# Patient Record
Sex: FEMALE | Race: WHITE | NOT HISPANIC OR LATINO | Employment: FULL TIME | URBAN - METROPOLITAN AREA
[De-identification: names, ages, dates, MRNs, and addresses within clinical notes are randomized per-mention and may not be internally consistent; named-entity substitution may affect disease eponyms.]

---

## 2019-10-23 ENCOUNTER — OFFICE VISIT (OUTPATIENT)
Dept: FAMILY MEDICINE | Facility: CLINIC | Age: 24
End: 2019-10-23
Payer: COMMERCIAL

## 2019-10-23 VITALS
TEMPERATURE: 97.9 F | DIASTOLIC BLOOD PRESSURE: 74 MMHG | WEIGHT: 276 LBS | OXYGEN SATURATION: 99 % | HEART RATE: 86 BPM | BODY MASS INDEX: 45.98 KG/M2 | RESPIRATION RATE: 20 BRPM | SYSTOLIC BLOOD PRESSURE: 130 MMHG | HEIGHT: 65 IN

## 2019-10-23 DIAGNOSIS — R45.851 SUICIDAL IDEATION: ICD-10-CM

## 2019-10-23 DIAGNOSIS — Z91.51 HISTORY OF SUICIDE ATTEMPT: ICD-10-CM

## 2019-10-23 DIAGNOSIS — F31.32 BIPOLAR AFFECTIVE DISORDER, CURRENTLY DEPRESSED, MODERATE (H): Primary | ICD-10-CM

## 2019-10-23 PROCEDURE — 99203 OFFICE O/P NEW LOW 30 MIN: CPT | Performed by: FAMILY MEDICINE

## 2019-10-23 ASSESSMENT — ANXIETY QUESTIONNAIRES
4. TROUBLE RELAXING: NEARLY EVERY DAY
7. FEELING AFRAID AS IF SOMETHING AWFUL MIGHT HAPPEN: NEARLY EVERY DAY
5. BEING SO RESTLESS THAT IT IS HARD TO SIT STILL: MORE THAN HALF THE DAYS
1. FEELING NERVOUS, ANXIOUS, OR ON EDGE: NEARLY EVERY DAY
3. WORRYING TOO MUCH ABOUT DIFFERENT THINGS: NEARLY EVERY DAY
7. FEELING AFRAID AS IF SOMETHING AWFUL MIGHT HAPPEN: NEARLY EVERY DAY
2. NOT BEING ABLE TO STOP OR CONTROL WORRYING: NEARLY EVERY DAY
6. BECOMING EASILY ANNOYED OR IRRITABLE: NEARLY EVERY DAY
GAD7 TOTAL SCORE: 20

## 2019-10-23 ASSESSMENT — PATIENT HEALTH QUESTIONNAIRE - PHQ9
10. IF YOU CHECKED OFF ANY PROBLEMS, HOW DIFFICULT HAVE THESE PROBLEMS MADE IT FOR YOU TO DO YOUR WORK, TAKE CARE OF THINGS AT HOME, OR GET ALONG WITH OTHER PEOPLE: SOMEWHAT DIFFICULT
SUM OF ALL RESPONSES TO PHQ QUESTIONS 1-9: 24
SUM OF ALL RESPONSES TO PHQ QUESTIONS 1-9: 24

## 2019-10-23 ASSESSMENT — MIFFLIN-ST. JEOR: SCORE: 2007.81

## 2019-10-23 NOTE — PROGRESS NOTES
"Subjective     Lenore Suggs is a 24 year old female who presents to clinic today for the following health issues:    HPI     New Patient/Transfer of Care    Abnormal Mood Symptoms      Duration: Ongoing    Description:  Depression: YES  Anxiety: YES  Panic attacks: YES     Accompanying signs and symptoms: see PHQ-9 and NINFA scores    History (similar episodes/previous evaluation): Yes    Precipitating or alleviating factors: None    Therapies tried and outcome: none    Has been on several anti-psych meds in the past.  Needs to establish with a PCP in order to get into Psych.  Requires referral.    Hx of Bipolar, Schizophrenia.  Diagnosed with mood disorder since age ~9.    Off meds since age 15 due to \"my father ripping up my health insurance cards and telling me I did not need psychiatric care.\"    Parents have since been .  Was living in [place] for some times.  Moved to [place] a few yrs ago.    Lives with her yudi--who is not abusive.  Feels very safe in her environment.  Wants to get mood under better control because she is thinking of getting .  Yudi is very supportive.    Reviewed and updated as needed this visit by Provider  Tobacco  Allergies  Meds  Problems  Med Hx  Surg Hx  Fam Hx         Review of Systems   ROS COMP: CONSTITUTIONAL: NEGATIVE for fever or chills.  INTEGUMENTARY/SKIN: NEGATIVE for worrisome rashes, moles or lesions  EYES: NEGATIVE for vision changes or irritation  ENT/MOUTH: NEGATIVE for ear, mouth and throat problems  RESP: NEGATIVE for significant cough or SOB  CV: NEGATIVE for chest pain, palpitations or peripheral edema  GI: NEGATIVE for nausea, abdominal pain, heartburn, or change in bowel habits  : NEGATIVE for frequency, dysuria, or hematuria  MUSCULOSKELETAL: NEGATIVE for significant arthralgias or myalgia  NEURO: NEGATIVE for weakness, dizziness or paresthesias  HEME: NEGATIVE for bleeding problems      Objective    /74   Pulse 86   Temp 97.9  F " "(36.6  C) (Tympanic)   Resp 20   Ht 1.651 m (5' 5\")   Wt 125.2 kg (276 lb)   LMP  (LMP Unknown)   SpO2 99%   Breastfeeding? No   BMI 45.93 kg/m    Body mass index is 45.93 kg/m .  Physical Exam   GENERAL: very pleasant and cooperative.  EYES: Eyes grossly normal to inspection, PERRL and conjunctivae and sclerae normal  HENT: ear canals and TM's normal, nose and mouth without ulcers or lesions  NECK: no adenopathy, no asymmetry, masses, or scars and thyroid normal to palpation  RESP: lungs clear to auscultation - no rales, rhonchi or wheezes  CV: regular rate and rhythm, normal S1 S2, no S3 or S4, no murmur, click or rub, no peripheral edema and peripheral pulses strong  ABDOMEN: soft, nontender, no hepatosplenomegaly, no masses and bowel sounds normal  MS: no gross musculoskeletal defects noted, no edema  SKIN: no suspicious lesions or rashes  NEURO: Normal strength and tone, mentation intact and speech normal  PSYCH: affect flat, anxious and fatigued; poor eye contact    Diagnostic Test Results:  Labs reviewed in Epic        Assessment & Plan   Problem List Items Addressed This Visit     History of suicide attempt    Suicidal ideation    Bipolar affective disorder, currently depressed, moderate (H) - Primary         Here to establish care and re-establish with Psychiatry.  Will defer meds to Psych.  She has been off meds since age 15 and tells me she would like to wait to start meds until she sees Psych   Has suicidal thoughts but no intent.  Knows to seek immediate medical attention if needed.    Care Coordinator and Psych referrals ordered.  Will RTC for physical with pap smear once she gets into psych and feels ready for her overdue preventive cares.    NATHALIE also requested for her previous clinic in [place].    BMI:   Estimated body mass index is 45.93 kg/m  as calculated from the following:    Height as of this encounter: 1.651 m (5' 5\").    Weight as of this encounter: 125.2 kg (276 lb).   Weight " management plan: Discussed healthy diet and exercise guidelines        See Patient Instructions  Return in about 4 weeks (around 11/20/2019) for re-check / follow-up.    Mercy Kellogg, [facility]    Answers for HPI/ROS submitted by the patient on 10/23/2019   If you checked off any problems, how difficult have these problems made it for you to do your work, take care of things at home, or get along with other people?: Somewhat difficult  PHQ9 TOTAL SCORE: 24  NINFA 7 TOTAL SCORE: 20

## 2019-10-23 NOTE — NURSING NOTE
"Chief Complaint   Patient presents with     New Patient     MOOD CHANGES     /74   Pulse 86   Temp 97.9  F (36.6  C) (Tympanic)   Resp 20   Ht 1.651 m (5' 5\")   Wt 125.2 kg (276 lb)   LMP  (LMP Unknown)   SpO2 99%   Breastfeeding? No   BMI 45.93 kg/m   Estimated body mass index is 45.93 kg/m  as calculated from the following:    Height as of this encounter: 1.651 m (5' 5\").    Weight as of this encounter: 125.2 kg (276 lb).  BP completed using cuff size: regular   Candice Fang CMA    Health Maintenance Due   Topic Date Due     PREVENTIVE CARE VISIT  1996     NINFA ASSESSMENT  1996     DEPRESSION ACTION PLAN  1996     DTAP/TDAP/TD IMMUNIZATION (3 - Tdap) 08/25/2003     HPV IMMUNIZATION (2 - Female 2-dose series) 04/18/2011     HIV SCREENING  08/25/2011     PHQ-9  05/03/2012     PAP  08/25/2017     Health Maintenance reviewed at today's visit patient asked to schedule/complete:   Routine Health Visit: Patient agrees to schedule  Cervical Cancer:  Patient agrees to schedule  Depression:  Patient agrees to schedule    "

## 2019-10-23 NOTE — LETTER
My Depression Action Plan  Name: Lenoer Suggs   Date of Birth 1995  Date: 10/23/2019    My doctor: [facility]   [facility]  [address] [address]  [phone]          GREEN    ZONE   Good Control    What it looks like:     Things are going generally well. You have normal up s and down s. You may even feel depressed from time to time, but bad moods usually last less than a day.   What you need to do:  1. Continue to care for yourself (see self care plan)  2. Check your depression survival kit and update it as needed  3. Follow your physician s recommendations including any medication.  4. Do not stop taking medication unless you consult with your physician first.           YELLOW         ZONE Getting Worse    What it looks like:     Depression is starting to interfere with your life.     It may be hard to get out of bed; you may be starting to isolate yourself from others.    Symptoms of depression are starting to last most all day and this has happened for several days.     You may have suicidal thoughts but they are not constant.   What you need to do:     1. Call your care team, your response to treatment will improve if you keep your care team informed of your progress. Yellow periods are signs an adjustment may need to be made.     2. Continue your self-care, even if you have to fake it!    3. Talk to someone in your support network    4. Open up your depression survival kit           RED    ZONE Medical Alert - Get Help    What it looks like:     Depression is seriously interfering with your life.     You may experience these or other symptoms: You can t get out of bed most days, can t work or engage in other necessary activities, you have trouble taking care of basic hygiene, or basic responsibilities, thoughts of suicide or death that will not go away, self-injurious  behavior.     What you need to do:  1. Call your care team and request a same-day appointment. If they are not available (weekends or after hours) call your local crisis line, emergency room or 911.            Depression Self Care Plan / Survival Kit    Self-Care for Depression  Here s the deal. Your body and mind are really not as separate as most people think.  What you do and think affects how you feel and how you feel influences what you do and think. This means if you do things that people who feel good do, it will help you feel better.  Sometimes this is all it takes.  There is also a place for medication and therapy depending on how severe your depression is, so be sure to consult with your medical provider and/ or Behavioral Health Consultant if your symptoms are worsening or not improving.     In order to better manage my stress, I will:    Exercise  Get some form of exercise, every day. This will help reduce pain and release endorphins, the  feel good  chemicals in your brain. This is almost as good as taking antidepressants!  This is not the same as joining a gym and then never going! (they count on that by the way ) It can be as simple as just going for a walk or doing some gardening, anything that will get you moving.      Hygiene   Maintain good hygiene (Get out of bed in the morning, Make your bed, Brush your teeth, Take a shower, and Get dressed like you were going to work, even if you are unemployed).  If your clothes don't fit try to get ones that do.    Diet  I will strive to eat foods that are good for me, drink plenty of water, and avoid excessive sugar, caffeine, alcohol, and other mood-altering substances.  Some foods that are helpful in depression are: complex carbohydrates, B vitamins, flaxseed, fish or fish oil, fresh fruits and vegetables.    Psychotherapy  I agree to participate in Individual Therapy (if recommended).    Medication  If prescribed medications, I agree to take them.   Missing doses can result in serious side effects.  I understand that drinking alcohol, or other illicit drug use, may cause potential side effects.  I will not stop my medication abruptly without first discussing it with my provider.    Staying Connected With Others  I will stay in touch with my friends, family members, and my primary care provider/team.    Use your imagination  Be creative.  We all have a creative side; it doesn t matter if it s oil painting, sand castles, or mud pies! This will also kick up the endorphins.    Witness Beauty  (AKA stop and smell the roses) Take a look outside, even in mid-winter. Notice colors, textures. Watch the squirrels and birds.     Service to others  Be of service to others.  There is always someone else in need.  By helping others we can  get out of ourselves  and remember the really important things.  This also provides opportunities for practicing all the other parts of the program.    Humor  Laugh and be silly!  Adjust your TV habits for less news and crime-drama and more comedy.    Control your stress  Try breathing deep, massage therapy, biofeedback, and meditation. Find time to relax each day.     My support system    Clinic Contact:  Phone number:    Contact 1:  Phone number:    Contact 2:  Phone number:    Catholic/:  Phone number:    Therapist:  Phone number:    Local crisis center:    Phone number:    Other community support:  Phone number:

## 2019-10-24 ASSESSMENT — ANXIETY QUESTIONNAIRES: GAD7 TOTAL SCORE: 20

## 2019-10-25 ENCOUNTER — PATIENT OUTREACH (OUTPATIENT)
Dept: CARE COORDINATION | Facility: CLINIC | Age: 24
End: 2019-10-25

## 2019-10-25 NOTE — LETTER
[place] CARE COORDINATION  [facility]  [address], [place] [address]    October 25, 2019    Lenore Suggs  [address] [facility] [address]  [place] [facility] [address]      Dear Lenore,    I am a clinic care coordinator who works with Mercy Kellogg DO at [facility]. I wanted to thank you for spending the time to talk with me and provide you with my contact information so that you can call me with questions or concerns about your health care. Below is a description of clinic care coordination and how I can further assist you.     The clinic care coordinator is a registered nurse and/or  who understand the health care system. The goal of clinic care coordination is to help you manage your health and improve access to the [facility] in the most efficient manner. The registered nurse can assist you in meeting your health care goals by providing education, coordinating services, and strengthening the communication among your providers. The  can assist you with financial, behavioral, psychosocial, chemical dependency, counseling, and/or psychiatric resources.    Please feel free to contact me at [phone] with any questions or concerns. We at [facility] are focused on providing you with the highest-quality healthcare experience possible and that all starts with you.     Sincerely,     Patrick Champion RN  Clinic Care Coordinator  [facility]  Ph: [phone]    Enclosed: I have enclosed a copy of the Complex Care Plan. This has helpful information and goals that we have talked about. Please keep this in an easy to access place to use as needed.

## 2019-10-25 NOTE — PROGRESS NOTES
Clinic Care Coordination Contact    Clinic Care Coordination Contact  OUTREACH    Referral Information:  Referral Source: PCP  Primary Diagnosis: Psychosocial  Chief Complaint   Patient presents with     Clinic Care Coordination - Initial     PCP referral     Clinical Concerns:  Care Coordination referral received from PCP per patient request related to mental health concerns.    CCC RN called and spoke with patient who reported she would like to get back on her psychiatric medications which she has been off of for several months.  She would like to establish care with Psychiatry as part of this goal.    CCC RN reviewed with the patient that 2 separate Mental Health referrals were placed by PCP.  Patient requested that this information be sent to her via [employer] message.    CCC RN explained role of Care Coordination within the clinic and discussed setting goal, which patient was agreeable to; see Goals below.    Medication Management:  Not currently taking any medications.     Psychosocial:  Mental health DX: Yes  Mental health DX how managed: None  Mental health management concern (GOAL): Yes  Informal Support system: Significant other    Referrals Placed: Mental Health (placed by PCP)     Goals Addressed                 This Visit's Progress       Patient Stated      1. Mental Health Management (pt-stated)        Goal Statement: I want to better manage my mental health by getting back on my medications and establishing care with a Psychiatrist within the next 3 months.  Measure of Success: The patient will confirm she is established with Psychiatry provider and will verbalize improved mental health.  Supportive Steps to Achieve: PCP placed Mental Health Referrals; CCC RN will provide patient with referral contact information.  The patient will call to schedule initial appointment with one of the mental health providers.  CCC RN will continue routine patient outreaches to provide ongoing support and additional  resources as needed.  Barriers: The patient reports she has been off of her psychiatric medications for several years.  Strengths: The patient is motivated to better control her mental health and establish care with a Psychiatrist.  Date to Achieve By: 1/31/20  Patient expressed understanding of goal: Yes          Patient/Caregiver understanding: Yes    Outreach Frequency: 2 weeks    Plan: CCC RN sent patient [employer] message, per her request, with mental health referral information; the patient will call to schedule an initial appointment with Psychiatry.  The patient will contact CCC RN with additional questions or concerns.  CCC RN will outreach to patient in approximately 2 weeks to get updates on patient status, assess goal progress, and offer additional support and resources as indicated.    Patrick Champion, RN  Clinic Care Coordinator  [facility]  Ph: [phone]

## 2019-10-25 NOTE — LETTER
[facility]  Complex Care Plan  About Me:    Patient Name:  Lenore Suggs    YOB: 1995  Age:         24 year old   [place] MRN:    [id number] Telephone Information:  Home Phone [phone]   Mobile [phone]       Address:  [address]  [facility] [address] Email address:  [email or web address]      Emergency Contact(s)    Name Relationship Lgl Grd Work Phone Home Phone Mobile Phone           Primary language:  English     needed? No   [place] [employer]:  [phone] op. 1  Other communication barriers:    Preferred Method of Communication:     Current living arrangement:    Mobility Status/ Medical Equipment:       Health Maintenance  Health Maintenance Reviewed: Due/Overdue   Health Maintenance Due   Topic Date Due     PREVENTIVE CARE VISIT  1996     NINFA ASSESSMENT  1996     CHLAMYDIA SCREENING  1996     DTAP/TDAP/TD IMMUNIZATION (3 - Tdap) 08/25/2003     HPV IMMUNIZATION (2 - Female 2-dose series) 04/18/2011     HIV SCREENING  08/25/2011     PAP  08/25/2017     My Access Plan  Medical Emergency 911   Primary Clinic Line [facility] - [phone]   24 Hour Appointment Line [phone] or  [phone]) (toll-free)   24 Hour Nurse Line [phone] (toll-free)   Preferred Urgent Care [facility], [phone]   Preferred Hospital     [id number] [address] AT [facility] & [facility]     Behavioral Health Crisis Line The National Suicide Prevention Lifeline at 1-202.143.7628 or 911       My Care Team Members  Patient Care Team       Relationship Specialty Notifications Start End    Mercy Kellogg DO PCP - General Family Practice  10/23/19     Phone: [phone] Fax: [phone] [address] [facility] [address]    Patrick Champion, RN Lead Care Coordinator  Primary Care - [facility]  10/25/19     Phone: [phone]                 My Care Plans  Self Management and Treatment Plan  Goals and (Comments)  Goals        General    1. Mental Health Management (pt-stated)     Notes - Note created  10/25/2019 11:07 AM by Patrick Champion RN    Goal Statement: I want to better manage my mental health by getting back on my medications and establishing care with a Psychiatrist within the next 3 months.  Measure of Success: The patient will confirm she is established with Psychiatry provider and will verbalize improved mental health.  Supportive Steps to Achieve: PCP placed Mental Health Referrals; CCC RN will provide patient with referral contact information.  The patient will call to schedule initial appointment with one of the mental health providers.  CCC RN will continue routine patient outreaches to provide ongoing support and additional resources as needed.  Barriers: The patient reports she has been off of her psychiatric medications for several years.  Strengths: The patient is motivated to better control her mental health and establish care with a Psychiatrist.  Date to Achieve By: 1/31/20  Patient expressed understanding of goal: Yes             Action Plans on File:    Depression       My Medical and Care Information  Problem List   Patient Active Problem List   Diagnosis     Schizophrenia (H)     History of suicide attempt     Suicidal ideation     Bipolar affective disorder, currently depressed, moderate (H)      Current Medications:  No current outpatient medications on file.     No current facility-administered medications for this visit.      Care Coordination Start Date: 10/25/2019   Frequency of Care Coordination: 2 weeks   Form Last Updated: 10/25/2019

## 2019-10-27 ENCOUNTER — MYC MEDICAL ADVICE (OUTPATIENT)
Dept: FAMILY MEDICINE | Facility: CLINIC | Age: 24
End: 2019-10-27

## 2019-10-28 NOTE — TELEPHONE ENCOUNTER
She needs to schedule a follow up appointment with Dr Kellogg for next week to discuss medication

## 2019-10-28 NOTE — TELEPHONE ENCOUNTER
Provider to review MyChart message. Patient was last seen on 10/23/19. Provider to advise if pt needs another OV.

## 2019-10-28 NOTE — TELEPHONE ENCOUNTER
Left message and let her know I will send mychart message that she should have a med check    Radha [facility] RN- Triage [employer]

## 2019-10-30 ENCOUNTER — TRANSFERRED RECORDS (OUTPATIENT)
Dept: HEALTH INFORMATION MANAGEMENT | Facility: CLINIC | Age: 24
End: 2019-10-30

## 2019-10-30 ENCOUNTER — NURSE TRIAGE (OUTPATIENT)
Dept: NURSING | Facility: CLINIC | Age: 24
End: 2019-10-30

## 2019-10-30 NOTE — TELEPHONE ENCOUNTER
Caller is inquiring  what she needs to do to  Fulfill health maintenance suggestions  As noted in her MyChart   [facility]   caller understands and will comply  Rose Marie RN  FNA    Reason for Disposition    General information question, no triage required and triager able to answer question    Protocols used: INFORMATION ONLY CALL-A-AH

## 2019-11-04 ENCOUNTER — OFFICE VISIT (OUTPATIENT)
Dept: FAMILY MEDICINE | Facility: CLINIC | Age: 24
End: 2019-11-04
Payer: COMMERCIAL

## 2019-11-04 ENCOUNTER — TELEPHONE (OUTPATIENT)
Dept: FAMILY MEDICINE | Facility: CLINIC | Age: 24
End: 2019-11-04

## 2019-11-04 VITALS
HEART RATE: 94 BPM | TEMPERATURE: 98.4 F | SYSTOLIC BLOOD PRESSURE: 124 MMHG | OXYGEN SATURATION: 99 % | DIASTOLIC BLOOD PRESSURE: 72 MMHG | RESPIRATION RATE: 20 BRPM

## 2019-11-04 DIAGNOSIS — F31.32 BIPOLAR AFFECTIVE DISORDER, CURRENTLY DEPRESSED, MODERATE (H): Primary | ICD-10-CM

## 2019-11-04 DIAGNOSIS — F41.0 ANXIETY ATTACK: ICD-10-CM

## 2019-11-04 PROCEDURE — 99214 OFFICE O/P EST MOD 30 MIN: CPT | Performed by: PHYSICIAN ASSISTANT

## 2019-11-04 RX ORDER — BUPROPION HYDROCHLORIDE 150 MG/1
300 TABLET ORAL EVERY MORNING
Qty: 60 TABLET | Refills: 1 | Status: SHIPPED | OUTPATIENT
Start: 2019-11-04 | End: 2019-11-14

## 2019-11-04 RX ORDER — PROPRANOLOL HYDROCHLORIDE 20 MG/1
20 TABLET ORAL 3 TIMES DAILY PRN
Qty: 30 TABLET | Refills: 1 | Status: SHIPPED | OUTPATIENT
Start: 2019-11-04 | End: 2021-06-22

## 2019-11-04 NOTE — PROGRESS NOTES
Subjective     Lenore Suggs is a 24 year old female who presents to clinic today for the following health issues:    HPI       Depression and Anxiety Follow-Up    How are you doing with your depression since your last visit? No change    How are you doing with your anxiety since your last visit?  No change    Are you having other symptoms that might be associated with depression or anxiety? Yes    Have you had a significant life event? OTHER: historically family problems, foster care, move across country     Do you have any concerns with your use of alcohol or other drugs? No     Patient would like to restart medication for depression and anxiety  In her chart, she has a history of Bipolar  Patient has tried many medications in the past, most recently bupropion worked well for her    Social History     Tobacco Use     Smoking status: Passive Smoke Exposure - Never Smoker     Smokeless tobacco: Never Used   Substance Use Topics     Alcohol use: Yes     Comment: 2shots and beers every weekend     Drug use: No     PHQ 10/23/2019   PHQ-9 Total Score 24   Q9: Thoughts of better off dead/self-harm past 2 weeks Several days   F/U: Thoughts of suicide or self-harm Yes   F/U: Self harm-plan No   F/U: Self-harm action No   F/U: Safety concerns No     NINFA-7 SCORE 10/23/2019   Total Score 20 (severe anxiety)   Total Score 20     Suicidal ideation:  Patient has history of many suicide attempts, most recently 4 years ago  She has constant thoughts of suicide, but no plan to harm herself or others  She understands to call someone or go to the ER if thoughts worsen   She has called the a suicide help line in the past  She has plenty of resources from her therapist and Dr. Kellogg.     Suicide Assessment Five-step Evaluation and Treatment (SAFE-T)      Patient Active Problem List   Diagnosis     Schizophrenia (H)     History of suicide attempt     Suicidal ideation     Bipolar affective disorder, currently depressed, moderate (H)      History reviewed. No pertinent surgical history.    Social History     Tobacco Use     Smoking status: Passive Smoke Exposure - Never Smoker     Smokeless tobacco: Never Used   Substance Use Topics     Alcohol use: Yes     Comment: 2shots and beers every weekend     Family History   Problem Relation Age of Onset     Mental Illness Mother      No Known Problems Father          Current Outpatient Medications   Medication Sig Dispense Refill     buPROPion (WELLBUTRIN XL) 150 MG 24 hr tablet Take 2 tablets (300 mg) by mouth every morning 60 tablet 1     propranolol (INDERAL) 20 MG tablet Take 1 tablet (20 mg) by mouth 3 times daily as needed (anxiety) 30 tablet 1     buPROPion (WELLBUTRIN XL) 300 MG 24 hr tablet Take 1 tablet (300 mg) by mouth every morning 30 tablet 1     Allergies   Allergen Reactions     Seasonal Allergies Other (See Comments) and Rash     sneezing         Reviewed and updated as needed this visit by Provider         Review of Systems   Constitutional: Negative.    HENT: Negative.    Eyes: Negative.    Respiratory: Negative.    Cardiovascular: Negative.    Gastrointestinal: Negative.    Genitourinary: Negative.    Musculoskeletal: Negative.    Skin: Negative.    Neurological: Negative.    Psychiatric/Behavioral:        As in HPI         Objective    /72 (BP Location: Right arm, Patient Position: Sitting, Cuff Size: Adult Large)   Pulse 94   Temp 98.4  F (36.9  C) (Tympanic)   Resp 20   LMP  (LMP Unknown)   SpO2 99%   Physical Exam  Constitutional:       General: She is not in acute distress.     Appearance: She is well-developed. She is not diaphoretic.   HENT:      Head: Normocephalic.      Right Ear: External ear normal.      Left Ear: External ear normal.      Nose: Nose normal.   Eyes:      Conjunctiva/sclera: Conjunctivae normal.   Neck:      Musculoskeletal: Normal range of motion.   Pulmonary:      Effort: Pulmonary effort is normal.   Neurological:      Mental Status: She is  "alert and oriented to person, place, and time.   Psychiatric:         Judgement: Judgment normal.         Diagnostic Test Results:  No results found for this or any previous visit (from the past 24 hour(s)).        Assessment & Plan   Problem List Items Addressed This Visit        Behavioral    Bipolar affective disorder, currently depressed, moderate (H) - Primary    Relevant Medications    buPROPion (WELLBUTRIN XL) 150 MG 24 hr tablet      Other Visit Diagnoses     Anxiety attack        Relevant Medications    buPROPion (WELLBUTRIN XL) 150 MG 24 hr tablet    propranolol (INDERAL) 20 MG tablet         We will restart bupropion and also start propranolol which she can take prior to work to help ease anxiety.   Follow up in 3-4 weeks.     25 minutes were spent with the patient, greater than 50% of our time was spent in counseling.      BMI:   Estimated body mass index is 45.93 kg/m  as calculated from the following:    Height as of 10/23/19: 1.651 m (5' 5\").    Weight as of 10/23/19: 125.2 kg (276 lb).   Weight management plan: Discussed healthy diet and exercise guidelines        There are no Patient Instructions on file for this visit.    Return in about 3 weeks (around 11/25/2019) for Depression Recheck, Anxiety Recheck, Medication Recheck.    Lanny Kaba PA-C  [facility]          "

## 2019-11-04 NOTE — TELEPHONE ENCOUNTER
Reason for call:  Medication   If this is a refill request, has the caller requested the refill from the pharmacy already? N/A  Will the patient be using a [facility]? N/A  Name of the pharmacy and phone number for the current request: on file    Name of the medication requested: buPROPion (WELLBUTRIN XL)    Other request: insurance does not cover for the  2  buPROPion (WELLBUTRIN XL) 150 MG 24 hr tablet will have to split it into two        Phone number to reach patient:  Other phone number:  [phone]    Best Time:  Business hours    Can we leave a detailed message on this number?  YES

## 2019-11-05 ENCOUNTER — TELEPHONE (OUTPATIENT)
Dept: FAMILY MEDICINE | Facility: CLINIC | Age: 24
End: 2019-11-05

## 2019-11-05 RX ORDER — BUPROPION HYDROCHLORIDE 300 MG/1
300 TABLET ORAL EVERY MORNING
Qty: 30 TABLET | Refills: 1 | Status: SHIPPED | OUTPATIENT
Start: 2019-11-05 | End: 2019-11-14

## 2019-11-05 NOTE — TELEPHONE ENCOUNTER
I believe they meant that insurance will only cover 1 tablet of bupropion and is requesting a Rx for 300 mg 1 tab per day

## 2019-11-05 NOTE — TELEPHONE ENCOUNTER
Prior Authorization Retail Medication Request    Medication/Dose:buPROPion (WELLBUTRIN XL) 300 MG 24 hr tablet   ICD code (if different than what is on RX):     Previously Tried and Failed:     Rationale:       Insurance Name:     Insurance ID:  [id number]      Pharmacy Information (if different than what is on RX)  [facility]  Phone:  [phone]

## 2019-11-06 NOTE — TELEPHONE ENCOUNTER
Contact preference is through [employer]. I will send her a message informing her of this change/update.    Princess MARIANA Murphy, CMA

## 2019-11-06 NOTE — TELEPHONE ENCOUNTER
Central Prior Authorization Team   Phone: [phone]    PA Initiation    Medication: buPROPion (WELLBUTRIN XL) 300 MG 24 hr tablet  Insurance Company: [employer] - Phone [phone] Fax [phone]  Pharmacy Filling the Rx: [id number] [place], [place] - [address] AT [facility]  Filling Pharmacy Phone: [phone]  Filling Pharmacy Fax: [phone]  Start Date: 11/6/2019

## 2019-11-07 ASSESSMENT — ENCOUNTER SYMPTOMS
NEUROLOGICAL NEGATIVE: 1
MUSCULOSKELETAL NEGATIVE: 1
EYES NEGATIVE: 1
CARDIOVASCULAR NEGATIVE: 1
RESPIRATORY NEGATIVE: 1
GASTROINTESTINAL NEGATIVE: 1
CONSTITUTIONAL NEGATIVE: 1

## 2019-11-10 ENCOUNTER — MYC MEDICAL ADVICE (OUTPATIENT)
Dept: FAMILY MEDICINE | Facility: CLINIC | Age: 24
End: 2019-11-10

## 2019-11-11 NOTE — TELEPHONE ENCOUNTER
Please have her come in to be seen for this, might have a UTI that would not get any better with a probiotic.  Thanks!  --Dr. Kellogg

## 2019-11-14 ENCOUNTER — TRANSFERRED RECORDS (OUTPATIENT)
Dept: HEALTH INFORMATION MANAGEMENT | Facility: CLINIC | Age: 24
End: 2019-11-14

## 2019-11-14 ENCOUNTER — E-VISIT (OUTPATIENT)
Dept: FAMILY MEDICINE | Facility: CLINIC | Age: 24
End: 2019-11-14
Payer: COMMERCIAL

## 2019-11-14 ENCOUNTER — MYC MEDICAL ADVICE (OUTPATIENT)
Dept: FAMILY MEDICINE | Facility: CLINIC | Age: 24
End: 2019-11-14

## 2019-11-14 DIAGNOSIS — F31.32 BIPOLAR AFFECTIVE DISORDER, CURRENTLY DEPRESSED, MODERATE (H): ICD-10-CM

## 2019-11-14 DIAGNOSIS — F41.0 ANXIETY ATTACK: Primary | ICD-10-CM

## 2019-11-14 PROCEDURE — 99444 ZZC PHYSICIAN ONLINE EVALUATION & MANAGEMENT SERVICE: CPT | Performed by: PHYSICIAN ASSISTANT

## 2019-11-14 RX ORDER — BUPROPION HYDROCHLORIDE 300 MG/1
300 TABLET ORAL EVERY MORNING
Qty: 90 TABLET | Refills: 1 | Status: SHIPPED | OUTPATIENT
Start: 2019-11-14 | End: 2019-12-16

## 2019-11-14 RX ORDER — BUPROPION HYDROCHLORIDE 300 MG/1
300 TABLET ORAL EVERY MORNING
Qty: 30 TABLET | Refills: 1 | Status: SHIPPED | OUTPATIENT
Start: 2019-11-14 | End: 2019-11-14

## 2019-11-14 RX ORDER — HYDROXYZINE HYDROCHLORIDE 25 MG/1
25 TABLET, FILM COATED ORAL 3 TIMES DAILY PRN
Qty: 60 TABLET | Refills: 1 | Status: SHIPPED | OUTPATIENT
Start: 2019-11-14 | End: 2020-04-06

## 2019-11-14 ASSESSMENT — ANXIETY QUESTIONNAIRES
1. FEELING NERVOUS, ANXIOUS, OR ON EDGE: NEARLY EVERY DAY
4. TROUBLE RELAXING: NEARLY EVERY DAY
GAD7 TOTAL SCORE: 21
7. FEELING AFRAID AS IF SOMETHING AWFUL MIGHT HAPPEN: NEARLY EVERY DAY
GAD7 TOTAL SCORE: 21
7. FEELING AFRAID AS IF SOMETHING AWFUL MIGHT HAPPEN: NEARLY EVERY DAY
GAD7 TOTAL SCORE: 21
3. WORRYING TOO MUCH ABOUT DIFFERENT THINGS: NEARLY EVERY DAY
2. NOT BEING ABLE TO STOP OR CONTROL WORRYING: NEARLY EVERY DAY
6. BECOMING EASILY ANNOYED OR IRRITABLE: NEARLY EVERY DAY
5. BEING SO RESTLESS THAT IT IS HARD TO SIT STILL: NEARLY EVERY DAY

## 2019-11-14 ASSESSMENT — PATIENT HEALTH QUESTIONNAIRE - PHQ9
SUM OF ALL RESPONSES TO PHQ QUESTIONS 1-9: 24
10. IF YOU CHECKED OFF ANY PROBLEMS, HOW DIFFICULT HAVE THESE PROBLEMS MADE IT FOR YOU TO DO YOUR WORK, TAKE CARE OF THINGS AT HOME, OR GET ALONG WITH OTHER PEOPLE: VERY DIFFICULT
SUM OF ALL RESPONSES TO PHQ QUESTIONS 1-9: 24

## 2019-11-14 NOTE — TELEPHONE ENCOUNTER
PHQ-9 SCORE 11/3/2011 10/23/2019 11/14/2019   PHQ-9 Total Score 25 - -   PHQ-9 Total Score MyChart - 24 (Severe depression) 24 (Severe depression)   PHQ-9 Total Score - 24 24     NINFA-7 SCORE 10/23/2019 11/14/2019   Total Score 20 (severe anxiety) 21 (severe anxiety)   Total Score 20 21

## 2019-11-14 NOTE — PATIENT INSTRUCTIONS
"    Recognizing Suicide Warning Signs in Yourself    People who are thinking about suicide may not know they are depressed. Certain thoughts, feelings, and actions can be signals that let you know you may need help. The best thing you can do is watch for signs that you may be at risk. Then, ask for help. You can talk to your regular healthcare provider or seek help from a mental health provider.  Depression  Depression is a treatable illness. To know if depression is causing you to feel like ending your life, ask yourself:    Do I feel worthless, guilty, helpless, or hopeless?    Have I been feeling sad, down, or blue on most days?    Have I lost interest in my work or people I used to enjoy?    Do I have trouble sleeping or do I sleep too much?    Do I eat more or less than usual?    Do I feel tired, weak, and low on energy?    Do I feel restless and unable to sit still?    Do I have trouble thinking or making choices?    Do I cry more than usual?    Do I feel life isn't worth living?  Warning signs for suicide    Thinking often about taking your life    Planning how you may attempt it    Talking or writing about committing suicide    Feeling that death is the only solution to your problems    Feeling a pressing need to make out your will or arrange your     Giving away things you own    Participating in risky behaviors, such as sex with someone you don't know or drinking and driving    Buying a lethal weapon, such as a gun, or hoarding medicines that could be used in an over dose  If you notice any of these warning signs, call for help right away or go to your closest hospital emergency department. You can also call a mental health clinic or a 24-hour suicide crisis hotline for help and support. Search for local suicide prevention resources on your computer or look for the number in the white pages of your phone book under \"Suicide.\" In an emergency, if you are in immediate risk of harming yourself, call " 911. For more information about depression:    National [place] of Mental Hgufcb337-710-4284lbm.[facility].nih.gov    National Suicide Prevention Hvauqndw442-428-9944 (059-468-JQIM)www.suicidepreventionlifeline.org    National [place] on Mental Vbdsgug605-139-2408ngc.negrita.org    Mental Health Dxaeivt154-861-4393hhu.[facility].org    National Suicide Dxjjqfn828-101-9825 (800-SUICIDE)   Date Last Reviewed: 1/1/2017 2000-2018 [employer]. [address] [place] [address]. All rights reserved. This information is not intended as a substitute for professional medical care. Always follow your healthcare professional's instructions.          Depression: Tips to Help Yourself    As your healthcare providers help treat your depression, you can also help yourself. Keep in mind that your illness affects you emotionally, physically, mentally, and socially. So full recovery will take time. Take care of your body and your soul, and be patient with yourself as you get better.  Self-care    Educate yourself. Read about treatment and medicine options. If you have the energy, attend local conferences or support groups. Keep a list of useful websites and helpful books and use them as needed. This illness is not your fault. Don t blame yourself for your depression.    Manage early symptoms. If you notice symptoms returning, experience triggers, or identify other factors that may lead to a depressive episode, get help as soon as possible. Ask trusted friends and family to monitor your behavior and let you know if they see anything of concern.    Work with your provider. Find a provider you can trust. Communicate honestly with that person and share information on your treatment for depression and your reaction to medicines.    Be prepared for a crisis. Know what to do if you experience a crisis. Keep the phone number of a crisis hotline and know the location of your community's urgent care centers and the closest  emergency department.    Hold off on big decisions. Depression can cloud your judgment. So wait until you feel better before making major life decisions, such as changing jobs, moving, or getting  or .    Be patient. Recovering from depression is a process. Don t be discouraged if it takes some time to feel better.    Keep it simple. Depression saps your energy and concentration. So you won t be able to do all the things you used to do. Set small goals and do what you can.    Be with others. Don t isolate yourself--you ll only feel worse. Try to be with other people. And take part in fun activities when you can. Go to a movie, [employer], Sikh service, or social event. Talk openly with people you can trust. And accept help when it s offered.  Take care of your body  People with depression often lose the desire to take care of themselves. That only makes their problems worse. During treatment and afterward, make a point to:    Exercise. It s a great way to take care of your body. And studies have shown that exercise helps fight depression.    Avoid drugs and alcohol. These may ease the pain in the short term. But they ll only make your problems worse in the long run.    Get relief from stress. Ask your healthcare provider for relaxation exercises and techniques to help relieve stress.    Eat right. A balanced and healthy diet helps keep your body healthy.  Date Last Reviewed: 1/1/2017 2000-2018 [employer]. [address] [place] [address], [place] [address]. All rights reserved. This information is not intended as a substitute for professional medical care. Always follow your healthcare professional's instructions.

## 2019-11-15 ASSESSMENT — ANXIETY QUESTIONNAIRES: GAD7 TOTAL SCORE: 21

## 2019-11-15 ASSESSMENT — PATIENT HEALTH QUESTIONNAIRE - PHQ9: SUM OF ALL RESPONSES TO PHQ QUESTIONS 1-9: 24

## 2019-11-18 ENCOUNTER — PATIENT OUTREACH (OUTPATIENT)
Dept: CARE COORDINATION | Facility: CLINIC | Age: 24
End: 2019-11-18

## 2019-11-18 ASSESSMENT — ACTIVITIES OF DAILY LIVING (ADL): DEPENDENT_IADLS:: INDEPENDENT

## 2019-11-18 NOTE — PROGRESS NOTES
Clinic Care Coordination Contact    Follow Up Progress Note      Assessment: The patient contacted CCC RN in response to [employer] message.  She updated that she is established with Nancy Cowart (ph: [phone]) for psychiatry.  She confirmed she has been taking the bupropion and hydroxyzine as prescribed.  She does feel the hydroxyzine has been helping with her sleep when taken in the evening.  The patient updated that she will be returning to [place] on 11/27/19 for college.  She isn't sure how that will impact her ability to see doctors there.  She updated that she continues to have difficulty focusing and states this has been an ongoing issue for years.  A friend of hers suggested she ask about the medication Strattera (for ADHD).    Goals Addressed                 This Visit's Progress       Patient Stated      1. Mental Health Management (pt-stated)   On track     Goal Statement: I want to better manage my mental health by getting back on my medications and establishing care with a Psychiatrist within the next 3 months.  Measure of Success: The patient will confirm she is established with Psychiatry provider and will verbalize improved mental health.  Supportive Steps to Achieve: PCP placed Mental Health Referrals; CCC RN will provide patient with referral contact information.  The patient will call to schedule initial appointment with one of the mental health providers.  CCC RN will continue routine patient outreaches to provide ongoing support and additional resources as needed.  Barriers: The patient reports she has been off of her psychiatric medications for several years.  Strengths: The patient is motivated to better control her mental health and establish care with a Psychiatrist.  Date to Achieve By: 1/31/20  Patient expressed understanding of goal: Yes        Intervention/Education provided during outreach: CCC RN recommended that the patient contact her insurance directly to discuss her upcoming  plans to return to [place] for college and ask about her options to see providers in [place].  The patient agreed to update CCC RN if she finds she is unable to see any [place] providers under her current insurance coverage so alternative options can be explored.  CCC RN discussed with patient plan to forward her ongoing concerns surround her ability to focus onto her PCP for further review and recommendations, though CCC RN did review that this could be a result of her ongoing anxiety/depression that it's possible it may improve now that she is on medications for treatment; she stated understanding.  The patient was agreeable to calling PCP clinic to schedule a follow-up appointment due to her upcoming move and in light of her ongoing struggles with focusing.     Outreach Frequency: 2 weeks    Plan: CCC RN will forward PCP patient's ongoing concern about her ability to focus and her inquiry about medication for this.  The patient will contact her insurance to inquire about her options to see providers in [place] when she moves back at the end of this month and will contact CCC RN if alternative resources are needed.  The patient will continue taking her medications as prescribed and attending her appointments as scheduled.  She will schedule a follow-up PCP appointment as discussed.  CCC RN will outreach to patient in approximately 2 weeks to get updates on patient status, assess goal progress, and offer additional support and resources as indicated.    Patrick Champion, RN  Clinic Care Coordinator  [facility]  Ph: [phone]

## 2019-11-18 NOTE — Clinical Note
Michael Mariscal -- I was following up with the patient today to see how she's been doing with our previously discussed goal of improving her mental health.  She is pleased to be on the bupropion and hydroxyzine and to have established with psychiatry (I called their office and they are processing the request to send our clinic her notes).  However, she just updated me today that she will be returning to [place] for college on 11/27/19.  She will be checking with her insurance to see what options she has for seeing providers there, but does plan to continue communicating with our clinic while she's at college.  In light of her returning to college, one thing she asked me to follow up with you about is her continued inability to focus.  A friend of hers suggested she ask about Strattera which I explained to her is a medication often used for ADHD.  She reported she has never been diagnosed with ADHD but has also never gotten answers as to why she might be experiencing such difficulty with her attention span.  I let her know I would forward this information on to you to see what you might suggest, but I also asked that she schedule an office visit in preparation for her upcoming move to ensure no outstanding concerns.  Let me know if there is anything you'd like me to relay to her!  Thank you!  --REYNALDO Nguyen Care Coordinator

## 2019-12-02 ENCOUNTER — PATIENT OUTREACH (OUTPATIENT)
Dept: CARE COORDINATION | Facility: CLINIC | Age: 24
End: 2019-12-02

## 2019-12-02 ASSESSMENT — ACTIVITIES OF DAILY LIVING (ADL): DEPENDENT_IADLS:: INDEPENDENT

## 2019-12-02 NOTE — PROGRESS NOTES
Clinic Care Coordination Contact  [facility]/Voicemail    Referral Source: PCP  CCC RN outreach to get updates on patient status, assess goal progress, and provide support and additional resources as needed.    Clinical Data: Care Coordinator Outreach  Outreach attempted x 1.  Left message on patient's voicemail with call back information and requested return call.  Plan: Care Coordinator will try to reach patient again in 3-5 business days.    Patrick Champion RN  Clinic Care Coordinator  [facility]  Ph: [phone]

## 2019-12-02 NOTE — Clinical Note
Michael Mariscal-- Felecia is requesting a 3-month supply for her hydroxyzine as well as her bupropion.  However, she is now in [place] for school.  I did explain to her that you likely cannot prescribe outside of [place] but that I would still ask you.  I instructed her to find a doctor in [place] even if she still plans to see you when she's back in [place] and she was agreeable to doing this.  She also agreed to schedule either an E-visit or telephone visit with you to review her plan of care in light of her currently being in [place].  Thanks, --Patrick RN Care Coordinator

## 2019-12-02 NOTE — LETTER
[place] CARE COORDINATION  [facility]  [address], [place] [address]    December 9, 2019    Lenore Suggs  [address]   [place] [address]      Dear Lenore,    I have been attempting to reach you since our last contact. I would like to continue to work with you and provide any additional support you may need on achieving your health care related goals. I would appreciate if you would give me a call at [phone] to let me know if you would like to continue working together. I know that there are many things that can affect our ability to communicate and I hope we can continue to work together.    All of us at the [facility] are invested in your health and are here to assist you in meeting your goals.     Sincerely,    Patrick Champion RN  Clinic Care Coordinator  [facility]  Ph: [phone]

## 2019-12-09 ASSESSMENT — ACTIVITIES OF DAILY LIVING (ADL): DEPENDENT_IADLS:: INDEPENDENT

## 2019-12-09 NOTE — PROGRESS NOTES
Clinic Care Coordination Contact  [facility]/Voicemail    Referral Source: PCP  CCC RN outreach to get updates on patient status, assess goal progress, and provide support and additional resources as needed.    Clinical Data: Care Coordinator Outreach  Outreach attempted x 2.  Left message on patient's voicemail with call back information and requested return call.  Plan: Care Coordinator will send unable to contact letter with care coordinator contact information via mail. Care Coordinator will try to reach patient again in approximately 2 weeks.    Patrick Champion RN  Clinic Care Coordinator  [facility]  Ph: [phone]

## 2019-12-09 NOTE — PROGRESS NOTES
Clinic Care Coordination Contact    Follow Up Progress Note      Assessment: The patient returned CCC RN call.  She updated she is now back in [place] for school, with classes starting in January.  She is living in [place] and doesn't currently have a vehicle, which is making it difficult for her to establish care with any providers as most doctors will be about 30 minutes away or more.  She reported she can likely come back to [place] in 2-3 months, if needed.  The patient did ask if she can get a 90-day supply of both her hydroxyzine and bupropion, though stated these would need to be sent to her [facility] in [place].    Goals addressed this encounter:   Goals Addressed                 This Visit's Progress       Patient Stated      1. Mental Health Management (pt-stated)   On track     Goal Statement: I want to better manage my mental health by getting back on my medications and establishing care with a Psychiatrist within the next 3 months.  Measure of Success: The patient will confirm she is established with Psychiatry provider and will verbalize improved mental health.  Supportive Steps to Achieve: PCP placed Mental Health Referrals; CCC RN will provide patient with referral contact information.  The patient will call to schedule initial appointment with one of the mental health providers.  CCC RN will continue routine patient outreaches to provide ongoing support and additional resources as needed.  Barriers: The patient reports she has been off of her psychiatric medications for several years.  Strengths: The patient is motivated to better control her mental health and establish care with a Psychiatrist.  Date to Achieve By: 1/31/20  Patient expressed understanding of goal: Yes        Intervention/Education provided during outreach: CCC RN recommended that the patient try to locate a doctor locally that she can establish care with that way she can get her medications prescribed and be more closely managed  while she's in school; the patient agreed to find a doctor in [place].  CCC RN also encouraged the patient to schedule either E-visits or telephone visits with both PCP and mental health provider to discuss her plan of care now that she is in [place].  CCC RN will inquire with PCP about medication prescriptions per patient's request but did inform the patient that it's likely the PCP cannot prescribe outside of [place]; she stated understanding.     Outreach Frequency: 2 weeks    Plan: The patient will work to find a doctor locally in [place].  She will also schedule E-visits/telephone visits with her PCP and mental health provider to discuss plans of care now that she is in [place] for school.  CCC RN will inquire with PCP about medication prescriptions per patient's request and will update patient when response received.  CCC RN will outreach to patient in approximately 2 weeks to get updates on patient status, assess goal progress, and offer additional support and resources as indicated.    Patrick Champion RN  Clinic Care Coordinator  [facility]  Ph: [phone]

## 2019-12-15 ENCOUNTER — E-VISIT (OUTPATIENT)
Dept: FAMILY MEDICINE | Facility: CLINIC | Age: 24
End: 2019-12-15
Payer: COMMERCIAL

## 2019-12-15 DIAGNOSIS — F31.32 BIPOLAR AFFECTIVE DISORDER, CURRENTLY DEPRESSED, MODERATE (H): ICD-10-CM

## 2019-12-15 PROCEDURE — 99444 ZZC PHYSICIAN ONLINE EVALUATION & MANAGEMENT SERVICE: CPT | Performed by: PHYSICIAN ASSISTANT

## 2019-12-15 ASSESSMENT — ANXIETY QUESTIONNAIRES
GAD7 TOTAL SCORE: 18
GAD7 TOTAL SCORE: 18
3. WORRYING TOO MUCH ABOUT DIFFERENT THINGS: NEARLY EVERY DAY
1. FEELING NERVOUS, ANXIOUS, OR ON EDGE: MORE THAN HALF THE DAYS
6. BECOMING EASILY ANNOYED OR IRRITABLE: NEARLY EVERY DAY
4. TROUBLE RELAXING: MORE THAN HALF THE DAYS
7. FEELING AFRAID AS IF SOMETHING AWFUL MIGHT HAPPEN: MORE THAN HALF THE DAYS
7. FEELING AFRAID AS IF SOMETHING AWFUL MIGHT HAPPEN: MORE THAN HALF THE DAYS
2. NOT BEING ABLE TO STOP OR CONTROL WORRYING: NEARLY EVERY DAY
5. BEING SO RESTLESS THAT IT IS HARD TO SIT STILL: NEARLY EVERY DAY
GAD7 TOTAL SCORE: 18

## 2019-12-15 ASSESSMENT — PATIENT HEALTH QUESTIONNAIRE - PHQ9
10. IF YOU CHECKED OFF ANY PROBLEMS, HOW DIFFICULT HAVE THESE PROBLEMS MADE IT FOR YOU TO DO YOUR WORK, TAKE CARE OF THINGS AT HOME, OR GET ALONG WITH OTHER PEOPLE: SOMEWHAT DIFFICULT
SUM OF ALL RESPONSES TO PHQ QUESTIONS 1-9: 16
SUM OF ALL RESPONSES TO PHQ QUESTIONS 1-9: 16

## 2019-12-16 RX ORDER — BUPROPION HYDROCHLORIDE 300 MG/1
300 TABLET ORAL EVERY MORNING
Qty: 90 TABLET | Refills: 1 | Status: SHIPPED | OUTPATIENT
Start: 2019-12-16 | End: 2020-06-09

## 2019-12-16 ASSESSMENT — ANXIETY QUESTIONNAIRES: GAD7 TOTAL SCORE: 18

## 2019-12-16 ASSESSMENT — PATIENT HEALTH QUESTIONNAIRE - PHQ9: SUM OF ALL RESPONSES TO PHQ QUESTIONS 1-9: 16

## 2019-12-16 NOTE — PATIENT INSTRUCTIONS
"    Recognizing Suicide Warning Signs in Yourself    People who are thinking about suicide may not know they are depressed. Certain thoughts, feelings, and actions can be signals that let you know you may need help. The best thing you can do is watch for signs that you may be at risk. Then, ask for help. You can talk to your regular healthcare provider or seek help from a mental health provider.  Depression  Depression is a treatable illness. To know if depression is causing you to feel like ending your life, ask yourself:    Do I feel worthless, guilty, helpless, or hopeless?    Have I been feeling sad, down, or blue on most days?    Have I lost interest in my work or people I used to enjoy?    Do I have trouble sleeping or do I sleep too much?    Do I eat more or less than usual?    Do I feel tired, weak, and low on energy?    Do I feel restless and unable to sit still?    Do I have trouble thinking or making choices?    Do I cry more than usual?    Do I feel life isn't worth living?  Warning signs for suicide    Thinking often about taking your life    Planning how you may attempt it    Talking or writing about committing suicide    Feeling that death is the only solution to your problems    Feeling a pressing need to make out your will or arrange your     Giving away things you own    Participating in risky behaviors, such as sex with someone you don't know or drinking and driving    Buying a lethal weapon, such as a gun, or hoarding medicines that could be used in an over dose  If you notice any of these warning signs, call for help right away or go to your closest hospital emergency department. You can also call a mental health clinic or a 24-hour suicide crisis hotline for help and support. Search for local suicide prevention resources on your computer or look for the number in the white pages of your phone book under \"Suicide.\" In an emergency, if you are in immediate risk of harming yourself, call " 911. For more information about depression:    National [place] of Mental Vpijdc006-459-0094ydn.[facility].nih.gov    National Suicide Prevention Bojmhmoh806-963-0875 (092-090-NYSN)www.suicidepreventionlifeline.org    National [place] on Mental Otdysef274-751-5834hbl.negrita.org    Mental Health Qmkgrzo436-155-8745ayg.[facility].org    National Suicide Mrwfcsb808-214-8951 (800-SUICIDE)   Date Last Reviewed: 1/1/2017 2000-2018 [employer]. [address] [place] [address]. All rights reserved. This information is not intended as a substitute for professional medical care. Always follow your healthcare professional's instructions.          Depression: Tips to Help Yourself    As your healthcare providers help treat your depression, you can also help yourself. Keep in mind that your illness affects you emotionally, physically, mentally, and socially. So full recovery will take time. Take care of your body and your soul, and be patient with yourself as you get better.  Self-care    Educate yourself. Read about treatment and medicine options. If you have the energy, attend local conferences or support groups. Keep a list of useful websites and helpful books and use them as needed. This illness is not your fault. Don t blame yourself for your depression.    Manage early symptoms. If you notice symptoms returning, experience triggers, or identify other factors that may lead to a depressive episode, get help as soon as possible. Ask trusted friends and family to monitor your behavior and let you know if they see anything of concern.    Work with your provider. Find a provider you can trust. Communicate honestly with that person and share information on your treatment for depression and your reaction to medicines.    Be prepared for a crisis. Know what to do if you experience a crisis. Keep the phone number of a crisis hotline and know the location of your community's urgent care centers and the closest  emergency department.    Hold off on big decisions. Depression can cloud your judgment. So wait until you feel better before making major life decisions, such as changing jobs, moving, or getting  or .    Be patient. Recovering from depression is a process. Don t be discouraged if it takes some time to feel better.    Keep it simple. Depression saps your energy and concentration. So you won t be able to do all the things you used to do. Set small goals and do what you can.    Be with others. Don t isolate yourself--you ll only feel worse. Try to be with other people. And take part in fun activities when you can. Go to a movie, [employer], Alevism service, or social event. Talk openly with people you can trust. And accept help when it s offered.  Take care of your body  People with depression often lose the desire to take care of themselves. That only makes their problems worse. During treatment and afterward, make a point to:    Exercise. It s a great way to take care of your body. And studies have shown that exercise helps fight depression.    Avoid drugs and alcohol. These may ease the pain in the short term. But they ll only make your problems worse in the long run.    Get relief from stress. Ask your healthcare provider for relaxation exercises and techniques to help relieve stress.    Eat right. A balanced and healthy diet helps keep your body healthy.  Date Last Reviewed: 1/1/2017 2000-2018 [employer]. [address] [place] [address]. All rights reserved. This information is not intended as a substitute for professional medical care. Always follow your healthcare professional's instructions.          Depression Affects Your Mind and Body    Everyone feels sad or  blue  from time to time for a few days or weeks. Depression is when these feelings don't go away and they interfere with daily life.  Depression is a real illness that can develop at any age. It is one of the  most common mental health problems in the [place] Depression makes you feel sad, helpless, and hopeless. It gets in the way of your life and relationships. It inhibits your ability to think and act. But, with help, you can feel better again.  Depression affects your whole body  Brain chemicals affect your body as well as your mood. So depression may do more than just make you feel low. You may also feel bad physically. Depression can:    Cause trouble with mental tasks such as remembering, concentrating, or making decisions    Make you feel nervous and jumpy    Cause trouble sleeping. Or you may sleep too much    Change your appetite    Cause headaches, stomachaches, or other aches and pains    Drain your body of energy  Depression and other illness  It is common for people who have chronic health problems to also have depression. It can often be hard to tell which one caused the other. A person might become depressed after finding out they have a health problem. But some studies suggest being depressed may make certain health problems more likely. And some depressed people stop taking care of themselves. This may make them more likely to get sick.  Date Last Reviewed: 1/1/2017 2000-2018 [employer]. [address] [place] [address], [place] [address]. All rights reserved. This information is not intended as a substitute for professional medical care. Always follow your healthcare professional's instructions.

## 2020-01-14 ENCOUNTER — PATIENT OUTREACH (OUTPATIENT)
Dept: CARE COORDINATION | Facility: CLINIC | Age: 25
End: 2020-01-14

## 2020-01-14 ASSESSMENT — ACTIVITIES OF DAILY LIVING (ADL): DEPENDENT_IADLS:: INDEPENDENT

## 2020-01-14 NOTE — PROGRESS NOTES
Clinic Care Coordination Contact  [facility]/Voicemail    Referral Source: PCP  CCC RN outreach to get updates on patient status, assess goal progress, and provide support and additional resources as needed.    Clinical Data: Care Coordinator Outreach  Outreach attempted x 1.  Left message on patient's voicemail with call back information and requested return call.  Plan: Care Coordinator will try to reach patient again in 1-2 business days.    Patrick Champion RN  Clinic Care Coordinator  [facility]  Ph: [phone]

## 2020-01-17 ASSESSMENT — ACTIVITIES OF DAILY LIVING (ADL): DEPENDENT_IADLS:: INDEPENDENT

## 2020-01-17 NOTE — PROGRESS NOTES
Clinic Care Coordination Contact  [facility]/Voicemail    Referral Source: PCP  CCC RN outreach to get updates on patient status, assess goal progress, and provide support and additional resources as needed.    Clinical Data: Care Coordinator Outreach  Outreach attempted x 2.  Left message on patient's voicemail with call back information and requested return call.  Plan: Care Coordinator will send unable to contact letter with care coordinator contact information via [employer]. Care Coordinator will try to reach patient again in approximately 2 weeks.    Patrick Champion, RN  Clinic Care Coordinator  [facility]  Ph: [phone]

## 2020-02-07 ENCOUNTER — PATIENT OUTREACH (OUTPATIENT)
Dept: CARE COORDINATION | Facility: CLINIC | Age: 25
End: 2020-02-07

## 2020-02-07 NOTE — PROGRESS NOTES
02/07/20-Community Health Worker called the patient for Clinic Care Coordination outreach follow up and scheduled a phone visit  appointment with SETH Jorge discuss goals.  Appointment: 2/12 at 11:00am.  ___________________________________      Next Outreach: 03/12/20   Planned Outreach Frequency: Monthly  Preferred Phone Number: [phone]    Enrollment Date: 10/25/19  Last Care Plan Assessment Date: 01/14/20

## 2020-02-12 ENCOUNTER — PATIENT OUTREACH (OUTPATIENT)
Dept: NURSING | Facility: CLINIC | Age: 25
End: 2020-02-12
Payer: COMMERCIAL

## 2020-02-12 RX ORDER — METHOCARBAMOL 750 MG/1
750 TABLET, FILM COATED ORAL 3 TIMES DAILY PRN
COMMUNITY
End: 2021-09-10

## 2020-02-12 ASSESSMENT — ACTIVITIES OF DAILY LIVING (ADL): DEPENDENT_IADLS:: INDEPENDENT
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
## 2020-02-12 NOTE — LETTER
[place] CARE COORDINATION  [facility] [address]  [address], [place] [address]    February 12, 2020        Lenore Suggs  [address]  [place] [address]                                                                          [facility]  Complex Care Plan  About Me:    Patient Name:  Lenore Suggs    YOB: 1995  Age:         24 year old   [place] MRN:    [id number] [place] Information:  Home Phone [phone]   Mobile [phone]       Address:  [address]  [place] [address] Email address:  [email or web address]      Emergency Contact(s)    Name Relationship Lgl Grd Work Phone Home Phone Mobile Phone   1. MICHELLE MCGREGOR* Friend    [phone]   2. DECLINED, PER * Declined               Primary language:  English     needed? No   [facility] [employer]:  [phone] op. 1  Other communication barriers: None  Preferred Method of Communication:     Current living arrangement:    Mobility Status/ Medical Equipment: Independent    Health Maintenance  Health Maintenance Reviewed: Due/Overdue   Health Maintenance Due   Topic Date Due     PREVENTIVE CARE VISIT  1995     CHLAMYDIA SCREENING  1996     HPV IMMUNIZATION (2 - Female 2-dose series) 04/18/2011     HIV SCREENING  08/25/2011     PAP  08/25/2017     My Access Plan  Medical Emergency 911   Primary Clinic Line [facility] - [phone]   24 Hour Appointment Line [phone] or  [phone]) (toll-free)   24 Hour Nurse Line [phone] (toll-free)   [facility], [phone]   Preferred Hospital     Preferred Pharmacy [id number] - [place], [place] - [address] AT [facility] [address]     Behavioral Health Crisis Line The National Suicide Prevention Lifeline at 1-698.738.6459 or 911       My Care Team  Members  Patient Care Team       Relationship Specialty Notifications Start End    Lanny Kaba PA-C PCP - General Physician Assistant  11/4/19     Phone: [phone] Fax: [phone]         [address] [facility] [address]    Patrick Champion, RN Lead Care Coordinator Primary Care - CC  10/25/19     Phone: [phone]         Lanny Kaba PA-C Assigned PCP   11/24/19     Phone: [phone] Fax: [phone]         [address] [facility] [address]    Fartun Camp MA Community Health Worker Primary Care - CC  1/22/20             My Care Plans  Self Management and Treatment Plan  Goals and (Comments)  Goals        General    1. Medical (pt-stated)     Notes - Note created  2/12/2020 11:38 AM by Patrick Champion, RN    Goal Statement: I will establish with Primary Care and Mental Health providers in [place].  Date Goal set: 2/12/20  Barriers: Patient unsure of insurance coverage.  Strengths: Patient understands importance of having doctors available locally.  Date to Achieve By: 4/30/20  Patient expressed understanding of goal: Yes  Action steps to achieve this goal:  1. I will call my insurance to inquire about my coverage and options to see doctors in [place].  2. I will establish care with a Primary Care doctor in [place].  3. I will establish care with a Psychiatrist and therapist in [place].             Action Plans on File:    Depression     My Medical and Care Information  Problem List   Patient Active Problem List   Diagnosis     Schizophrenia (H)     History of suicide attempt     Suicidal ideation     Bipolar affective disorder, currently depressed, moderate (H)      Current Medications:  Current Outpatient Medications   Medication     methocarbamol (ROBAXIN) 750 MG tablet     buPROPion (WELLBUTRIN XL) 300 MG 24 hr tablet     hydrOXYzine (ATARAX) 25 MG tablet     propranolol (INDERAL) 20 MG tablet     No current facility-administered medications for this visit.       Care Coordination Start Date: 10/25/2019   Frequency of Care Coordination: monthly   Form Last Updated: 02/12/2020

## 2020-02-25 ENCOUNTER — PATIENT OUTREACH (OUTPATIENT)
Dept: CARE COORDINATION | Facility: CLINIC | Age: 25
End: 2020-02-25

## 2020-02-25 NOTE — PROGRESS NOTES
02/25/20-Community Health Worker called the patient for Clinic Care Coordination outreach follow up on goals and action steps.  Spoke to Felecia.  Discussed:    Patient has not been able to find a new PCP or Psychiatrist in [place] yet due to patient's insurance.    CHW called patient's [facility] provider line for information on out of state care.    CHW spoke to Alaina at the Behavioral Health dept with [facility] [phone] option 2 for ().  Per Alaina, patient needs to find a provider in [place] and have the provider call the phone number listed above for prior authorization for care.  For the PCP call the same number [phone] or [phone].  _______________________________________________  Plan:     CHW called the patient with information on prior authorizations.    Patient will schedule appointments.    CHW will follow up next week with patient.  _______________________________________________    Delegation from SETH Jorge to CHW:  Please get updates from patient about what she found out from her insurance regarding her need to establish care with PCP and Psychiatry in [place].    Delegation Complete.  _______________________________________________  Goals:  Goal Statement: I will establish with Primary Care and Mental Health providers in [place].  Date Goal set: 2/12/20  Barriers: Patient unsure of insurance coverage.  Strengths: Patient understands importance of having doctors available locally.  Date to Achieve By: 4/30/20  Patient expressed understanding of goal: Yes  Action steps to achieve this goal:  1. I will call my insurance to inquire about my coverage and options to see doctors in [place].  2. I will establish care with a Primary Care doctor in [place].  3. I will establish care with a Psychiatrist and therapist in [place].  _______________________________________________    Next Outreach: 03/06/20  Planned Outreach Frequency: Monthly  Preferred Phone Number: [phone]     Enrollment  Date: 12/02/19  Last Care Plan Assessment Date: 12/02/19

## 2020-03-01 ENCOUNTER — HEALTH MAINTENANCE LETTER (OUTPATIENT)
Age: 25
End: 2020-03-01

## 2020-03-06 ENCOUNTER — PATIENT OUTREACH (OUTPATIENT)
Dept: CARE COORDINATION | Facility: CLINIC | Age: 25
End: 2020-03-06

## 2020-03-06 NOTE — PROGRESS NOTES
Clinic Care Coordination Contact  [facility]/Voicemail       Clinical Data: Care Coordinator Outreach  Outreach attempted x 1.  Left message on patient's voicemail with call back information and requested return call. Clinic Care Coordination outreach follow up on goals and action steps.  _______________________________________________    Goals:  Goal Statement: I will establish with Primary Care and Mental Health providers in [place].  Date Goal set: 2/12/20  Barriers: Patient unsure of insurance coverage.  Strengths: Patient understands importance of having doctors available locally.  Date to Achieve By: 4/30/20  Patient expressed understanding of goal: Yes  Action steps to achieve this goal:  1. I will call my insurance to inquire about my coverage and options to see doctors in [place].  2. I will establish care with a Primary Care doctor in [place].  3. I will establish care with a Psychiatrist and therapist in [place].  _______________________________________________    Next Outreach: 03/13/20  Planned Outreach Frequency: Monthly  Preferred Phone Number: [phone]  _______________________________________________      GEORGINA Logan  Clinic Care Coordination  [facility]  Phone: [phone]

## 2020-03-16 NOTE — PROGRESS NOTES
Clinic Care Coordination Contact  [facility]/Voicemail       Clinical Data: Care Coordinator Outreach  Outreach attempted x 2.  Left message on patient's voicemail with call back information and requested return call.  Plan: Care Coordinator will try to reach patient again in 14 business days.    GEORGINA Logan  Clinic Care Coordination  [facility]  Phone: [phone]  _______________________________________________    Next Outreach: 03/30/20

## 2020-03-18 ENCOUNTER — PATIENT OUTREACH (OUTPATIENT)
Dept: CARE COORDINATION | Facility: CLINIC | Age: 25
End: 2020-03-18

## 2020-03-18 ASSESSMENT — ACTIVITIES OF DAILY LIVING (ADL): DEPENDENT_IADLS:: INDEPENDENT

## 2020-03-18 NOTE — PROGRESS NOTES
Clinic Care Coordination Contact    Situation: Patient chart reviewed by care coordinator.    Background: Care Coordination following patient to assist with Goal as below.    Assessment: Per chart review, patient has been actively working with CC CHW to accomplish Goal.  Patient's goal remains appropriate and relevant at this time.    Goals Addressed                 This Visit's Progress       Patient Stated      1. Medical (pt-stated)        Goal Statement: I will establish with Primary Care and Mental Health providers in [place].  Date Goal set: 2/12/20  Barriers: Patient unsure of insurance coverage.  Strengths: Patient understands importance of having doctors available locally.  Date to Achieve By: 4/30/20  Patient expressed understanding of goal: Yes  Action steps to achieve this goal:  1. I will call my insurance to inquire about my coverage and options to see doctors in [place].  2. I will establish care with a Primary Care doctor in [place].  3. I will establish care with a Psychiatrist and therapist in [place].    As of today's date 3/18/2020 goal is met at 0 - 25%.   Goal Status:  Active - Patient given information in order to schedule appointments with PCP/Psychiatry; no appointments yet scheduled.        Plan/Recommendations: The patient will continue working with Care Coordination to achieve Goal as above.  CC RN will review patient chart again in approximately 6 weeks to assess patient status and goal progress with outreach to patient as indicated.    Patrick Champion RN  Clinic Care Coordinator  [facility]  Ph: [phone]

## 2020-04-02 ENCOUNTER — PATIENT OUTREACH (OUTPATIENT)
Dept: CARE COORDINATION | Facility: CLINIC | Age: 25
End: 2020-04-02

## 2020-04-02 NOTE — PROGRESS NOTES
Clinic Care Coordination Contact  [facility]/Voicemail       Clinical Data: Care Coordinator Outreach  Outreach attempted x 1.  Left message on patient's voicemail with call back information and requested return call.  Plan: Care Coordinator will try to reach patient again in 10 business days.    Next Outreach: 04/16/20    GEORGINA Logan  Clinic Care Coordination  [facility]  Phone: [phone]

## 2020-04-06 DIAGNOSIS — F41.0 ANXIETY ATTACK: ICD-10-CM

## 2020-04-06 RX ORDER — HYDROXYZINE HYDROCHLORIDE 25 MG/1
25 TABLET, FILM COATED ORAL 3 TIMES DAILY PRN
Qty: 60 TABLET | Refills: 1 | Status: SHIPPED | OUTPATIENT
Start: 2020-04-06 | End: 2021-10-20

## 2020-04-23 ENCOUNTER — PATIENT OUTREACH (OUTPATIENT)
Dept: CARE COORDINATION | Facility: CLINIC | Age: 25
End: 2020-04-23

## 2020-04-23 NOTE — PROGRESS NOTES
Clinic Care Coordination Contact  [facility]/Voicemail     Clinical Data: Care Coordinator Outreach  Outreach attempted x 1.  Left message on patient's voicemail with call back information and requested return call.  Plan:  Care Coordinator will try to reach patient again in 10 business days.    GEORGINA Logan  Clinic Care Coordination  [facility]  Phone: [phone]

## 2020-05-15 ENCOUNTER — PATIENT OUTREACH (OUTPATIENT)
Dept: CARE COORDINATION | Facility: CLINIC | Age: 25
End: 2020-05-15

## 2020-05-15 ASSESSMENT — ACTIVITIES OF DAILY LIVING (ADL): DEPENDENT_IADLS:: INDEPENDENT

## 2020-05-15 NOTE — LETTER
[facility] CARE COORDINATION  [facility]  [address], [place] [address]    May 22, 2020        Lenore Suggs  [address]  [place], [place] [address]          Dear Lenore,     Attached is an updated Complex Care Plan for your continued enrollment in Care Coordination. Please let us know if you have additional questions, concerns, or goals that we can assist with.    Sincerely,    Patrick Champion RN        [facility]  Complex Care Plan  About Me:    Patient Name:  Lenore Suggs    YOB: 1995  Age:         24 year old   [place] MRN:    [id number] Telephone Information:  Home Phone [phone]   Mobile [phone]       Address:  [address]  [place], [place] [address] Email address:  [email or web address]      Emergency Contact(s)    Name Relationship Lgl Grd Work Phone Home Phone Mobile Phone   1. MICHELLE CLARKERIKA* Friend    [phone]   2. DECLINED, PER * Declined               Primary language:  English     needed? No   [employer] [employer]:  [phone] op. 1  Other communication barriers: None  Preferred Method of Communication:     Current living arrangement: I live alone  Mobility Status/ Medical Equipment: Independent    Health Maintenance  Health Maintenance Reviewed: Due/Overdue   Health Maintenance Due   Topic Date Due     PREVENTIVE CARE VISIT  1995     CHLAMYDIA SCREENING  1996     HPV IMMUNIZATION (2 - 2-dose series) 04/18/2011     HIV SCREENING  08/25/2011     PAP  08/25/2017     DTAP/TDAP/TD IMMUNIZATION (4 - Td) 04/25/2020     My Access Plan  Medical Emergency 911   Primary Clinic Line [facility] - [phone]   24 Hour Appointment Line [phone] or  [phone]) (toll-free)   24 Hour Nurse Line [phone] (toll-free)   Preferred Urgent Care [facility], [phone]   [facility]  [phone]   Preferred Pharmacy [id number] - [place], [place] - [address] AT [facility]     Behavioral Health Crisis Line The National Suicide Prevention Lifeline at 1-353.569.9472 or 912       My Care Team Members  Patient Care Team       Relationship Specialty Notifications Start End    Lanny Kaba PA-C PCP - General Physician Assistant  11/4/19     Phone: [phone] Fax: [phone]         [address] [facility] [address]    Patrick Champion, RN Lead Care Coordinator Primary Care - CC  10/25/19     Phone: [phone]         Lanny Kaba PA-C Assigned PCP   11/24/19     Phone: [phone] Fax: [phone]         [address] [facility] [address]    Fartun Camp MA Community Health Worker Primary Care - CC  1/22/20             My Care Plans  Self Management and Treatment Plan  Goals and (Comments)  Goals        General    1. Medical (pt-stated)     Notes - Note edited  5/22/2020  3:52 PM by Patrick Champion, RN    Goal Statement: I will establish with Primary Care and Mental Health providers in [place].  Date Goal set: 2/12/20  Barriers: Patient unsure of insurance coverage.  Strengths: Patient understands importance of having doctors available locally.  Date to Achieve By: extended to 7/31/20  Patient expressed understanding of goal: Yes  Action steps to achieve this goal:  1. I will call my insurance to inquire about my coverage and options to see doctors in [place].  2. I will establish care with a Primary Care doctor in [place].  3. I will establish care with a Psychiatrist and therapist in [place].           Action Plans on File:   Depression      My Medical and Care Information  Problem List   Patient Active Problem List   Diagnosis     Schizophrenia (H)     History of suicide attempt     Suicidal ideation     Bipolar affective disorder, currently depressed, moderate (H)      Current  Medications:    Current Outpatient Medications   Medication     buPROPion (WELLBUTRIN XL) 300 MG 24 hr tablet     hydrOXYzine (ATARAX) 25 MG tablet     methocarbamol (ROBAXIN) 750 MG tablet     propranolol (INDERAL) 20 MG tablet     No current facility-administered medications for this visit.      Care Coordination Start Date: 10/25/2019   Frequency of Care Coordination: monthly   Form Last Updated: 05/22/2020

## 2020-05-15 NOTE — PROGRESS NOTES
Clinic Care Coordination Contact  [facility]/Voicemail    Referral Source: PCP  CC RN outreach to get updates on patient status, assess goal progress, and provide support and additional resources as needed.    Clinical Data: Care Coordinator Outreach  Outreach attempted x 1.  Left message on patient's voicemail with call back information and requested return call.  Also sent patient MyChart message.  Plan: Care Coordinator will try to reach patient again in approximately 1 week.    Patrick Champion RN  Clinic Care Coordinator  [facility]  Ph: [phone]

## 2020-05-22 ASSESSMENT — ACTIVITIES OF DAILY LIVING (ADL): DEPENDENT_IADLS:: INDEPENDENT

## 2020-05-22 NOTE — PROGRESS NOTES
Clinic Care Coordination Contact    Follow Up Progress Note -- CC RN communicated with patient via [employer]; refer to [employer] Medical Advice encounter dated 5/15/20 for details     Assessment:    The patient updated that she plans, at this time, to remain in [place] and has no definitive plans to return to [place].    The patient reported she has been struggling to establish with a primary care doctor in [place] due to limited providers seeing new patients.    The patient stated the COVID-19 pandemic has also made it difficult for her to establish care with any providers.    The patient stated she has noticed her mood has changed; she noted irritability, low attention-span, and difficulty holding conversations.    The patient did note she is interested in establishing care with Dr. Amy Dacosta who is a PCP in [place].    Goals addressed this encounter:   Goals Addressed                 This Visit's Progress       Patient Stated      1. Medical (pt-stated)   Not on track     Goal Statement: I will establish with Primary Care and Mental Health providers in [place].  Date Goal set: 2/12/20  Barriers: Patient unsure of insurance coverage.  Strengths: Patient understands importance of having doctors available locally.  Date to Achieve By: extended to 7/31/20  Patient expressed understanding of goal: Yes  Action steps to achieve this goal:  1. I will call my insurance to inquire about my coverage and options to see doctors in [place].  2. I will establish care with a Primary Care doctor in [place].  3. I will establish care with a Psychiatrist and therapist in [place].    As of today's date 3/18/2020 goal is met at 0 - 25%.   Goal Status:  Active - Patient given information in order to schedule appointments with PCP/Psychiatry; no appointments yet scheduled.  As of today's date 5/22/2020 goal is met at 26 - 50%.   Goal Status:  Active - Patient identified a PCP she would like to establish with; she will call her insurance  "to see if able to see the provider and actions needed.        Intervention/Education provided during outreach: CC RN advised that the patient contact her insurance and let them know of her plans to remain in [place] and inquire about process of her being able to establish with PCP/mental health provider in [place].  CC RN provide the patient with phone number for her insurance; the patient agreed to call them and update CC RN with her progress.  CC RN also provided the patient with information for [facility] (\"one-stop shop for mental health information\") and Crisis Hotline of [facility]; encouraged her to look into both of these resources for additional mental health support.     Outreach Frequency: monthly    Plan:     CC RN will send patient an updated Complex Care Plan.    The patient will contact her insurance to notify of her plan to remain in [place] and inquire about process of getting established with PCP and mental health provider locally.    The patient agree to update CC RN with progress and call/message with additional questions or concerns.    CC RN provided patient with additional local mental health resources in [place] for her use, if needed.    Care Coordination will outreach to patient in approximately 1 month to get updates on patient status, assess goal progress, and offer additional support and resources as indicated.    Patrick Champion, RN  Clinic Care Coordinator  [facility]  Ph: [phone]  "

## 2020-06-09 ENCOUNTER — VIRTUAL VISIT (OUTPATIENT)
Dept: FAMILY MEDICINE | Facility: CLINIC | Age: 25
End: 2020-06-09
Payer: COMMERCIAL

## 2020-06-09 ENCOUNTER — NURSE TRIAGE (OUTPATIENT)
Dept: FAMILY MEDICINE | Facility: CLINIC | Age: 25
End: 2020-06-09

## 2020-06-09 DIAGNOSIS — R51.9 HEADACHE, UNSPECIFIED HEADACHE TYPE: Primary | ICD-10-CM

## 2020-06-09 DIAGNOSIS — F31.32 BIPOLAR AFFECTIVE DISORDER, CURRENTLY DEPRESSED, MODERATE (H): ICD-10-CM

## 2020-06-09 PROBLEM — E66.01 MORBID OBESITY (H): Status: ACTIVE | Noted: 2020-06-09

## 2020-06-09 PROCEDURE — 99214 OFFICE O/P EST MOD 30 MIN: CPT | Mod: 95 | Performed by: PHYSICIAN ASSISTANT

## 2020-06-09 RX ORDER — SUMATRIPTAN 50 MG/1
50-100 TABLET, FILM COATED ORAL
Qty: 9 TABLET | Refills: 3 | Status: SHIPPED | OUTPATIENT
Start: 2020-06-09 | End: 2021-12-02

## 2020-06-09 RX ORDER — BUPROPION HYDROCHLORIDE 300 MG/1
300 TABLET ORAL EVERY MORNING
Qty: 90 TABLET | Refills: 1 | Status: CANCELLED | OUTPATIENT
Start: 2020-06-09

## 2020-06-09 RX ORDER — ONDANSETRON 4 MG/1
4 TABLET, FILM COATED ORAL EVERY 8 HOURS PRN
Qty: 20 TABLET | Refills: 3 | Status: CANCELLED | OUTPATIENT
Start: 2020-06-09

## 2020-06-09 NOTE — PATIENT INSTRUCTIONS
Patient Education     Preventing Migraine Headaches: Triggers     Red wine is a common migraine trigger.   The first step in preventing migraines is to learn what triggers them. You may then be able to control your triggers to avoid or reduce the severity of your migraines.  Know your triggers  Be aware that you may have more than one trigger, and that some triggers may work together. Common migraine triggers include:    Food and nutrition. Skipping meals or not drinking enough water can trigger headaches. So can certain foods, such as caffeine, chocolate, artificial sweeteners, monosodium glutamate (MSG), aged cheese, or sausage.    Alcohol. Red wine and other alcoholic beverages are common migraine triggers.    Chemicals. Scents, cleaning products, gasoline, glue, perfume, and paint can be triggers. So can tobacco smoke, including secondhand smoke.    Emotions. Stress can trigger headaches or make them worse once they start.    Sleep disruption. Staying up late, sleeping late, and traveling across time zones can disrupt your sleep cycle, triggering headaches.    Hormones. Many women notice that migraines tend to happen at a certain point in their menstrual cycle. Birth control pills or hormone replacement therapy may also trigger migraines.    Environment and weather. Air travel, changes in altitude, air pressure changes, hot sun, or bright or flashing lights can be triggers.    Medicine overuse. Frequent use of pain medicines for headache pain can also cause a headache. This may also be called rebound headache.  Control your triggers  These are some of the things you can do to try to control triggers:    Avoid triggers if you can. For example, stay clear of alcohol and foods that trigger your headaches. Use unscented household products. Keep regular sleep habits. Manage stress to help control emotional triggers.    Change your behavior at times when triggers can't be avoided. For example, make sure to get  enough rest and drink plenty of water while you're traveling. Make sure to carry a hat, sunglasses, and your medicines. Be alert for migraine symptoms, so you can treat a migraine early if it happens.  Date Last Reviewed: 5/1/2018 2000-2019 [employer]. [address] [place] [address], [place] [address]. All rights reserved. This information is not intended as a substitute for professional medical care. Always follow your healthcare professional's instructions.
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
## 2020-06-09 NOTE — TELEPHONE ENCOUNTER
"PT stating an increase in severe headaches.  Prior concerns for migraines but recently worsening.  Ibuprofen and HC remedies have not helped.  No fevers, no other issues besides occasional ear pain.    Scheduled for virtual appt with covering provider as pt is currently in [place].    Additional Information    Negative: Difficult to awaken or acting confused (e.g., disoriented, slurred speech)    Negative: Weakness of the face, arm or leg on one side of the body and new onset    Negative: Numbness of the face, arm or leg on one side of the body and new onset    Negative: Loss of speech or garbled speech and new onset    Negative: Passed out (i.e., fainted, collapsed and was not responding)    Negative: Sounds like a life-threatening emergency to the triager    Negative: Followed a head injury within last 3 days    Negative: Traumatic Brain Injury (TBI) is suspected    Negative: Sinus pain of forehead and yellow or green nasal discharge    Negative: Pregnant    Negative: Unable to walk without falling    Negative: Stiff neck (can't touch chin to chest)    Negative: Possibility of carbon monoxide exposure    Negative: SEVERE headache, states 'worst headache' of life    Negative: SEVERE headache, sudden onset (i.e., reaching maximum intensity within 30 seconds)    Negative: Severe pain in one eye    Negative: Loss of vision or double vision    Negative: Patient sounds very sick or weak to the triager    Negative: Fever > 103 F (39.4 C)    Negative: Fever > 100.0 F (37.8 C) and has diabetes mellitus or a weak immune system (e.g., HIV positive, cancer chemotherapy, organ transplant, splenectomy, chronic steroids)    SEVERE headache (e.g., excruciating) and has had severe headaches before    Answer Assessment - Initial Assessment Questions  1. LOCATION: \"Where does it hurt?\"       Back of eyeball to the neck.  Takes air away    2. ONSET: \"When did the headache start?\" (Minutes, hours or days)       Increasing over the " "past few weeks    3. PATTERN: \"Does the pain come and go, or has it been constant since it started?\"      Come and go \"really random\"    4. SEVERITY: \"How bad is the pain?\" and \"What does it keep you from doing?\"  (e.g., Scale 1-10; mild, moderate, or severe)    - MILD (1-3): doesn't interfere with normal activities     - MODERATE (4-7): interferes with normal activities or awakens from sleep     - SEVERE (8-10): excruciating pain, unable to do any normal activities         Moderate to severe    5. RECURRENT SYMPTOM: \"Have you ever had headaches before?\" If so, ask: \"When was the last time?\" and \"What happened that time?\"       Yes. She has had these as a kid but not this bad    6. CAUSE: \"What do you think is causing the headache?\"      Migraines    7. MIGRAINE: \"Have you been diagnosed with migraine headaches?\" If so, ask: \"Is this headache similar?\"       Yes in the past, but not this bad    8. HEAD INJURY: \"Has there been any recent injury to the head?\"       No    9. OTHER SYMPTOMS: \"Do you have any other symptoms?\" (fever, stiff neck, eye pain, sore throat, cold symptoms)      No    10. PREGNANCY: \"Is there any chance you are pregnant?\" \"When was your last menstrual period?\"        No    Protocols used: HEADACHE-A-[place]    "

## 2020-06-09 NOTE — TELEPHONE ENCOUNTER
Patient Contact    Attempt # 1    Was call answered?  No.  Left message on voicemail with information to call triage back.    On call back, triage symptoms re: migraine. See mychart message.

## 2020-06-15 ENCOUNTER — VIRTUAL VISIT (OUTPATIENT)
Dept: FAMILY MEDICINE | Facility: CLINIC | Age: 25
End: 2020-06-15
Payer: COMMERCIAL

## 2020-06-15 DIAGNOSIS — R45.851 SUICIDAL IDEATION: ICD-10-CM

## 2020-06-15 DIAGNOSIS — Z91.51 HISTORY OF SUICIDE ATTEMPT: ICD-10-CM

## 2020-06-15 DIAGNOSIS — F41.0 ANXIETY ATTACK: ICD-10-CM

## 2020-06-15 DIAGNOSIS — F31.32 BIPOLAR AFFECTIVE DISORDER, CURRENTLY DEPRESSED, MODERATE (H): Primary | ICD-10-CM

## 2020-06-15 PROCEDURE — 99213 OFFICE O/P EST LOW 20 MIN: CPT | Mod: TEL | Performed by: PHYSICIAN ASSISTANT

## 2020-06-15 RX ORDER — BUPROPION HYDROCHLORIDE 300 MG/1
TABLET ORAL
COMMUNITY
Start: 2020-05-11 | End: 2020-06-15

## 2020-06-15 RX ORDER — BUPROPION HYDROCHLORIDE 300 MG/1
300 TABLET ORAL EVERY MORNING
Qty: 90 TABLET | Refills: 1 | Status: SHIPPED | OUTPATIENT
Start: 2020-06-15 | End: 2020-11-27

## 2020-06-15 ASSESSMENT — ENCOUNTER SYMPTOMS
MUSCULOSKELETAL NEGATIVE: 1
CONSTITUTIONAL NEGATIVE: 1
CARDIOVASCULAR NEGATIVE: 1
EYES NEGATIVE: 1
GASTROINTESTINAL NEGATIVE: 1
RESPIRATORY NEGATIVE: 1
NEUROLOGICAL NEGATIVE: 1

## 2020-06-15 ASSESSMENT — PATIENT HEALTH QUESTIONNAIRE - PHQ9
SUM OF ALL RESPONSES TO PHQ QUESTIONS 1-9: 19
5. POOR APPETITE OR OVEREATING: NEARLY EVERY DAY

## 2020-06-15 ASSESSMENT — ANXIETY QUESTIONNAIRES
GAD7 TOTAL SCORE: 18
5. BEING SO RESTLESS THAT IT IS HARD TO SIT STILL: MORE THAN HALF THE DAYS
1. FEELING NERVOUS, ANXIOUS, OR ON EDGE: NEARLY EVERY DAY
2. NOT BEING ABLE TO STOP OR CONTROL WORRYING: NEARLY EVERY DAY
6. BECOMING EASILY ANNOYED OR IRRITABLE: NEARLY EVERY DAY
7. FEELING AFRAID AS IF SOMETHING AWFUL MIGHT HAPPEN: SEVERAL DAYS
3. WORRYING TOO MUCH ABOUT DIFFERENT THINGS: NEARLY EVERY DAY
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
## 2020-06-15 NOTE — NURSING NOTE
I called the [facility] # and they will fax over release and then records. Will wait for release today.  Tanika Bailey LPN

## 2020-06-15 NOTE — Clinical Note
Please assist patient with getting her medical records from [place] - please call the patient regarding fax paperwork needed - she may need to come in and sign.

## 2020-06-15 NOTE — Clinical Note
Follow up on previous message - medical records contact in [place] [phone] and they have an incorrect  as 96

## 2020-06-16 ASSESSMENT — ANXIETY QUESTIONNAIRES: GAD7 TOTAL SCORE: 18

## 2020-06-30 ENCOUNTER — PATIENT OUTREACH (OUTPATIENT)
Dept: CARE COORDINATION | Facility: CLINIC | Age: 25
End: 2020-06-30

## 2020-06-30 NOTE — PROGRESS NOTES
Clinic Care Coordination Contact  [facility]/Voicemail       Clinical Data: Care Coordinator Outreach  Outreach attempted x 1.  Left message on patient's voicemail with call back information and requested return call.  Plan: Care Coordinator will try to reach patient again in 5-10 business days.    Next Outreach: 7/10/20    GEORGINA Logan  Clinic Care Coordination  [facility] Clinics: [facility], [facility] [place], and [facility]  Phone: [phone]

## 2020-07-08 ENCOUNTER — TELEPHONE (OUTPATIENT)
Dept: PSYCHIATRY | Facility: CLINIC | Age: 25
End: 2020-07-08

## 2020-07-08 ENCOUNTER — VIRTUAL VISIT (OUTPATIENT)
Dept: PSYCHIATRY | Facility: CLINIC | Age: 25
End: 2020-07-08
Payer: COMMERCIAL

## 2020-07-08 DIAGNOSIS — Z53.9 ERRONEOUS ENCOUNTER--DISREGARD: Primary | ICD-10-CM

## 2020-07-08 ASSESSMENT — ANXIETY QUESTIONNAIRES
2. NOT BEING ABLE TO STOP OR CONTROL WORRYING: SEVERAL DAYS
6. BECOMING EASILY ANNOYED OR IRRITABLE: NEARLY EVERY DAY
5. BEING SO RESTLESS THAT IT IS HARD TO SIT STILL: NEARLY EVERY DAY
7. FEELING AFRAID AS IF SOMETHING AWFUL MIGHT HAPPEN: MORE THAN HALF THE DAYS
1. FEELING NERVOUS, ANXIOUS, OR ON EDGE: SEVERAL DAYS
3. WORRYING TOO MUCH ABOUT DIFFERENT THINGS: NEARLY EVERY DAY
GAD7 TOTAL SCORE: 16
IF YOU CHECKED OFF ANY PROBLEMS ON THIS QUESTIONNAIRE, HOW DIFFICULT HAVE THESE PROBLEMS MADE IT FOR YOU TO DO YOUR WORK, TAKE CARE OF THINGS AT HOME, OR GET ALONG WITH OTHER PEOPLE: EXTREMELY DIFFICULT

## 2020-07-08 ASSESSMENT — PATIENT HEALTH QUESTIONNAIRE - PHQ9
5. POOR APPETITE OR OVEREATING: NEARLY EVERY DAY
SUM OF ALL RESPONSES TO PHQ QUESTIONS 1-9: 21

## 2020-07-08 NOTE — TELEPHONE ENCOUNTER
Reason for Call:  Other call back    Detailed comments: pt stated she has been talking with 2 nurses from dr. Garza's  Office and missed s few ph calls. I did not see any mesg from your dept   But pt is asking for call back     Phone Number Patient can be reached at: Cell number on file:    Telephone Information:   Mobile [phone]       Best Time: any      Can we leave a detailed message on this number? YES    Call taken on 7/8/2020 at 7:30 AM by Dangelo Polo

## 2020-07-08 NOTE — TELEPHONE ENCOUNTER
Attempted to return patient call, LVM for her to call back if she still needs assistance. After patient called office this AM,  patient had conversation with Dr. Garza.

## 2020-07-08 NOTE — PATIENT INSTRUCTIONS
Unfortunately we were unable to visit today. If you are feeling suicidal and/or unable to keep yourself safe, call 911, go to nearest emergency room, and/or call one of the resources below for additional support. I am not sure what city/county you are living in in [place], so I am unable to find the nearest [facility] resources but I am sure the [facility] website would have some good resources for you for mental health services.     Additional Resources:    National [place] of Mental Mwaoyj290-687-7554dzo.[facility].nih.gov    National Suicide Prevention Ldzmulxt896-583-6773 (577-897-SMNW)www.suicidepreventionlifeline.org    National [place] on Mental Bdigyev867-909-0096zup.negrita.org    Mental Health Qdmyxiw237-492-4492jaz.[facility].org    National Suicide Hkcetsq286-342-3899 (800-SUICIDE)

## 2020-07-08 NOTE — PROGRESS NOTES
Pt currently living in [place] for at least the next 6 months. Discussed that many states have ended their state of emergencies (or didn't have one to begin with) and insurance companies are no longer covering telephone/telemedicine visits as if they are in-state. Also difficult to establish care with a patient within the CC[place] model who is not living in [place].     Pt was offered a one-time visit today to provide small supply of medication to get her by until she can establish care in [place]. Due to the possibility she could incur out-of-pocket expenses, she declined the visit at this time.     I offered to assist in finding resources for [facility] mental health services for where she is located in [place] and she declined stating she has already done this and has been unable to find a provider.     Chart review reveals a note from 12/2019 from her primary care provider (e-visit) during which she said she would be finding new pcp and psychiatrist to establish care with in [place]. That was over 7 months ago and pt has yet to establish any care in [place]. I again stressed the importance of finding providers in [place]. She can always resume with providers here and [facility] once she [facility].                                                         Pt did not endorse any acute safety concerns and was given resources for when she is feeling suicidal or unsafe. Declined additional help from me to find local [facility] resources in [place] where she lives.     (Addendum) Of note: I do not have a medical license in [place] and am not aware of what their state rules are for interstate care delivery at this time. I spoke with one of my supervisors and they are also not aware of what the current regulations or exceptions are in [place] regarding interstate care at this time. This was also a concern at the time of this visit.       Claudia Garza, DO on 7/8/2020 at 8:40 AM

## 2020-07-09 ENCOUNTER — PATIENT OUTREACH (OUTPATIENT)
Dept: CARE COORDINATION | Facility: CLINIC | Age: 25
End: 2020-07-09

## 2020-07-09 ASSESSMENT — ACTIVITIES OF DAILY LIVING (ADL): DEPENDENT_IADLS:: INDEPENDENT

## 2020-07-09 ASSESSMENT — ANXIETY QUESTIONNAIRES: GAD7 TOTAL SCORE: 16

## 2020-07-09 NOTE — PROGRESS NOTES
Clinic Care Coordination Contact    Per chart review, CC CHW is in process of patient outreach.  Unclear if patient would like to establish mental health care in [place] or in [place]; will ask that CC CHW clarify with patient so Care Coordination can assist, if needed.  CC RN will await CC CHW updates following patient outreach.    Patrick Champion RN  Clinic Care Coordinator  [facility]  Ph: [phone]

## 2020-07-13 ENCOUNTER — PATIENT OUTREACH (OUTPATIENT)
Dept: CARE COORDINATION | Facility: CLINIC | Age: 25
End: 2020-07-13

## 2020-07-13 NOTE — PROGRESS NOTES
Clinic Care Coordination Contact  [facility]/Voicemail       Clinical Data: Care Coordinator Outreach  Outreach attempted x 2.  Left message on patient's voicemail with call back information and requested return call.  Plan: Care Coordinator will try to reach patient again in 10 business days.    Next Outreach: 7/22/20    GEORGINA Logan  Clinic Care Coordination  [facility] Clinics: [facility]

## 2020-07-29 ENCOUNTER — PATIENT OUTREACH (OUTPATIENT)
Dept: CARE COORDINATION | Facility: CLINIC | Age: 25
End: 2020-07-29

## 2020-07-29 NOTE — PROGRESS NOTES
Clinic Care Coordination Contact  [facility]/Voicemail      Clinical Data: Care Coordinator Outreach  Outreach attempted x 1.  Left message on patient's voicemail with call back information and requested return call.  Plan: Care Coordinator will try to reach patient again in 10 business days.  Patient left a voicemail message stating that she is having difficulty getting an appointment in [place].  Patient stated that she has called many clinics, and she is told that someone will call her back, but she has not heard from any of them.      Next Outreach: 8/12/20  Planned Outreach Frequency: Monthly       GEORGINA Logan  Clinic Care Coordination  [facility] Clinics: [facility], [facility], and [facility]  Phone: [phone]

## 2020-07-31 NOTE — PROGRESS NOTES
Clinic Care Coordination Contact  [facility]/Voicemail       Clinical Data: Care Coordinator Outreach  Outreach attempted x 1.  Left message on patient's voicemail with call back information and requested return call.  Patient returned call from 7/29/20  Plan: Care Coordinator will try to reach patient again in 5-10 business days.    Next Outreach: 8/12/20  Planned Outreach Frequency: Monthly    GEORGINA Logan  Clinic Care Coordination  [facility] Clinics: [facility], [facility], [place], and [facility]  Phone: [phone]

## 2020-08-04 ENCOUNTER — PATIENT OUTREACH (OUTPATIENT)
Dept: NURSING | Facility: CLINIC | Age: 25
End: 2020-08-04
Payer: COMMERCIAL

## 2020-08-04 NOTE — PROGRESS NOTES
Clinic Care Coordination Contact    Situation: Patient chart reviewed by care coordinator.     Background: Care Coordination following patient to assist with goal as below.     Assessment: Per chart review, patient outreach completed by CC CHW on 8/4/20.  Patient is actively working to accomplish goal.  CC CHW huddled with CC RN following patient outreach; she updated that patient continues to struggle to establish care with mental health provider (or PCP) in [place] or [place].  She did attempt to have visit with Dr. Garza through [facility] but due to uncertainty about out-of-pocket costs patient might incur, patient ultimately declined the visit.  Patient reported CC CHW that she has not been getting return calls from the clinics she's tried calling in [place]; she will provide CC CHW details of which clinics she has been trying to contact in hopes that Care Coordination can assist with scheduling, if possible.  Per chart review of past patient outreaches, CC CHW indicated that patient is able to see providers in [place] but the provider will need to call patient's insurance for prior authorization.  Patient's goal remains appropriate and relevant at this time.    Goals        Patient Stated      1. Medical (pt-stated)      Goal Statement: I will establish with Primary Care and Mental Health providers in [place].  Date Goal set: 2/12/20  Barriers: Patient unsure of insurance coverage.  Strengths: Patient understands importance of having doctors available locally.  Date to Achieve By: 2/12/20 // extended to 9/30/20  Patient expressed understanding of goal: Yes  Action steps to achieve this goal:  1. I will call my insurance to inquire about my coverage and options to see doctors in [place].  2. I will establish care with a Primary Care doctor in [place].  3. I will establish care with a Psychiatrist and therapist in [place].      Plan/Recommendations: The patient will continue working with Care Coordination to achieve goal as  "above; \"Date to Achieve By\" extended accordingly.  CC CHW will await return call from patient with details of which clinics she has attempted to reach in order to establish care; CC CHW/RN to assist with getting patient scheduled, if possible.  CC RN will review patient chart again in approximately 6 weeks (sooner if needed) to assess patient status and goal progress with outreach to patient as indicated.    Patrick Champion RN  Clinic Care Coordinator  [facility]  Ph: [phone]  "

## 2020-08-13 NOTE — PROGRESS NOTES
Clinic Care Coordination Contact  Community Health Worker Follow Up    Goals:   Goals Addressed as of 8/13/2020 at 4:11 PM                 8/4/20 7/9/20       Patient Stated      1. Medical (pt-stated)   0%  10%    Added 2/12/20 by Patrick Champion RN     Goal Statement: I will establish with Primary Care and Mental Health providers in [place].  Date Goal set: 2/12/20  Barriers: Patient unsure of insurance coverage.  Strengths: Patient understands importance of having doctors available locally.  Date to Achieve By: 2/12/20 // extended to 9/30/20  Patient expressed understanding of goal: Yes    Action steps to achieve this goal:  1. I will call my insurance to inquire about my coverage and options to see doctors in [place].  2. I will establish care with a Primary Care doctor in [place].  3. I will establish care with a Psychiatrist and therapist in [place].    As of today's date 3/18/2020 goal is met at 0 - 25%.   Goal Status:  Active - Patient given information in order to schedule appointments with PCP/Psychiatry; no appointments yet scheduled.  As of today's date 5/22/2020 goal is met at 26 - 50%.   Goal Status:  Active - Patient identified a PCP she would like to establish with; she will call her insurance to see if able to see the provider and actions needed.  8/4/20-CHW    Patient stated that she has been having a very difficult time finding a primary care doctor in [place].  Patient stated that she has called quite a few clinics, and sh is told that someone will contact her to schedule an appointment, and no one has.  Patient is getting frustrated, and would like to see a counselor.    Patient stated that when she was in [place] for a few weeks, the doctor told her that there would be a co-pay since she is living in [place].  The patient stated that the appointment was cancelled.              Intervention and Education during outreach: Used Empathy and Active listening to understand the patients barriers  and struggles. CHW encouraged patient to contact CC if there are any other changes or resources needed.  Patient acknowledged understanding        CHW Plan: will call [facility] to check on benefits for providers in [place].    Next Outreach: 8/25/20  Planned Outreach Frequency: Monthly    GEORGINA Logan  Clinic Care Coordination  [facility] Clinics: [facility], [facility], and [facility]  Phone: [phone]

## 2020-08-26 ENCOUNTER — MYC MEDICAL ADVICE (OUTPATIENT)
Dept: FAMILY MEDICINE | Facility: CLINIC | Age: 25
End: 2020-08-26

## 2020-08-29 ENCOUNTER — E-VISIT (OUTPATIENT)
Dept: FAMILY MEDICINE | Facility: CLINIC | Age: 25
End: 2020-08-29
Payer: COMMERCIAL

## 2020-08-29 DIAGNOSIS — G89.29 CHRONIC BILATERAL LOW BACK PAIN WITHOUT SCIATICA: Primary | ICD-10-CM

## 2020-08-29 DIAGNOSIS — M54.50 CHRONIC BILATERAL LOW BACK PAIN WITHOUT SCIATICA: Primary | ICD-10-CM

## 2020-08-29 PROCEDURE — 99422 OL DIG E/M SVC 11-20 MIN: CPT | Performed by: FAMILY MEDICINE

## 2020-08-31 ENCOUNTER — PATIENT OUTREACH (OUTPATIENT)
Dept: CARE COORDINATION | Facility: CLINIC | Age: 25
End: 2020-08-31

## 2020-08-31 RX ORDER — METHOCARBAMOL 750 MG/1
750 TABLET, FILM COATED ORAL 3 TIMES DAILY PRN
Qty: 30 TABLET | Refills: 1 | Status: SHIPPED | OUTPATIENT
Start: 2020-08-31 | End: 2021-09-10

## 2020-08-31 NOTE — PATIENT INSTRUCTIONS
Caring for Your Back    You are not alone.    Low back pain is very common. Nearly half of all adults will have low back pain in any given year. The good news is that back pain is rarely a danger to your health. Most people can manage their back pain on their own. About half of people start feeling better within two weeks. In 9 out of 10 cases, low back pain goes away or no longer limits daily activity within 6 weeks.     Your outlook is good!     Your symptoms tell us that your low back pain is most likely not a danger to you. Most of the time we will not know the exact cause of low back pain, even if you see a doctor or have an MRI. However, treatment can still work without knowing the cause of the pain. Less than 1 in 100 people need surgery for their back pain.     What can I do about my low back pain?     There are three basic things you can do to ease low back pain and help it go away.     Use heat or cold packs.    Take medicine as directed.    Use positions, movements and exercises.    Using heat or cold packs:    Try cold packs or gentle heat to ease your pain.  Use whichever gives you the most relief. Apply the cold pack or heat for 15 minutes at a time, as often as needed.    Taking medicine:    If taking over-the-counter medicine:    Take ibuprofen (Advil, Motrin) 600 mg three times a day as needed for pain.  OR    Take Aleve (naproxen) 220 to 440 mg two times a day as needed for pain. If your doctor prescribed a muscle relaxant (cyclobenzaprine 10 mg.):    Take   to 1 tablet at bedtime.    Do not drive when taking this medicine. This drug may make you sleepy.     Using positions, movements and exercises:    Research tells us that moving your joints and muscles can help you recover from back pain. Such activity should be simple and gentle. Use the positions below as well as walking to help relieve your pain. Try taking a short walk every 3 to 4 hours during the day. Walk for a few minutes inside your  home or take longer walks outside, on a treadmill or at a mall. Slowly increase the amount of time you walk. Expect discomfort when you begin, but it should lessen as your back starts to heal. When your back feels better, walk daily to keep your back and body healthy.    Finding a position that is comfortable:    When your back pain is new, certain positions will ease your pain. Gently try each of the positions below until you find one that is helpful. Once you find a position of comfort, use it as often as you like when you are resting. You will recover faster if you combine rest with activity.    * Lie on your back with your legs bent. You can do this by placing a pillow under your knees or lie on the floor and rest your lower legs on the seat of a chair.  * Lie on your side with your knees bent and place a pillow between your knees.    Lie on your stomach over pillows.       When should I call my doctor?    Your back pain should improve over the first couple of weeks. As it improves, you should be able to return to your normal activities.  But call your doctor if:      You have a sudden change in your ability to control your bladder or bowels.    You begin to feel tingling in your groin or legs.    The pain spreads down your leg and into your foot.    Your toes, feet or leg muscles begin to feel weak.    You feel generally unwell or sick.    Your pain gets worse.    If you are deaf or hard of hearing, please let us know. We provide many free services including sign language interpreters, oral interpreters, TTYs, telephone amplifiers, note takers and written materials.    For informational purposes only. Not to replace the advice of your health care provider. Copyright   2013 [facility]. All rights reserved. [employer] [id number] - 04/13.

## 2020-08-31 NOTE — PROGRESS NOTES
Clinic Care Coordination Contact  [facility]/Voicemail       Clinical Data: Care Coordinator Outreach  Outreach attempted x 1.  Left message on patient's voicemail with call back information and requested return call.  Plan: Care Coordinator will try to reach patient again in 10 business days.    Next Outreach: 9/14/20    GEORGINA Logan  Clinic Care Coordination  [facility] Clinics: [facility], [facility], and [facility]  Phone: [phone]

## 2020-09-01 ENCOUNTER — MYC MEDICAL ADVICE (OUTPATIENT)
Dept: FAMILY MEDICINE | Facility: CLINIC | Age: 25
End: 2020-09-01

## 2020-09-01 DIAGNOSIS — R41.840 ATTENTION AND CONCENTRATION DEFICIT: Primary | ICD-10-CM

## 2020-09-01 NOTE — TELEPHONE ENCOUNTER
Please see patient mychart message.     Mental health referral pended for provider review. Also, anything further you would recommend.

## 2020-09-01 NOTE — TELEPHONE ENCOUNTER
I think she needs to see Psych due to her complex mental health history.  Psych can do the ADD testing as well.

## 2020-09-22 ENCOUNTER — PATIENT OUTREACH (OUTPATIENT)
Dept: CARE COORDINATION | Facility: CLINIC | Age: 25
End: 2020-09-22

## 2020-09-22 ASSESSMENT — ACTIVITIES OF DAILY LIVING (ADL): DEPENDENT_IADLS:: INDEPENDENT

## 2020-09-22 NOTE — PROGRESS NOTES
Clinic Care Coordination Contact  [facility]/Voicemail    Referral Source: PCP  Clinical Data: Care Coordinator Outreach  CC RN outreach to get updates on patient status, assess goal progress, and provide support and additional resources as needed.    Outreach attempted x 1.  Left message on patient's voicemail with call back information and requested return call.    Plan: Care Coordinator will try to reach patient again in approximately 7-10 business days.    Patrick Champion RN  Clinic Care Coordinator  [facility]  Ph: [phone]

## 2020-10-12 ENCOUNTER — PATIENT OUTREACH (OUTPATIENT)
Dept: NURSING | Facility: CLINIC | Age: 25
End: 2020-10-12
Payer: COMMERCIAL

## 2020-10-12 ASSESSMENT — ACTIVITIES OF DAILY LIVING (ADL): DEPENDENT_IADLS:: INDEPENDENT

## 2020-10-12 NOTE — LETTER
[place] CARE COORDINATION  [facility]  [address], [place] [address]    October 12, 2020        Lenore Suggs  [address]  [facility] [place] [address]          Dear Brayden Grover is an updated Complex Care Plan for your continued enrollment in Care Coordination. Please let us know if you have additional questions, concerns, or goals that we can assist with.    Sincerely,    Patrick Champion RN        [facility]  Complex Care Plan  About Me:    Patient Name:  Lenore Suggs    YOB: 1995  Age:         25 year old   [place] MRN:    [id number] Telephone Information:  Home Phone [phone]   Mobile [phone]       Address:  [address]  [facility] [place] [address] Email address:  [email or web address]      Emergency Contact(s)    Name Relationship Lgl Grd Work Phone Home Phone Mobile Phone   1. MICHELLE HEALLZE* Friend    [phone]   2. DECLINED, PER * Declined               Primary language:  English     needed? No   [place] [facility]:  [phone] op. 1  Other communication barriers: None  Preferred Method of Communication:     Current living arrangement: I live alone  Mobility Status/ Medical Equipment: Independent    Health Maintenance  Health Maintenance Reviewed: Due/Overdue   Health Maintenance Due   Topic Date Due     PREVENTIVE CARE VISIT  1995     CHLAMYDIA SCREENING  1996     HPV IMMUNIZATION (2 - 2-dose series) 04/18/2011     HIV SCREENING  08/25/2011     PAP  08/25/2017     DTAP/TDAP/TD IMMUNIZATION (4 - Td) 04/25/2020     INFLUENZA VACCINE (1) 09/01/2020     My Access Plan  Medical Emergency 911   Primary Clinic Line [facility] - [phone]   24 Hour Appointment Line [phone] or  [phone]) (toll-free)   24 Hour Nurse Line [phone] (toll-free)   Preferred Urgent Care [facility], [phone]    Preferred Hospital [facility]  [phone]   Preferred Pharmacy [id number] - [place], [place] - [address] AT [facility] & [facility]     Behavioral Health Crisis Line The National Suicide Prevention Lifeline at 1-414.561.3287 or 917       My Care Team Members  Patient Care Team       Relationship Specialty Notifications Start End    Lanny Kaba PA-C PCP - General Physician Assistant  11/4/19     Phone: [phone] Fax: [phone]         [address] [facility] [address]    Patrick Champion, RN Lead Care Coordinator Primary Care - CC  10/25/19     Phone: [phone]         Lanny Kaba PA-C Assigned PCP   11/24/19     Phone: [phone] Fax: [phone]         [address] [facility] [address]    Fartun Camp MA Community Health Worker Primary Care - CC  1/22/20     Phone: [phone]                 My Care Plans  Self Management and Treatment Plan  Goals and (Comments)  Goals        General    1. Medical (pt-stated)     Notes - Note edited  10/12/2020  2:28 PM by Patrick Champion, RN    Goal Statement: I will establish with Primary Care and Mental Health providers in [place].  Date Goal set: 2/12/20  Barriers: Patient unsure of insurance coverage.  Strengths: Patient understands importance of having doctors available locally.  Date to Achieve By: 2/12/20 // extended to 12/31/20  Patient expressed understanding of goal: Yes    Action steps to achieve this goal:  1. I will call my insurance to inquire about my coverage and options to see doctors in [place].  2. I will establish care with a Primary Care doctor in [place].  3. I will establish care with a Psychiatrist and therapist in [place].             Action Plans on File:   Depression    Advance Care Plans/Directives Type:   None on file      My Medical and Care Information  Problem List   Patient Active Problem List   Diagnosis     Schizophrenia (H)     History  of suicide attempt     Suicidal ideation     Bipolar affective disorder, currently depressed, moderate (H)     Headache, unspecified headache type     BMI > 35      Current Medication:  Current Outpatient Medications   Medication Instructions     buPROPion (WELLBUTRIN XL) 300 mg, Oral, EVERY MORNING     hydrOXYzine (ATARAX) 25 mg, Oral, 3 TIMES DAILY PRN     methocarbamol (ROBAXIN) 750 mg, Oral, 3 TIMES DAILY PRN     methocarbamol (ROBAXIN) 750 mg, Oral, 3 TIMES DAILY PRN     propranolol (INDERAL) 20 mg, Oral, 3 TIMES DAILY PRN     SUMAtriptan (IMITREX)  mg, Oral, AT ONSET OF HEADACHE, May repeat in 2 hrs.  Max 200 mg in 24 hrs     Care Coordination Start Date: 10/25/2019   Frequency of Care Coordination: monthly   Form Last Updated: 10/12/2020

## 2020-10-12 NOTE — PROGRESS NOTES
"Clinic Care Coordination Contact    Follow Up Progress Note      Assessment:    Patient updated that she has continued to struggle with establishing care with PCP and Psychiatry in [place].  She stated she \"just keeps calling and gets told they'll look into it and then she never receives a call back\".    Patient did note that she has established care with a therapist in [place].    Patient was unable to provide any names of providers or clinics that she has been in contact with.    She did not that there is a primary care clinic just down the street from where she is living so she can call them back and ask about openings to establish care.  She will get their contact information, as well, and provide it to CC RN following her call.    Patient stated she would appreciate whatever help Care Coordination can provide and she has been struggling to coordinate her care.    Goals addressed this encounter:   Goals Addressed                 This Visit's Progress       Patient Stated      1. Medical (pt-stated)   20%     Goal Statement: I will establish with Primary Care and Mental Health providers in [place].  Date Goal set: 2/12/20  Barriers: Patient unsure of insurance coverage.  Strengths: Patient understands importance of having doctors available locally.  Date to Achieve By: 2/12/20 // extended to 12/31/20  Patient expressed understanding of goal: Yes    Action steps to achieve this goal:  1. I will call my insurance to inquire about my coverage and options to see doctors in [place].  2. I will establish care with a Primary Care doctor in [place].  3. I will establish care with a Psychiatrist and therapist in [place].        Intervention/Education provided during outreach:    CC RN reviewed patient's goal as above and emphasized importance of her establishing care with both PCP and specialty mental health providers/Psychiatry in [place]; patient stated understanding and is agreeable to continuing to work toward this " goal.    CC RN noted patient's scheduled virtual visit with Psychiatry on 10/20/20; patient stated she was not aware of this appointment and did not schedule this; CC RN will look into this.     Outreach Frequency: monthly    Plan:     Patient will contact the primary care clinic close to her house tomorrow and inquire about establishing care.  She will provide CC RN with clinic's name and phone number once she speaks to them so CC can continue supporting patient to establish care.    CC RN will send message to Psychiatry provider as scheduled on 10/20/20 to request that schedulers/team reach out to patient to either confirm or cancel patient's appointment; noted that patient [place]of[place].    CC RN will await follow-up call/message from patient to determine next steps.    Patrick Champion RN  Clinic Care Coordinator  [facility] & [facility]  Ph: [phone]

## 2020-10-23 ENCOUNTER — PATIENT OUTREACH (OUTPATIENT)
Dept: CARE COORDINATION | Facility: CLINIC | Age: 25
End: 2020-10-23

## 2020-10-23 ASSESSMENT — ACTIVITIES OF DAILY LIVING (ADL): DEPENDENT_IADLS:: INDEPENDENT

## 2020-10-23 NOTE — PROGRESS NOTES
Clinic Care Coordination Contact  Care Team Conversations    Spoke with Magdalena, representative with [facility] at [place].  She indicated that their records show that patient has a [facility] insurance plan.  In order to be able to schedule with their clinic, patient needs to call her insurance and have them switch her PCP to one of their approved PCPs; Magdalena stated patient could switch PCP to Dr. Crissy Hernandez.    CC RN spoke with representative with [facility]; they informed that because CC RN did not have member's ID, they were unable to assist.  Member would need to get her member ID, call [facility], select Member Services, and then they can assist with above.    CC RN send update message to patient via [employer] to inquire if she has any information regarding her [employer] insurance plan; will await reply.    Patrick Champion RN  Clinic Care Coordinator  [facility] & [facility]  Ph: [phone]

## 2020-10-25 ENCOUNTER — MYC MEDICAL ADVICE (OUTPATIENT)
Dept: FAMILY MEDICINE | Facility: CLINIC | Age: 25
End: 2020-10-25

## 2020-10-26 ENCOUNTER — NURSE TRIAGE (OUTPATIENT)
Dept: FAMILY MEDICINE | Facility: CLINIC | Age: 25
End: 2020-10-26

## 2020-10-26 NOTE — TELEPHONE ENCOUNTER
Started separate encounter and routed to triage. Closing this encounter. Patient has been notified to seek care in ED with shortness of breath and worsening symptoms by other triage RN.

## 2020-10-26 NOTE — TELEPHONE ENCOUNTER
Patient Contact    Attempt # 1    Was call answered?  No.  Left message on voicemail with information to call triage back.    On call back:     - Triage symptoms

## 2020-10-26 NOTE — TELEPHONE ENCOUNTER
Patient Contact    Attempt # 1 (2nd attempt today)    Was call answered?  No.  Left message on voicemail with information to call triage back.    On call back:     -Triage symptoms

## 2020-10-27 NOTE — TELEPHONE ENCOUNTER
Patient Contact    Attempt # 2    Was call answered?  No.  Left message on voicemail with information to call me back.    On call back:    -Please triage pt sx.

## 2020-10-28 NOTE — TELEPHONE ENCOUNTER
Patient Contact     Attempt # 4     Followup MC message sent.     On call back:     -Please triage pt sx.

## 2020-10-28 NOTE — TELEPHONE ENCOUNTER
Patient Contact     Attempt # 3     Was call answered?  No.  Left message on voicemail with information to call me back.     On call back:     -Please triage pt sx.

## 2020-11-01 ENCOUNTER — MYC MEDICAL ADVICE (OUTPATIENT)
Dept: FAMILY MEDICINE | Facility: CLINIC | Age: 25
End: 2020-11-01

## 2020-11-02 RX ORDER — ALBUTEROL SULFATE 90 UG/1
2 AEROSOL, METERED RESPIRATORY (INHALATION) EVERY 6 HOURS
Status: CANCELLED | OUTPATIENT
Start: 2020-11-02

## 2020-11-02 NOTE — TELEPHONE ENCOUNTER
Copied patient mychart and sent to PCP to review in separate encounter.     No further action needed in this encounter.

## 2020-11-02 NOTE — TELEPHONE ENCOUNTER
Reason for Call:  Medication or medication refill    Do you use a [facility]?    No   Name of the pharmacy and phone number for the current request   [facility]  On [address] [facility]      Name of the medication requested   Pt needs an new inhaler     Other request   No     Can we leave a detailed message on this number? YES    Phone number patient can be reached at: Home number on file [phone] (home)    Best Time   anytime    Call taken on 11/2/2020 at 12:37 PM by Andra Nguyễn

## 2020-11-02 NOTE — TELEPHONE ENCOUNTER
Zyban and Wellbutrin are the same meds...  Wellbutrin has many indications.  It can treat Depression, ADD, help you quit smoking, etc.  So yes, Wellbutrin/Zyban can help you focus better.

## 2020-11-02 NOTE — TELEPHONE ENCOUNTER
See [employer] message.    Patient sent follow-up message:  Update to the Wellbutrin and Zyban together? Is that the medication that was helping me focus? I looked it up and said it's used for patients that smoke? I was 13 years old and I don't smoke anything.    Routing to provider to review/advise.

## 2020-11-09 ENCOUNTER — PATIENT OUTREACH (OUTPATIENT)
Dept: CARE COORDINATION | Facility: CLINIC | Age: 25
End: 2020-11-09

## 2020-11-09 ASSESSMENT — ACTIVITIES OF DAILY LIVING (ADL): DEPENDENT_IADLS:: INDEPENDENT

## 2020-11-09 NOTE — PROGRESS NOTES
Clinic Care Coordination Contact    Care Team Conversations    Based on update from patient stating that she was not able to apply for [facility] Medicaid, CC RN made additional calls in hopes of better understanding how to assist patient to establish care in [place].    CC RN spoke with [facility] provider assistance [place]; was advised that CC RN/patient should contact [facility] to inquire further about next steps related to her foster child history.    CC RN called and spoke with [facility]; was advised that CC RN/patient need to call Economic Assistance provider line (ph: [phone]) to discuss situation further.    [facility]/Voicemail    Referral Source: PCP  Clinical Data: Care Coordinator Outreach  Outreach attempted x 1.  Left message on patient's voicemail with call back information and requested return call.    Also sent patient [employer] message.    Plan: CC RN will await return call/message from patient.  Plan to call with patient to Economic Assistance to discuss patient's situation and plan to ensure appropriate insurance coverage in order for her to establish care with providers in [place].    Patrick Champion RN  Clinic Care Coordinator  [facility]  Ph: [phone]

## 2020-11-16 DIAGNOSIS — R05.9 COUGH: ICD-10-CM

## 2020-11-19 NOTE — TELEPHONE ENCOUNTER
Routing refill request to provider for review/approval because:  ACT out of date, provider review needed.

## 2020-11-20 RX ORDER — ALBUTEROL SULFATE 90 UG/1
2 AEROSOL, METERED RESPIRATORY (INHALATION) EVERY 4 HOURS PRN
Qty: 6.7 G | Refills: 0 | Status: SHIPPED | OUTPATIENT
Start: 2020-11-20 | End: 2021-10-29

## 2020-11-24 ENCOUNTER — PATIENT OUTREACH (OUTPATIENT)
Dept: CARE COORDINATION | Facility: CLINIC | Age: 25
End: 2020-11-24

## 2020-11-24 DIAGNOSIS — F41.0 ANXIETY ATTACK: ICD-10-CM

## 2020-11-24 DIAGNOSIS — F31.32 BIPOLAR AFFECTIVE DISORDER, CURRENTLY DEPRESSED, MODERATE (H): ICD-10-CM

## 2020-11-24 ASSESSMENT — ACTIVITIES OF DAILY LIVING (ADL): DEPENDENT_IADLS:: INDEPENDENT

## 2020-11-24 NOTE — LETTER
[place] CARE COORDINATION  [facility]  [address], [place] [address]    December 14, 2020    Lenore Suggs  [address]  [facility] [place] [facility] [address]      Dear Lenore,    I have been unsuccessful in reaching you since our last contact. At this time the Care Coordination team will make no further attempts to reach you, however this does not change your ability to continue receiving care from your providers at your primary care clinic. If you need additional support from a care coordinator in the future please contact Fartun Camp, Community Health Worker, at [phone].    All of us at the [facility] are invested in your health and are here to assist you in meeting your goals.     Sincerely,    Patrick Champion RN  Clinic Care Coordinator  [facility] & [facility]  Ph: [phone]

## 2020-11-24 NOTE — PROGRESS NOTES
Clinic Care Coordination Contact  [facility]/Voicemail    Referral Source: PCP  Clinical Data: Care Coordinator Outreach  CC RN outreach to get updates on patient status, discuss goal of establishing care in [place], and provide support and additional resources as needed.    Outreach attempted x 2 (previous attempt on 11/9/20).  Left message on patient's voicemail with call back information and requested return call.    Plan: CC RN will try to reach patient again in 7-10 business days.  CC RN will update CC CHW outreach date based on CC RN current patient outreach attempts.    Patrick Champion, RN  Clinic Care Coordinator  [facility] & [facility]  Ph: [phone]

## 2020-11-25 NOTE — TELEPHONE ENCOUNTER
Routing refill request to provider for review/approval because:  Labs out of range:  PHQ-9    PHQ 6/15/2020 7/6/2020 7/8/2020   PHQ-9 Total Score 19 23 21   Q9: Thoughts of better off dead/self-harm past 2 weeks Several days More than half the days Several days   F/U: Thoughts of suicide or self-harm - No -   F/U: Self harm-plan - - -   F/U: Self-harm action - - -   F/U: Safety concerns - No -

## 2020-11-27 RX ORDER — BUPROPION HYDROCHLORIDE 300 MG/1
300 TABLET ORAL EVERY MORNING
Qty: 90 TABLET | Refills: 1 | Status: SHIPPED | OUTPATIENT
Start: 2020-11-27 | End: 2021-05-20

## 2020-12-01 NOTE — TELEPHONE ENCOUNTER
Patient Contact    Attempt # 2    Was call answered?  No.  Left message on voicemail with information to call me back.    On call back:    -Update PHQ-9

## 2020-12-02 NOTE — TELEPHONE ENCOUNTER
Patient Contact     Attempt # 3     Was call answered?  No.  Left message on voicemail with information to call me back.     On call back:     -Update PHQ-9

## 2020-12-14 ENCOUNTER — HEALTH MAINTENANCE LETTER (OUTPATIENT)
Age: 25
End: 2020-12-14

## 2020-12-14 ASSESSMENT — ACTIVITIES OF DAILY LIVING (ADL): DEPENDENT_IADLS:: INDEPENDENT

## 2020-12-14 NOTE — PROGRESS NOTES
Clinic Care Coordination Contact  [facility]/Voicemail    Referral Source: PCP  Clinical Data: Care Coordinator Outreach  CC RN outreach to get updates on patient status, discuss goal of establishing care in [place], and provide support and additional resources as needed.    Outreach attempted x 3 (previous attempts on 11/9/20 and 11/24/20; no return calls/messages received).  Left message on patient's voicemail with call back information and requested return call.    Plan: Care Coordinator will send disenrollment letter with care coordinator contact information via [employer]. Care Coordinator will do no further outreaches at this time.  FYI to PCP.    Patrick Champion, RN  Clinic Care Coordinator  [facility] & [facility]  Ph: [phone]

## 2021-01-06 ENCOUNTER — MYC MEDICAL ADVICE (OUTPATIENT)
Dept: FAMILY MEDICINE | Facility: CLINIC | Age: 26
End: 2021-01-06

## 2021-01-06 NOTE — TELEPHONE ENCOUNTER
Spoke to pt, states she will need to call back and schedule. [employer] message also sent to pt.Ok HART CMA

## 2021-01-08 ENCOUNTER — VIRTUAL VISIT (OUTPATIENT)
Dept: PSYCHOLOGY | Facility: CLINIC | Age: 26
End: 2021-01-08
Payer: COMMERCIAL

## 2021-01-08 DIAGNOSIS — Z13.39 ATTENTION DEFICIT HYPERACTIVITY DISORDER (ADHD) EVALUATION: ICD-10-CM

## 2021-01-08 DIAGNOSIS — F33.2 SEVERE EPISODE OF RECURRENT MAJOR DEPRESSIVE DISORDER, WITHOUT PSYCHOTIC FEATURES (H): ICD-10-CM

## 2021-01-08 DIAGNOSIS — F43.10 POSTTRAUMATIC STRESS DISORDER WITH DISSOCIATIVE SYMPTOMS: Primary | ICD-10-CM

## 2021-01-08 PROCEDURE — 90837 PSYTX W PT 60 MINUTES: CPT | Mod: 95 | Performed by: PSYCHOLOGIST

## 2021-01-08 ASSESSMENT — COLUMBIA-SUICIDE SEVERITY RATING SCALE - C-SSRS: 1. IN THE PAST MONTH, HAVE YOU WISHED YOU WERE DEAD OR WISHED YOU COULD GO TO SLEEP AND NOT WAKE UP?: YES

## 2021-01-08 NOTE — PROGRESS NOTES
Progress Note - Initial Visit    Client Name:  Lenore Suggs Date: 2021         Service Type: Individual     Visit Start Time: 903  Visit End Time: 957    Visit #: 1    Attendees: Client    Service Modality:  Video Visit:      Provider verified identity through the following two step process.  Patient provided:  Patient     Telemedicine Visit: The patient's condition can be safely assessed and treated via synchronous audio and visual telemedicine encounter.      Reason for Telemedicine Visit: Services only offered telehealth    Originating Site (Patient Location): Patient's home    Distant Site (Provider Location): Provider Remote Setting    Consent:  The patient/guardian has verbally consented to: the potential risks and benefits of telemedicine (video visit) versus in person care; bill my insurance or make self-payment for services provided; and responsibility for payment of non-covered services.     Patient would like the video invitation sent by: Send to e-mail at: [email or web address]    Mode of Communication:  Video Conference via Amwell    As the provider I attest to compliance with applicable laws and regulations related to telemedicine.       DATA:   Interactive Complexity: No   Crisis: No     Presenting Concerns/  Current Stressors:   ADHD Evaluation    ASSESSMENT:  Mental Status Assessment:  Appearance:   Appropriate   Eye Contact:   Poor  Psychomotor Behavior: Restless   Attitude:   Cooperative   Orientation:   All  Speech   Rate / Production: Normal/ Responsive   Volume:  Normal   Mood:    Anxious  Depressed   Affect:    Restricted   Thought Content:  Clear   Thought Form:  Logical   Insight:    Fair  Dissociative     Safety Issues and Plan for Safety and Risk Management:     [place] Suicide Severity Rating Scale (Short Version)  [place] Suicide Severity Rating (Short Version) 2021   Over the past 2 weeks have you felt down, depressed, or hopeless? yes   Over  the past 2 weeks have you had thoughts of killing yourself? yes   Have you ever attempted to kill yourself? yes   When did this last happen? more than 6 months ago   Q1 Wished to be Dead (Past Month) yes   Q2 Suicidal Thoughts (Past Month) yes   Q3 Suicidal Thought Method yes   Q4 Suicidal Intent without Specific Plan no   Q5 Suicide Intent with Specific Plan no   Q6 Suicide Behavior (Lifetime) yes     Patient denies current fears or concerns for personal safety.  Patient reports the following current or recent suicidal ideation or behaviors: passive and active.  Patient denies current or recent homicidal ideation or behaviors.  Patient reports current or recent self injurious behavior or ideation including -.  Patient denies other safety concerns.  Recommended that patient call 911 or go to the local ED should there be a change in any of these risk factors.     Diagnostic Criteria:  Posttraumatic Stress Disorder with Dis.  Major Depressive Disorder  Rule Out ADHD    WHODAS 2.0 (12 item): No flowsheet data found.  Intervention:   During today's session, this writer outlined the purpose and expectations for the ADHD Evaluation including Notice of Billing. Ms. Suggs discussed her reason for referral and symptoms. She outlined a history of PTSD and MDD. She struggled to speak of her past and current symptoms due to emotional dysregulation. She disassociated during the meeting and required skills coaching to regulate she was open to coaching and able to follow along. She was encouraged to use the skills as practiced between session. She acknowledged recent passive and active suicidal ideations. She denied recent attempts to end her life yet stated that she will take the option off the table. This writer began filling out the Adult ADHD Evaluation Intake Form to guide the interview; it was not completed. She was scheduled for a second session and will require one or two more session to complete the  interview.  Collateral Reports Completed:  Routed note to PCP      PLAN: (Homework, other):  1. Provider will continue Diagnostic Assessment.  Patient was given the following to do until next session:  Use skills handout sent.    2.  Safety plan will be created.      Lisa Petit, PhD LP  January 8, 2021

## 2021-01-18 ENCOUNTER — VIRTUAL VISIT (OUTPATIENT)
Dept: PSYCHOLOGY | Facility: CLINIC | Age: 26
End: 2021-01-18
Payer: COMMERCIAL

## 2021-01-18 DIAGNOSIS — F33.2 SEVERE EPISODE OF RECURRENT MAJOR DEPRESSIVE DISORDER, WITHOUT PSYCHOTIC FEATURES (H): Primary | ICD-10-CM

## 2021-01-18 DIAGNOSIS — Z13.39 ATTENTION DEFICIT HYPERACTIVITY DISORDER (ADHD) EVALUATION: ICD-10-CM

## 2021-01-18 PROCEDURE — 90837 PSYTX W PT 60 MINUTES: CPT | Mod: 95 | Performed by: PSYCHOLOGIST

## 2021-01-18 NOTE — PROGRESS NOTES
Progress Note - Initial Visit    Client Name:  Lenore Suggs Date: 1/18/2021         Service Type: Individual     Visit Start Time: 1603  Visit End Time: 1715    Visit #: 2    Attendees: Client    Service Modality:  Video Visit:      Provider verified identity through the following two step process.  Patient provided:  Patient photo    Telemedicine Visit: The patient's condition can be safely assessed and treated via synchronous audio and visual telemedicine encounter.      Reason for Telemedicine Visit: Services only offered telehealth    Originating Site (Patient Location): Patient's home    Distant Site (Provider Location): Provider Remote Setting    Consent:  The patient/guardian has verbally consented to: the potential risks and benefits of telemedicine (video visit) versus in person care; bill my insurance or make self-payment for services provided; and responsibility for payment of non-covered services.     Patient would like the video invitation sent by:  Send to e-mail at: [email or web address]    Mode of Communication:  Video Conference via Amwell    As the provider I attest to compliance with applicable laws and regulations related to telemedicine.       DATA:   Interactive Complexity: No   Crisis: No     Presenting Concerns/  Current Stressors:   ADHD Evaluation    ASSESSMENT:  Mental Status Assessment:  Appearance:   Appropriate   Eye Contact:   Poor  Psychomotor Behavior: Restless   Attitude:   Cooperative   Orientation:   All  Speech   Rate / Production: Normal/ Responsive   Volume:  Normal   Mood:    Anxious  Depressed   Affect:    Blunted   Thought Content:  Clear   Thought Form:  Goal Directed   Insight:    Fair       Safety Issues and Plan for Safety and Risk Management:     [place] Suicide Severity Rating Scale (Short Version)  [place] Suicide Severity Rating (Short Version) 1/8/2021   Over the past 2 weeks have you felt down, depressed, or hopeless? yes   Over the  past 2 weeks have you had thoughts of killing yourself? yes   Have you ever attempted to kill yourself? yes   When did this last happen? more than 6 months ago   Q1 Wished to be Dead (Past Month) yes   Q2 Suicidal Thoughts (Past Month) yes   Q3 Suicidal Thought Method yes   Q4 Suicidal Intent without Specific Plan no   Q5 Suicide Intent with Specific Plan no   Q6 Suicide Behavior (Lifetime) yes     Patient denies current fears or concerns for personal safety.  Patient reports the following current or recent suicidal ideation or behaviors: passive SI, 3x per week.  Patient denies current or recent homicidal ideation or behaviors.  Patient denies current or recent self injurious behavior or ideation.  Patient denies other safety concerns.     A safety and risk management plan has been developed including: Patient consented to co-developed safety plan.  Safety and risk management plan was completed.  Patient agreed to use safety plan should any safety concerns arise.  A copy was given to the patient.    Diagnostic Criteria:  A) Recurrent episode(s) - symptoms have been present during the same 2-week period and represent a change from previous functioning 5 or more symptoms (required for diagnosis)   - Depressed mood. Note: In children and adolescents, can be irritable mood.     - Diminished interest or pleasure in all, or almost all, activities.    - Decreased sleep.    - Psychomotor activity retardation.    - Fatigue or loss of energy.    - Feelings of worthlessness or inappropriate and excessive guilt.    - Diminished ability to think or concentrate, or indecisiveness.    - Recurrent thoughts of death (not just fear of dying), recurrent suicidal ideation without a specific plan, or a suicide attempt or a specific plan for committing suicide.   B) The symptoms cause clinically significant distress or impairment in social, occupational, or other important areas of functioning     Social Anxiety    Hx of OCD  "symptoms    Rule Out ADHD    WHODAS 2.0 (12 item): No flowsheet data found.  Intervention:   During today's session this writer worked with Ms. Suggs to complete the Adult ADHD Evaluation Intake form; it was finished. This writer also created a Safety Plan with Ms. Suggs due to her suicidal ideations and history of attempts. This writer scheduled a 3rd session to work on collecting background information; due to the complexities two more sessions likely will be needed.   Collateral Reports Completed:  Routed note to PCP      PLAN: (Homework, other):  1. Provider will continue Diagnostic Assessment.      2.  Safety plan created.  Provider recommended that patient  Utilize plan as needed.        Lisa Petit, PhD LP  January 18, 2021                                                 Lenore LUZMAEwa Suggs     SAFETY PLAN:  Step 1: Warning signs / cues (Thoughts, images, mood, situation, behavior) that a crisis may be developing:    Thoughts: \"People would be better off without me\", \"I'm a burden\", \"I just want this to end\" and \"I'm not good enough\"    Images: visions of harm: previous attempts    Thinking Processes: depersonalization and ruminations, critical thoughts of self    Mood: overwhelmed    Behaviors: isolating/withdrawing  and increasing frequency and duration of dissociation    Situations: teased  by others  Step 2: Coping strategies - Things I can do to take my mind off of my problems without contacting another person (relaxation technique, physical activity):    Distress Tolerance Strategies:  relaxation activities: video games, watch television    Physical Activities: go for a walk and taking pictures    Focus on helpful thoughts:  \"I will get through this\"  Step 3: People and social settings that provide distraction:   Name: Partner    Name: Therapist    Place: Bedroom, the dark   Step 4: Remind myself of people and things that are important to me and worth living for:   Things can get better  Step " 5: When I am in crisis, I can ask these people to help me use my safety plan:   Name: Partner  Step 6: Making the environment safe:     be around others  Step 7: Professionals or agencies I can contact during a crisis:    [facility] Daytime Number: [phone]    Suicide Prevention Lifeline: 4-582-019-TALK (8255)    Crisis Text Line Service (available 24 hours a day, 7 days a week): Text [place] to 354193    Call 911 or go to my nearest emergency department.   I helped develop this safety plan and agree to use it when needed.  I have been given a copy of this plan.      Client signature _________________________________________________________________  Today s date:  1/18/2021  Adapted from Safety Plan Template 2008 Joy Kendall and Jaylan Tanner is reprinted with the express permission of the authors.  No portion of the Safety Plan Template may be reproduced without the express, written permission.  You can contact the authors at [email or web address] or [email or web address].

## 2021-01-29 ENCOUNTER — TELEPHONE (OUTPATIENT)
Dept: FAMILY MEDICINE | Facility: CLINIC | Age: 26
End: 2021-01-29

## 2021-01-29 NOTE — TELEPHONE ENCOUNTER
Needs of attention regarding:  -Cervical Cancer Screening  -Wellness (Physical) Visit   -Chlamydia Screening    Health Maintenance Topics with due status: Overdue       Topic Date Due    PREVENTIVE CARE VISIT 1996    CHLAMYDIA SCREENING 1996    HPV IMMUNIZATION 04/18/2011    HIV SCREENING 08/25/2011    HEPATITIS C SCREENING 08/25/2014    PAP 08/25/2017    DTAP/TDAP/TD IMMUNIZATION 04/25/2020    INFLUENZA VACCINE 09/01/2020       Communication:  See MyChart message

## 2021-02-03 ENCOUNTER — VIRTUAL VISIT (OUTPATIENT)
Dept: PSYCHOLOGY | Facility: CLINIC | Age: 26
End: 2021-02-03
Payer: COMMERCIAL

## 2021-02-03 DIAGNOSIS — Z13.39 ATTENTION DEFICIT HYPERACTIVITY DISORDER (ADHD) EVALUATION: Primary | ICD-10-CM

## 2021-02-03 NOTE — PROGRESS NOTES
This writer spoke to Felecia but the session could not take place because she was vacationing in the [facility]. She was scheduled for 2/16

## 2021-02-16 ENCOUNTER — VIRTUAL VISIT (OUTPATIENT)
Dept: PSYCHOLOGY | Facility: CLINIC | Age: 26
End: 2021-02-16
Payer: COMMERCIAL

## 2021-02-16 DIAGNOSIS — F43.10 POSTTRAUMATIC STRESS DISORDER WITH DISSOCIATIVE SYMPTOMS: Primary | ICD-10-CM

## 2021-02-16 DIAGNOSIS — F33.2 SEVERE EPISODE OF RECURRENT MAJOR DEPRESSIVE DISORDER, WITHOUT PSYCHOTIC FEATURES (H): ICD-10-CM

## 2021-02-16 DIAGNOSIS — Z13.39 ATTENTION DEFICIT HYPERACTIVITY DISORDER (ADHD) EVALUATION: ICD-10-CM

## 2021-02-16 PROCEDURE — 90837 PSYTX W PT 60 MINUTES: CPT | Mod: 95 | Performed by: PSYCHOLOGIST

## 2021-02-16 NOTE — PROGRESS NOTES
Progress Note - Initial Visit    Client Name:  Lenore Suggs Date: 2021         Service Type: Individual     Visit Start Time: 705  Visit End Time: 802    Visit #: 3    Attendees: Client    Service Modality:  Video Visit:      Provider verified identity through the following two step process.  Patient provided:  Patient     Telemedicine Visit: The patient's condition can be safely assessed and treated via synchronous audio and visual telemedicine encounter.      Reason for Telemedicine Visit: Services only offered telehealth    Originating Site (Patient Location): Patient's home    Distant Site (Provider Location): Provider Remote Setting    Consent:  The patient/guardian has verbally consented to: the potential risks and benefits of telemedicine (video visit) versus in person care; bill my insurance or make self-payment for services provided; and responsibility for payment of non-covered services.     Patient would like the video invitation sent by:  My Chart    Mode of Communication:  Video Conference via Amwell    As the provider I attest to compliance with applicable laws and regulations related to telemedicine.       DATA:   Interactive Complexity: No   Crisis: No     Presenting Concerns/  Current Stressors:   ADHD Evaluation    ASSESSMENT:  Mental Status Assessment:  Appearance:   Appropriate   Eye Contact:   Poor  Psychomotor Behavior: Restless   Attitude:   Cooperative   Orientation:   All  Speech   Rate / Production: Talkative   Volume:  Normal   Mood:    Anxious  Depressed   Affect:    Blunted   Thought Content:  Clear   Thought Form:  Goal Directed   Insight:    Fair       Safety Issues and Plan for Safety and Risk Management:     [place] Suicide Severity Rating Scale (Short Version)  [place] Suicide Severity Rating (Short Version) 2021   Over the past 2 weeks have you felt down, depressed, or hopeless? yes   Over the past 2 weeks have you had thoughts of killing  yourself? yes   Have you ever attempted to kill yourself? yes   When did this last happen? more than 6 months ago   Q1 Wished to be Dead (Past Month) yes   Q2 Suicidal Thoughts (Past Month) yes   Q3 Suicidal Thought Method yes   Q4 Suicidal Intent without Specific Plan no   Q5 Suicide Intent with Specific Plan no   Q6 Suicide Behavior (Lifetime) yes     Patient denies current fears or concerns for personal safety.  Patient reports the following current or recent suicidal ideation or behaviors: chronic SI.  Patient denies current or recent homicidal ideation or behaviors.  Patient denies current or recent self injurious behavior or ideation.  Patient denies other safety concerns.  A safety and risk management plan has been developed including: Patient consented to co-developed safety plan.  Safety and risk management plan was completed.  Patient agreed to use safety plan should any safety concerns arise.  A copy was given to the patient.  Patient reports there are no firearms in the house.     Diagnostic Criteria:  PTSD  MDD  Rule Out ADHD    WHODAS 2.0 (12 item): No flowsheet data found.  Intervention:   During today's session, this writer worked to collect background information. Felecia discussed her developmental, relational, educational and occupational history. We began speaking about mental health but didn't complete that section. A 4th session was scheduled to complete the interview process.     Collateral Reports Completed:  Routed note to Care Team Member(s)      PLAN: (Homework, other):  1. Provider will continue Diagnostic Assessment.    2.  Use Safety plan as needed.       Lisa Petit, PhD LP  February 16, 2021

## 2021-03-02 ENCOUNTER — VIRTUAL VISIT (OUTPATIENT)
Dept: PSYCHOLOGY | Facility: CLINIC | Age: 26
End: 2021-03-02
Payer: COMMERCIAL

## 2021-03-02 DIAGNOSIS — F43.10 POSTTRAUMATIC STRESS DISORDER WITH DISSOCIATIVE SYMPTOMS: Primary | ICD-10-CM

## 2021-03-02 DIAGNOSIS — F33.2 SEVERE EPISODE OF RECURRENT MAJOR DEPRESSIVE DISORDER, WITHOUT PSYCHOTIC FEATURES (H): ICD-10-CM

## 2021-03-02 DIAGNOSIS — Z13.39 ATTENTION DEFICIT HYPERACTIVITY DISORDER (ADHD) EVALUATION: ICD-10-CM

## 2021-03-02 PROCEDURE — 90791 PSYCH DIAGNOSTIC EVALUATION: CPT | Mod: 95 | Performed by: PSYCHOLOGIST
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
## 2021-03-26 ENCOUNTER — TELEPHONE (OUTPATIENT)
Dept: PSYCHOLOGY | Facility: CLINIC | Age: 26
End: 2021-03-26

## 2021-04-17 ENCOUNTER — HEALTH MAINTENANCE LETTER (OUTPATIENT)
Age: 26
End: 2021-04-17

## 2021-05-20 DIAGNOSIS — F31.32 BIPOLAR AFFECTIVE DISORDER, CURRENTLY DEPRESSED, MODERATE (H): ICD-10-CM

## 2021-05-20 DIAGNOSIS — F41.0 ANXIETY ATTACK: ICD-10-CM

## 2021-05-20 RX ORDER — BUPROPION HYDROCHLORIDE 300 MG/1
TABLET ORAL
Qty: 30 TABLET | Refills: 0 | Status: SHIPPED | OUTPATIENT
Start: 2021-05-20 | End: 2021-06-23

## 2021-05-20 NOTE — TELEPHONE ENCOUNTER
Routing refill request to provider for review/approval because:  Labs out of range:  PHQ-9  Last visit with Marine Willoughby PA: 6/9/2020    Michelle Vizcarra RN

## 2021-06-07 ENCOUNTER — VIRTUAL VISIT (OUTPATIENT)
Dept: PSYCHOLOGY | Facility: CLINIC | Age: 26
End: 2021-06-07
Payer: COMMERCIAL

## 2021-06-07 DIAGNOSIS — F43.10 POSTTRAUMATIC STRESS DISORDER WITH DISSOCIATIVE SYMPTOMS: Primary | ICD-10-CM

## 2021-06-07 DIAGNOSIS — Z86.59 HX OF BORDERLINE PERSONALITY DISORDER: ICD-10-CM

## 2021-06-07 DIAGNOSIS — F90.2 ATTENTION DEFICIT HYPERACTIVITY DISORDER (ADHD), COMBINED TYPE: ICD-10-CM

## 2021-06-07 DIAGNOSIS — F33.0 MILD EPISODE OF RECURRENT MAJOR DEPRESSIVE DISORDER (H): ICD-10-CM

## 2021-06-07 PROCEDURE — 96131 PSYCL TST EVAL PHYS/QHP EA: CPT | Mod: 95 | Performed by: PSYCHOLOGIST

## 2021-06-07 PROCEDURE — 96130 PSYCL TST EVAL PHYS/QHP 1ST: CPT | Mod: 95 | Performed by: PSYCHOLOGIST
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
## 2021-06-08 ENCOUNTER — FCC EXTENDED DOCUMENTATION (OUTPATIENT)
Dept: PSYCHOLOGY | Facility: CLINIC | Age: 26
End: 2021-06-08

## 2021-06-09 NOTE — PROGRESS NOTES
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 Suggs disclosed she attempted to end her life on several occasions. She stated that her last suicide attempt occurred in April of 2018. She asserted that in her twenties she detached from others and experienced no interest in anyone. She outlined episodes of disassociation, obsessive thoughts, compulsive acts and fantasies of death. She admitted she does not like being touched and fears being contaminated. She reported she washes her hands too much and likes things to feel the right way. She reported she has been diagnosed with several disorders including Schizophrenia and Bipolar I Disorder yet these were rule out by this writer. She indicated she has been prescribed psychiatric medications with little symptom relief which may indicated personality dysfunction.    Major Depressive Disorder, Recurrent, Mild   Ms. Suggs was previously diagnosed with depression and continues to present with features. She indicated that symptoms emerged in her early  childhood and have persisted into adulthood. When administered the MMPI-2 and PHQ-9, her scores were indicative of clinically significant depressive symptoms.     Attention Deficit Hyperactivity Disorder  Ms. Suggs presented with features of ADHD. She recalled symptoms emerging at the age of 11 years old. She indicated she had difficulty socializing with peers, organizing and cleaning, completing school assignments, keeping track of her stuff, remembering school items, independently initiating her work, following the rules, and managing her mood. Ms. Suggs s Childhood Symptoms forms from the [employer], as completed by her and a foster sister, were mixed. She endorsed clinically significant, but her sister did not. In adulthood, Ms. Suggs indicated she has been struggling to stay present at home, work and school. She acknowledged that at work, she is often bored, easily bored and distracted, late finishing projects, poorly manages her time, struggles to get along with co-workers, and has problems organizing. The results of the remaining [facility] Reports, as completed by her and her partner, were also mixed. She endorsed clinically significant impairment in the areas of current and executive functioning but not psychosocial functioning. Her partner reported clinically significant impairment in all areas of functioning.      Recommendations:    1. Ms. Suggs likely would benefit from receiving supportive services, tutoring services and accommodations through the future post-secondary institution of her choosing.    a. Ms. Suggs would benefit from taking exams alone, in a quiet room.  b. Ms. Suggs would benefit from extended exam time.  c. Ms. Suggs would benefit from note taking in or audio recording of lectures.  d. Ms. Suggs would benefit from use of a fidget device or the ability move around the classroom as along as she is not disturbing others.  e. Ms. Suggs would benefit from extensions on  course work due dates.  f. Ms. Suggs may benefit from student support for coping with a disability in college.  g. Ms. Suggs may benefit from help with time management and organizational skills.  h. Ms. Suggs may benefit from assistance with advocating to his instructors and departments, her needs and accommodations.   2. Ms. Suggs would likely benefit from engaging in medication management services. She is recommended to follow her provider s treatment guidelines and recommendations. If her diagnoses are beyond the scope of practice for her primary care provider, she should be recommended to a psychiatric provider.    3. Ms. Suggs may wish to reference the Coping with Attention-Deficit/Hyperactivity Disorder handout made available by [facility] for helpful tips for adults struggling with the disorder.    4. Ms. Suggs may wish to participate in cognitive testing to further assess her attention, working memory and processing speed when in-person sessions resume.    5. Ms. Suggs was encouraged to continue participating in individual therapy and DBT Skills Group.     Psychological Testing Bill/Service Summary:  Interview/Assessment Date Time   Interview 1/07/2021;1/18/2021;02/16/2021; 3/02/[phone] (1); [id number] (1);705-802 (1); [phone] (.75)   Documentation 1/07/2021;1/18/2021; 2/16/2021; 3/02/2021 [id number] (.25); [id number] (.25);   Testing Evaluation 72779- 1st 60 mins 54001- each add 60   Record Review & Clarify Referral Question     Clinical Decision Making     Patient Symptom Management     Battery Modification     Integration/ Report Generation 6/03/2021; 06/06/[phone] (.5); [phone] (1.75) & [id number] (1.5)   Feedback Session 6/07/2021 [id number] (1)   Post-Service Work 6/07/2021 0120-5248   Total Time 5.75    Total Units 1 5   Test Administration 31035- 1st 30 mins 83309- each add 30    Test Administration (Face)     Scoring     Total Time 0    Total Units 0    Diagnoses  PTSD, MDD, ADHD

## 2021-06-23 ENCOUNTER — OFFICE VISIT (OUTPATIENT)
Dept: INTERNAL MEDICINE | Facility: CLINIC | Age: 26
End: 2021-06-23
Payer: COMMERCIAL

## 2021-06-23 VITALS
TEMPERATURE: 98.7 F | DIASTOLIC BLOOD PRESSURE: 74 MMHG | BODY MASS INDEX: 44.2 KG/M2 | WEIGHT: 265.6 LBS | SYSTOLIC BLOOD PRESSURE: 110 MMHG | HEART RATE: 108 BPM | OXYGEN SATURATION: 98 %

## 2021-06-23 DIAGNOSIS — M54.50 CHRONIC BILATERAL LOW BACK PAIN WITHOUT SCIATICA: ICD-10-CM

## 2021-06-23 DIAGNOSIS — R45.851 SUICIDAL IDEATION: ICD-10-CM

## 2021-06-23 DIAGNOSIS — F90.2 ATTENTION DEFICIT HYPERACTIVITY DISORDER (ADHD), COMBINED TYPE: Primary | ICD-10-CM

## 2021-06-23 DIAGNOSIS — Z13.220 ENCOUNTER FOR SCREENING FOR LIPID DISORDER: ICD-10-CM

## 2021-06-23 DIAGNOSIS — Z13.1 ENCOUNTER FOR SCREENING FOR DIABETES MELLITUS: ICD-10-CM

## 2021-06-23 DIAGNOSIS — G89.29 CHRONIC BILATERAL LOW BACK PAIN WITHOUT SCIATICA: ICD-10-CM

## 2021-06-23 DIAGNOSIS — F60.3 BORDERLINE PERSONALITY DISORDER (H): ICD-10-CM

## 2021-06-23 DIAGNOSIS — F43.10 PTSD (POST-TRAUMATIC STRESS DISORDER): ICD-10-CM

## 2021-06-23 LAB
ALBUMIN SERPL-MCNC: 3.7 G/DL (ref 3.4–5)
ALP SERPL-CCNC: 83 U/L (ref 40–150)
ALT SERPL W P-5'-P-CCNC: 44 U/L (ref 0–50)
ANION GAP SERPL CALCULATED.3IONS-SCNC: 3 MMOL/L (ref 3–14)
AST SERPL W P-5'-P-CCNC: 23 U/L (ref 0–45)
BILIRUB SERPL-MCNC: 0.3 MG/DL (ref 0.2–1.3)
BUN SERPL-MCNC: 9 MG/DL (ref 7–30)
CALCIUM SERPL-MCNC: 9 MG/DL (ref 8.5–10.1)
CHLORIDE SERPL-SCNC: 108 MMOL/L (ref 94–109)
CHOLEST SERPL-MCNC: 164 MG/DL
CO2 SERPL-SCNC: 28 MMOL/L (ref 20–32)
CREAT SERPL-MCNC: 0.75 MG/DL (ref 0.52–1.04)
GFR SERPL CREATININE-BSD FRML MDRD: >90 ML/MIN/{1.73_M2}
GLUCOSE SERPL-MCNC: 95 MG/DL (ref 70–99)
HDLC SERPL-MCNC: 52 MG/DL
LDLC SERPL CALC-MCNC: 98 MG/DL
NONHDLC SERPL-MCNC: 112 MG/DL
POTASSIUM SERPL-SCNC: 3.8 MMOL/L (ref 3.4–5.3)
PROT SERPL-MCNC: 7.3 G/DL (ref 6.8–8.8)
SODIUM SERPL-SCNC: 139 MMOL/L (ref 133–144)
TRIGL SERPL-MCNC: 71 MG/DL

## 2021-06-23 PROCEDURE — 36415 COLL VENOUS BLD VENIPUNCTURE: CPT | Performed by: INTERNAL MEDICINE

## 2021-06-23 PROCEDURE — 99214 OFFICE O/P EST MOD 30 MIN: CPT | Performed by: INTERNAL MEDICINE

## 2021-06-23 PROCEDURE — 80053 COMPREHEN METABOLIC PANEL: CPT | Performed by: INTERNAL MEDICINE

## 2021-06-23 PROCEDURE — 80061 LIPID PANEL: CPT | Performed by: INTERNAL MEDICINE

## 2021-06-23 RX ORDER — KETOROLAC TROMETHAMINE 10 MG/1
10 TABLET, FILM COATED ORAL EVERY 6 HOURS PRN
COMMUNITY
End: 2021-06-23

## 2021-06-23 RX ORDER — DEXTROAMPHETAMINE SACCHARATE, AMPHETAMINE ASPARTATE, DEXTROAMPHETAMINE SULFATE AND AMPHETAMINE SULFATE 2.5; 2.5; 2.5; 2.5 MG/1; MG/1; MG/1; MG/1
10 TABLET ORAL DAILY
Qty: 30 TABLET | Refills: 0 | Status: SHIPPED | OUTPATIENT
Start: 2021-06-23 | End: 2021-09-17

## 2021-06-23 RX ORDER — KETOROLAC TROMETHAMINE 10 MG/1
10 TABLET, FILM COATED ORAL EVERY 8 HOURS PRN
Qty: 20 TABLET | Refills: 1 | Status: SHIPPED | OUTPATIENT
Start: 2021-06-23 | End: 2021-08-27

## 2021-06-23 RX ORDER — ATOMOXETINE 40 MG/1
CAPSULE ORAL
Qty: 115 CAPSULE | Refills: 0 | Status: CANCELLED | OUTPATIENT
Start: 2021-06-23 | End: 2021-08-22

## 2021-06-23 RX ORDER — BUPROPION HYDROCHLORIDE 450 MG/1
450 TABLET, FILM COATED, EXTENDED RELEASE ORAL EVERY MORNING
Qty: 30 TABLET | Refills: 1 | Status: SHIPPED | OUTPATIENT
Start: 2021-06-23 | End: 2021-09-10

## 2021-06-23 NOTE — PROGRESS NOTES
Assessment & Plan     Attention deficit hyperactivity disorder (ADHD), combined type  Diagnosed via psych assessment earlier this month. Has found no benefit from Wellbutrin as it relates to her ADHD symptoms (though does feel it improves her SI as below). Discussed atomoxetine vs Adderall and she'd like to try Adderall. Notes of past bipolar diagnosis in her chart but per recent psych note this is not the case and Felecia also denies that to be the case today. Discussed side effects (palpitations, anorexia, weight loss, anxiety, or difficulty sleeping). Blood pressure and heart rate within normal limits today. [place]  Aware reviewed - no suspect behavior. If Adderall does not work, would ask psychiatry to get involved here given Felecia's complex mental history and her history of trying numerous mood-related medications as below without benefit. Referral placed.  - MENTAL HEALTH REFERRAL  - Adult; Psychiatry; Psychiatry and Psychotherapy (Individual/Couple/Family Therapy); [facility] [phone]; Yes; Several Medications tried and failed; Yes; We...  - amphetamine-dextroamphetamine (ADDERALL) 10 MG tablet; Take 1 tablet (10 mg) by mouth daily    Borderline personality disorder (H)  Suicidal ideation  PTSD (post-traumatic stress disorder)  Has struggled with SI for years. Currently undergoing DBT for BPD through outside therapist. She is taking Wellbutrin 450mg daily and feels that this dose works well to diminish her SI. Updated her med list to reflect this. Also placed referral to psychiatry given Felecia's history of trying numerous mood-related medications as below without benefit.  - buPROPion 450 MG TB24; Take 450 mg by mouth every morning  - MENTAL HEALTH REFERRAL  - Adult; Psychiatry; Psychiatry and Psychotherapy (Individual/Couple/Family Therapy); [facility] [phone]; Yes; Several  Medications tried and failed; Yes; We...    Chronic bilateral low back pain without sciatica  Finds benefit from as needed PO ketorolac. Takes very sparingly. Refilled.  - ketorolac (TORADOL) 10 MG tablet; Take 1 tablet (10 mg) by mouth every 8 hours as needed for moderate pain    Encounter for screening for lipid disorder  Fasting today.  - Lipid Profile    Encounter for screening for diabetes mellitus  Fasting today.  - Comprehensive metabolic panel    Return in about 4 weeks (around 7/21/2021) for Mood Check, Video Visit.    Terrance Vazquez MD  [facility]    Subjective   Felecia is a 25 year old who presents today to discuss ADHD medications. She was diagnosed via psych assessment earlier this month (chart reviewed today). She has never tried any medications for ADHD. She has a complex mental health history, and previously carried diagnoses of bipolar and schizophrenia. Per psych notes and per Felecia, these diagnoses are not the case. She has tried numerous psychiatric medications in the past, including Lexapro, sertraline, Seroquel, trazodone, Geodon, Latuda, lithium. None were found to be beneficial. She is currently on Wellbutrin which she has increased slowly to 450mg daily on her own as she did not find benefit from the 150mg or 300mg doses. She does feel the 450mg dose curbs her chronic SI. She knows that is the max dose of that medication, but she reports she is tolerating well without side effects. Denies any history of seizures.    Review of Systems   Constitutional, CV, MSK, psych systems are negative, except as otherwise noted.      Objective    /74   Pulse 108   Temp 98.7  F (37.1  C) (Tympanic)   Wt 120.5 kg (265 lb 9.6 oz)   SpO2 98%   BMI 44.20 kg/m    Body mass index is 44.2 kg/m .     Physical Exam   GENERAL: alert and in no distress.  EYES: conjunctivae/corneas clear. EOMs grossly intact  HENT: NC/AT, facies symmetric.  RESP: No iWOB.  CV: RRR to DP.  MSK:  No cyanosis or clubbing noted bilaterally in upper and/or lower extremities.  SKIN: No significant ulcers, lesions, or rashes on the visualized portions of the skin  NEURO: Alert. Oriented. Sensation grossly WNL.

## 2021-08-03 PROBLEM — F90.2 ATTENTION DEFICIT HYPERACTIVITY DISORDER (ADHD), COMBINED TYPE: Status: ACTIVE | Noted: 2021-08-03

## 2021-08-03 ASSESSMENT — PATIENT HEALTH QUESTIONNAIRE - PHQ9
SUM OF ALL RESPONSES TO PHQ QUESTIONS 1-9: 22
SUM OF ALL RESPONSES TO PHQ QUESTIONS 1-9: 22
10. IF YOU CHECKED OFF ANY PROBLEMS, HOW DIFFICULT HAVE THESE PROBLEMS MADE IT FOR YOU TO DO YOUR WORK, TAKE CARE OF THINGS AT HOME, OR GET ALONG WITH OTHER PEOPLE: VERY DIFFICULT

## 2021-08-04 ASSESSMENT — PATIENT HEALTH QUESTIONNAIRE - PHQ9: SUM OF ALL RESPONSES TO PHQ QUESTIONS 1-9: 22
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
## 2021-08-05 ENCOUNTER — VIRTUAL VISIT (OUTPATIENT)
Dept: BEHAVIORAL HEALTH | Facility: CLINIC | Age: 26
End: 2021-08-05
Attending: INTERNAL MEDICINE
Payer: COMMERCIAL

## 2021-08-05 ENCOUNTER — VIRTUAL VISIT (OUTPATIENT)
Dept: PSYCHIATRY | Facility: CLINIC | Age: 26
End: 2021-08-05
Attending: INTERNAL MEDICINE
Payer: COMMERCIAL

## 2021-08-05 DIAGNOSIS — F41.1 GENERALIZED ANXIETY DISORDER: ICD-10-CM

## 2021-08-05 DIAGNOSIS — F33.1 DEPRESSION, MAJOR, RECURRENT, MODERATE (H): Primary | ICD-10-CM

## 2021-08-05 DIAGNOSIS — F90.2 ATTENTION DEFICIT HYPERACTIVITY DISORDER (ADHD), COMBINED TYPE: Primary | ICD-10-CM

## 2021-08-05 PROCEDURE — 90791 PSYCH DIAGNOSTIC EVALUATION: CPT | Mod: 95 | Performed by: SOCIAL WORKER

## 2021-08-05 PROCEDURE — 99205 OFFICE O/P NEW HI 60 MIN: CPT | Mod: 95 | Performed by: NURSE PRACTITIONER

## 2021-08-05 RX ORDER — BUPROPION HYDROCHLORIDE 150 MG/1
150 TABLET ORAL EVERY MORNING
Qty: 30 TABLET | Refills: 0 | Status: SHIPPED | OUTPATIENT
Start: 2021-08-05 | End: 2021-10-20

## 2021-08-05 RX ORDER — LISDEXAMFETAMINE DIMESYLATE 30 MG/1
30 CAPSULE ORAL EVERY MORNING
Qty: 30 CAPSULE | Refills: 0 | Status: SHIPPED | OUTPATIENT
Start: 2021-08-05 | End: 2021-10-26

## 2021-08-05 ASSESSMENT — ANXIETY QUESTIONNAIRES
7. FEELING AFRAID AS IF SOMETHING AWFUL MIGHT HAPPEN: MORE THAN HALF THE DAYS
3. WORRYING TOO MUCH ABOUT DIFFERENT THINGS: SEVERAL DAYS
2. NOT BEING ABLE TO STOP OR CONTROL WORRYING: SEVERAL DAYS
IF YOU CHECKED OFF ANY PROBLEMS ON THIS QUESTIONNAIRE, HOW DIFFICULT HAVE THESE PROBLEMS MADE IT FOR YOU TO DO YOUR WORK, TAKE CARE OF THINGS AT HOME, OR GET ALONG WITH OTHER PEOPLE: VERY DIFFICULT
6. BECOMING EASILY ANNOYED OR IRRITABLE: SEVERAL DAYS
GAD7 TOTAL SCORE: 10
1. FEELING NERVOUS, ANXIOUS, OR ON EDGE: SEVERAL DAYS
5. BEING SO RESTLESS THAT IT IS HARD TO SIT STILL: SEVERAL DAYS

## 2021-08-05 ASSESSMENT — COLUMBIA-SUICIDE SEVERITY RATING SCALE - C-SSRS
5. HAVE YOU STARTED TO WORK OUT OR WORKED OUT THE DETAILS OF HOW TO KILL YOURSELF? DO YOU INTEND TO CARRY OUT THIS PLAN?: NO
REASONS FOR IDEATION PAST MONTH: MOSTLY TO END OR STOP THE PAIN (YOU COULDN'T GO ON LIVING WITH THE PAIN OR HOW YOU WERE FEELING)
4. HAVE YOU HAD THESE THOUGHTS AND HAD SOME INTENTION OF ACTING ON THEM?: NO
4. HAVE YOU HAD THESE THOUGHTS AND HAD SOME INTENTION OF ACTING ON THEM?: YES
ATTEMPT PAST THREE MONTHS: NO
TOTAL  NUMBER OF INTERRUPTED ATTEMPTS PAST 3 MONTHS: NO
LETHALITY/MEDICAL DAMAGE CODE FIRST ACTUAL ATTEMPT: MINOR PHYSICAL DAMAGE
REASONS FOR IDEATION LIFETIME: MOSTLY TO END OR STOP THE PAIN (YOU COULDN'T GO ON LIVING WITH THE PAIN OR HOW YOU WERE FEELING)
6. HAVE YOU EVER DONE ANYTHING, STARTED TO DO ANYTHING, OR PREPARED TO DO ANYTHING TO END YOUR LIFE?: NO
5. HAVE YOU STARTED TO WORK OUT OR WORKED OUT THE DETAILS OF HOW TO KILL YOURSELF? DO YOU INTEND TO CARRY OUT THIS PLAN?: YES
TOTAL  NUMBER OF ACTUAL ATTEMPTS LIFETIME: 8
TOTAL  NUMBER OF ABORTED OR SELF INTERRUPTED ATTEMPTS PAST LIFETIME: NO
1. IN THE PAST MONTH, HAVE YOU WISHED YOU WERE DEAD OR WISHED YOU COULD GO TO SLEEP AND NOT WAKE UP?: NO
3. HAVE YOU BEEN THINKING ABOUT HOW YOU MIGHT KILL YOURSELF?: YES
2. HAVE YOU ACTUALLY HAD ANY THOUGHTS OF KILLING YOURSELF?: YES
TOTAL  NUMBER OF INTERRUPTED ATTEMPTS LIFETIME: NO
ATTEMPT LIFETIME: YES
2. HAVE YOU ACTUALLY HAD ANY THOUGHTS OF KILLING YOURSELF LIFETIME?: YES
TOTAL  NUMBER OF ABORTED OR SELF INTERRUPTED ATTEMPTS PAST 3 MONTHS: NO
1. IN THE PAST MONTH, HAVE YOU WISHED YOU WERE DEAD OR WISHED YOU COULD GO TO SLEEP AND NOT WAKE UP?: YES
6. HAVE YOU EVER DONE ANYTHING, STARTED TO DO ANYTHING, OR PREPARED TO DO ANYTHING TO END YOUR LIFE?: NO

## 2021-08-05 ASSESSMENT — PATIENT HEALTH QUESTIONNAIRE - PHQ9: 5. POOR APPETITE OR OVEREATING: NEARLY EVERY DAY

## 2021-08-05 NOTE — PROGRESS NOTES
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 between the type of PTSD that developed from chronic, long-lasting traumatic events as compared to PTSD from short-lived event.  The diagnosis of  complex PTSD  refers to the set of symptoms that commonly follow exposure to a chronic, prolonged long lasting traumatic event.  These  generally involve some form of physical or emotional captivity, such as childhood sexual and/or physical abuse or domestic violence.  In these types of events, the victim is under the control of another person and doesn t have the ability to easily escape.     Symptoms of complex PTSD:     Emotion regulation problems- People with complex PTSD experience difficulties managing their emotions.  They may experience severe depression, thoughts of suicide, or have difficulty controlling their anger.     Changes in consciousness: following exposure to a chronic traumatic event, a person may repress memories of the traumatic event or experience flashbacks.  In rare cases, a person may experience disassociation.     Symptoms in this category include feelings of helplessness, shame, guilt, or feeling  detached and different from others     Changes in how the victim views the perpetrator: a person with complex PTSD may feel like he has no power over perpetrator (the perpetrator has complete power in a relationship).  In complex PTSD, people may also become preoccupied with their relationship with the perpetrator.  For example, constant thoughts of wanting revenge.     Changes in personal relationships:  These symptoms include problems with relationships, such as isolating oneself or being distrusting of others     Changes in how one views the world: People exposed to chronic or repeated traumatic events may also lose jose in humanity or have a sense of hopelessness.      Complex PTSD explains affective instability, mood symptoms, anxiety and maladaptive behaviors more parsimoniously than separate diagnosis of anxiety, depression, bipolar, OCD and/or personality disorder.  Medications ultimately will not provide definitive treatment, but nonpharmacological interventions like dialectical behavior therapy (DBT) and eye movement desensitization and reprocessing (EMDR).     MEDICAL COMORBIDITY IMPACTING CLINICAL PICTURE: None noted    ASSESSMENT AND PLAN    Lenore Suggs is a 25 year old White [place]  or  female presenting for psychiatric evaluation and medication management through the [facility].  Information is obtained from patient and available records.  Reports history of multiple diagnosis throughout her childhood primarily in the context of traumatic events.  Previously psychiatrically hospitalized as an adolescent [facility] occassions, typcially in the context of dysregulation, SI/SA.   Hx of suicidal ideation and attempts. Last hospitalization was when she was 18 yo.   Genetically loaded for  schizophrenia (biomother) and unknown paternal genetic load.       Problem List Items Addressed This Visit        Behavioral     Long history of emotional, behavioral and  cognitive dysregulation starting at a young age in the context of chronic trauma.  Mood is essentially stabilizing and she is actively involved in DBT and individual therapy targeting lack of ability to regulate emotions, intense reaction to disappointments, recurrent suicidal ideations and threats, feelings of aloneness and fear of abandonment and learning stress tolerance skills and mindfulness techniques. Recently diagnosed with ADHD through psychiatric testing.  She is currently on Wellbutrin 450 mg which has not had impact on her executive functioning impairment.  The Adderall initiated by her primary care is partially effective however it is immediate release and lasting only about 4 hours.  Will trial Vyvanse 30 mg.  There is concern that the combination of the Wellbutrin and Vyvanse will increase noradrenaline.  Therefore she will decrease to 300 mg of the bupropion well initiating Vyvanse at 30 milligrams.  If she does experience irritability, agitation, headache, feeling jittery.  A prescription for 150 mg of the Wellbutrin is provided.  Likely will find the dose of Vyvanse most effective and evaluate the need for a antidepressant.  If needed will consider an SSRI such as Zoloft.  Follow-up in 4 weeks             Attention deficit hyperactivity disorder (ADHD), combined type - Primary    Relevant Medications    lisdexamfetamine (VYVANSE) 30 MG capsule    buPROPion (WELLBUTRIN XL) 150 MG 24 hr tablet             CONSULTS/REFERRALS:   Continue therapy   Coordinate care with therapist as needed    MEDICAL:   None at this time  Coordinate care with PCP ([facility], [facility]) as needed    FOLLOW UP: Schedule an appointment with me in one week and four weeks or sooner as needed.   Call the psychiatric nurse line with medication questions or concerns at [phone] or [phone]  Follow up with primary care provider as planned or for acute medical concerns.    PSYCHOEDUCATION:  Medication side  effects and alternatives reviewed. Health promotion activities recommended and reviewed today. All questions addressed. Education and counseling completed regarding risks and benefits of medications and psychotherapy options.  Consent provided by patient/guardian  Call the psychiatric nurse line with medication questions or concerns at [phone].  MyChart may be used to communicate with your provider, but this is not intended to be used for emergencies.  STIMULANT THERAPY: Side effects discussed including but not limited to cardiac (including HTN, tachycardia, sudden death), motor/tic, appetite/growth, mood lability and sleep disruption. This is a controlled substance with risk for abuse, need to keep in a safe keep place and cannot replace lost scripts  Medlineplus.gov is information for patients.  It is run by the [employer] and it contains information about all disorders, diseases and all medications.      COMMUNITY RESOURCES:    CRISIS NUMBERS: Provided in AVS 8/5/2021  National Suicide Prevention Lifeline: 9-577-019-TALK (437-798-7116)  CPower/resources for a list of additional resources (SOS)            [facility] - [phone]   Urgent Care Adult Mental Xjhapf-[phone] mobile unit/ 24/7 crisis line  [facility] -[phone]   COPE 24/7 [place] Mobile Team -[phone] (adults)/ [phone] (child)  Poison Control Center - 1-655.576.2537    OR  go to nearest ER  Crisis Text Line for any crisis 24/7 send this-   To: 530499   [employer] [facility] ([facility]  [phone]  National Suicide Prevention Lifeline: 889.469.1090 (TTY: 910.456.1358). Call anytime for help.  (www.suicidepreventionlifeline.org)  National [place] on Mental Illness (www.negrita.org): 630.339.9844 or 493-591-2964.   Mental Health Association (www.mentalhealth.org): 276.778.5668 or 231-896-3722.  [place] Crisis Text Line: Text [place] to 693047  Suicide LifeLine  Chat: suicidepreventionlifeline.org/chat    ADMINISTRATIVE BILLIN min spent interviewing patient, reviewing referral documents, obtaining and reviewing outside records, communication with other health specialists, and preparing this report on today's date  Video/Phone Start Time: 730  Video/Phone End Time: 8:26 AM    Patient Status:  Our psychiatry providers act as a specialty service for Primary Care Providers in the [facility] that seek to optimize medications for unstable patients.  Once medications have been optimized, our providers discharge the patient back to the referring Primary Care Provider for ongoing medication management.  This type of system allows our providers to serve a high volume of patients. At this time  Patient will continue to be seen for ongoing consultation and stabilization.    Signed:   Clementine Myles, MSN, APRN, FMHNP-[facility] ([facility])   Chart documentation done in part with Dragon Voice Recognition software.  Although reviewed after completion, some word and grammatical errors may remain.

## 2021-08-05 NOTE — Clinical Note
Thank you for the Psychiatry referral to the [facility] ([facility]). Our psychiatry providers act as a specialty service for Primary Care Providers in the [facility] who seek to optimize medications for unstable patients.  Once medications have been optimized, our providers discharge the patient back to the referring Primary Care Provider for ongoing medication management.  This type of system allows our providers to serve a high volume of patients.     Please see my Impression and Plan. I will continue to work with them in stabilizing their mood.  If you have any questions or concerns, please let me know. Thank you again!    Clementine Myles, MSN, APRN, CNP, FMHNP-BC,   [facility]

## 2021-08-05 NOTE — ASSESSMENT & PLAN NOTE
Long history of emotional, behavioral and cognitive dysregulation starting at a young age in the context of chronic trauma.  Mood is essentially stabilizing and she is actively involved in DBT and individual therapy targeting lack of ability to regulate emotions, intense reaction to disappointments, recurrent suicidal ideations and threats, feelings of aloneness and fear of abandonment and learning stress tolerance skills and mindfulness techniques. Recently diagnosed with ADHD through psychiatric testing.  She is currently on Wellbutrin 450 mg which has not had impact on her executive functioning impairment.  The Adderall initiated by her primary care is partially effective however it is immediate release and lasting only about 4 hours.  Will trial Vyvanse 30 mg.  There is concern that the combination of the Wellbutrin and Vyvanse will increase noradrenaline.  Therefore she will decrease to 300 mg of the bupropion well initiating Vyvanse at 30 milligrams.  If she does experience irritability, agitation, headache, feeling jittery.  A prescription for 150 mg of the Wellbutrin is provided.  Likely will find the dose of Vyvanse most effective and evaluate the need for a antidepressant.  If needed will consider an SSRI such as Zoloft.  Follow-up in 4 weeks

## 2021-08-06 ASSESSMENT — ANXIETY QUESTIONNAIRES: GAD7 TOTAL SCORE: 10

## 2021-08-16 ENCOUNTER — MYC MEDICAL ADVICE (OUTPATIENT)
Dept: INTERNAL MEDICINE | Facility: CLINIC | Age: 26
End: 2021-08-16

## 2021-08-16 DIAGNOSIS — G89.29 CHRONIC BILATERAL LOW BACK PAIN WITHOUT SCIATICA: Primary | ICD-10-CM

## 2021-08-16 DIAGNOSIS — M54.50 CHRONIC BILATERAL LOW BACK PAIN WITHOUT SCIATICA: Primary | ICD-10-CM

## 2021-08-19 ENCOUNTER — THERAPY VISIT (OUTPATIENT)
Dept: PHYSICAL THERAPY | Facility: CLINIC | Age: 26
End: 2021-08-19
Attending: INTERNAL MEDICINE
Payer: COMMERCIAL

## 2021-08-19 DIAGNOSIS — M54.50 BILATERAL LOW BACK PAIN WITHOUT SCIATICA: ICD-10-CM

## 2021-08-19 DIAGNOSIS — G89.29 CHRONIC BILATERAL LOW BACK PAIN WITHOUT SCIATICA: ICD-10-CM

## 2021-08-19 DIAGNOSIS — M54.50 CHRONIC BILATERAL LOW BACK PAIN WITHOUT SCIATICA: ICD-10-CM

## 2021-08-19 PROCEDURE — 97161 PT EVAL LOW COMPLEX 20 MIN: CPT | Mod: GP | Performed by: PHYSICAL THERAPIST

## 2021-08-19 PROCEDURE — 97110 THERAPEUTIC EXERCISES: CPT | Mod: GP | Performed by: PHYSICAL THERAPIST
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
## 2021-08-27 ENCOUNTER — NURSE TRIAGE (OUTPATIENT)
Dept: NURSING | Facility: CLINIC | Age: 26
End: 2021-08-27

## 2021-08-27 NOTE — TELEPHONE ENCOUNTER
Patient just heard today that someone she had direct contact with on Wednesday has tested positive for Covid and her employer is asking for her to be tested for Covid.  She denies new symptoms.    Care advice given and patient verbalizes understanding.  She will contact her PCP about testing per guidelines.    Alexandria Campos RN  [facility]          Reason for Disposition    [1] CLOSE CONTACT COVID-19 EXPOSURE within last 14 days AND [2] needs COVID-19 lab test to return to work AND [3] NO symptoms    Protocols used: CORONAVIRUS (COVID-19) EXPOSURE-A- 3.25

## 2021-09-02 ENCOUNTER — THERAPY VISIT (OUTPATIENT)
Dept: PHYSICAL THERAPY | Facility: CLINIC | Age: 26
End: 2021-09-02
Payer: COMMERCIAL

## 2021-09-02 DIAGNOSIS — M54.50 BILATERAL LOW BACK PAIN WITHOUT SCIATICA: ICD-10-CM

## 2021-09-02 PROCEDURE — 97110 THERAPEUTIC EXERCISES: CPT | Mod: GP | Performed by: PHYSICAL THERAPIST

## 2021-09-02 PROCEDURE — 97530 THERAPEUTIC ACTIVITIES: CPT | Mod: GP | Performed by: PHYSICAL THERAPIST

## 2021-09-10 ENCOUNTER — VIRTUAL VISIT (OUTPATIENT)
Dept: BEHAVIORAL HEALTH | Facility: CLINIC | Age: 26
End: 2021-09-10
Payer: COMMERCIAL

## 2021-09-10 ENCOUNTER — VIRTUAL VISIT (OUTPATIENT)
Dept: PSYCHIATRY | Facility: CLINIC | Age: 26
End: 2021-09-10
Payer: COMMERCIAL

## 2021-09-10 DIAGNOSIS — F90.2 ATTENTION DEFICIT HYPERACTIVITY DISORDER (ADHD), COMBINED TYPE: ICD-10-CM

## 2021-09-10 DIAGNOSIS — F43.10 COMPLEX POSTTRAUMATIC STRESS DISORDER: Primary | ICD-10-CM

## 2021-09-10 DIAGNOSIS — F33.1 DEPRESSION, MAJOR, RECURRENT, MODERATE (H): Primary | ICD-10-CM

## 2021-09-10 PROBLEM — F31.32 BIPOLAR AFFECTIVE DISORDER, CURRENTLY DEPRESSED, MODERATE (H): Status: RESOLVED | Noted: 2019-10-23 | Resolved: 2021-09-10

## 2021-09-10 PROCEDURE — 90832 PSYTX W PT 30 MINUTES: CPT | Mod: 95 | Performed by: SOCIAL WORKER

## 2021-09-10 PROCEDURE — 99213 OFFICE O/P EST LOW 20 MIN: CPT | Mod: 95 | Performed by: NURSE PRACTITIONER

## 2021-09-10 RX ORDER — SERTRALINE HYDROCHLORIDE 100 MG/1
100 TABLET, FILM COATED ORAL DAILY
Qty: 30 TABLET | Refills: 1 | Status: SHIPPED | OUTPATIENT
Start: 2021-09-10 | End: 2021-10-20

## 2021-09-10 RX ORDER — LISDEXAMFETAMINE DIMESYLATE 40 MG/1
40 CAPSULE ORAL EVERY MORNING
Qty: 30 CAPSULE | Refills: 0 | Status: SHIPPED | OUTPATIENT
Start: 2021-09-10 | End: 2021-10-26
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
## 2021-09-10 NOTE — ASSESSMENT & PLAN NOTE
Discontinue the bupropion   Start sertraline titrated to 100 mg  Increase the Vyvanse to 40 mg  Follow up in 4 weeks.

## 2021-09-16 ENCOUNTER — MYC MEDICAL ADVICE (OUTPATIENT)
Dept: INTERNAL MEDICINE | Facility: CLINIC | Age: 26
End: 2021-09-16

## 2021-09-16 DIAGNOSIS — R11.0 NAUSEA: Primary | ICD-10-CM

## 2021-09-17 RX ORDER — ONDANSETRON 4 MG/1
4 TABLET, ORALLY DISINTEGRATING ORAL EVERY 8 HOURS PRN
Qty: 30 TABLET | Refills: 0 | Status: SHIPPED | OUTPATIENT
Start: 2021-09-17 | End: 2022-01-20

## 2021-09-17 NOTE — TELEPHONE ENCOUNTER
Please see patient's mychart and reply back to patient as appropriate, or route to Triage or TC with any follow up needed.     Ashli William RN

## 2021-10-02 ENCOUNTER — HEALTH MAINTENANCE LETTER (OUTPATIENT)
Age: 26
End: 2021-10-02

## 2021-10-18 ENCOUNTER — MYC MEDICAL ADVICE (OUTPATIENT)
Dept: PSYCHIATRY | Facility: CLINIC | Age: 26
End: 2021-10-18

## 2021-10-20 ENCOUNTER — VIRTUAL VISIT (OUTPATIENT)
Dept: PSYCHIATRY | Facility: CLINIC | Age: 26
End: 2021-10-20
Payer: COMMERCIAL

## 2021-10-20 ENCOUNTER — VIRTUAL VISIT (OUTPATIENT)
Dept: BEHAVIORAL HEALTH | Facility: CLINIC | Age: 26
End: 2021-10-20
Payer: COMMERCIAL

## 2021-10-20 DIAGNOSIS — F41.1 GENERALIZED ANXIETY DISORDER: ICD-10-CM

## 2021-10-20 DIAGNOSIS — R53.83 OTHER FATIGUE: Primary | ICD-10-CM

## 2021-10-20 DIAGNOSIS — F33.1 DEPRESSION, MAJOR, RECURRENT, MODERATE (H): Primary | ICD-10-CM

## 2021-10-20 DIAGNOSIS — F90.2 ATTENTION DEFICIT HYPERACTIVITY DISORDER (ADHD), COMBINED TYPE: ICD-10-CM

## 2021-10-20 PROCEDURE — 99214 OFFICE O/P EST MOD 30 MIN: CPT | Mod: 95 | Performed by: NURSE PRACTITIONER

## 2021-10-20 PROCEDURE — 90832 PSYTX W PT 30 MINUTES: CPT | Mod: 95 | Performed by: SOCIAL WORKER

## 2021-10-20 RX ORDER — METHYLPHENIDATE HYDROCHLORIDE 36 MG/1
36 TABLET ORAL EVERY MORNING
Qty: 7 TABLET | Refills: 0 | Status: SHIPPED | OUTPATIENT
Start: 2021-10-20 | End: 2021-10-27

## 2021-10-20 NOTE — PROGRESS NOTES
"   PSYCHIATRIC MEDICATION FOLLOW UP APPT     Name:  Lenore Suggs  : 1995    Lenore Suggs is a 26 year old female who is being evaluated via a billable video visit.      How would you like to obtain your AVS? MyChart  If the video visit is dropped, the invitation should be resent by: Text to cell phone: [phone]  Will anyone else be joining your video visit? No    Location of patient: at [facility] [employer]  If not at home address below, please ask where they are in case of an emergency situation arises during the appointment.  [address] [facility] [address]    Telemedicine Visit: The patient's condition can be safely assessed and treated via synchronous audio and visual telemedicine encounter.      Reason for Telemedicine Visit: COVID 19 pandemic and the social and physical recommendations by the CDC and MD.      Originating Site (Patient Location): Patient's home    Distant Site (Provider Location): Provider Remote Setting    Consent:  The patient/guardian has verbally consented to: the potential risks and benefits of telemedicine (video visit or phone) versus in person care; bill my insurance or make self-payment for services provided; and responsibility for payment of non-covered services.     Mode of Communication:  [employer] video platform     As the provider I attest to compliance with applicable laws and regulations related to telemedicine.    IDENTIFICATION   Lenore Suggs is a 25 year old female who prefers. Patient prefers to be called: \"Felecia\"  Referred by: Terrance Rand, [facility]    Patient Care Team:  [facility] Macey Nance as PCP - Claudia Austin DO as Assigned Behavioral Health Provider  Terrance Rand MD as Assigned PCP  Therapist: Currently undergoing DBT for BPD through outside therapist.      Patient attended the phone/video session alone.    Last seen for outpatient psychiatry Return Visit on 09/10/2021. "      FOLLOWING PLAN PUT INTO PLACE: Discontinue the bupropion   Start sertraline titrated to 100 mg  Increase the Vyvanse to 40 mg  Follow up in 4 weeks.       INTERIM HISTORY     COMMUNICATIONS FROM PATIENT VIA:  none    RECORDS AVAILABLE FOR REVIEW: EHR records through [facility] .  In addition, reviewed the assessment completed by Riana Roque [facility], dated today        HISTORY OF PRESENT ILLNESS   CCPS referral for psychiatric medication consult in August, 2021.   Reports history of multiple diagnosis throughout her childhood primarily in the context of traumatic events.  [facility] typcially in the context of dysregulation, SI/SA.   Hx of suicidal ideation and attempts. Last hospitalization was when she was 20 yo.   Genetically loaded for  schizophrenia (biomother) and unknown paternal genetic load.      Reports she first sought treatment at a very young age.    Reports symptoms of depression and anxiety emerged in early childhood. She reported she experienced her first panic attack at the age of 7 years old and attempted to end her life at the age of 8 years old. Started medications when she was 9 years old.  She highlighted that at that age, her foster parents wanted to adopt her but not her brothers. She asserted she  didn t want to be without [her] brothers  so she tried to end her life. She disclosed that the family  still wanted to adopt [her]  so she began engaging in behaviors to get them to  change their mind  such as throwing violent tantrums, urinating in her bed and refusing to shower. She indicated she was hospitalized for 3 months and after 2 years of out of control behaviors, the family rescinded their intent to adopt her. She reported she then  bounced around from [foster] home to home  until she was 11 when she settled in a home, she  liked.  She added that her  father  then attempted to get custody of her and she relocated  with him to the [facility] when she was 15 years old. She acknowledged being a  really angry  teenager because she  couldn t express [herself].  She asserted she was diagnosed with several disorders including Schizophrenia and Bipolar I Disorder and prescribed psychiatric medications. She also disclosed she attempted to end her life on several occasions. Ms. Suggs stated that her last suicide attempt occurred in April of 2018. She asserted that in her twenties she has detached from others and experienced  no interest in anyone.  She outlined episodes of disassociation, obsessive thoughts, compulsive acts and fantasies of death. She admitted she does not like being touched and fears being contaminated. She reported she  washes [her] hands too much  and likes things to feel  the right way.   She shares a previous diagnosis of bipolar and schizophrenia. Reports she had been off all medication from age 15 through 24 years old.     Meets criteria for multiple diagnosis. When evaluating closer, symptoms are primarily in the context of chronic trauma they has experienced as a child, adolescent and adult.   Primary diagnosis is complex PTSD.  In individuals who are exposed to chronic repeated, or long-standing traumatic events, the classical symptoms of PTSD often fail to completely describe the psychological harm, emotional problems, and changes in how people view themselves and the world around them.  As a result, some professionals believe that we should distinguish between the type of PTSD that developed from chronic, long-lasting traumatic events as compared to PTSD from short-lived event.  The diagnosis of  complex PTSD  refers to the set of symptoms that commonly follow exposure to a chronic, prolonged long lasting traumatic event.  These  generally involve some form of physical or emotional captivity, such as childhood sexual and/or physical abuse or domestic violence.  In these types of events, the victim  is under the control of another person and doesn t have the ability to easily escape.      Symptoms of complex PTSD:      Emotion regulation problems- People with complex PTSD experience difficulties managing their emotions.  They may experience severe depression, thoughts of suicide, or have difficulty controlling their anger.      Changes in consciousness: following exposure to a chronic traumatic event, a person may repress memories of the traumatic event or experience flashbacks.  In rare cases, a person may experience disassociation.      Symptoms in this category include feelings of helplessness, shame, guilt, or feeling detached and different from others      Changes in how the victim views the perpetrator: a person with complex PTSD may feel like he has no power over perpetrator (the perpetrator has complete power in a relationship).  In complex PTSD, people may also become preoccupied with their relationship with the perpetrator.  For example, constant thoughts of wanting revenge.      Changes in personal relationships:  These symptoms include problems with relationships, such as isolating oneself or being distrusting of others      Changes in how one views the world: People exposed to chronic or repeated traumatic events may also lose jose in humanity or have a sense of hopelessness.     Psychiatric testing by Lisa Petit, PhD LP Psychologist, documented date 6/8/2021 available for review with the following identified diagnoses:     Major Depressive Disorder, Recurrent, Mild   Posttraumatic Stress Disorder, With disassociation   Features of Borderline Personality Disorder  Attention Deficit Hyperactivity Disorder    FAMILY, MEDICAL, SURGICAL HISTORY REVIEWED.  MEDICATION HAVE BEEN REVIEWED AND ARE CURRENT TO THE BEST OF MY KNOWLEDGE AND ABILITY.  Pharmacy tech      MEDICATIONS                                                                                                Current Outpatient  "Medications   Medication Sig     albuterol (PROAIR HFA/PROVENTIL HFA/VENTOLIN HFA) 108 (90 Base) MCG/ACT inhaler Inhale 2 puffs into the lungs every 4 hours as needed for shortness of breath / dyspnea or wheezing     ketorolac (TORADOL) 10 MG tablet Take 1 tablet (10 mg) by mouth every 8 hours as needed for moderate pain     lisdexamfetamine (VYVANSE) 40 MG capsule Take 1 capsule (40 mg) by mouth every morning     ondansetron (ZOFRAN-ODT) 4 MG ODT tab Take 1 tablet (4 mg) by mouth every 8 hours as needed for nausea     SUMAtriptan (IMITREX) 50 MG tablet Take 1-2 tablets ( mg) by mouth at onset of headache May repeat in 2 hrs.  Max 200 mg in 24 hrs     buPROPion (WELLBUTRIN XL) 150 MG 24 hr tablet Take 1 tablet (150 mg) by mouth every morning Tapering off the bupropion.  Patient aware of process (Patient not taking: Reported on 10/20/2021)     hydrOXYzine (ATARAX) 25 MG tablet Take 1 tablet (25 mg) by mouth 3 times daily as needed for anxiety (Patient not taking: Reported on 10/20/2021)     lisdexamfetamine (VYVANSE) 30 MG capsule Take 1 capsule (30 mg) by mouth every morning     sertraline (ZOLOFT) 100 MG tablet Take 1 tablet (100 mg) by mouth daily 1/2 tab daily for one week then increase to 1 tablet thereafter (Patient not taking: Reported on 10/20/2021)     No current facility-administered medications for this visit.      NOTES ABOUT CURRENT PSYCHOTROPIC MEDICATIONS:   Sertraline 100 mg, discontinued due to increase in fatigued   Vyvanse 40 mg, doesn't take on her days off.  Feedback from employer indicate improvement in attention and focus.      PAST PSYCHOTROPIC MEDICATIONS:  Risperidone   Olanzapine   Peshtigo  Ziprasidone   Quetiapine   Guanfacine  Trazodone   Latuda   Citalopram   Escitalopram   Fluoxetine   Adderall IR 10 mg once daily  Bupropion  mg     TODAY PATIENT REPORTS THE FOLLOWING PSYCHIATRIC ROS:   Per [facility], Riana Roque, during today's team-based visit:  \"MH update: Stopped taking " "sertraline because it was causing fatigue. Unsure the vyvanse is working but not sure what improvement to expect. She wonders if having a physical exam to r/o a medical cause for her fatigue, encouraged. Denies SI. Reports that mood is normal. Finding it difficult to stay motivated for grad school.  Most important: testing for ASD, suggested Rigoberto. Wondering if adding another med might help.\"     PROBLEM: DEPRESSION: Improving Feeling more cheerful and primary is fatigue.  Last PHQ-9 8/3/2021   1.  Little interest or pleasure in doing things 3   2.  Feeling down, depressed, or hopeless 1   3.  Trouble falling or staying asleep, or sleeping too much 2   4.  Feeling tired or having little energy 3   5.  Poor appetite or overeating 3   6.  Feeling bad about yourself 3   7.  Trouble concentrating 3   8.  Moving slowly or restless 3   Q9: Thoughts of better off dead/self-harm past 2 weeks 1   PHQ-9 Total Score 22   Difficulty at work, home, or with people -   In the past two weeks have you had thoughts of suicide or self harm? Yes   Do you have concerns about your personal safety or the safety of others? No   In the past 2 weeks have you thought about a plan or had intention to harm yourself? No   In the past 2 weeks have you acted on these thoughts in any way? No     PHQ-9 SCORE 7/8/2020 12/11/2020 8/3/2021   PHQ-9 Total Score - - -   PHQ-9 Total Score MyChart - 18 (Moderately severe depression) 22 (Severe depression)   PHQ-9 Total Score 21 18 22   PHQ9 score is Not completed today  Suicidal ideation:  No     PROBLEM: ANXIETY: No change. Attempting to use nonpharmacological interventions for residual symptoms that she is learning in DBT    NINFA-7 scores:   GAD7 score is Not completed today  NINFA-7 SCORE 7/8/2020 12/11/2020 8/5/2021   Total Score - 17 (severe anxiety) -   Total Score 16 17 10     PROBLEM: CHRONIC SUICIDAL IDEATIONS: current: No     PROBLEM: ADHD: Improving       CURRENT STRESSORS: Occupational and COVID " "19 Pandemic and the social and physical distancing recommendations by the CDC and MD  COPING MECHANISMS AND SUPPORTS: Family and Friends  SLEEP:   adequate  DIET:  Adequate  EXERCISE: Adequate  SIDE EFFECTS:   tolerating medications without reported side effects  SUBSTANCE USE:  Denies   COMPLIANCE:  states Adherent to medication regimen  REPORTS THE FOLLOWING NEW MEDICAL ISSUES:  none    PREGNANT: denies possibility of pregnancy.     PERTINENT PAST MEDICAL AND SURGICAL HISTORY     Past Medical History:   Diagnosis Date     Asthma      Bipolar affective disorder (H)      Outbursts of anger      Schizophrenia (H)        VITALS     BP Readings from Last 1 Encounters:   06/23/21 110/74     Pulse Readings from Last 1 Encounters:   06/23/21 108     Wt Readings from Last 1 Encounters:   06/23/21 120.5 kg (265 lb 9.6 oz)     Ht Readings from Last 1 Encounters:   10/23/19 1.651 m (5' 5\")     Estimated body mass index is 44.2 kg/m  as calculated from the following:    Height as of 10/23/19: 1.651 m (5' 5\").    Weight as of 6/23/21: 120.5 kg (265 lb 9.6 oz).    LABS & IMAGING                                                                                                                  No lab results found.  Recent Labs   Lab Test 06/23/21  1138      POTASSIUM 3.8   CHLORIDE 108   CO2 28   GLC 95   KIKE 9.0   BUN 9   CR 0.75   GFRESTIMATED >90   ALBUMIN 3.7   PROTTOTAL 7.3   AST 23   ALT 44   ALKPHOS 83   BILITOTAL 0.3     Recent Labs   Lab Test 06/23/21  1138   CHOL 164   LDL 98   HDL 52   TRIG 71       ALLERGY & IMMUNIZATIONS       Allergies   Allergen Reactions     Alcohol Hives     Kiwi Hives     Seasonal Allergies Other (See Comments) and Rash     sneezing       MEDICAL REVIEW OF SYSTEMS:   Ten system review was completed with pertinent positives noted     MENTAL STATUS EXAM:   General/Constitutional:  Appearance:   awake, alert, adequately groomed, appeared stated age and no apparent distress  Attitude:    " cooperative   Eye Contact:  good  Musculoskeletal:  Psychomotor Behavior:  no evidence of tardive dyskinesia, dystonia, or tics from the head up  Psychiatric:  Speech:  clear, coherent, regular rate, rhythm, and volume,   No pressure speech noted.  Associations:  no loose associations  Thought Process:   logical, linear and goal oriented  Thought Content:   Endorses passive SI, no intent or plan. No homicidal ideation, no evidence of psychotic thought, no auditory hallucinations present and no visual hallucinations present  Mood:  emotionally labile and can use nonpharmacological interventions for residual symptoms    Affect:  full range and was congruent to speech content.  Insight:  good  Judgment:  intact, adequate for safety  Impulse Control:  intact  Neurological:  Oriented to:  person, place, time, and situation  Attention Span and Concentration:  normal    Language: intact     Recent and Remote Memory:  Intact to interview. Not formally assessed. No amnesia.    Fund of Knowledge: appropriate        SAFETY   Feels safe in home: Yes   Suicidal ideation: passive, no intent or plan  History of suicide attempts:  Yes   Hx of impulsivity: Yes   Hope for the future: present    Hx of Command hallucinations or current psychosis: No  History of Self-injurious behaviors: Yes Current:  No  Family member  by suicide:  No        SAFETY ASSESSMENT:   Based on all available evidence including the factors cited above, overall Risk for harm is low and is appropriate for outpatient level of care.   Recommended that patient call 911 or go to the local ED should there be a change in any of these risk factors.    DSM 5 DIAGNOSIS:   Major Depressive Disorder, Recurrent, Mild, per psych testing 2021  Complex Posttraumatic Stress Disorder, With disassociation (Primary)  Features of Borderline Personality Disorder  Attention Deficit Hyperactivity Disorder, per psych testing 2021        MEDICAL COMORBIDITY IMPACTING CLINICAL  PICTURE: None noted    ASSESSMENT AND PLAN      Problem List as of 10/20/2021 Reviewed: 10/20/2021  2:36 PM by Clementine Myles APRN CNP        Noted       Behavioral    Last System Assessment & Plan 10/20/2021 Virtual Visit Written 10/26/2021  9:54 AM by Clementine Myles APRN CNP     Unclear benefit of the Vyvanse and will transition to the methylphenidate family, specifically Concerta.  She is planning to see her PCP regarding fatigue and will order CBC with diff. To evaluate for anemia and also thyroid tests.  Follow up in 6 weeks.          1. Attention deficit hyperactivity disorder (ADHD), combined type 8/3/2021     Relevant Medications     methylphenidate (CONCERTA) 36 MG CR tablet     Other Relevant Orders     **CBC with platelets FUTURE 6mo         CONSULTS/REFERRALS:   Continue therapy   Coordinate care with therapist as needed     MEDICAL:   None at this time  Coordinate care with PCP ([facility], [facility]) as needed     FOLLOW UP: Schedule an appointment with me in four weeks or sooner as needed.   Call the psychiatric nurse line with medication questions or concerns at [phone] or [phone]  Follow up with primary care provider as planned or for acute medical concerns.     PSYCHOEDUCATION:  Medication side effects and alternatives reviewed. Health promotion activities recommended and reviewed today. All questions addressed. Education and counseling completed regarding risks and benefits of medications and psychotherapy options.  Consent provided by patient/guardian  Call the psychiatric nurse line with medication questions or concerns at [phone].  MyChart may be used to communicate with your provider, but this is not intended to be used for emergencies.  STIMULANT THERAPY: Side effects discussed including but not limited to cardiac (including HTN, tachycardia, sudden death), motor/tic, appetite/growth, mood lability and sleep disruption. This is a controlled substance  with risk for abuse, need to keep in a safe keep place and cannot replace lost scripts  Medlineplus.gov is information for patients.  It is run by the National Library of Medicine and it contains information about all disorders, diseases and all medications.       COMMUNITY RESOURCES:    CRISIS NUMBERS: Provided in AVS 2021  National Suicide Prevention Lifeline: 0-716-411-TALK (469-756-7773)  Applits/resources for a list of additional resources (SOS)            [facility] - [phone]   Urgent Care Adult Mental Ekfyxy-[phone] mobile unit/  crisis line  [facility] -[phone]   COPE  [facility] Mobile Team -[phone] (adults)/ [phone] (child)  Poison Control Center - 1-977.552.9321    OR  go to nearest ER  Crisis Text Line for any crisis  send this-   To: 290467   [facility]  [phone]  National Suicide Prevention Lifeline: 940.278.9604 (TTY: 259.648.7860). Call anytime for help.  (www.suicidepreventionlifeline.org)  National [place] on Mental Illness (www.negrita.org): 751.346.8269 or 570-223-7301.   Mental Health Association (www.mentalhealth.org): 906.982.2430 or 711-422-6565.  [place] Crisis Text Line: Text [place] to 691214  Suicide LifeLine Chat: suicidepreCytonics.org/chat      ADMINISTRATIVE BILLIN min spent interviewing patient, reviewing referral documents, obtaining and reviewing outside records, communication with other health specialists, and preparing this report.    Video/Phone Start Time:  2:20 pm  Video/Phone End Time:  2:45 pm    Greater than 50% of time was spent in counseling and coordination of care regarding above diagnoses and treatment plan.  Patient Status:  Patient will continue to be seen for ongoing consultation and stabilization.    Signed:   Clementine Myles, MSN, APRN, FMHNP-BC  [facility] ([facility])   Chart documentation done in part with  [employer] Voice Recognition software.  Although reviewed after completion, some word and grammatical errors may remain.
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
## 2021-10-21 ENCOUNTER — DOCUMENTATION ONLY (OUTPATIENT)
Dept: LAB | Facility: CLINIC | Age: 26
End: 2021-10-21

## 2021-10-21 DIAGNOSIS — R53.83 FATIGUE, UNSPECIFIED TYPE: Primary | ICD-10-CM

## 2021-10-21 PROBLEM — M54.50 BILATERAL LOW BACK PAIN WITHOUT SCIATICA: Status: RESOLVED | Noted: 2021-08-19 | Resolved: 2021-10-21

## 2021-10-21 NOTE — PROGRESS NOTES
PATIENT COMING IN FOR LABS 10/25/2021. ORDERS PLACED ARE NOT DUE TILL 04/18/2022. PLEASE CONFIRM YOU WOULD LIKE THESE LABS DONE NOW AND OR PLACE NEW ORDERS. THANK YOU.

## 2021-10-21 NOTE — PROGRESS NOTES
Subjective:  HPI  Physical Exam  Oswestry Score: 6 %                 Objective:  System    Physical Exam    General     ROS    Assessment/Plan:    DISCHARGE REPORT    Progress reporting period is from 8-19-21 to 9-2-21, 2 visits.       SUBJECTIVE  At second visit patient reported being partially compliant with HEP.  She was doing REIL about 2-3x/day. Using towel roll for lumbar roll.  She was improving and had not had pain for a couple days when being active.  Sitting could still produce pain after about an hour, quickly relieved with walking - up to 7/10 briefly/first steps.     Current Pain level: 0/10.      Initial Pain level:  (3-9/10).   Changes in function:  Yes (See Goal flowsheet attached for changes in current functional level)  Adverse reaction to treatment or activity: None    OBJECTIVE  At second visit lumbar AROM extension WNL, bilateral SG WNL with slight LB discomfort.    Good sitting posture.      ASSESSMENT/PLAN  Updated problem list and treatment plan: Diagnosis 1:  Chronic LBP  Pain -  self management and home program  STG/LTGs have been met or progress has been made towards goals:  Yes (See Goal flow sheet completed today.)  Assessment of Progress: The patient's condition is improving.  The patient's condition has potential to improve.  Self Management Plans:  Patient is independent in a home treatment program.  I have re-evaluated this patient and find that the nature, scope, duration and intensity of the therapy is appropriate for the medical condition of the patient.  Lenore continues to require the following intervention to meet STG and LTG's:  PT intervention is no longer required to meet STG/LTG.    Recommendations:  This patient is ready to be discharged from therapy and continue their home treatment program.    Please refer to the daily flowsheet for treatment today, total treatment time and time spent performing 1:1 timed codes.

## 2021-10-21 NOTE — TELEPHONE ENCOUNTER
Thank you. I put knew orders in. Please let me know what I should have done to make it correct for what you need.  I want to make sure I do it write going forward.  Clementine Myles, MSN, APRN, CNP, FMHNP-BC,

## 2021-10-25 ENCOUNTER — MYC MEDICAL ADVICE (OUTPATIENT)
Dept: PSYCHIATRY | Facility: CLINIC | Age: 26
End: 2021-10-25

## 2021-10-25 ENCOUNTER — LAB (OUTPATIENT)
Dept: LAB | Facility: CLINIC | Age: 26
End: 2021-10-25
Payer: COMMERCIAL

## 2021-10-25 DIAGNOSIS — R53.83 FATIGUE, UNSPECIFIED TYPE: ICD-10-CM

## 2021-10-25 DIAGNOSIS — F90.2 ATTENTION DEFICIT HYPERACTIVITY DISORDER (ADHD), COMBINED TYPE: Primary | ICD-10-CM

## 2021-10-25 LAB
BASOPHILS # BLD AUTO: 0 10E3/UL (ref 0–0.2)
BASOPHILS NFR BLD AUTO: 0 %
EOSINOPHIL # BLD AUTO: 0.1 10E3/UL (ref 0–0.7)
EOSINOPHIL NFR BLD AUTO: 2 %
ERYTHROCYTE [DISTWIDTH] IN BLOOD BY AUTOMATED COUNT: 12.7 % (ref 10–15)
HCT VFR BLD AUTO: 38 % (ref 35–47)
HGB BLD-MCNC: 12.2 G/DL (ref 11.7–15.7)
LYMPHOCYTES # BLD AUTO: 2.1 10E3/UL (ref 0.8–5.3)
LYMPHOCYTES NFR BLD AUTO: 40 %
MCH RBC QN AUTO: 29.3 PG (ref 26.5–33)
MCHC RBC AUTO-ENTMCNC: 32.1 G/DL (ref 31.5–36.5)
MCV RBC AUTO: 91 FL (ref 78–100)
MONOCYTES # BLD AUTO: 0.4 10E3/UL (ref 0–1.3)
MONOCYTES NFR BLD AUTO: 8 %
NEUTROPHILS # BLD AUTO: 2.7 10E3/UL (ref 1.6–8.3)
NEUTROPHILS NFR BLD AUTO: 50 %
PLATELET # BLD AUTO: 192 10E3/UL (ref 150–450)
RBC # BLD AUTO: 4.16 10E6/UL (ref 3.8–5.2)
TSH SERPL DL<=0.005 MIU/L-ACNC: 1.36 MU/L (ref 0.4–4)
WBC # BLD AUTO: 5.3 10E3/UL (ref 4–11)

## 2021-10-25 PROCEDURE — 84443 ASSAY THYROID STIM HORMONE: CPT

## 2021-10-25 PROCEDURE — 36415 COLL VENOUS BLD VENIPUNCTURE: CPT

## 2021-10-25 PROCEDURE — 85025 COMPLETE CBC W/AUTO DIFF WBC: CPT

## 2021-10-26 RX ORDER — METHYLPHENIDATE HYDROCHLORIDE 54 MG/1
54 TABLET ORAL EVERY MORNING
Qty: 30 TABLET | Refills: 0 | Status: SHIPPED | OUTPATIENT
Start: 2021-10-26 | End: 2021-12-09

## 2021-10-26 NOTE — TELEPHONE ENCOUNTER
See MyChart message regarding Concerta.  Will increase to 54 mg daily.  #30 prescribed.    Clementine Myles, MSN, APRN, CNP, FMHNP-BC,

## 2021-10-26 NOTE — ASSESSMENT & PLAN NOTE
Unclear benefit of the Vyvanse and will transition to the methylphenidate family, specifically Concerta.  7 days prescribed and she will send a MyChart message to this provider to update efficacy and tolerability. She is planning to see her PCP regarding fatigue and will order CBC with diff. To evaluate for anemia and also thyroid tests.  Follow up in 6 weeks.

## 2021-11-30 DIAGNOSIS — R51.9 HEADACHE, UNSPECIFIED HEADACHE TYPE: ICD-10-CM

## 2021-11-30 NOTE — LETTER
December 9, 2021      Lenore Suggs  [address] [place] [id number]        {Comm Man dear:[id number]}          Sincerely,        Laura Willoughby PA-C

## 2021-11-30 NOTE — LETTER
December 9, 2021      Lenore Suggs  [address] [facility] [id number]        Dear Lenore,     We have made several attempts to contact you.  You will need to call the clinic at [phone] to schedule an appointment prior to any further refills of your medication.        Sincerely,        Laura Willoughby PA-C

## 2021-12-01 RX ORDER — SUMATRIPTAN 50 MG/1
50-100 TABLET, FILM COATED ORAL
Qty: 9 TABLET | Refills: 3 | OUTPATIENT
Start: 2021-12-01

## 2021-12-01 NOTE — TELEPHONE ENCOUNTER
Routing refill request to provider for review/approval because:  Patient needs to be seen because it has been more than 1 year since last office visit.    Yonathan VALENZUELA RN

## 2021-12-02 ENCOUNTER — E-VISIT (OUTPATIENT)
Dept: FAMILY MEDICINE | Facility: CLINIC | Age: 26
End: 2021-12-02
Payer: COMMERCIAL

## 2021-12-02 ENCOUNTER — MYC MEDICAL ADVICE (OUTPATIENT)
Dept: PSYCHIATRY | Facility: CLINIC | Age: 26
End: 2021-12-02

## 2021-12-02 DIAGNOSIS — R51.9 HEADACHE, UNSPECIFIED HEADACHE TYPE: ICD-10-CM

## 2021-12-02 PROCEDURE — 99421 OL DIG E/M SVC 5-10 MIN: CPT | Performed by: PHYSICIAN ASSISTANT

## 2021-12-02 RX ORDER — SUMATRIPTAN 50 MG/1
50-100 TABLET, FILM COATED ORAL
Qty: 9 TABLET | Refills: 3 | Status: SHIPPED | OUTPATIENT
Start: 2021-12-02 | End: 2022-04-06

## 2021-12-02 NOTE — TELEPHONE ENCOUNTER
Can you send a message to the patient giving her the appointment number?  I dont think people understand they can't make appointments with specialities via [employer].  If she can't get in with me, I will put a refill until her next appointment that is scheduled.  Thank you!     Clementine Myles, MSN, APRN, CNP, [facility],

## 2021-12-02 NOTE — PATIENT INSTRUCTIONS
Thank you for choosing us for your care. I have placed an order for a prescription so that you can start treatment. View your full visit summary for details by clicking on the link below. Your pharmacist will able to address any questions you may have about the medication.     If you're not feeling better within 5-7 days, please schedule an appointment.  You can schedule an appointment right here in [employer], or call [phone]  If the visit is for the same symptoms as your eVisit, we'll refund the cost of your eVisit if seen within seven days.      Migraine Headache   A migraine headache is an often severe type of headache. It's different from other types of headaches in that symptoms other than pain occur with the it. For instance, a classic migraine headache means visual symptoms (or aura) such as flashes of light, blind spots or other vision changes, warns you a headache is coming on. Nausea and vomiting, lightheadedness, sensitivity to light or sound, and other visual disturbances are common migraine symptoms. The pain may last from a few hours to several days. It's not clear why migraines occur, but certain factors called triggers can raise the risk of having a migraine attack. A migraine may be triggered by emotional stress or depression, or by hormone changes during the menstrual cycle. Other triggers include certain birth control pills, overuse of migraine medicines, alcohol or caffeine, foods with tyramine such as aged cheese and wine, eyestrain, weather changes, missed meals, or too little or too much sleep.  Home care  Follow these tips when taking care of yourself at home:    Don t drive yourself home if you were given pain medicine for your headache or are having visual symptoms. Instead, have someone else drive you home. Try to sleep when you get home. You should feel much better when you wake up.    Cold can help ease migraine symptoms. Put an ice pack wrapped in a thin towel on your forehead or  at the base of your skull. Put heat on the back of your neck to help ease any neck spasm.    Drink only clear liquids or eat a light diet until your symptoms get better. This will help you prevent nausea and vomiting.  How to prevent migraines  Pay attention to what seems to trigger your headache. Try to stay away from the triggers when you can. If you have headaches often, consider keeping a headache diary. In it, write down what you were doing, feeling, or eating in the hours before each headache. Show this to your healthcare provider to help find the cause of your headaches.  If stress seems to be a trigger for your headaches, figure out what is causing stress in your life. Learn new ways to handle your stress. Ideas include regular exercise, biofeedback, self-hypnosis, yoga, and meditation. Talk with your healthcare provider to find out more information about managing stress. Many books and digital media are also available on this subject.  Tyramine is a substance found in many foods. It can trigger a migraine in some people. These foods contain tyramine:    Chocolate    Yogurt    All cheeses, but especially aged cheeses    Smoked or pickled fish and meat, including herring, caviar, bologna, pepperoni, and salami    Liver    Avocados    Bananas    Figs    Raisins    Red wine  Try staying away from these foods for 1 to 2 months to see if you have fewer headaches.  How to treat future headaches    Take time out at the first sign of a headache, if possible. Find a quiet, dark, comfortable place to sit or lie down. Let yourself relax or sleep.    Put an ice pack wrapped in a thin towel on your forehead or on the area of greatest pain. A heating pad and massage may help if you are having a muscle spasm and tightness in your neck.    If you have been prescribed a medicine to stop a migraine headache, use this at the first warning sign of the headache for best results. First signs may be an aura or pain.    If you have  been prescribed a medicine to prevent the headaches, it's important to take the medicine as directed. Many of these medicines may take a few weeks to start preventing headaches, so it's important to not give up on them right away. If you continue to have just as many headaches after taking these medicines for a while, talk with your doctor to see if the dose needs to be changed or if a different medicine is advised.    If you need to take medicine often for your migraine, talk with your healthcare provider about other ways to prevent your headaches.    Follow-up care  Follow up with your healthcare provider, or as advised. Talk with your provider if you have frequent headaches. He or she can figure out a treatment plan. Ask if you can have medicine to take at home the next time you get a bad headache. This may keep you from having to visit the emergency department in the future. You may need to see a headache specialist (neurologist) if you continue to have headaches.  When to seek medical advice  Call your healthcare provider right away if any of these occur:    Your head pain gets worse, or doesn t get better within 24 hours    You can t keep liquids down (repeated vomiting)    Pain in your sinuses, ears, or throat    Fever of 100.4  F (38  C) or higher, or as directed by your healthcare provider    Stiff neck    Extreme drowsiness, confusion, or fainting    Dizziness, or dizziness with spinning sensation (vertigo)    Weakness or trouble feeling in an arm or leg, or on one side of your face    Trouble talking or seeing  StayWell last reviewed this educational content on 9/1/2019 2000-2021 [employer]. All rights reserved. This information is not intended as a substitute for professional medical care. Always follow your healthcare professional's instructions.
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
## 2021-12-09 ENCOUNTER — VIRTUAL VISIT (OUTPATIENT)
Dept: BEHAVIORAL HEALTH | Facility: CLINIC | Age: 26
End: 2021-12-09
Payer: COMMERCIAL

## 2021-12-09 ENCOUNTER — TELEPHONE (OUTPATIENT)
Dept: PSYCHIATRY | Facility: CLINIC | Age: 26
End: 2021-12-09
Payer: COMMERCIAL

## 2021-12-09 ENCOUNTER — VIRTUAL VISIT (OUTPATIENT)
Dept: PSYCHIATRY | Facility: CLINIC | Age: 26
End: 2021-12-09
Payer: COMMERCIAL

## 2021-12-09 DIAGNOSIS — G47.00 PERSISTENT INSOMNIA: Primary | ICD-10-CM

## 2021-12-09 DIAGNOSIS — F43.10 COMPLEX POSTTRAUMATIC STRESS DISORDER: ICD-10-CM

## 2021-12-09 DIAGNOSIS — F33.1 DEPRESSION, MAJOR, RECURRENT, MODERATE (H): Primary | ICD-10-CM

## 2021-12-09 DIAGNOSIS — F90.2 ATTENTION DEFICIT HYPERACTIVITY DISORDER (ADHD), COMBINED TYPE: ICD-10-CM

## 2021-12-09 DIAGNOSIS — F41.1 GENERALIZED ANXIETY DISORDER: ICD-10-CM

## 2021-12-09 DIAGNOSIS — G25.81 RESTLESS LEGS SYNDROME: ICD-10-CM

## 2021-12-09 PROCEDURE — 99214 OFFICE O/P EST MOD 30 MIN: CPT | Mod: 95 | Performed by: NURSE PRACTITIONER

## 2021-12-09 PROCEDURE — 90832 PSYTX W PT 30 MINUTES: CPT | Mod: 95 | Performed by: SOCIAL WORKER

## 2021-12-09 RX ORDER — VILAZODONE HYDROCHLORIDE 40 MG/1
40 TABLET ORAL DAILY
Qty: 30 TABLET | Refills: 1 | Status: SHIPPED | OUTPATIENT
Start: 2021-12-09 | End: 2022-01-20

## 2021-12-09 RX ORDER — METHYLPHENIDATE HYDROCHLORIDE 54 MG/1
54 TABLET ORAL EVERY MORNING
Qty: 30 TABLET | Refills: 0 | Status: SHIPPED | OUTPATIENT
Start: 2022-01-07 | End: 2022-04-06

## 2021-12-09 RX ORDER — METHYLPHENIDATE HYDROCHLORIDE 54 MG/1
54 TABLET ORAL EVERY MORNING
Qty: 30 TABLET | Refills: 0 | Status: SHIPPED | OUTPATIENT
Start: 2021-12-09 | End: 2022-01-20

## 2021-12-09 NOTE — PROGRESS NOTES
"   PSYCHIATRIC MEDICATION FOLLOW UP APPT     Name:  Lenore Suggs  : 1995    Lenore Suggs is a 26 year old female who is being evaluated via a billable video visit.      How would you like to obtain your AVS? MyChart  If the video visit is dropped, the invitation should be resent by: Text to cell phone: [phone]  Will anyone else be joining your video visit? No    Location of patient: at [facility] [employer]  If not at home address below, please ask where they are in case of an emergency situation arises during the appointment.  [address] [facility] [address]    Telemedicine Visit: The patient's condition can be safely assessed and treated via synchronous audio and visual telemedicine encounter.      Reason for Telemedicine Visit: COVID 19 pandemic and the social and physical recommendations by the CDC and MD.      Originating Site (Patient Location): Patient's home    Distant Site (Provider Location): Provider Remote Setting    Consent:  The patient/guardian has verbally consented to: the potential risks and benefits of telemedicine (video visit or phone) versus in person care; bill my insurance or make self-payment for services provided; and responsibility for payment of non-covered services.     Mode of Communication:  [employer] video platform     As the provider I attest to compliance with applicable laws and regulations related to telemedicine.    IDENTIFICATION   Lenore Suggs is a 25 year old female who prefers. Patient prefers to be called: \"Felecia\"  Referred by: Terrance Rand, [facility]    Patient Care Team:  [facility] Macey Shawano as PCP - Claudia Austin DO as Assigned Behavioral Health Provider  Terrance Rand MD as Assigned PCP  Therapist: Currently undergoing DBT for BPD through outside therapist.      Patient attended the phone/video session alone.    Last seen for outpatient psychiatry Return Visit on 10/20/2021. "      FOLLOWING PLAN PUT INTO PLACE: Unclear benefit of the Vyvanse and will transition to the methylphenidate family, specifically Concerta.  7 days prescribed and she will send a WISETIVI message to this provider to update efficacy and tolerability. She is planning to see her PCP regarding fatigue and will order CBC with diff. To evaluate for anemia and also thyroid tests.  Follow up in 6 weeks.     INTERIM HISTORY     COMMUNICATIONS FROM PATIENT VIA:  10/26/2021 increase the Concerta to 54 mg    RECORDS AVAILABLE FOR REVIEW: EHR records through [employer] .  In addition, reviewed the assessment completed by Riana Roque [facility], dated today        HISTORY OF PRESENT ILLNESS   CCPS referral for psychiatric medication consult in August, 2021.   Reports history of multiple diagnosis throughout her childhood primarily in the context of traumatic events.  [facility], typcially in the context of dysregulation, SI/SA.   Hx of suicidal ideation and attempts. Last hospitalization was when she was 18 yo.   Genetically loaded for  schizophrenia (biomother) and unknown paternal genetic load.      Reports she first sought treatment at a very young age.    Reports symptoms of depression and anxiety emerged in early childhood. She reported she experienced her first panic attack at the age of 7 years old and attempted to end her life at the age of 8 years old. Started medications when she was 9 years old.  She highlighted that at that age, her foster parents wanted to adopt her but not her brothers. She asserted she  didn t want to be without [her] brothers  so she tried to end her life. She disclosed that the family  still wanted to adopt [her]  so she began engaging in behaviors to get them to  change their mind  such as throwing violent tantrums, urinating in her bed and refusing to shower. She indicated she was hospitalized for 3 months and after 2 years of out  of control behaviors, the family rescinded their intent to adopt her. She reported she then  bounced around from [foster] home to home  until she was 11 when she settled in a home, she  liked.  She added that her  father  then attempted to get custody of her and she relocated with him to the [facility] when she was 15 years old. She acknowledged being a  really angry  teenager because she  couldn t express [herself].  She asserted she was diagnosed with several disorders including Schizophrenia and Bipolar I Disorder and prescribed psychiatric medications. She also disclosed she attempted to end her life on several occasions. Ms. Suggs stated that her last suicide attempt occurred in April of 2018. She asserted that in her twenties she has detached from others and experienced  no interest in anyone.  She outlined episodes of disassociation, obsessive thoughts, compulsive acts and fantasies of death. She admitted she does not like being touched and fears being contaminated. She reported she  washes [her] hands too much  and likes things to feel  the right way.   She shares a previous diagnosis of bipolar and schizophrenia. Reports she had been off all medication from age 15 through 24 years old.     Meets criteria for multiple diagnosis. When evaluating closer, symptoms are primarily in the context of chronic trauma they has experienced as a child, adolescent and adult.   Primary diagnosis is complex PTSD.  In individuals who are exposed to chronic repeated, or long-standing traumatic events, the classical symptoms of PTSD often fail to completely describe the psychological harm, emotional problems, and changes in how people view themselves and the world around them.  As a result, some professionals believe that we should distinguish between the type of PTSD that developed from chronic, long-lasting traumatic events as compared to PTSD from short-lived event.  The diagnosis of  complex PTSD  refers to the  set of symptoms that commonly follow exposure to a chronic, prolonged long lasting traumatic event.  These  generally involve some form of physical or emotional captivity, such as childhood sexual and/or physical abuse or domestic violence.  In these types of events, the victim is under the control of another person and doesn t have the ability to easily escape.      Symptoms of complex PTSD:      Emotion regulation problems- People with complex PTSD experience difficulties managing their emotions.  They may experience severe depression, thoughts of suicide, or have difficulty controlling their anger.      Changes in consciousness: following exposure to a chronic traumatic event, a person may repress memories of the traumatic event or experience flashbacks.  In rare cases, a person may experience disassociation.      Symptoms in this category include feelings of helplessness, shame, guilt, or feeling detached and different from others      Changes in how the victim views the perpetrator: a person with complex PTSD may feel like he has no power over perpetrator (the perpetrator has complete power in a relationship).  In complex PTSD, people may also become preoccupied with their relationship with the perpetrator.  For example, constant thoughts of wanting revenge.      Changes in personal relationships:  These symptoms include problems with relationships, such as isolating oneself or being distrusting of others      Changes in how one views the world: People exposed to chronic or repeated traumatic events may also lose jose in humanity or have a sense of hopelessness.     Psychiatric testing by Lisa Petit, PhD LP Psychologist, documented date 6/8/2021 available for review with the following identified diagnoses:     Major Depressive Disorder, Recurrent, Mild   Posttraumatic Stress Disorder, With disassociation   Features of Borderline Personality Disorder  Attention Deficit Hyperactivity Disorder    FAMILY,  "MEDICAL, SURGICAL HISTORY REVIEWED.  MEDICATION HAVE BEEN REVIEWED AND ARE CURRENT TO THE BEST OF MY KNOWLEDGE AND ABILITY.  Pharmacy tech    MEDICATIONS                                                                                                Current Outpatient Medications   Medication Sig     albuterol (PROAIR HFA/PROVENTIL HFA/VENTOLIN HFA) 108 (90 Base) MCG/ACT inhaler Inhale 1-2 puffs into the lungs every 6 hours as needed for shortness of breath / dyspnea or wheezing     ketorolac (TORADOL) 10 MG tablet Take 1 tablet (10 mg) by mouth every 8 hours as needed for moderate pain     methylphenidate (CONCERTA) 54 MG CR tablet Take 1 tablet (54 mg) by mouth every morning     SUMAtriptan (IMITREX) 50 MG tablet Take 1-2 tablets ( mg) by mouth at onset of headache May repeat in 2 hrs.  Max 200 mg in 24 hrs     ondansetron (ZOFRAN-ODT) 4 MG ODT tab Take 1 tablet (4 mg) by mouth every 8 hours as needed for nausea (Patient not taking: Reported on 12/9/2021)     No current facility-administered medications for this visit.      NOTES ABOUT CURRENT PSYCHOTROPIC MEDICATIONS:   Concerta 54 mg       PAST PSYCHOTROPIC MEDICATIONS:  Risperidone   Olanzapine   [place]  Ziprasidone   Quetiapine   Guanfacine  Trazodone   Latuda   Citalopram   Escitalopram   Fluoxetine   Adderall IR 10 mg once daily  Bupropion  mg   Sertraline 100 mg, discontinued due to increase in fatigued   Vyvanse 40 mg, doesn't take on her days off.  Feedback from employer indicate improvement in attention and focus.     TODAY PATIENT REPORTS THE FOLLOWING PSYCHIATRIC ROS:   Per [facility] [place], Riana Roque, during today's team-based visit:  \" update: Fewer side effects with methylphenidate, calmer but felt more focused with vyvanse. People have noticed that she's more approachable since being on the methylphenidate. Mood is \"relaxed\" but ongoing irritability. She's going to take a year off from grad school to take classes at [employer] because " "she's feeling burned out of rigorous academics. Denies SI.  Stresses: Work stress may be causing migraines.  Appetite: unchanged  Sleep: better but difficult to get up  Therapy: Inquired with Rigoberto about getting tested for ASD but she didn't hear back.  Interventions: Explored good self care, deep breathing, educated about how exercise can enhance and stabilize mood.  Most important: is methylphenidate causing or contributing to her headaches which have increased since starting it, referral for ASD testing.\"     PROBLEM: DEPRESSION: Improving Feeling more cheerful and primary is fatigue.  Primary symptom is irritable, quick to react to minimal triggers which is the same and not worsened by the stimulant.  Still feeling tired and fatigued.  Reports the lab work up with PCP was within normal limits.  Reports she is not snoring and endorsing restless legs but sleep is nonrestorative.  Last PHQ-9 8/3/2021   1.  Little interest or pleasure in doing things 3   2.  Feeling down, depressed, or hopeless 1   3.  Trouble falling or staying asleep, or sleeping too much 2   4.  Feeling tired or having little energy 3   5.  Poor appetite or overeating 3   6.  Feeling bad about yourself 3   7.  Trouble concentrating 3   8.  Moving slowly or restless 3   Q9: Thoughts of better off dead/self-harm past 2 weeks 1   PHQ-9 Total Score 22   Difficulty at work, home, or with people -   In the past two weeks have you had thoughts of suicide or self harm? Yes   Do you have concerns about your personal safety or the safety of others? No   In the past 2 weeks have you thought about a plan or had intention to harm yourself? No   In the past 2 weeks have you acted on these thoughts in any way? No     PHQ-9 SCORE 7/8/2020 12/11/2020 8/3/2021   PHQ-9 Total Score - - -   PHQ-9 Total Score MyChart - 18 (Moderately severe depression) 22 (Severe depression)   PHQ-9 Total Score 21 18 22   PHQ9 score is Not completed today  Suicidal ideation:  No " "    PROBLEM: ANXIETY: No change. Attempting to use nonpharmacological interventions for residual symptoms that she is learning in DBT    NINFA-7 scores:   GAD7 score is Not completed today  NINFA-7 SCORE 7/8/2020 12/11/2020 8/5/2021   Total Score - 17 (severe anxiety) -   Total Score 16 17 10     PROBLEM: CHRONIC SUICIDAL IDEATIONS: current: No     PROBLEM: ADHD: Improving       CURRENT STRESSORS: Occupational and COVID 19 Pandemic and the social and physical distancing recommendations by the CDC and MD  COPING MECHANISMS AND SUPPORTS: Family and Friends  SLEEP:   adequate  DIET:  Adequate  EXERCISE: Adequate  SIDE EFFECTS:   tolerating medications without reported side effects  SUBSTANCE USE:  Denies   COMPLIANCE:  states Adherent to medication regimen  REPORTS THE FOLLOWING NEW MEDICAL ISSUES:  none    PREGNANT: denies possibility of pregnancy.     PERTINENT PAST MEDICAL AND SURGICAL HISTORY     Past Medical History:   Diagnosis Date     Asthma      Bipolar affective disorder (H)      Outbursts of anger      Schizophrenia (H)        VITALS     BP Readings from Last 1 Encounters:   06/23/21 110/74     Pulse Readings from Last 1 Encounters:   06/23/21 108     Wt Readings from Last 1 Encounters:   06/23/21 120.5 kg (265 lb 9.6 oz)     Ht Readings from Last 1 Encounters:   10/23/19 1.651 m (5' 5\")     Estimated body mass index is 44.2 kg/m  as calculated from the following:    Height as of 10/23/19: 1.651 m (5' 5\").    Weight as of 6/23/21: 120.5 kg (265 lb 9.6 oz).    LABS & IMAGING                                                                                                                  Recent Labs   Lab Test 10/25/21  1138   WBC 5.3   HGB 12.2   HCT 38.0   MCV 91        Recent Labs   Lab Test 06/23/21  1138      POTASSIUM 3.8   CHLORIDE 108   CO2 28   GLC 95   KIKE 9.0   BUN 9   CR 0.75   GFRESTIMATED >90   ALBUMIN 3.7   PROTTOTAL 7.3   AST 23   ALT 44   ALKPHOS 83   BILITOTAL 0.3     Recent Labs "   Lab Test 21  1138   CHOL 164   LDL 98   HDL 52   TRIG 71       ALLERGY & IMMUNIZATIONS       Allergies   Allergen Reactions     Alcohol Hives     Kiwi Hives     Seasonal Allergies Other (See Comments) and Rash     sneezing       MEDICAL REVIEW OF SYSTEMS:   Ten system review was completed with pertinent positives noted     MENTAL STATUS EXAM:   General/Constitutional:  Appearance:   awake, alert, adequately groomed, appeared stated age and no apparent distress  Attitude:    cooperative   Eye Contact:  good  Musculoskeletal:  Psychomotor Behavior:  no evidence of tardive dyskinesia, dystonia, or tics from the head up  Psychiatric:  Speech:  clear, coherent, regular rate, rhythm, and volume,   No pressure speech noted.  Associations:  no loose associations  Thought Process:   logical, linear and goal oriented  Thought Content:   Endorses passive SI, no intent or plan. No homicidal ideation, no evidence of psychotic thought, no auditory hallucinations present and no visual hallucinations present  Mood:  emotionally labile and can use nonpharmacological interventions for residual symptoms    Affect:  full range and was congruent to speech content.  Insight:  good  Judgment:  intact, adequate for safety  Impulse Control:  intact  Neurological:  Oriented to:  person, place, time, and situation  Attention Span and Concentration:  normal    Language: intact     Recent and Remote Memory:  Intact to interview. Not formally assessed. No amnesia.    Fund of Knowledge: appropriate        SAFETY   Feels safe in home: Yes   Suicidal ideation: passive, no intent or plan  History of suicide attempts:  Yes   Hx of impulsivity: Yes   Hope for the future: present    Hx of Command hallucinations or current psychosis: No  History of Self-injurious behaviors: Yes Current:  No  Family member  by suicide:  No        SAFETY ASSESSMENT:   Based on all available evidence including the factors cited above, overall Risk for harm is  low and is appropriate for outpatient level of care.   Recommended that patient call 911 or go to the local ED should there be a change in any of these risk factors.    DSM 5 DIAGNOSIS:   Major Depressive Disorder, Recurrent, Mild, per psych testing 6/2021  Complex Posttraumatic Stress Disorder, With disassociation (Primary)  Features of Borderline Personality Disorder  Attention Deficit Hyperactivity Disorder, per psych testing 6/2021        MEDICAL COMORBIDITY IMPACTING CLINICAL PICTURE: None noted    ASSESSMENT AND PLAN      Problem List as of 12/9/2021 Reviewed: 10/20/2021  2:36 PM by Clementine Myles APRN CNP          Noted       Musculoskeletal and Integumentary    1. Complex posttraumatic stress disorder 9/10/2021     Overview Signed 9/10/2021  2:45 PM by Clementine Myles APRN CNP      Complex PTSD explains affective instability, mood symptoms, anxiety and maladaptive behaviors more parsimoniously than separate diagnosis of anxiety, depression, bipolar, OCD and/or personality disorder.  Medications ultimately will not provide definitive treatment, but nonpharmacological interventions like dialectical behavior therapy (DBT) and eye movement desensitization and reprocessing (EMDR).             Relevant Medications     vilazodone (VIIBRYD) 40 MG TABS tablet       Behavioral    Last System Assessment & Plan 12/9/2021 Virtual Visit Written 12/9/2021 11:10 AM by Clementine Myles APRN CNP     Has noted the Concerta is effective in targeting attention and focus and coworkers have noticed that she is more calm.  She continues to endorse irritability, quick to react to minimal triggers.  She has not had an increase in this since starting stimulant it has remained consistently the same.  Not currently on an SSRI or SNRI.  She has had multiple medication trials.  Will trial Viibryd titrated to 40 mg.  Continue current dose of Concerta.  Follow-up in 2 months.  Continue therapy learning DBT skills               CONSULTS/REFERRALS:   Continue therapy   Coordinate care with therapist as needed     MEDICAL:   None at this time  [facility] as needed     FOLLOW UP: Schedule an appointment with me in four weeks or sooner as needed.   Call the psychiatric nurse line with medication questions or concerns at [phone] or [phone]  Follow up with primary care provider as planned or for acute medical concerns.     PSYCHOEDUCATION:  Medication side effects and alternatives reviewed. Health promotion activities recommended and reviewed today. All questions addressed. Education and counseling completed regarding risks and benefits of medications and psychotherapy options.  Consent provided by patient/guardian  Call the psychiatric nurse line with medication questions or concerns at [phone].  C3DNAhart may be used to communicate with your provider, but this is not intended to be used for emergencies.  STIMULANT THERAPY: Side effects discussed including but not limited to cardiac (including HTN, tachycardia, sudden death), motor/tic, appetite/growth, mood lability and sleep disruption. This is a controlled substance with risk for abuse, need to keep in a safe keep place and cannot replace lost scripts  Medlineplus.gov is information for patients.  It is run by the National Library of Medicine and it contains information about all disorders, diseases and all medications.       COMMUNITY RESOURCES:    CRISIS NUMBERS: Provided in AVS 8/5/2021  National Suicide Prevention Lifeline: 2-586-641-TALK (482-922-7104)  Seen/resources for a list of additional resources (SOS)            [facility] - [phone]   Urgent Care Adult Mental Lgjplq-[phone] mobile unit/ 24/7 crisis line  [facility] -[phone]   COPE 24/7 [place] Mobile Team -[phone] (adults)/ [phone] (child)  Poison Control Center - [phone]     OR  go to nearest ER  Crisis Text Line for any crisis  send this-   To: 700377   [facility] ([facility]  [phone]  National Suicide Prevention Lifeline: 281.327.8486 (TTY: 874.119.8838). Call anytime for help.  (www.suicidepreventionlifeline.org)  National [place] [employer] Mental Illness (www.negrita.org): 309.445.7690 or 231-963-9965.   Mental Health Association (www.mentalhealth.org): 743.149.6087 or 246-687-6926.  [place] Crisis Text Line: Text [place] to 956243  Suicide LifeLine Chat: suicideXY Mobile.org/chat      ADMINISTRATIVE BILLIN min spent interviewing patient, reviewing referral documents, obtaining and reviewing outside records, communication with other health specialists, and preparing this report.    Video/Phone Start Time:  1022  Video/Phone End Time:  1051    Greater than 50% of time was spent in counseling and coordination of care regarding above diagnoses and treatment plan.    Patient Status:  Patient will continue to be seen for ongoing consultation and stabilization.      Signed:   Clementine Myles, MSN, APRN, FMHNP-[facility] ([facility])   Chart documentation done in part with Dragon Voice Recognition software.  Although reviewed after completion, some word and grammatical errors may remain.

## 2021-12-09 NOTE — TELEPHONE ENCOUNTER
Prior Authorization Specialty Medication Request    Medication/Dose: Viibryd titrated to 40 mg  ICD code (if different than what is on RX):  MDD  Previously Tried and Failed:  PAST PSYCHOTROPIC MEDICATIONS:  Risperidone   Olanzapine   [place]  Ziprasidone   Quetiapine   Guanfacine  Trazodone   Latuda   Citalopram   Escitalopram   Fluoxetine   Bupropion  mg   Sertraline 100 mg, discontinued due to increase in fatigued     Important Lab Values: not applicable   Rationale: multiple failed trials    Insurance Name: [facility]  Insurance ID: [id number]  Insurance Phone Number: ?    Pharmacy Information (if different than what is on RX)  same

## 2021-12-09 NOTE — ASSESSMENT & PLAN NOTE
Has noted the Concerta is effective in targeting attention and focus and coworkers have noticed that she is more calm.  She continues to endorse irritability, quick to react to minimal triggers.  She has not had an increase in this since starting stimulant it has remained consistently the same.  Not currently on an SSRI or SNRI.  She has had multiple medication trials.  Will trial Viibryd titrated to 40 mg.  Continue current dose of Concerta.  Follow-up in 2 months.  Continue therapy learning DBT skills

## 2021-12-09 NOTE — LETTER
2021    Regarding:  Lenore Suggs  [address]  [facility] [address]  :  1995    I am writing to you regarding the recent denial of Viibryd for the above named patient.  This is a formal request for appeal.  Patient has trialed the following medications:      Risperidone   Olanzapine   [place]  Ziprasidone   Quetiapine   Guanfacine  Trazodone   Latuda   Citalopram   Escitalopram   Fluoxetine   Bupropion  mg   Sertraline 100 mg, discontinued due to increase in fatigue     Nefazodone is not available in the [place].  She has trialed bupropion and trazodone.  Mirtazapine is not appropriate as it is not indicated for Major Depressive Disorder and carries a significant side effect profile of weight gain and patient's BMI is currently 44.1.  Venlafaxine is an SNRI and she is currently on methylphenidate which is contraindicated due to increase in norepinephrine.     Continued denial of this medication places her at a dangerous risk of side effects, hospitalizations, and instability as well as symptom exacerbation that may have untoward side effects and far reaching consequences. By taking her off this medication, there is an increased risk of expense, adverse events, and lengthy medical appointments, not to mention the cost accrued by each of these as we fight for stability once again- trialing medications that have proven to be failures in the past. Please forward the name of the person within your organization that is assuming responsibility for the care of this patient, as well as the management of the side effects, adverse reactions, increased medical treatment necessary, and cost incurred by making this change, which is not in the best interest of Ms. Suggs.  I am prescribing utilizing evidenced based practice.  I will need confirmation that your company will take full responsibility for any complications or risks involved to my patient should the Viibryd continued to be denied  and she utilizes your recommended psychotropic requirements.   I will await your prompt reply.      Clementine Myles, MSN, APRN, CNP, [facility],   [facility]

## 2021-12-09 NOTE — TELEPHONE ENCOUNTER
Central Prior Authorization Team   Phone: [phone]      PA Initiation    Medication: Viibryd-Initiated  Insurance Company: [employer] - Phone [phone] Fax [phone]  Pharmacy Filling the Rx: [id number] [place], [place] - [address] AT [facility] & [facility]  Filling Pharmacy Phone: [phone]  Filling Pharmacy Fax:    Start Date: 12/9/2021

## 2021-12-13 ENCOUNTER — MYC MEDICAL ADVICE (OUTPATIENT)
Dept: PSYCHIATRY | Facility: CLINIC | Age: 26
End: 2021-12-13
Payer: COMMERCIAL

## 2021-12-13 NOTE — TELEPHONE ENCOUNTER
PRIOR AUTHORIZATION DENIED    Medication: Viibryd-DENIED    Denial Date: 12/9/2021    Denial Rational:                Appeal Information:        Name band;

## 2021-12-14 NOTE — TELEPHONE ENCOUNTER
Medication Appeal Initiation    We have initiated an appeal for the requested medication:  Medication: Viibryd-APPEAL INITIATED  Appeal Start Date:  12/14/2021  Insurance Company: [employer] - Phone [phone] Fax [phone]  Comments:       Manually faxed letter of medical necessity to [phone].

## 2021-12-15 NOTE — TELEPHONE ENCOUNTER
MEDICATION APPEAL APPROVED    Medication: Viibryd-APPEAL APPROVED  Authorization Effective Date: 11/14/2021  Authorization Expiration Date: 12/14/2022  Approved Dose/Quantity:   Reference #:     Insurance Company: [employer] - Phone [phone] Fax [phone]  Expected CoPay:       CoPay Card Available:      Foundation Assistance Needed:    Which Pharmacy is filling the prescription (Not needed for infusion/clinic administered): [id number] - [facility] [address] AT [address]       Pharmacy will notify patient when medication is ready.

## 2021-12-20 ENCOUNTER — HOSPITAL ENCOUNTER (EMERGENCY)
Facility: CLINIC | Age: 26
Discharge: HOME | End: 2021-12-20
Attending: EMERGENCY MEDICINE | Admitting: EMERGENCY MEDICINE
Payer: COMMERCIAL

## 2021-12-20 VITALS
WEIGHT: 250 LBS | RESPIRATION RATE: 16 BRPM | BODY MASS INDEX: 40.18 KG/M2 | HEIGHT: 66 IN | SYSTOLIC BLOOD PRESSURE: 151 MMHG | OXYGEN SATURATION: 99 % | DIASTOLIC BLOOD PRESSURE: 103 MMHG | HEART RATE: 94 BPM

## 2021-12-20 DIAGNOSIS — T88.7XXA MEDICATION SIDE EFFECTS: ICD-10-CM

## 2021-12-20 PROCEDURE — 99282 EMERGENCY DEPT VISIT SF MDM: CPT

## 2021-12-20 ASSESSMENT — ENCOUNTER SYMPTOMS
NAUSEA: 1
CONFUSION: 1
WEAKNESS: 1
FATIGUE: 1
FEVER: 0
DIZZINESS: 1
COUGH: 0

## 2021-12-20 ASSESSMENT — MIFFLIN-ST. JEOR: SCORE: 1895.74

## 2021-12-20 NOTE — ED TRIAGE NOTES
Pt takes medication for ADD and today took first dose of viibryd (?) for an antidepressant.  Pt started feeling, weak, sweaty, shaky, and confused.

## 2021-12-20 NOTE — DISCHARGE INSTRUCTIONS
Speak with your psychiatrist for further guidance on this medication.  Do not take this medication until you speak with him.  Options may be to decrease the dose and taper up more slowly.

## 2021-12-20 NOTE — Clinical Note
Lenore Suggs was seen and treated in our emergency department on 12/20/2021.  She may return to work on 12/21/2021.       If you have any questions or concerns, please don't hesitate to call.      Crissy Menon MD

## 2021-12-20 NOTE — ED PROVIDER NOTES
"  History   Chief Complaint:  Altered Mental Status    The history is provided by the patient.      Lenore Suggs is a 26 year old female with a history of suicide attempts, asthma, and ADHD who presents with her friend for an altered mental status. At 0800 today, she took her new medications, which were vilazodone for an antidepressant. At 0900, she had breakfast. She started experiencing symptoms of  confusion, a sense of time going fast, dizziness, nauseous, and weakness at 1000.  She  Now feels better. She was diagnosed with ADHD earlier this year and is on methylphenidate. The patient denies cough, fever, illness, and no calf swelling. She denies taking any recreational drugs and is allergic to alcohol, so she does not drink.     Review of Systems   Psychiatric/Behavioral: Positive for confusion.   All other systems reviewed and are negative.      Allergies:  Alcohol  Kiwi  Seasonal Allergies    Medications:  ketorolac   methylphenidate  ondansetron   SUMAtriptan   vilazodone    Past Medical History:     Asthma   Bipolar affective disorder   Outbursts of anger  Schizophrenia   History of suicide attempt  Suicidal ideation  Headache, unspecified headache type  BMI > 35  Attention deficit hyperactivity disorder (ADHD), combined type  Complex posttraumatic stress disorder      Past Surgical History:    The patient denies any past surgical history.    Family History:    Mental Illness - Mother  Paranoid behavior - Mother    Social History:  The patient arrived with her friend.  The patient is a pharmacy technician.     Physical Exam     Patient Vitals for the past 24 hrs:   BP Pulse Resp SpO2 Height Weight   12/20/21 1228 (!) 151/103 94 16 99 % 1.676 m (5' 6\") 113.4 kg (250 lb)     Physical Exam   Constitutional:  Patient is oriented to person, place, and time. They appear well-developed and well-nourished.   HENT:   Mouth/Throat:   Oropharynx is clear and moist.   Eyes:    Conjunctivae normal and EOM are " normal. Pupils are equal, round, and reactive to light.   Neck:    Normal range of motion.   Cardiovascular: Normal rate, regular rhythm and normal heart sounds.  Exam reveals no gallop and no friction rub.  No murmur heard.  Pulmonary/Chest:  Effort normal and breath sounds normal. Patient has no wheezes. Patient has no rales.   Abdominal:   Soft. Bowel sounds are normal. Patient exhibits no mass. There is no tenderness. There is no rebound and no guarding.   Musculoskeletal:  Normal range of motion. Patient exhibits no edema.   Neurological:   Patient is alert and oriented to person, place, and time. Patient has normal strength. No cranial nerve deficit or sensory deficit. GCS 15. NHSS is 0. Fine tremor in hands.   Skin:   Skin is warm and dry. No rash noted. No erythema.   Psychiatric:   Patient has a normal mood and affect. Patient's behavior is normal. Judgment and thought content normal.     Emergency Department Course     Emergency Department Course:  Reviewed:  I reviewed nursing notes, vitals, past medical history, Care Everywhere and [facility]    Assessments:  1058 I obtained history and examined the patient as noted above.   1315 I rechecked the patient and explained findings.     Disposition:  The patient was discharged to home.     Impression & Plan     Medical Decision Making:    Felecia Suggs is a 26-year-old female presenting to the emergency department with side effects from taking a new medication.  She started her new medication this morning about an hour later began to feel lightheaded dizzy and as of time were moving really fast.  She feels much improved now.  This is the only change that she has had and she is otherwise been in her normal state of health.  Her physical exam was very reassuring.  She was moderately hypertensive on exam here I think this is in part due to her medications.  She had no focal neurologic symptoms.  She had no rigidity or concern for adverse reaction or allergic reaction.   At this time we did advise her to follow-up with her psychiatrist to prescribe this medication and let them know the side effects she was having.  She may need to start at a lower dose or they may need to find a different medication.    Diagnosis:    ICD-10-CM    1. Medication side effects  T88.7XXA        Discharge Medications:  Discharge Medication List as of 12/20/2021  1:07 PM          Scribe Disclosure:  I, Jenaleksandareb Mario, am serving as a scribe at 12:55 PM on 12/20/2021 to document services personally performed by Crissy Menon MD based on my observations and the provider's statements to me.          Crissy Menon MD  12/20/21 3410

## 2021-12-24 ENCOUNTER — NURSE TRIAGE (OUTPATIENT)
Dept: NURSING | Facility: CLINIC | Age: 26
End: 2021-12-24
Payer: COMMERCIAL

## 2021-12-24 NOTE — TELEPHONE ENCOUNTER
Patient calling reporting her girlfriend has been around someone that has covid19. States she has difficulty of breathing at rest. Reports having headache. Per guideline, advised patient to be seen at the emergency department. Caller verbalized understanding. Denies further questions.      Rolando Craig RN  [facility]     COVID 19 Nurse Triage Plan/Patient Instructions    Please be aware that novel coronavirus (COVID-19) may be circulating in the community. If you develop symptoms such as fever, cough, or SOB or if you have concerns about the presence of another infection including coronavirus (COVID-19), please contact your health care provider or visit [email or web address].     Disposition/Instructions    ED Visit recommended. Follow protocol based instructions.     Bring Your Own Device:  Please also bring your smart device(s) (smart phones, tablets, laptops) and their charging cables for your personal use and to communicate with your care team during your visit.    Thank you for taking steps to prevent the spread of this virus.  o Limit your contact with others.  o Wear a simple mask to cover your cough.  o Wash your hands well and often.    Resources    [facility]: About COVID-19: [email or web address]    CDC: What to Do If You're Sick: www.cdc.gov/coronavirus/2019-ncov/about/steps-when-sick.html    CDC: Ending Home Isolation: www.cdc.gov/coronavirus/2019-ncov/hcp/disposition-in-home-patients.html     CDC: Caring for Someone: www.cdc.gov/coronavirus/2019-ncov/if-you-are-sick/care-for-someone.html     [facility]: Interim Guidance for Hospital Discharge to Home: www.health.[facility].[place]/diseases/coronavirus/hcp/hospdischarge.pdf    [facility] clinical trials (COVID-19 research studies): [email or web address]/umn-[email or web address]     Below are the COVID-19 hotlines at the [place] Department of Health ([facility]). Interpreters are available.   o For health questions:  Call 322-732-2536 or 1-562.736.5279 (7 a.m. to 7 p.m.)  o For questions about schools and childcare: Call 593-708-3369 or 1-477.409.1626 (7 a.m. to 7 p.m.)                       Reason for Disposition    MODERATE difficulty breathing (e.g., speaks in phrases, SOB even at rest, pulse 100-120)    Additional Information    Negative: SEVERE difficulty breathing (e.g., struggling for each breath, speaks in single words)    Negative: Difficult to awaken or acting confused (e.g., disoriented, slurred speech)    Negative: Bluish (or gray) lips or face now    Negative: Shock suspected (e.g., cold/pale/clammy skin, too weak to stand, low BP, rapid pulse)    Negative: Sounds like a life-threatening emergency to the triager    Negative: SEVERE or constant chest pain or pressure (Exception: mild central chest pain, present only when coughing)    Protocols used: CORONAVIRUS (COVID-19) DIAGNOSED OR [id number] 8.25.2021

## 2022-02-24 ENCOUNTER — MYC MEDICAL ADVICE (OUTPATIENT)
Dept: PSYCHIATRY | Facility: CLINIC | Age: 27
End: 2022-02-24
Payer: COMMERCIAL

## 2022-02-24 NOTE — LETTER
[facility] MENTAL HEALTH & ADDICTION [facility]  [address]  [address]  [facility] [address]  Phone: [phone]  2022    RE: Lenore Suggs  : 1995      To whom it may concern:    Dear Erik,  I am a Psychiatric Nurse Practioner with the [facility] Psychiatry Services team.  I am writing to confirm that she is a person with a disability and requires an emotional support animal to fully use and enjoy their housing.  It is my understanding that they are requesting to have an emotional support animal in their rental housing.  The patient has a relevant diagnosis or diagnoses of:  Major Depressive Disorder. The associated impairments impact some of the Patient s major life activities, including but not limited to caring for self, sleeping, thinking, communicating, and working.  An emotional support animal will allow the Patient to fully enjoy and use their home because:   *The companionship of the animal will alleviate fears of being alone.  *The companionship of the animal will ease the stress of social interactions.  *The companionship of the animal will alleviate or calm her mood.  *The need to care for the animal will motivate them to engage in self-care.  *The need to care for the animal will provide a sense of purpose.  *The need to take the animal outside will motivate them to leave the apartment and interact with others.    This individual does not have impairments that would prevent them from caring for a  pet. It should be understood that a  status does not exempt the pet owner from any damage caused by the pet.    Sincerely,    Clementine Myles, MSN, APRN, CNP, FMHNP-BC,   [facility]

## 2022-02-25 NOTE — TELEPHONE ENCOUNTER
See [employer] Online message regarding emotional service animal letter      Clementine Myles, MSN, APRN, CNP, FMHNP-BC,

## 2022-02-28 ENCOUNTER — OFFICE VISIT (OUTPATIENT)
Dept: SLEEP MEDICINE | Facility: CLINIC | Age: 27
End: 2022-02-28
Attending: NURSE PRACTITIONER
Payer: COMMERCIAL

## 2022-02-28 VITALS
OXYGEN SATURATION: 99 % | DIASTOLIC BLOOD PRESSURE: 85 MMHG | SYSTOLIC BLOOD PRESSURE: 125 MMHG | WEIGHT: 275 LBS | HEART RATE: 98 BPM | HEIGHT: 66 IN | BODY MASS INDEX: 44.2 KG/M2

## 2022-02-28 DIAGNOSIS — F51.04 CHRONIC INSOMNIA: Primary | ICD-10-CM

## 2022-02-28 DIAGNOSIS — F43.10 PTSD (POST-TRAUMATIC STRESS DISORDER): ICD-10-CM

## 2022-02-28 DIAGNOSIS — G25.81 RESTLESS LEGS SYNDROME: ICD-10-CM

## 2022-02-28 DIAGNOSIS — R53.82 CHRONIC FATIGUE: ICD-10-CM

## 2022-02-28 PROCEDURE — 99204 OFFICE O/P NEW MOD 45 MIN: CPT | Performed by: INTERNAL MEDICINE

## 2022-02-28 RX ORDER — ZOLPIDEM TARTRATE 5 MG/1
TABLET ORAL
Qty: 1 TABLET | Refills: 0 | Status: SHIPPED | OUTPATIENT
Start: 2022-02-28 | End: 2022-08-10

## 2022-02-28 ASSESSMENT — SLEEP AND FATIGUE QUESTIONNAIRES
HOW LIKELY ARE YOU TO NOD OFF OR FALL ASLEEP WHEN YOU ARE A PASSENGER IN A CAR FOR AN HOUR WITHOUT A BREAK: WOULD NEVER DOZE
HOW LIKELY ARE YOU TO NOD OFF OR FALL ASLEEP IN A CAR, WHILE STOPPED FOR A FEW MINUTES IN TRAFFIC: WOULD NEVER DOZE
HOW LIKELY ARE YOU TO NOD OFF OR FALL ASLEEP WHILE LYING DOWN TO REST IN THE AFTERNOON WHEN CIRCUMSTANCES PERMIT: WOULD NEVER DOZE
HOW LIKELY ARE YOU TO NOD OFF OR FALL ASLEEP WHILE SITTING AND READING: SLIGHT CHANCE OF DOZING
HOW LIKELY ARE YOU TO NOD OFF OR FALL ASLEEP WHILE SITTING QUIETLY AFTER LUNCH WITHOUT ALCOHOL: WOULD NEVER DOZE
HOW LIKELY ARE YOU TO NOD OFF OR FALL ASLEEP WHILE SITTING INACTIVE IN A PUBLIC PLACE: WOULD NEVER DOZE
HOW LIKELY ARE YOU TO NOD OFF OR FALL ASLEEP WHILE WATCHING TV: SLIGHT CHANCE OF DOZING
HOW LIKELY ARE YOU TO NOD OFF OR FALL ASLEEP WHILE SITTING AND TALKING TO SOMEONE: WOULD NEVER DOZE

## 2022-02-28 ASSESSMENT — ANXIETY QUESTIONNAIRES
7. FEELING AFRAID AS IF SOMETHING AWFUL MIGHT HAPPEN: MORE THAN HALF THE DAYS
2. NOT BEING ABLE TO STOP OR CONTROL WORRYING: MORE THAN HALF THE DAYS
1. FEELING NERVOUS, ANXIOUS, OR ON EDGE: MORE THAN HALF THE DAYS
GAD7 TOTAL SCORE: 16
3. WORRYING TOO MUCH ABOUT DIFFERENT THINGS: SEVERAL DAYS
GAD7 TOTAL SCORE: 16
7. FEELING AFRAID AS IF SOMETHING AWFUL MIGHT HAPPEN: MORE THAN HALF THE DAYS
4. TROUBLE RELAXING: NEARLY EVERY DAY
5. BEING SO RESTLESS THAT IT IS HARD TO SIT STILL: NEARLY EVERY DAY
6. BECOMING EASILY ANNOYED OR IRRITABLE: NEARLY EVERY DAY
GAD7 TOTAL SCORE: 16

## 2022-02-28 ASSESSMENT — PATIENT HEALTH QUESTIONNAIRE - PHQ9
10. IF YOU CHECKED OFF ANY PROBLEMS, HOW DIFFICULT HAVE THESE PROBLEMS MADE IT FOR YOU TO DO YOUR WORK, TAKE CARE OF THINGS AT HOME, OR GET ALONG WITH OTHER PEOPLE: VERY DIFFICULT
SUM OF ALL RESPONSES TO PHQ QUESTIONS 1-9: 22
SUM OF ALL RESPONSES TO PHQ QUESTIONS 1-9: 22

## 2022-02-28 NOTE — NURSING NOTE
"Chief Complaint   Patient presents with     Sleep Problem     Long history of trouble sleeping, RLS       Initial /85   Pulse 98   Ht 1.676 m (5' 6\")   Wt 124.7 kg (275 lb)   SpO2 99%   BMI 44.39 kg/m   Estimated body mass index is 44.39 kg/m  as calculated from the following:    Height as of this encounter: 1.676 m (5' 6\").    Weight as of this encounter: 124.7 kg (275 lb).    Medication Reconciliation: complete  ESS 2  Neck circumference: 44 centimeters.  Saida Hernandez, MONE  "

## 2022-02-28 NOTE — PATIENT INSTRUCTIONS
- Overnight sleep study     Instructions for treating Delayed Sleep Phase Syndrome:    Delayed Sleep Phase Syndrome (DSPS) means that your body's internal timing is set late compared to the 24 hour day. Therefore, it is often difficult to get up on time for work in the morning and sometimes difficult to fall asleep on time, in order to get enough sleep. People with DSPS often tend to like to stay up late on weekends and sleep in until between 10 AM and noon, sometimes even later.This is actually a bad habit that will perpetuate the problem. It reinforces your body's tendency to be on that later schedule.    You should go to bed when you are sleepy and ready to sleep. During this entire process, you should not engage in activities that may make it worse, such as watching TV in bed, leaving the TV on all night, drinking any caffeine 6 hours before bed or exercising 1-2 hours before bed.     Start taking Melatonin, 1 mg tablet 5 hours before the time that you fall asleep on average (not your desired bedtime or time that you get in bed, but the time you normally fall asleep on your own).     Upon awakening, get exposure to sun-light for about 30-45 minutes. You do not need to look at the sun, in fact, this is dangerous. Reading the paper with the sun shining on you is adequate.  Alternatively, you may use a Seasonal Affective Disorder Lamp (intensity 10,000 Lux) instead of the sun. The lamp should be positioned 1-2 arms lengths away from you. They lamps are sold at Home Medical Companies such as [employer] or [employer]. A prescription can be written to get insurance coverage in some cases. They are also sold on [employer].    Using the light and melatonin should help march your internal clock (known as your circadian rhythms) gradually earlier. As your bedtime advances, remember to take your melatonin earlier, keeping it 5 hours before your fall asleep time.    Avoid naps and sleeping in because sleeping  during the day will delay your body's clock and you will have to start from scratch.     More information about light therapy:  If you have any concerns regarding the safety of bright light therapy for you, it is recommended that you consult an ophthalmologist before using a light box.  If you have a condition that makes your eyes very sensitive to light, macular degeneration, a family history of such problems, or diabetic changes to your eyes, consult an ophthalmologist before using a light box. If you have anxiety disorder and have an increase in anxiety discontinue use.

## 2022-02-28 NOTE — PROGRESS NOTES
Presenting History:     Ms. Lenore Suggs is a 26 years old female with history of PTSD, depression & ADHD, who presents to clinic to discuss poor sleep quality, daytime fatigue and sleep initiation difficulty.     Patient works as a pharmacy technician. Her work schedules starts at 8 AM on some days and 12 PM on other days.     Patient has a history of depression and anxiety symptoms and psychiatric treatment since childhood.  A history of previous psychiatric hospitalizations and traumatic experiences is detailed on psychiatry note from 12/9/2021.  Patient reports that she was in a gifted class while in school and developed competency in 8 languages.    She gets into bed around 10 PM and plays video games in bed until she is ready to sleep. At midnight, she will switch off her devices and try to sleep.  Sleep latency can be up to 2 hours.  When she has to work early, she keeps an alarm to wake up in the morning. On days off, she falls asleep around 2-3 AM and wakes up around 9 AM.  Sleep is reported to be fragmented with multiple nocturnal arousals.  She does not feel refreshed in the morning.    Patient is told that she has intermittent mild snoring. She has occasionally experienced a gasping episode in sleep. She mostly sleeps on her back.  Patient does not have a regular bed partner.  She denies any knowledge of witnessed apneas.  She denies morning headaches.    She does experience restless leg symptoms at bedtime.  Symptomatic burden is reported to be low.    She has occasional sleep talking and denies sleep walking, dream enactment behavior or hypnagogic/ hypnopompic hallucinations.     Patient reports feeling tired during the day but denies any significant sleepiness during the daytime.  Her [place] Sleepiness Scale score is 0.  She does not drive.    She has 1 coffee per day. She denies alcohol or drug use.     Past Medical History:   Diagnosis Date     Asthma      Bipolar affective disorder (H)       "Outbursts of anger      Schizophrenia (H)      /85   Pulse 98   Ht 1.676 m (5' 6\")   Wt 124.7 kg (275 lb)   SpO2 99%   BMI 44.39 kg/m      Exam:  Constitutional: alert and active  ENT: Parker class IV  Psychiatric: affect normal/bright. No suicidal intentions or plans.     Impression & Plan:     1. To rule out obstructive sleep apnea    There is an intermediate risk for sleep apnea considering elevated BMI, history of snoring, nonrestorative sleep and daytime fatigue.  She is noted to have a crowded upper airway on examination.  An overnight sleep study is recommended to evaluate if there is clinically significant sleep disordered breathing.    Plan:     - PSG for assessment of sleep apnea    2. Delayed sleep phase circadian rhythm     Her insomnia is multifactorial, contributed by comorbid PTSD and depression.  She also seems to have a delayed sleep phase circadian rhythm, which is also playing a significant role in her sleep initiation difficulty.  Reviewed management of delayed sleep phase with light therapy and melatonin and patient was given instructions.    3. Restless leg syndrome     We will follow-up on restless leg symptoms after completion of her sleep study to decide if we need to consider pharmacotherapy.    I will follow-up with her after completion of her sleep study    I spent a total of 45 minutes for this appointment on this date of service which include time spent before, during and after the visit for chart review, patient care, counseling and coordination of care.    Dr. Randal Villafuerte                 "

## 2022-03-01 ASSESSMENT — ANXIETY QUESTIONNAIRES: GAD7 TOTAL SCORE: 16

## 2022-03-01 ASSESSMENT — PATIENT HEALTH QUESTIONNAIRE - PHQ9: SUM OF ALL RESPONSES TO PHQ QUESTIONS 1-9: 22

## 2022-03-17 ENCOUNTER — TELEPHONE (OUTPATIENT)
Dept: PSYCHIATRY | Facility: CLINIC | Age: 27
End: 2022-03-17
Payer: COMMERCIAL

## 2022-03-17 NOTE — TELEPHONE ENCOUNTER
Request for assistance animal verification received via fax. Forms scanned in to provider's e-mail

## 2022-03-30 ENCOUNTER — MYC MEDICAL ADVICE (OUTPATIENT)
Dept: PSYCHIATRY | Facility: CLINIC | Age: 27
End: 2022-03-30
Payer: COMMERCIAL

## 2022-04-05 NOTE — PROGRESS NOTES
[facility] behavioral health [facility]  April 6, 2022      Behavioral Health Clinician Progress Note    Patient Name: Lenore Suggs           Service Type:  Individual      Service Location:   [employer] / Email (patient reached)     Session Start Time: 1100am  Session End Time: 1119am      Session Length: 16 - 37      Attendees: Patient     Service Modality:  Video Visit:      Provider verified identity through the following two step process.  Patient provided:  Patient photo    Telemedicine Visit: The patient's condition can be safely assessed and treated via synchronous audio and visual telemedicine encounter.      Reason for Telemedicine Visit: Services only offered telehealth    Originating Site (Patient Location): Patient's home    Distant Site (Provider Location): Provider Remote Setting- Home Office    Consent:  The patient/guardian has verbally consented to: the potential risks and benefits of telemedicine (video visit) versus in person care; bill my insurance or make self-payment for services provided; and responsibility for payment of non-covered services.     Patient would like the video invitation sent by:  My Chart    Mode of Communication:  Video Conference via [employer]    As the provider I attest to compliance with applicable laws and regulations related to telemedicine.    Visit Activities (Refresh list every visit): [facility] Only    Diagnostic Assessment Date: 08/25/2021  Treatment Plan Review Date: 08/25/2022  See Flowsheets for today's PHQ-9 and NINFA-7 results  Previous PHQ-9:   PHQ-9 SCORE 8/3/2021 2/28/2022 4/6/2022   PHQ-9 Total Score - - -   PHQ-9 Total Score MyChart 22 (Severe depression) 22 (Severe depression) -   PHQ-9 Total Score 22 22 12     Previous NINFA-7:   NINFA-7 SCORE 8/5/2021 2/28/2022 4/6/2022   Total Score - 16 (severe anxiety) -   Total Score 10 16 13       DORINA LEVEL:  No flowsheet data found.    DATA  Extended Session (60+ minutes): No  Interactive Complexity: No  Crisis:  "No  [facility] Patient: No    Treatment Objective(s) Addressed in This Session:  Target Behavior(s): maintain progress    Anxiety: will experience a reduction in anxiety    Current Stressors / Issues:  MH update: Pt reports that things have going well. Mood is stable and good. Attention is good. Has taken a year off from school because of stress. She reports ongoing high work stress, had some tingling in her face but denies any weakness, drooping. Explored skills to manage her anxiety. Reports that she uses her CBT skills, deep breathing. Denies SI. She has a sleep study scheduled.   Stresses: work  Appetite: unremarkable  Sleep: trouble staying asleep, worrying, nightmares, vivid dreams  Therapy: Pt still hasn't heard anything from [facility] after leaving several voice mails and sending an email. Suggested she check with [facility].   MIGUEL: No  Preg: No  Interventions: reinforced CBT skills  Goals: \"maintain\"  Most important: she sometimes has headaches and has imitrix but it's contraindicated with vybriid because that can cause seizures.       Progress on Treatment Objective(s) / Homework:  Stable - MAINTENANCE (Working to maintain change, with risk of relapse); Intervened by continuing to positively reinforce healthy behavior choice     Motivational Interviewing    MI Intervention: Co-Developed Goal: use skills to maintain progress, Expressed Empathy/Understanding, Open-ended questions, Reflections: simple and complex, Change talk (evoked) and Reframe     Change Talk Expressed by the Patient: Desire to change Ability to change Reasons to change Need to change Committment to change Activation Taking steps    Provider Response to Change Talk: E - Evoked more info from patient about behavior change, A - Affirmed patient's thoughts, decisions, or attempts at behavior change, R - Reflected patient's change talk and S - Summarized patient's change talk statements      Care Plan review completed: Yes    Medication Review:  No changes " to current psychiatric medication(s)    Medication Compliance:  NA    Changes in Health Issues:   None reported    Chemical Use Review:   Substance Use: Chemical use reviewed, no active concerns identified      Tobacco Use: No current tobacco use.      Assessment: Current Emotional / Mental Status (status of significant symptoms):  Risk status (Self / Other harm or suicidal ideation)  Patient has had a history of suicidal ideation: age 19  Patient denies current fears or concerns for personal safety.  Patient denies current or recent suicidal ideation or behaviors.  Patient denies current or recent homicidal ideation or behaviors.  Patient denies current or recent self injurious behavior or ideation.  Patient denies other safety concerns.  A safety and risk management plan has not been developed at this time, however patient was encouraged to call [place] [phone] should there be a change in any of these risk factors.    Appearance:   Appropriate   Eye Contact:   Good   Psychomotor Behavior: Normal   Attitude:   Cooperative   Orientation:   All  Speech   Rate / Production: Normal    Volume:  Normal   Mood:    Normal  Affect:    Appropriate   Thought Content:  Clear   Thought Form:  Coherent  Logical   Insight:    Good     Diagnoses:  1. Depression, major, recurrent, moderate (H)    2. Generalized anxiety disorder    3. Attention deficit hyperactivity disorder (ADHD), combined type        Collateral Reports Completed:  Collaborated with Clementine Myles, MSN, APRN, CNP, FMHNP-BC, [place] [facility]    Plan: (Homework, other):  Patient was given information about behavioral services and encouraged to schedule a follow up appointment with the clinic [facility] in 1 month.  She was also given information about mental health symptoms and treatment options .  CD Recommendations: No indications of CD issues.  Riana CORTES [facility]      ______________________________________________________________________    Integrated  Primary Care Behavioral Health Treatment Plan    Patient's Name: Lenore Suggs  YOB: 1995    Date of Creation: 04/06/2022  Date Treatment Plan Last Reviewed/Revised: NA    DSM5 Diagnoses: 296.31 (F33.0) Major Depressive Disorder, Recurrent Episode, Mild _ and With anxious distress or 300.02 (F41.1) Generalized Anxiety Disorder  Psychosocial / Contextual Factors: work stress  PROMIS (reviewed every 90 days): 24    Referral / Collaboration:  Collaborated with Clementine Myles, MSN, APRN, CNP, [facility], [place] [facility].    Anticipated number of session for this episode of care: 4-6  Anticipation frequency of session: Monthly  Anticipated Duration of each session: 16-37 minutes  Treatment plan will be reviewed in 90 days or when goals have been changed.       MeasurableTreatment Goal(s) related to diagnosis / functional impairment(s)  Goal 1: Patient will experience improvements in her anxiety    I will know I've met my goal when maintain.      Objective #A (Patient Action)    Patient will use at least 3 coping skills for anxiety management in the next 4 weeks.  Status: Continued - Date(s): 7/6/2022    Intervention(s)  Therapist will teach emotional regulation skills. for anxiety.      Patient has reviewed and agreed to the above plan.      Riana Roque, [place]  April 6, 2022

## 2022-04-06 ENCOUNTER — VIRTUAL VISIT (OUTPATIENT)
Dept: PSYCHIATRY | Facility: CLINIC | Age: 27
End: 2022-04-06
Payer: COMMERCIAL

## 2022-04-06 ENCOUNTER — VIRTUAL VISIT (OUTPATIENT)
Dept: BEHAVIORAL HEALTH | Facility: CLINIC | Age: 27
End: 2022-04-06
Payer: COMMERCIAL

## 2022-04-06 DIAGNOSIS — F33.1 DEPRESSION, MAJOR, RECURRENT, MODERATE (H): Primary | ICD-10-CM

## 2022-04-06 DIAGNOSIS — F90.2 ATTENTION DEFICIT HYPERACTIVITY DISORDER (ADHD), COMBINED TYPE: ICD-10-CM

## 2022-04-06 DIAGNOSIS — F43.10 COMPLEX POSTTRAUMATIC STRESS DISORDER: ICD-10-CM

## 2022-04-06 DIAGNOSIS — F41.1 GENERALIZED ANXIETY DISORDER: ICD-10-CM

## 2022-04-06 PROCEDURE — 99214 OFFICE O/P EST MOD 30 MIN: CPT | Mod: 95 | Performed by: NURSE PRACTITIONER

## 2022-04-06 PROCEDURE — 90832 PSYTX W PT 30 MINUTES: CPT | Mod: 95 | Performed by: SOCIAL WORKER

## 2022-04-06 RX ORDER — VILAZODONE HYDROCHLORIDE 40 MG/1
40 TABLET ORAL DAILY
Qty: 90 TABLET | Refills: 0 | Status: SHIPPED | OUTPATIENT
Start: 2022-04-06 | End: 2022-07-20

## 2022-04-06 RX ORDER — METHYLPHENIDATE HYDROCHLORIDE 54 MG/1
54 TABLET ORAL EVERY MORNING
Qty: 30 TABLET | Refills: 0 | Status: SHIPPED | OUTPATIENT
Start: 2022-04-06 | End: 2022-07-28

## 2022-04-06 RX ORDER — METHYLPHENIDATE HYDROCHLORIDE 54 MG/1
54 TABLET ORAL EVERY MORNING
Qty: 30 TABLET | Refills: 0 | Status: SHIPPED | OUTPATIENT
Start: 2022-05-04 | End: 2022-08-10

## 2022-04-06 RX ORDER — METHYLPHENIDATE HYDROCHLORIDE 54 MG/1
54 TABLET ORAL EVERY MORNING
Qty: 30 TABLET | Refills: 0 | Status: SHIPPED | OUTPATIENT
Start: 2022-06-02 | End: 2022-08-10

## 2022-04-06 ASSESSMENT — ANXIETY QUESTIONNAIRES
1. FEELING NERVOUS, ANXIOUS, OR ON EDGE: MORE THAN HALF THE DAYS
6. BECOMING EASILY ANNOYED OR IRRITABLE: MORE THAN HALF THE DAYS
3. WORRYING TOO MUCH ABOUT DIFFERENT THINGS: MORE THAN HALF THE DAYS
5. BEING SO RESTLESS THAT IT IS HARD TO SIT STILL: NEARLY EVERY DAY
2. NOT BEING ABLE TO STOP OR CONTROL WORRYING: MORE THAN HALF THE DAYS
GAD7 TOTAL SCORE: 13
7. FEELING AFRAID AS IF SOMETHING AWFUL MIGHT HAPPEN: NOT AT ALL

## 2022-04-06 ASSESSMENT — PATIENT HEALTH QUESTIONNAIRE - PHQ9
5. POOR APPETITE OR OVEREATING: MORE THAN HALF THE DAYS
SUM OF ALL RESPONSES TO PHQ QUESTIONS 1-9: 12

## 2022-04-06 NOTE — PROGRESS NOTES
"   PSYCHIATRIC MEDICATION FOLLOW UP APPT     Name:  Lenore Suggs  : 1995    Lenore Suggs is a 26 year old female who is being evaluated via a billable video visit.      How would you like to obtain your AVS? MyChart  If the video visit is dropped, the invitation should be resent by: Text to cell phone: [phone]  Will anyone else be joining your video visit? No    Location [place] If not at [place] address below, please ask where they are in case of an emergency situation arises during the appointment.  [address] [facility] [address]    Telemedicine Visit: The patient's condition can be safely assessed and treated via synchronous audio and visual telemedicine encounter.      Reason for Telemedicine Visit: COVID 19 pandemic and the social and physical recommendations by the CDC and MD.      Originating Site (Patient Location): Patient's home    Distant Site (Provider Location): Provider Remote Setting    Consent:  The patient/guardian has verbally consented to: the potential risks and benefits of telemedicine (video visit or phone) versus in person care; bill my insurance or make self-payment for services provided; and responsibility for payment of non-covered services.     Mode of Communication:  Amwell video platform     As the provider I attest to compliance with applicable laws and regulations related to telemedicine.    IDENTIFICATION   Lenore Suggs is a 25 year old female who prefers. Patient prefers to be called: \"Felecia\"  Referred by: Terrance Rand, [facility]    Patient Care Team:  [facility] Macey Treutlen as PCP - Claudia Austin DO as Assigned Behavioral Health Provider  Terrance Rand MD as Assigned PCP  Therapist: Currently undergoing DBT for BPD through outside therapist.      Patient attended the phone/video session alone.    Last seen for outpatient psychiatry Return Visit on 2021.      FOLLOWING " PLAN PUT INTO PLACE: Has noted the Concerta is effective in targeting attention and focus and coworkers have noticed that she is more calm. She continues to endorse irritability, quick to react to minimal triggers. She has not had an increase in this since starting stimulant it has remained consistently the same. Not currently on an SSRI or SNRI. She has had multiple medication trials. Will trial Viibryd titrated to 40 mg. Continue current dose of Concerta. Follow-up in 2 months. Continue therapy learning DBT skills       INTERIM HISTORY     COMMUNICATIONS FROM PATIENT VIA:  SyndicateRoom message regarding emotional service animal     RECORDS AVAILABLE FOR REVIEW: EHR records through [employer] .  In addition, reviewed the assessment completed by Riana Roque [facility], dated today        HISTORY OF PRESENT ILLNESS   CCPS referral for psychiatric medication consult in August, 2021.   Reports history of multiple diagnosis throughout her childhood primarily in the context of traumatic events.  Previously psychiatrically hospitalized as an adolescent on [facility] occassions, typcially in the context of dysregulation, SI/SA.   Hx of suicidal ideation and attempts. Last hospitalization was when she was 18 yo.   Genetically loaded for  schizophrenia (biomother) and unknown paternal genetic load.      Reports she first sought treatment at a very young age.    Reports symptoms of depression and anxiety emerged in early childhood. She reported she experienced her first panic attack at the age of 7 years old and attempted to end her life at the age of 8 years old. Started medications when she was 9 years old.  She highlighted that at that age, her foster parents wanted to adopt her but not her brothers. She asserted she  didn t want to be without [her] brothers  so she tried to end her life. She disclosed that the family  still wanted to adopt [her]  so she began engaging in behaviors to get them to  change their mind  such as throwing  violent tantrums, urinating in her bed and refusing to shower. She indicated she was hospitalized for 3 months and after 2 years of out of control behaviors, the family rescinded their intent to adopt her. She reported she then  bounced around from [foster] home to home  until she was 11 when she settled in a home, she  liked.  She added that her  father  then attempted to get custody of her and she relocated with him to the [facility] [place] when she was 15 years old. She acknowledged being a  really angry  teenager because she  couldn t express [herself].  She asserted she was diagnosed with several disorders including Schizophrenia and Bipolar I Disorder and prescribed psychiatric medications. She also disclosed she attempted to end her life on several occasions. Ms. Suggs stated that her last suicide attempt occurred in April of 2018. She asserted that in her twenties she has detached from others and experienced  no interest in anyone.  She outlined episodes of disassociation, obsessive thoughts, compulsive acts and fantasies of death. She admitted she does not like being touched and fears being contaminated. She reported she  washes [her] hands too much  and likes things to feel  the right way.   She shares a previous diagnosis of bipolar and schizophrenia. Reports she had been off all medication from age 15 through 24 years old.     Meets criteria for multiple diagnosis. When evaluating closer, symptoms are primarily in the context of chronic trauma they has experienced as a child, adolescent and adult.   Primary diagnosis is complex PTSD.  In individuals who are exposed to chronic repeated, or long-standing traumatic events, the classical symptoms of PTSD often fail to completely describe the psychological harm, emotional problems, and changes in how people view themselves and the world around them.  As a result, some professionals believe that we should distinguish between the type of PTSD that developed  from chronic, long-lasting traumatic events as compared to PTSD from short-lived event.  The diagnosis of  complex PTSD  refers to the set of symptoms that commonly follow exposure to a chronic, prolonged long lasting traumatic event.  These  generally involve some form of physical or emotional captivity, such as childhood sexual and/or physical abuse or domestic violence.  In these types of events, the victim is under the control of another person and doesn t have the ability to easily escape.      Symptoms of complex PTSD:      Emotion regulation problems- People with complex PTSD experience difficulties managing their emotions.  They may experience severe depression, thoughts of suicide, or have difficulty controlling their anger.      Changes in consciousness: following exposure to a chronic traumatic event, a person may repress memories of the traumatic event or experience flashbacks.  In rare cases, a person may experience disassociation.      Symptoms in this category include feelings of helplessness, shame, guilt, or feeling detached and different from others      Changes in how the victim views the perpetrator: a person with complex PTSD may feel like he has no power over perpetrator (the perpetrator has complete power in a relationship).  In complex PTSD, people may also become preoccupied with their relationship with the perpetrator.  For example, constant thoughts of wanting revenge.      Changes in personal relationships:  These symptoms include problems with relationships, such as isolating oneself or being distrusting of others      Changes in how one views the world: People exposed to chronic or repeated traumatic events may also lose jose in humanity or have a sense of hopelessness.     Psychiatric testing by Lisa Petit, PhD LP Psychologist, documented date 6/8/2021 available for review with the following identified diagnoses:     Major Depressive Disorder, Recurrent, Mild   Posttraumatic  Stress Disorder, With disassociation   Features of Borderline Personality Disorder  Attention Deficit Hyperactivity Disorder    FAMILY, MEDICAL, SURGICAL HISTORY REVIEWED.  MEDICATION HAVE BEEN REVIEWED AND ARE CURRENT TO THE BEST OF MY KNOWLEDGE AND ABILITY.  Pharmacy tech    MEDICATIONS                                                                                                Current Outpatient Medications   Medication Sig     albuterol (PROAIR HFA/PROVENTIL HFA/VENTOLIN HFA) 108 (90 Base) MCG/ACT inhaler Inhale 1-2 puffs into the lungs every 6 hours as needed for shortness of breath / dyspnea or wheezing     ketorolac (TORADOL) 10 MG tablet Take 1 tablet (10 mg) by mouth every 8 hours as needed for moderate pain     methylphenidate (CONCERTA) 54 MG CR tablet Take 1 tablet (54 mg) by mouth every morning     methylphenidate (CONCERTA) 54 MG CR tablet Take 1 tablet (54 mg) by mouth every morning     vilazodone (VIIBRYD) 40 MG TABS tablet Take 1 tablet (40 mg) by mouth daily     zolpidem (AMBIEN) 5 MG tablet Take tablet by mouth 15 minutes prior to sleep, for Sleep Study     SUMAtriptan (IMITREX) 50 MG tablet Take 1-2 tablets ( mg) by mouth at onset of headache May repeat in 2 hrs.  Max 200 mg in 24 hrs (Patient not taking: Reported on 2/28/2022)     No current facility-administered medications for this visit.      NOTES ABOUT CURRENT PSYCHOTROPIC MEDICATIONS:   Concerta 54 mg  Viibryd 40 mg      PAST PSYCHOTROPIC MEDICATIONS:  Risperidone   Olanzapine   [place]  Ziprasidone   Quetiapine   Guanfacine  Trazodone   Latuda   Citalopram   Escitalopram   Fluoxetine   Adderall IR 10 mg once daily  Bupropion  mg   Sertraline 100 mg, discontinued due to increase in fatigued   Vyvanse 40 mg, doesn't take on her days off.  Feedback from employer indicate improvement in attention and focus.     TODAY PATIENT REPORTS THE FOLLOWING PSYCHIATRIC ROS:   Per [facility], Riana Roque, during today's team-based  "visit:  \" update: Pt reports that things have going well. Mood is stable and good. Attention is good. Has taken a year off from school because of stress. She reports ongoing high work stress, had some tingling in her face but denies any weakness, drooping. Explored skills to manage her anxiety. Reports that she uses her CBT skills, deep breathing. Denies SI. She has a sleep study scheduled.  Stresses: work  Appetite: unremarkable  Sleep: trouble staying asleep, worrying, nightmares, vivid dreams  Therapy: Pt still hasn't heard anything from [facility] after leaving several voice mails and sending an email. Suggested she check with [facility].  MIGUEL: No  Preg: No  Interventions: reinforced CBT skills  Goals: \"maintain\"  Most important: she sometimes has headaches and has imitrix but it's contraindicated with vybriid because that can cause seizures.\"     PROBLEM: DEPRESSION: Improving Reports energy is improved overall  Last PHQ-9 2/28/2022   1.  Little interest or pleasure in doing things 3   2.  Feeling down, depressed, or hopeless 2   3.  Trouble falling or staying asleep, or sleeping too much 3   4.  Feeling tired or having little energy 3   5.  Poor appetite or overeating 3   6.  Feeling bad about yourself 3   7.  Trouble concentrating 2   8.  Moving slowly or restless 3   Q9: Thoughts of better off dead/self-harm past 2 weeks 0   PHQ-9 Total Score 22   Difficulty at work, home, or with people -   In the past two weeks have you had thoughts of suicide or self harm? -   Do you have concerns about your personal safety or the safety of others? -   In the past 2 weeks have you thought about a plan or had intention to harm yourself? -   In the past 2 weeks have you acted on these thoughts in any way? -     PHQ-9 SCORE 8/3/2021 2/28/2022 4/6/2022   PHQ-9 Total Score - - -   PHQ-9 Total Score MyChart 22 (Severe depression) 22 (Severe depression) -   PHQ-9 Total Score 22 22 12   PHQ9 score is 12 indicating moderate " "depression.  Suicidal ideation:  No     PROBLEM: ANXIETY: Improving. Attempting to use nonpharmacological interventions for residual symptoms that she is learning in DBT    NINFA-7 scores:   GAD7 score is Not completed today  NINFA-7 SCORE 8/5/2021 2/28/2022 4/6/2022   Total Score - 16 (severe anxiety) -   Total Score 10 16 13     PROBLEM: CHRONIC SUICIDAL IDEATIONS: current: No     PROBLEM: ADHD: Improving       CURRENT STRESSORS: Occupational and COVID 19 Pandemic and the social and physical distancing recommendations by the CDC and [facility]  COPING MECHANISMS AND SUPPORTS: Family and Friends  SLEEP:   adequate  DIET:  Adequate  EXERCISE: Adequate  SIDE EFFECTS:   tolerating medications without reported side effects  SUBSTANCE USE:  Denies   COMPLIANCE:  states Adherent to medication regimen  REPORTS THE FOLLOWING NEW MEDICAL ISSUES:  none    PREGNANT: denies possibility of pregnancy.     PERTINENT PAST MEDICAL AND SURGICAL HISTORY     Past Medical History:   Diagnosis Date     Asthma      Bipolar affective disorder (H)      Outbursts of anger      Schizophrenia (H)        VITALS     BP Readings from Last 1 Encounters:   02/28/22 125/85     Pulse Readings from Last 1 Encounters:   02/28/22 98     Wt Readings from Last 1 Encounters:   02/28/22 124.7 kg (275 lb)     Ht Readings from Last 1 Encounters:   02/28/22 1.676 m (5' 6\")     Estimated body mass index is 44.39 kg/m  as calculated from the following:    Height as of 2/28/22: 1.676 m (5' 6\").    Weight as of 2/28/22: 124.7 kg (275 lb).    LABS & IMAGING                                                                                                                  Recent Labs   Lab Test 10/25/21  1138   WBC 5.3   HGB 12.2   HCT 38.0   MCV 91        Recent Labs   Lab Test 06/23/21  1138      POTASSIUM 3.8   CHLORIDE 108   CO2 28   GLC 95   KIKE 9.0   BUN 9   CR 0.75   GFRESTIMATED >90   ALBUMIN 3.7   PROTTOTAL 7.3   AST 23   ALT 44   ALKPHOS 83   BILITOTAL 0.3 "     Recent Labs   Lab Test 21  1138   CHOL 164   LDL 98   HDL 52   TRIG 71       ALLERGY & IMMUNIZATIONS       Allergies   Allergen Reactions     Alcohol Hives     Kiwi Hives     Seasonal Allergies Other (See Comments) and Rash     sneezing       MEDICAL REVIEW OF SYSTEMS:   Ten system review was completed with pertinent positives noted     MENTAL STATUS EXAM:   General/Constitutional:  Appearance:   awake, alert, adequately groomed, appeared stated age and no apparent distress  Attitude:    cooperative   Eye Contact:  good  Musculoskeletal:  Psychomotor Behavior:  no evidence of tardive dyskinesia, dystonia, or tics from the head up  Psychiatric:  Speech:  clear, coherent, regular rate, rhythm, and volume,   No pressure speech noted.  Associations:  no loose associations  Thought Process:   logical, linear and goal oriented  Thought Content:   Endorses passive SI, no intent or plan. No homicidal ideation, no evidence of psychotic thought, no auditory hallucinations present and no visual hallucinations present  Mood:  emotionally labile and can use nonpharmacological interventions for residual symptoms    Affect:  full range and was congruent to speech content.  Insight:  good  Judgment:  intact, adequate for safety  Impulse Control:  intact  Neurological:  Oriented to:  person, place, time, and situation  Attention Span and Concentration:  normal    Language: intact     Recent and Remote Memory:  Intact to interview. Not formally assessed. No amnesia.    Fund of Knowledge: appropriate        SAFETY   Feels safe in home: Yes   Suicidal ideation: passive, no intent or plan  History of suicide attempts:  Yes   Hx of impulsivity: Yes   Hope for the future: present    Hx of Command hallucinations or current psychosis: No  History of Self-injurious behaviors: Yes Current:  No  Family member  by suicide:  No        SAFETY ASSESSMENT:   Based on all available evidence including the factors cited above, overall  Risk for harm is low and is appropriate for outpatient level of care.   Recommended that patient call 911 or go to the local ED should there be a change in any of these risk factors.    DSM 5 DIAGNOSIS:   Major Depressive Disorder, Recurrent, Mild, per psych testing 6/2021  Complex Posttraumatic Stress Disorder, With disassociation (Primary)  Features of Borderline Personality Disorder  Attention Deficit Hyperactivity Disorder, per psych testing 6/2021        MEDICAL COMORBIDITY IMPACTING CLINICAL PICTURE: None noted    ASSESSMENT AND PLAN      Problem List as of 4/6/2022 Reviewed: 4/6/2022 11:41 AM by Clementine Myles APRN CNP          Noted       Behavioral    Last System Assessment & Plan 4/6/2022 Virtual Visit Written 4/6/2022 11:43 AM by Clementine Myles APRN CNP     Psychiatrically stable at present. Will return to PCP for ongoing care, medication prescribing and future medication refills.   3 months of medications fills provided.  Follow up with [facility] in about  3 months. Patient can be re-referred back to this service for further consultation in the future if needed but a new referral will need to be placed.    Form completed regarding emotional service animal          1. Attention deficit hyperactivity disorder (ADHD), combined type 8/3/2021     Relevant Medications     methylphenidate (CONCERTA) 54 MG CR tablet (Start on 6/2/2022)     methylphenidate (CONCERTA) 54 MG CR tablet (Start on 5/4/2022)     methylphenidate (CONCERTA) 54 MG CR tablet    2. Complex posttraumatic stress disorder 9/10/2021     Overview Signed 9/10/2021  2:45 PM by Clementine Myles APRN CNP      Complex PTSD explains affective instability, mood symptoms, anxiety and maladaptive behaviors more parsimoniously than separate diagnosis of anxiety, depression, bipolar, OCD and/or personality disorder.  Medications ultimately will not provide definitive treatment, but nonpharmacological  interventions like dialectical behavior therapy (DBT) and eye movement desensitization and reprocessing (EMDR).             Relevant Medications     vilazodone (VIIBRYD) 40 MG TABS tablet         CONSULTS/REFERRALS:   Continue therapy   Coordinate care with therapist as needed     MEDICAL:   None at this time  Coordinate care with PCP ([facility]) as needed     FOLLOW UP: Schedule an appointment with me in four weeks or sooner as needed.   Call the psychiatric nurse line with medication questions or concerns at [phone] or [phone]  Follow up with primary care provider as planned or for acute medical concerns.     PSYCHOEDUCATION:  Medication side effects and alternatives reviewed. Health promotion activities recommended and reviewed today. All questions addressed. Education and counseling completed regarding risks and benefits of medications and psychotherapy options.  Consent provided by patient/guardian  Call the psychiatric nurse line with medication questions or concerns at [phone].  Expandlyhart may be used to communicate with your provider, but this is not intended to be used for emergencies.  STIMULANT THERAPY: Side effects discussed including but not limited to cardiac (including HTN, tachycardia, sudden death), motor/tic, appetite/growth, mood lability and sleep disruption. This is a controlled substance with risk for abuse, need to keep in a safe keep place and cannot replace lost scripts  Medlineplus.gov is information for patients.  It is run by the National Library of Medicine and it contains information about all disorders, diseases and all medications.       COMMUNITY RESOURCES:    CRISIS NUMBERS: Provided in AVS 8/5/2021  National Suicide Prevention Lifeline: 1-827-413-TALK (136-612-9719)  Cloudmach/resources for a list of additional resources (SOS)            [facility] - [phone]   Urgent Care Adult Mental Lrjmnq-[phone] mobile unit/  24/7 crisis line  [facility] -[phone]   COPE 24/7 [place] Mobile Team -[phone] (adults)/ [phone] (child)  Poison Control Center - [phone]    OR  go to nearest ER  Crisis Text Line for any crisis 24/7 send this-   To: 438456   [facility] ER  [phone]  National Suicide Prevention Lifeline: 249.514.2264 (TTY: 428.180.6281). Call anytime for help.  (www.suicidepreventionlifeline.org)  National [employer]land on Mental Illness (www.negrita.org): 506.961.2182 or 183-128-0044.   Mental Health Association (www.mentalhealth.org): 534.967.4249 or 950-114-6568.  [place] Crisis Text Line: Text [place] to 016337  Suicide LifeLine Chat: suicideVelotton.org/chat      ADMINISTRATIVE BILLING:     Time spent interviewing patient, reviewing referral documents, obtaining and reviewing outside records, communication with other health specialists, and preparing this report.    Video/Phone Start Time:  11:22 AM  Video/Phone End Time:  11:47 AM    Greater than 50% of time was spent in counseling and coordination of care regarding above diagnoses and treatment plan.    Patient Status:  Patient has been stabilized and will be returned to PCP for ongoing care, medication prescribing and future medication refills.    3 months of medications fills provided.  Follow up with @PCP@ in about   3 months. Patient can be re-referred back to this service for further consultation in the future if needed but a new referral will need to be placed.      Signed:   Clementine Myles, MSN, APRN, FMHNP-[facility] Psychiatry Service (CCPS)   Chart documentation done in part with Dragon Voice Recognition software.  Although reviewed after completion, some word and grammatical errors may remain.

## 2022-04-06 NOTE — Clinical Note
The patient is being returned to the referring provider for ongoing care and medication prescribing.  The patient can be referred back to this service for further consultation as needed.  Please make a note should they call to schedule for follow-up.  Thank you!    Clementine Myles, MSN, APRN, CNP, HNP-BC,   [facility]

## 2022-04-07 ASSESSMENT — ANXIETY QUESTIONNAIRES: GAD7 TOTAL SCORE: 13

## 2022-04-17 ENCOUNTER — THERAPY VISIT (OUTPATIENT)
Dept: SLEEP MEDICINE | Facility: CLINIC | Age: 27
End: 2022-04-17
Attending: INTERNAL MEDICINE
Payer: COMMERCIAL

## 2022-04-17 DIAGNOSIS — G25.81 RESTLESS LEGS SYNDROME: ICD-10-CM

## 2022-04-17 DIAGNOSIS — F43.10 PTSD (POST-TRAUMATIC STRESS DISORDER): ICD-10-CM

## 2022-04-17 DIAGNOSIS — R53.82 CHRONIC FATIGUE: ICD-10-CM

## 2022-04-17 DIAGNOSIS — F51.04 CHRONIC INSOMNIA: ICD-10-CM

## 2022-04-17 PROCEDURE — 95810 POLYSOM 6/> YRS 4/> PARAM: CPT | Performed by: INTERNAL MEDICINE

## 2022-04-18 NOTE — PATIENT INSTRUCTIONS
[facility]    1. Your sleep study will be reviewed by a sleep physician within the next few days.     2. Please follow up in the sleep clinic as scheduled, or, make an appointment with your sleep provider to be seen within two weeks to discuss the results of the sleep study.    3. If you have any questions or problems with your treatment plan, please contact your sleep clinic provider at [phone] to further manage your condition.    4. Please review your attached medication list, and, at your follow-up appointment advise your sleep clinic provider about any changes.    5. Go to [email or web address] for more information about your sleep problems.    Crissy Pittman, JEFFERSONGT  April 18, 2022

## 2022-04-25 LAB — SLPCOMP: NORMAL

## 2022-04-29 ASSESSMENT — SLEEP AND FATIGUE QUESTIONNAIRES
HOW LIKELY ARE YOU TO NOD OFF OR FALL ASLEEP WHILE SITTING INACTIVE IN A PUBLIC PLACE: WOULD NEVER DOZE
HOW LIKELY ARE YOU TO NOD OFF OR FALL ASLEEP WHILE SITTING AND TALKING TO SOMEONE: WOULD NEVER DOZE
HOW LIKELY ARE YOU TO NOD OFF OR FALL ASLEEP WHILE SITTING AND READING: WOULD NEVER DOZE
HOW LIKELY ARE YOU TO NOD OFF OR FALL ASLEEP WHEN YOU ARE A PASSENGER IN A CAR FOR AN HOUR WITHOUT A BREAK: WOULD NEVER DOZE
HOW LIKELY ARE YOU TO NOD OFF OR FALL ASLEEP WHILE LYING DOWN TO REST IN THE AFTERNOON WHEN CIRCUMSTANCES PERMIT: SLIGHT CHANCE OF DOZING
HOW LIKELY ARE YOU TO NOD OFF OR FALL ASLEEP IN A CAR, WHILE STOPPED FOR A FEW MINUTES IN TRAFFIC: WOULD NEVER DOZE
HOW LIKELY ARE YOU TO NOD OFF OR FALL ASLEEP WHILE WATCHING TV: WOULD NEVER DOZE
HOW LIKELY ARE YOU TO NOD OFF OR FALL ASLEEP WHILE SITTING QUIETLY AFTER LUNCH WITHOUT ALCOHOL: WOULD NEVER DOZE

## 2022-05-06 ENCOUNTER — VIRTUAL VISIT (OUTPATIENT)
Dept: SLEEP MEDICINE | Facility: CLINIC | Age: 27
End: 2022-05-06
Payer: COMMERCIAL

## 2022-05-06 VITALS — HEIGHT: 66 IN | BODY MASS INDEX: 44.2 KG/M2 | WEIGHT: 275 LBS

## 2022-05-06 DIAGNOSIS — F51.04 CHRONIC INSOMNIA: Primary | ICD-10-CM

## 2022-05-06 DIAGNOSIS — G47.21 CIRCADIAN RHYTHM SLEEP DISORDER, DELAYED SLEEP PHASE TYPE: ICD-10-CM

## 2022-05-06 PROCEDURE — 99213 OFFICE O/P EST LOW 20 MIN: CPT | Mod: 95 | Performed by: INTERNAL MEDICINE

## 2022-05-06 NOTE — PATIENT INSTRUCTIONS
Instructions for treating Delayed Sleep Phase Syndrome:    Delayed Sleep Phase Syndrome (DSPS) means that your body's internal timing is set late compared to the 24 hour day. Therefore, it is often difficult to get up on time for work in the morning and sometimes difficult to fall asleep on time, in order to get enough sleep. People with DSPS often tend to like to stay up late on weekends and sleep in until between 10 AM and noon, sometimes even later.This is actually a bad habit that will perpetuate the problem. It reinforces your body's tendency to be on that later schedule.    You should go to bed when you are sleepy and ready to sleep. During this entire process, you should not engage in activities that may make it worse, such as watching TV in bed, leaving the TV on all night, drinking any caffeine 6 hours before bed or exercising 1-2 hours before bed.     Start taking Melatonin, 1 mg tablet 5 hours before the time that you fall asleep on average (not your desired bedtime or time that you get in bed, but the time you normally fall asleep on your own).     Upon awakening, get exposure to sun-light for about 30-45 minutes. You do not need to look at the sun, in fact, this is dangerous. Reading the paper with the sun shining on you is adequate.  Alternatively, you may use a Seasonal Affective Disorder Lamp (intensity 10,000 Lux) instead of the sun. The lamp should be positioned 1-2 arms lengths away from you. They lamps are sold at Home Medical Companies such as [employer] or [employer]. A prescription can be written to get insurance coverage in some cases. They are also sold on [employer].    Using the light and melatonin should help march your internal clock (known as your circadian rhythms) gradually earlier. As your bedtime advances, remember to take your melatonin earlier, keeping it 5 hours before your fall asleep time.    Avoid naps and sleeping in because sleeping during the day will delay  your body's clock and you will have to start from scratch.     More information about light therapy:  If you have any concerns regarding the safety of bright light therapy for you, it is recommended that you consult an ophthalmologist before using a light box.  If you have a condition that makes your eyes very sensitive to light, macular degeneration, a family history of such problems, or diabetic changes to your eyes, consult an ophthalmologist before using a light box. If you have anxiety disorder and have an increase in anxiety discontinue use.  Insomnia  Several treatment books for insomnia are recommended for your consideration.These resources are intended to guide you through a process of change that may take 4-6 weeks to complete.    Suggested Resource Insomnia Treatment Books   Overcoming Insomnia:  A Cognitive Behavioral Therapy  Approach Workbook by Oniel Jordan and Olivia Ramos (2008)    Say Lupe to Insomnia by Kwame Petit (2009)     Quiet Your Mind and Get to Sleep:  Solutions to Insomnia for Those with Depression, Anxiety or Chronic Pain by Olivia Ramos and Anya Barrera (2009)      You have symptoms of insomnia and would likely benefit from non-medication approaches to treatment.  your behaviors and thoughts associated with sleep.  People that are motivated to make changes in their sleep pattern are likely to benefit from internet based cognitive behavioral treatment for insomnia.  Internet CBT programs include but are not limited to www.A-STAR or www.[employer].  There is a fee for using these programs that is similar to actually seeing an insomnia expert.  By entering the code  [employer]  it will allow us to know if our patients find these services useful.      Online Programs   www.A-STAR (pronounced shut eye). There is a fee for this program. Enter the code  [employer] [place]  if you decide to enroll in this program.    www.sleepIO.com (pronounced sleep ee oh). There is a fee  for this program. Enter the [facility]  if you decide to enroll in this program.

## 2022-05-06 NOTE — PROGRESS NOTES
Felecia is a 26 year old who is being evaluated via a billable video visit.      How would you like to obtain your AVS? MyChart  If the video visit is dropped, the invitation should be resent by: Text to cell phone: [phone]  Will anyone else be joining your video visit? Salome Murphy      Video Start Time: 2:05 PM  Video-Visit Details    Type of service:  Video Visit    Video End Time:2:20 PM    Originating Location (pt. Location): Home    Distant Location (provider location):  [facility]     Platform used for Video Visit: Break30  Sleep Study Follow-Up Visit:    Date on this visit: 5/6/2022    Lenore Suggs comes in today for follow-up of her sleep study done on 4/17/22 at the [facility] for possible sleep apnea.    Sleep latency 35 minutes with Ambien.  REM achieved.   REM latency 335 minutes.  Sleep efficiency 88%. Total sleep time 403.5 minutes.    Sleep architecture:  Stage 1, 3.6% (5%), stage 2, 55.5% (45-55%), stage 3, 34.9% (15-20%), stage REM, 5.9% (20-25%).  AHI was 1.8, without desaturations. RDI 2.2.  REM AHI 7.5, consistent with mild REM JILLIAN.  Supine AHI 2, consistent with no SUPINE JILLIAN.  Periodic Limb Movement Index 10/hour.       These findings were reviewed with patient.     Past medical/surgical history, family history, social history, medications and allergies were reviewed.      Problem List:  Patient Active Problem List    Diagnosis Date Noted     Complex posttraumatic stress disorder 09/10/2021     Priority: Medium     Complex PTSD explains affective instability, mood symptoms, anxiety and maladaptive behaviors more parsimoniously than separate diagnosis of anxiety, depression, bipolar, OCD and/or personality disorder.  Medications ultimately will not provide definitive treatment, but nonpharmacological interventions like dialectical behavior therapy (DBT) and eye movement desensitization and reprocessing (EMDR).          Attention deficit  hyperactivity disorder (ADHD), combined type 08/03/2021     Priority: Medium     Headache, unspecified headache type 06/09/2020     Priority: Medium     BMI > 35 06/09/2020     Priority: Medium     History of suicide attempt 10/23/2019     Priority: Medium     Suicidal ideation 10/23/2019     Priority: Medium        Impression/Plan:    1. Negative for sleep apnea or movement abnormalities.     PSG result was reviewed in detail.  Test is negative for sleep apnea or movement abnormalities.  This was a good test that captured entire night in supine sleep and monitored supine REM.  Overall percentage of REM was low.  Arousal frequency was normal and she had adequate stage III sleep.    2. Chronic Insomnia     As discussed before, insomnia is multifactorial and complicated by comorbid PTSD and depression.  However his significant factor in sleep initiation difficulty is an underlying delayed sleep phase circadian rhythm.  Her sleep onset is usually around 2 AM.  On weekends, she sleeps until 9 AM and on weekdays she has wake up by 6:30 AM for work.  We reviewed management of delayed sleep phase circadian rhythm again with the use of light therapy and melatonin.    Patient is interested in self-help for insomnia.  I provided recommendations for insomnia treatment books and Internet cognitive behavioral therapy resources.    We discussed occasional PVCs seen in her EKG. Patient has experienced skipped beats and occasional symptoms. I advises that she gets this evaluated further through primary care.     I spent a total of 25 minutes for this appointment on this date of service which include time spent before, during and after the visit for chart review, patient care, counseling and coordination of care.    Dr. Randal Villafuerte     CC: Clinic, [facility]

## 2022-05-14 ENCOUNTER — HEALTH MAINTENANCE LETTER (OUTPATIENT)
Age: 27
End: 2022-05-14

## 2022-06-02 ENCOUNTER — OFFICE VISIT (OUTPATIENT)
Dept: URGENT CARE | Facility: URGENT CARE | Age: 27
End: 2022-06-02
Payer: COMMERCIAL

## 2022-06-02 ENCOUNTER — NURSE TRIAGE (OUTPATIENT)
Dept: NURSING | Facility: CLINIC | Age: 27
End: 2022-06-02
Payer: COMMERCIAL

## 2022-06-02 VITALS
TEMPERATURE: 98.6 F | OXYGEN SATURATION: 100 % | SYSTOLIC BLOOD PRESSURE: 142 MMHG | WEIGHT: 274 LBS | BODY MASS INDEX: 44.22 KG/M2 | HEART RATE: 96 BPM | DIASTOLIC BLOOD PRESSURE: 80 MMHG | RESPIRATION RATE: 20 BRPM

## 2022-06-02 DIAGNOSIS — R42 VERTIGO: Primary | ICD-10-CM

## 2022-06-02 DIAGNOSIS — R07.89 CHEST HEAVINESS: ICD-10-CM

## 2022-06-02 PROCEDURE — 99214 OFFICE O/P EST MOD 30 MIN: CPT | Performed by: NURSE PRACTITIONER

## 2022-06-02 PROCEDURE — 93000 ELECTROCARDIOGRAM COMPLETE: CPT | Performed by: NURSE PRACTITIONER

## 2022-06-02 RX ORDER — SUMATRIPTAN 50 MG/1
TABLET, FILM COATED ORAL
COMMUNITY
Start: 2022-04-26 | End: 2023-08-30

## 2022-06-02 NOTE — PROGRESS NOTES
"Chief Complaint   Patient presents with     Tingling     Pt is having L arm tingling that has been going on for a month along with L shoulder pain. Pt also has been having a lot of dizziness and she is also feeling a \"chill sensation\" going up chest.          ICD-10-CM    1. Vertigo  R42    2. Chest heaviness  R07.89 EKG 12-lead complete w/read - Clinics     Suggested patient try meclizine and to be sure she is drinking enough water.  If chest discomfort recurs and is continuous she is instructed to go to the emergency room for further assessment and treatment.  Otherwise suggested she follow-up with her primary care provider as scheduled.    32 minutes spent on the date of the encounter doing chart review, history and exam, documentation and further activities per the note    EKG - Reviewed and interpreted by me appears normal, NSR,, with no acute changes.    Subjective     Lenore Suggs is an 26 year old female who presents to clinic today for left arm tingling intermittently, dizziness, feeling like she is spinning, wakes with right hand feeling numb and takes a minute for her to be able to start moving it.  Symptoms started a couple of days ago.  Her primary concern is the dizziness and spinning.  She has also felt a chill sensation substernally 1 or 2 times today    ROS: 10 point ROS neg other than the symptoms noted above in the HPI.       Objective    BP (!) 142/80   Pulse 96   Temp 98.6  F (37  C) (Tympanic)   Resp 20   Wt 124.3 kg (274 lb)   SpO2 100%   BMI 44.22 kg/m    Nurses notes and VS have been reviewed.    Physical Exam         GENERAL APPEARANCE: healthy appearing, alert     EYES: PERRL, EOMI, sclera non-icteric     HENT: oral exam benign, mucus membranes intact, without ulcers or lesions     NECK: no adenopathy or asymmetry, thyroid normal to palpation     RESP: lungs clear to auscultation - no rales, rhonchi or wheezes     CV: regular rates and rhythm, no murmurs, rubs, or gallop     " ABDOMEN:  soft, nontender, no HSM or masses and bowel sounds normal     MS: extremities normal- no gross deformities noted; normal muscle tone.     SKIN: no suspicious lesions or rashes     NEURO: Normal strength and tone, mentation intact and speech normal     PSYCH: normal thought process; no significant mood disturbance    Patient Instructions   Try Meclizine which is available over the counter.      ANAYELI Chávez, CNP  [facility] Provider    The use of Dragon/[employer] dictation services may have been used to construct the content in this note; any grammatical or spelling errors are non-intentional. Please contact the author of this note directly if you are in need of any clarification.

## 2022-06-02 NOTE — TELEPHONE ENCOUNTER
Agree with ER/UC evaluation if these symptoms persist or worsen. I do not see a visit for 6/7 as mentioned, though see she's on my schedule for 7/8. Okay to move that up and overbook a 7:30 AM slot on Tues, Thurs, or Fri.

## 2022-06-02 NOTE — TELEPHONE ENCOUNTER
"Nurse Triage SBAR    Is this a 2nd Level Triage? YES, LICENSED PRACTITIONER REVIEW IS REQUIRED    Situation: Patient calling with several days of constant dizziness and intermittent severe dizziness.    Background: Patient states she recently had a sleep study, they found she has heart palpitations, she has a follow up with reported PCP Dr. Vazquez on 6/7/22, but feels she cannot wait until then to address current dizziness.      Assessment: Patient is calling due to feeling dizzy for the last couple of days. She also says she feels low energy. Sometime she feels disoriented, and she gets \"a chill up her body\" as well during dizzy spells. She checked her blood pressure \"ten minutes ago\" and it was 164/113, pulse 91.She also states she recently had a sleep study, they noted heart palpitations, she has an appointment with reported PCP Dr. Vazquez on 6/7 for follow-up.     She states she is dizzy most of the time, but then occasionally it will get \"really bad.\" Right hand and left hand get really tingly occasionally as well. She currently feels hot and sweaty, not cold/clammy. She is walking around at work and moving to a private room to check her blood pressure: /107, pulse 96. She was just feeling very dizzy when she called the nurse line.     Per protocol, patient should be seen in ED/UCC or to office with PCP approval. Patient states she plans to finish her shift until 6pm due to being short staffed, though if her PCP says she should go to ED right away she will ask her direct supervisor.    Protocol Recommended Disposition:   Go To ED/UCC Now (Or To Office With PCP Approval)    Recommendation: Routing high priority message to Dr. Vazquez, patient's reported PCP for second level triage. She requests calling back on work phone: [phone], and ask for Lenore.    Routed to provider    Does the patient meet one of the following criteria for ADS visit consideration? 16+ years old, with an [facility] PCP     Nori SEPULVEDA" "REYNALDO Caruso  [place] Nurse Advisor  2:51 PM  6/2/2022    COVID 19 Nurse Triage Plan/Patient Instructions    Please be aware that novel coronavirus (COVID-19) may be circulating in the community. If you develop symptoms such as fever, cough, or SOB or if you have concerns about the presence of another infection including coronavirus (COVID-19), please contact your health care provider or visit [email or web address].     Disposition/Instructions    Additional COVID19 information to add for patients.   How can I protect others?  If you have symptoms (fever, cough, body aches or trouble breathing): Stay home and away from others (self-isolate) until:    At least 10 days have passed since your symptoms started, And     You ve had no fever--and no medicine that reduces fever--for 1 full day (24 hours), And      Your other symptoms have resolved (gotten better).     If you don t have symptoms, but a test showed that you have COVID-19 (you tested positive):    Stay home and away from others (self-isolate). Follow the tips under \"How do I self-isolate?\" below for 10 days (20 days if you have a weak immune system).    You don't need to be retested for COVID-19 before going back to school or work. As long as you're fever-free and feeling better, you can go back to school, work and other activities after waiting the 10 or 20 days.     How do I self-isolate?    Stay in your own room, even for meals. Use your own bathroom if you can.     Stay away from others in your home. No hugging, kissing or shaking hands. No visitors.    Don t go to work, school or anywhere else.     Clean  high touch  surfaces often (doorknobs, counters, handles, etc.). Use a household cleaning spray or wipes. You ll find a full list on the EPA website:  www.epa.gov/pesticide-registration/list-n-disinfectants-use-against-sars-cov-2.    Cover your mouth and nose with a mask, tissue or washcloth to avoid spreading germs.    Wash your hands and face " often. Use soap and water.    Caregivers in these groups are at risk for severe illness due to COVID-19:  o People 65 years and older  o People who live in a nursing home or long-term care facility  o People with chronic disease (lung, heart, cancer, diabetes, kidney, liver, immunologic)  o People who have a weakened immune system, including those who:  - Are in cancer treatment  - Take medicine that weakens the immune system, such as corticosteroids  - Had a bone marrow or organ transplant  - Have an immune deficiency  - Have poorly controlled HIV or AIDS  - Are obese (body mass index of 40 or higher)  - Smoke regularly    Caregivers should wear gloves while washing dishes, handling laundry and cleaning bedrooms and bathrooms.    Use caution when washing and drying laundry: Don t shake dirty laundry, and use the warmest water setting that you can.    For more tips, go to www.cdc.gov/coronavirus/2019-ncov/downloads/10Things.pdf.    How can I take care of myself?  1. Get lots of rest. Drink extra fluids (unless a doctor has told you not to).     2. Take Tylenol (acetaminophen) for fever or pain. If you have liver or kidney problems, ask your family doctor if it s okay to take Tylenol.     Adults can take either:     650 mg (two 325 mg pills) every 4 to 6 hours, or     1,000 mg (two 500 mg pills) every 8 hours as needed.     Note: Don t take more than 3,000 mg in one day.   Acetaminophen is found in many medicines (both prescribed and over-the-counter medicines). Read all labels to be sure you don t take too much.     For children, check the Tylenol bottle for the right dose. The dose is based on the child s age or weight.    3. If you have other health problems (like cancer, heart failure, an organ transplant or severe kidney disease): Call your specialty clinic if you don t feel better in the next 2 days.    4. Know when to call 911: Emergency warning signs include:    Trouble breathing or shortness of  breath    Pain or pressure in the chest that doesn t go away    Feeling confused like you haven t felt before, or not being able to wake up    Bluish-colored lips or face    What are the symptoms of COVID-19?     The most common symptoms are cough, fever and trouble breathing.     Less common symptoms include body aches, chills, diarrhea (loose, watery poops), fatigue (feeling very tired), headache, runny nose, sore throat and loss of smell.    COVID-19 can cause severe coughing (bronchitis) and lung infection (pneumonia).    How does it spread?     The virus may spread when a person coughs or sneezes into the air. The virus can travel about 6 feet this way, and it can live on surfaces.      Common  (household disinfectants) will kill the virus.    Who is at risk?  Anyone can catch COVID-19 if they re around someone who has the virus.    How can others protect themselves?     Stay away from people who have COVID-19 (or symptoms of COVID-19).    Wash hands often with soap and water. Or, use hand  with at least 60% alcohol.    Avoid touching the eyes, nose or mouth.     Wear a face mask when you go out in public, when sick or when caring for a sick person.    Where can I get more information?    [facility]: About COVID-19: [email or web address]    CDC: What to Do If You re Sick: www.cdc.gov/coronavirus/2019-ncov/about/steps-when-sick.html    CDC: Ending Home Isolation: www.cdc.gov/coronavirus/2019-ncov/hcp/disposition-in-home-patients.html     CDC: Caring for Someone: www.cdc.gov/coronavirus/2019-ncov/if-you-are-sick/care-for-someone.html     [facility]: Interim Guidance for Hospital Discharge to Home: www.health.[facility].mn.us/diseases/coronavirus/hcp/hospdischarge.pdf    [facility] clinical trials (COVID-19 research studies): clinicalaffairs.[facility]-clinical-trials     Below are the COVID-19 hotlines at the [place] Department of Health ([facility]). Interpreters are available.    o For health questions: Call 457-354-8696 or 1-647.513.3423 (7 a.m. to 7 p.m.)  o For questions about schools and childcare: Call 248-322-6840 or 1-872.687.3747 (7 a.m. to 7 p.m.)          Thank you for taking steps to prevent the spread of this virus.  o Limit your contact with others.  o Wear a simple mask to cover your cough.  o Wash your hands well and often.    Resources    [facility]: About COVID-19: [email or web address]    CDC: What to Do If You're Sick: www.cdc.gov/coronavirus/2019-ncov/about/steps-when-sick.html    CDC: Ending Home Isolation: www.cdc.gov/coronavirus/2019-ncov/hcp/disposition-in-home-patients.html     CDC: Caring for Someone: www.cdc.gov/coronavirus/2019-ncov/if-you-are-sick/care-for-someone.html     [facility]: Interim Guidance for Hospital Discharge to Home: www.[facility].mn.us/diseases/coronavirus/hcp/hospdischarge.pdf    [facility] clinical trials (COVID-19 research studies): clinicalaffairs.[facility]-clinical-trials     Below are the COVID-19 hotlines at the [place] Department of [facility] ([facility]). Interpreters are available.   o For health questions: Call 550-476-1943 or 1-761.253.4862 (7 a.m. to 7 p.m.)  o For questions about schools and childcare: Call 652-002-8076 or 1-774.629.8795 (7 a.m. to 7 p.m.)                     TIP  Providers, please consider if this condition is appropriate for management at one of our Acute and Diagnostic Services sites.     If patient is a good candidate, please use dotphrase <dot>triageresponse and select Refer to ADS to document.    Reason for Disposition    Extra heart beats OR irregular heart beating (i.e., 'palpitations')    Additional Information    Negative: Shock suspected (e.g., cold/pale/clammy skin, too weak to stand, low BP, rapid pulse)    Negative: Fainted, and still feels dizzy afterwards    Negative: Difficult to awaken or acting confused (e.g., disoriented, slurred speech)    Negative: Overdose (accidental  or intentional) of medications    Negative: Severe difficulty breathing (e.g., struggling for each breath, speaks in single words)    Negative: New neurologic deficit that is present now: * Weakness of the face, arm, or leg on one side of the body * Numbness of the face, arm, or leg on one side of the body * Loss of speech or garbled speech    Negative: Heart beating < 50 beats per minute OR > 140 beats per minute    Negative: Sounds like a life-threatening emergency to the triager    Negative: Chest pain    Negative: Rectal bleeding, bloody stool, or tarry-black stool    Negative: Vomiting is the main symptom    Negative: Diarrhea is the main symptom    Negative: Headache is the main symptom    Negative: Heat exhaustion suspected (i.e., dehydration from heat exposure)    Negative: Patient states that he/she is having an anxiety/panic attack    Negative: SEVERE dizziness (e.g., unable to stand, requires support to walk, feels like passing out now)    Negative: Spinning or tilting sensation (vertigo) present now and one or more stroke risk factors (i.e., hypertension, diabetes, prior stroke/TIA, heart attack, age over 60) (Exception: prior physician evaluation for this AND no different/worse than usual)    Negative: SEVERE headache or neck pain    Negative: Loss of vision or double vision    Protocols used: DIZZINESS-A-OH

## 2022-06-02 NOTE — TELEPHONE ENCOUNTER
Provider Recommendation Follow Up:   Reached patient/caregiver. Informed of provider's recommendations. Patient verbalized understanding and agrees with the plan.     Called work phone as directed by patient was on hold for 10 minutes then call was disconnected    Called back and relayed recommendations patient will go to  now     Appointment rescheduled for 6/7

## 2022-07-06 ENCOUNTER — TELEPHONE (OUTPATIENT)
Dept: ALLERGY | Facility: CLINIC | Age: 27
End: 2022-07-06

## 2022-07-06 NOTE — TELEPHONE ENCOUNTER
[facility] Call Center    Phone Message    May a detailed message be left on voicemail: yes     Reason for Call: Symptoms or Concerns     If patient has red-flag symptoms, warm transfer to triage line    Current symptom or concern: Hives, Coughing fits, red and swollen cheeks, Asthma Sx    Symptoms have been present for:  1 week(s) Hives and swollen cheeks   Since 2018 for the cough asthma Dx    Has patient previously been seen for this? No    By : NA    Date: NA    Are there any new or worsening symptoms? Yes: Please call pt to see what steps to take. Thanks       Action Taken: Message routed to:  [facility] Allergy     Travel Screening: Not Applicable

## 2022-07-07 NOTE — TELEPHONE ENCOUNTER
Called patient. She would like to establish care with Allergy. Has 2 year history of on and off hives, coughing fits, red/swollen cheeks and history of asthma.   Patient's PCP manages asthma currently and takes an inhaler PRN.    Scheduled for next available new appt time 8/19/22 at 1pm. Advised to hold any antihistamines 7 days prior to visit in case the provider recommends skin testing. Patient does not take any antihistamines. Denies previously seeing any other allergy providers.   Hunter JACOBS, BSN  07/07/22 9:56 AM

## 2022-07-17 DIAGNOSIS — F43.10 COMPLEX POSTTRAUMATIC STRESS DISORDER: ICD-10-CM

## 2022-07-20 RX ORDER — VILAZODONE HYDROCHLORIDE 40 MG/1
40 TABLET ORAL DAILY
Qty: 90 TABLET | Refills: 0 | Status: SHIPPED | OUTPATIENT
Start: 2022-07-20 | End: 2022-08-10

## 2022-07-20 NOTE — TELEPHONE ENCOUNTER
"Good morning!     She actually is no longer under my care.  Last seen 4/2021 \"Psychiatrically stable at present. Will return to PCP for ongoing care, medication prescribing and future medication refills.   3 months of medications fills provided.  Follow up with [facility], [facility] in about  3 months. Patient can be re-referred back to this service for further consultation in the future if needed but a new referral will need to be placed.\"  Clementine Myles, MSN, APRN, CNP, FMHNP-BC,     "

## 2022-08-10 ENCOUNTER — VIRTUAL VISIT (OUTPATIENT)
Dept: INTERNAL MEDICINE | Facility: CLINIC | Age: 27
End: 2022-08-10
Payer: COMMERCIAL

## 2022-08-10 DIAGNOSIS — F90.2 ATTENTION DEFICIT HYPERACTIVITY DISORDER (ADHD), COMBINED TYPE: Primary | ICD-10-CM

## 2022-08-10 DIAGNOSIS — G89.29 CHRONIC BILATERAL LOW BACK PAIN WITHOUT SCIATICA: ICD-10-CM

## 2022-08-10 DIAGNOSIS — M54.50 CHRONIC BILATERAL LOW BACK PAIN WITHOUT SCIATICA: ICD-10-CM

## 2022-08-10 DIAGNOSIS — F43.10 COMPLEX POSTTRAUMATIC STRESS DISORDER: ICD-10-CM

## 2022-08-10 PROCEDURE — 99214 OFFICE O/P EST MOD 30 MIN: CPT | Mod: 95 | Performed by: INTERNAL MEDICINE

## 2022-08-10 RX ORDER — METHYLPHENIDATE HYDROCHLORIDE 54 MG/1
54 TABLET ORAL DAILY
Qty: 30 TABLET | Refills: 0 | Status: SHIPPED | OUTPATIENT
Start: 2022-08-10 | End: 2022-08-19

## 2022-08-10 RX ORDER — KETOROLAC TROMETHAMINE 10 MG/1
10 TABLET, FILM COATED ORAL EVERY 8 HOURS PRN
Qty: 30 TABLET | Refills: 3 | Status: SHIPPED | OUTPATIENT
Start: 2022-08-10 | End: 2023-05-18

## 2022-08-10 RX ORDER — METHYLPHENIDATE HYDROCHLORIDE 54 MG/1
54 TABLET ORAL DAILY
Qty: 30 TABLET | Refills: 0 | Status: SHIPPED | OUTPATIENT
Start: 2022-09-10 | End: 2022-08-19

## 2022-08-10 RX ORDER — VILAZODONE HYDROCHLORIDE 40 MG/1
40 TABLET ORAL DAILY
Qty: 90 TABLET | Refills: 3 | Status: SHIPPED | OUTPATIENT
Start: 2022-08-10 | End: 2023-07-25

## 2022-08-10 RX ORDER — METHYLPHENIDATE HYDROCHLORIDE 54 MG/1
54 TABLET ORAL DAILY
Qty: 30 TABLET | Refills: 0 | Status: SHIPPED | OUTPATIENT
Start: 2022-10-11 | End: 2022-08-19

## 2022-08-10 NOTE — PROGRESS NOTES
Felecia is a 26 year old who is being evaluated via a billable video visit.      How would you like to obtain your AVS? ActualSunhart  If the video visit is dropped, the invitation should be resent by: Text to cell phone: [phone]  Will anyone else be joining your video visit? No    Assessment & Plan   Attention deficit hyperactivity disorder (ADHD), combined type  Doing well on Concerta 54mg daily. Three months' worth of medication provided today; may receive another three months' worth of medication in three months without office visit (patient to send me a [employer] message or refill request at least a week before this refills is needed); in-person office visit in six months (earlier as needed). I encouraged her to book this at least 2 months in advance to ensure proper timing.  - methylphenidate (CONCERTA) 54 MG CR tablet; Take 1 tablet (54 mg) by mouth daily for 30 days  - methylphenidate (CONCERTA) 54 MG CR tablet; Take 1 tablet (54 mg) by mouth daily for 30 days  - methylphenidate (CONCERTA) 54 MG CR tablet; Take 1 tablet (54 mg) by mouth daily for 30 days    Complex posttraumatic stress disorder  Stable on Viibryd. Refilled.  - vilazodone (VIIBRYD) 40 MG TABS tablet; Take 1 tablet (40 mg) by mouth daily    Chronic bilateral low back pain without sciatica  Takes intermittent for back pain. 90 tabs has lasted 1+ years. Refilled.  - ketorolac (TORADOL) 10 MG tablet; Take 1 tablet (10 mg) by mouth every 8 hours as needed for moderate pain    Return in about 6 months (around 2/10/2023) for Physical Exam, ADHD Check.    Terrance Vazquez MD  [facility]    Subjective   Felecia is a 26 year old who presents via video visit for a med check. Currently on Concerta 54mg daily for ADHD. This was previously instituted and managed by psychiatry but Felecia was felt to be stable on her current regimen and her care was transitioned back to us in primary care. Today is the first visit since that happened.  Felecia states she's tolerating Concerta well - no adverse side effects (palpitations, anorexia, weight loss, anxiety, or difficulty sleeping). Blood pressure and heart rate within normal limits. Dose working well for attention/hyperactivity. Patient would like to remain on current dose. Needs refills. [place]  Aware reviewed - no suspect behavior.    Review of Systems   Constitutional, psych systems are negative, except as otherwise noted.      Objective    Vitals - Patient Reported  Weight (Patient Reported): 124.3 kg (274 lb)  Vitals: No vitals were obtained today due to virtual visit.    Physical Exam   GENERAL: Healthy, alert and no distress  EYES: Eyes grossly normal to inspection.  No discharge or erythema, or obvious scleral/conjunctival abnormalities.  RESP: No audible wheeze, cough, or visible cyanosis.  No visible retractions or increased work of breathing.    SKIN: Visible skin clear. No significant rash, abnormal pigmentation or lesions.  NEURO: Cranial nerves grossly intact.  Mentation and speech appropriate for age.  PSYCH: Mentation appears normal, affect normal/bright, judgement and insight intact, normal speech and appearance well-groomed.    Video-Visit Details  Video Start Time: 11:51 AM    Type of service: Video Visit    Video End Time: 11:57 AM    Originating Location (pt. Location): Home    Distant Location (provider location):  [facility]     Platform used for Video Visit: [email or web address]

## 2022-08-19 ENCOUNTER — OFFICE VISIT (OUTPATIENT)
Dept: ALLERGY | Facility: CLINIC | Age: 27
End: 2022-08-19

## 2022-08-19 VITALS
HEART RATE: 97 BPM | WEIGHT: 282.6 LBS | OXYGEN SATURATION: 99 % | DIASTOLIC BLOOD PRESSURE: 81 MMHG | BODY MASS INDEX: 45.61 KG/M2 | SYSTOLIC BLOOD PRESSURE: 120 MMHG

## 2022-08-19 DIAGNOSIS — L50.9 HIVES: ICD-10-CM

## 2022-08-19 DIAGNOSIS — J45.20 MILD INTERMITTENT ASTHMA, UNSPECIFIED WHETHER COMPLICATED: Primary | ICD-10-CM

## 2022-08-19 DIAGNOSIS — R05.9 COUGH: ICD-10-CM

## 2022-08-19 DIAGNOSIS — T78.1XXA OTHER ADVERSE FOOD REACTIONS, NOT ELSEWHERE CLASSIFIED, INITIAL ENCOUNTER: ICD-10-CM

## 2022-08-19 PROCEDURE — 95004 PERQ TESTS W/ALRGNC XTRCS: CPT | Performed by: INTERNAL MEDICINE

## 2022-08-19 PROCEDURE — 99204 OFFICE O/P NEW MOD 45 MIN: CPT | Mod: 25 | Performed by: INTERNAL MEDICINE

## 2022-08-19 ASSESSMENT — ENCOUNTER SYMPTOMS
CHEST TIGHTNESS: 0
FEVER: 0
MYALGIAS: 0
FACIAL SWELLING: 0
SINUS PRESSURE: 1
COUGH: 0
EYE REDNESS: 0
SHORTNESS OF BREATH: 0
JOINT SWELLING: 0
WHEEZING: 0
ADENOPATHY: 0
EYE DISCHARGE: 1
RHINORRHEA: 1
ARTHRALGIAS: 0
EYE ITCHING: 1
DIARRHEA: 0
CHILLS: 0
VOMITING: 0
ACTIVITY CHANGE: 0
NAUSEA: 0
FATIGUE: 1
HEADACHES: 1

## 2022-08-19 ASSESSMENT — ASTHMA QUESTIONNAIRES: ACT_TOTALSCORE: 13

## 2022-08-19 NOTE — PATIENT INSTRUCTIONS
Start Famotidine 20 mg twice daily for possible reflux  Start Allegra 180 mg daily to help prevent hives  Follow up in 4 weeks for a breathing test

## 2022-08-19 NOTE — PROGRESS NOTES
Lenore Suggs was seen in the Allergy Clinic at [facility].    Lenore Suggs is a 26 year old female being seen today  in consultation for food allergies and asthma.    The patient has symptoms of a throat closing sensation and white flashy lights in her eyes when she eats spicy Doritos and Cheetos.  She also has coughing associated with these foods.  However further questioning she describes a numerous meals will cause increased coughing episodes and which is usually productive of mucus.  This usually occurs during eating or right after eating.  The white flashing of the eyes is something that she atypically happens.    She also has a history of asthma which was diagnosed a few years ago and albuterol does help with the cough.  Albuterol is used on a daily basis currently.    She also describes hives with itching for several years.  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  However she did have a reaction occurring when she was given the antihistamine.    She is not had a recent spirometry.      Past Medical History:   Diagnosis Date     Asthma      Bipolar affective disorder (H)      Outbursts of anger      Schizophrenia (H)      Family History   Problem Relation Age of Onset     Mental Illness Mother      Paranoid behavior Mother      No Known Problems Father      History reviewed. No pertinent surgical history.    ENVIRONMENTAL HISTORY:   Pets inside the house include 1 cat(s).  Do you smoke cigarettes or other recreational drugs? No There is/are 0 smokers living in the house. The house does not have a damp basement.     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      Review of Systems   Constitutional: Positive for fatigue. Negative for activity change, chills and fever.   HENT: Positive  for congestion, postnasal drip, rhinorrhea, sinus pressure and sneezing. Negative for dental problem, ear pain, facial swelling and nosebleeds.    Eyes: Positive for discharge and itching. Negative for redness.   Respiratory: Negative for cough, chest tightness, shortness of breath and wheezing.    Cardiovascular: Negative for chest pain.   Gastrointestinal: Negative for diarrhea, nausea and vomiting.   Musculoskeletal: Negative for arthralgias, joint swelling and myalgias.   Skin: Positive for rash.   Neurological: Positive for headaches.   Hematological: Negative for adenopathy.   Psychiatric/Behavioral: Negative for behavioral problems and self-injury.         Current Outpatient Medications:      albuterol (PROAIR HFA/PROVENTIL HFA/VENTOLIN HFA) 108 (90 Base) MCG/ACT inhaler, Inhale 1-2 puffs into the lungs every 6 hours as needed for shortness of breath / dyspnea or wheezing, Disp: 18 g, Rfl: 4     ketorolac (TORADOL) 10 MG tablet, Take 1 tablet (10 mg) by mouth every 8 hours as needed for moderate pain, Disp: 30 tablet, Rfl: 3     methylphenidate (CONCERTA) 54 MG CR tablet, Take 1 tablet (54 mg) by mouth every morning, Disp: 7 tablet, Rfl: 0     vilazodone (VIIBRYD) 40 MG TABS tablet, Take 1 tablet (40 mg) by mouth daily, Disp: 90 tablet, Rfl: 3     SUMAtriptan (IMITREX) 50 MG tablet, , Disp: , Rfl:   Allergies   Allergen Reactions     Alcohol Hives     Kiwi Hives     Seasonal Allergies Other (See Comments) and Rash     sneezing         EXAM:   /81   Pulse 97   Wt 128.2 kg (282 lb 9.6 oz)   LMP 07/17/2022   SpO2 99%   BMI 45.61 kg/m      Physical Exam  Constitutional:       General: She is not in acute distress.     Appearance: Normal appearance. She is not ill-appearing.   HENT:      Head: Normocephalic and atraumatic.      Nose: Nose normal. No congestion or rhinorrhea.      Mouth/Throat:      Mouth: Mucous membranes are moist.      Pharynx: Oropharynx is clear. No posterior oropharyngeal erythema.    Eyes:      General:         Right eye: No discharge.         Left eye: No discharge.   Cardiovascular:      Rate and Rhythm: Normal rate and regular rhythm.      Heart sounds: Normal heart sounds.   Pulmonary:      Effort: Pulmonary effort is normal.      Breath sounds: Normal breath sounds. No wheezing or rhonchi.   Skin:     General: Skin is warm.      Findings: She has hyperpigmented lesion small macular lesions on bilateral forearms.  No obvious hives.  Neurological:      General: No focal deficit present.      Mental Status: She is alert. Mental status is at baseline.   Psychiatric:         Mood and Affect: Mood normal.         Behavior: Behavior normal.      WORKUP: Skin testing was negative for all aeroallergens    ENVIRONMENTAL PERCUTANEOUS SKIN TESTING: ADULT  [facility] 8/19/2022   Consent N   Ordering Physician Dr. Lizarraga   Interpreting Physician Dr. Lizarraga   Testing Technician Merly JOHN MA   Location Back   Time start:  2:05 PM   Time End:  2:20 PM   Positive Control: Histatrol*ALK 1 mg/ml 5/7   Negative Control: 50% Glycerin 0   Cat Hair*ALK (10,000 BAU/ml) 0   AP Dog Hair/Dander (1:100 w/v) 0   Dust Mite p. 30,000 AU/ml 0   Dust Mite f. (30,000 AU/ml) 0   [place]oney (W/F in millimeters) 0   Theodore Grass (100,000 BAU/mL) 0   Red Cedar (W/F in millimeters) 0   Maple/[place] (W/F in millimeters) 0   Hackberry (W/F in millimeters) 0   [place] (W/F in millimeters) 0   [place] *ALK (W/F in millimeters) 0   American Elm (W/F in millimeters) 0   [place] (W/F in millimeters) 0   [place] (W/F in millimeters) 0   Birch Mix (W/F in millimeters) 0   [place] (W/F in millimeters) 0   Oak (W/F in millimeters) 0   Cocklebur (W/F in millimeters) 0   [place] (W/F in millimeters) 0   White Javier (W/F in millimeters) 0   Careless (W/F in millimeters) 0   Nettle (W/F in millimeters) 0   English Plantain (W/F in millimeters) 0   Kochia (W/F in millimeters) 0   Lamb's Quarter (W/F in millimeters) 0    Marshelder (W/F in millimeters) 0   Ragweed Mix* ALK (W/F in millimeters) 0   Russian Thistle (W/F in millimeters) 0   Sagebrush/Mugwort (W/F in millimeters) 0   Sheep Sorrel (W/F in millimeters) 0   Feather Mix* ALK (W/F in millimeters) 0   Penicillium Mix (1:10 w/v) 0   Curvularia spicifera (1:10 w/v) 0   Epicoccum (1:10 w/v) 0   Aspergillus fumigatus (1:10 w/v): 0   Alternaria tenius (1:10 w/v) 0   H. Cladosporium (1:10 w/v) 0   Phoma herbarum (1:10 w/v) 0   Other (W/F in millimeters) 0   Other (W/F in millimeters) 0      ASSESSMENT/PLAN:  Lenore Suggs is a 26 year old female seen today for your possible allergic rhinitis, food allergy concerns, as well as hives.  She did not have any hives when in the office.  The rash that she is pointing to on her arms does not look consistent with hives but there may be an additional rash that she has at times which is hives.  Also will further evaluate her asthma concerns with a breathing test.  Continue albuterol as needed.  Depending on those results may consider alternative therapies.    With the increased cough with mucus production after almost all meals sounds more reflux related rather than allergy related.  The spicy Doritos and Oswaldo's causing white flashing of her eyes is very atypical.  Recommended to see ophthalmology.    1. Start Famotidine 20 mg twice daily for possible reflux  2. Start Allegra 180 mg daily to help prevent hives  3. Follow up in 4 weeks for a breathing test    Follow-up in 4 weeks      Thank you for allowing me to participate in the care of Lenore Suggs.      A total of 40 minutes was spent on the day of the encounter performing chart review, history and exam, documentation, and counseling and coordination of care as noted above.       Christo Lizarraga MD  Allergy/Immunology  [facility]

## 2022-08-19 NOTE — PROGRESS NOTES
Per provider verbal order, placed Adult Environmental Panel scratch test.  Once panels were placed, patient was monitored for 15 minutes in clinic.  Provider read test after 15 minutes..  Pt tolerated procedure well.  All questions and concerns were addressed at office visit.     JOSE Hoover

## 2022-08-19 NOTE — LETTER
8/19/2022         RE: Lenore Suggs  [address] [facility] [address]        Dear Colleague,    Thank you for referring your patient, Lenore Suggs, to the [facility] [place]. Please see a copy of my visit note below.    Lenore Suggs was seen in the Allergy Clinic at [facility].    Lenore Suggs is a 26 year old female being seen today  in consultation for food allergies and asthma.    The patient has symptoms of a throat closing sensation and white flashy lights in her eyes when she eats spicy Doritos and Cheetos.  She also has coughing associated with these foods.  However further questioning she describes a numerous meals will cause increased coughing episodes and which is usually productive of mucus.  This usually occurs during eating or right after eating.  The white flashing of the eyes is something that she atypically happens.    She also has a history of asthma which was diagnosed a few years ago and albuterol does help with the cough.  Albuterol is used on a daily basis currently.    She also describes hives with itching for several years.  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  However she did have a reaction occurring when she was given the antihistamine.    She is not had a recent spirometry.      Past Medical History:   Diagnosis Date     Asthma      Bipolar affective disorder (H)      Outbursts of anger      Schizophrenia (H)      Family History   Problem Relation Age of Onset     Mental Illness Mother      Paranoid behavior Mother      No Known Problems Father      History reviewed. No pertinent surgical history.    ENVIRONMENTAL HISTORY:   Pets inside the house include 1 cat(s).  Do you smoke cigarettes or other recreational drugs? No There is/are 0 smokers living in the house.  The house does not have a damp basement.     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      Review of Systems   Constitutional: Positive for fatigue. Negative for activity change, chills and fever.   HENT: Positive for congestion, postnasal drip, rhinorrhea, sinus pressure and sneezing. Negative for dental problem, ear pain, facial swelling and nosebleeds.    Eyes: Positive for discharge and itching. Negative for redness.   Respiratory: Negative for cough, chest tightness, shortness of breath and wheezing.    Cardiovascular: Negative for chest pain.   Gastrointestinal: Negative for diarrhea, nausea and vomiting.   Musculoskeletal: Negative for arthralgias, joint swelling and myalgias.   Skin: Positive for rash.   Neurological: Positive for headaches.   Hematological: Negative for adenopathy.   Psychiatric/Behavioral: Negative for behavioral problems and self-injury.         Current Outpatient Medications:      albuterol (PROAIR HFA/PROVENTIL HFA/VENTOLIN HFA) 108 (90 Base) MCG/ACT inhaler, Inhale 1-2 puffs into the lungs every 6 hours as needed for shortness of breath / dyspnea or wheezing, Disp: 18 g, Rfl: 4     ketorolac (TORADOL) 10 MG tablet, Take 1 tablet (10 mg) by mouth every 8 hours as needed for moderate pain, Disp: 30 tablet, Rfl: 3     methylphenidate (CONCERTA) 54 MG CR tablet, Take 1 tablet (54 mg) by mouth every morning, Disp: 7 tablet, Rfl: 0     vilazodone (VIIBRYD) 40 MG TABS tablet, Take 1 tablet (40 mg) by mouth daily, Disp: 90 tablet, Rfl: 3     SUMAtriptan (IMITREX) 50 MG tablet, , Disp: , Rfl:   Allergies   Allergen Reactions     Alcohol Hives     Kiwi Hives     Seasonal Allergies Other (See Comments) and Rash     sneezing         EXAM:   /81   Pulse 97   Wt 128.2 kg (282 lb 9.6 oz)   LMP 07/17/2022   SpO2 99%   BMI 45.61 kg/m      Physical Exam  Constitutional:       General: She is not in acute distress.     Appearance: Normal  appearance. She is not ill-appearing.   HENT:      Head: Normocephalic and atraumatic.      Nose: Nose normal. No congestion or rhinorrhea.      Mouth/Throat:      Mouth: Mucous membranes are moist.      Pharynx: Oropharynx is clear. No posterior oropharyngeal erythema.   Eyes:      General:         Right eye: No discharge.         Left eye: No discharge.   Cardiovascular:      Rate and Rhythm: Normal rate and regular rhythm.      Heart sounds: Normal heart sounds.   Pulmonary:      Effort: Pulmonary effort is normal.      Breath sounds: Normal breath sounds. No wheezing or rhonchi.   Skin:     General: Skin is warm.      Findings: She has hyperpigmented lesion small macular lesions on bilateral forearms.  No obvious hives.  Neurological:      General: No focal deficit present.      Mental Status: She is alert. Mental status is at baseline.   Psychiatric:         Mood and Affect: Mood normal.         Behavior: Behavior normal.      WORKUP: Skin testing was negative for all aeroallergens    ENVIRONMENTAL PERCUTANEOUS SKIN TESTING: ADULT  [facility] 8/19/2022   Consent N   Ordering Physician Dr. Lizarraga   Interpreting Physician Dr. Lizarraga   Testing Technician Merly JOHN MA   Location Back   Time start:  2:05 PM   Time End:  2:20 PM   Positive Control: Histatrol*ALK 1 mg/ml 5/7   Negative Control: 50% Glycerin 0   Cat Hair*ALK (10,000 BAU/ml) 0   AP Dog Hair/Dander (1:100 w/v) 0   Dust Mite p. 30,000 AU/ml 0   Dust Mite f. (30,000 AU/ml) 0   [place] (W/F in millimeters) 0   Theodore Grass (100,000 BAU/mL) 0   Red Cedar (W/F in millimeters) 0   Maple/[place] (W/F in millimeters) 0   Hackberry (W/F in millimeters) 0   [place] (W/F in millimeters) 0   [place] *ALK (W/F in millimeters) 0   American Elm (W/F in millimeters) 0   [place] (W/F in millimeters) 0   [place] (W/F in millimeters) 0   Birch Mix (W/F in millimeters) 0   [place] (W/F in millimeters) 0   Oak (W/F in millimeters) 0   Cocklebur (W/F in  millimeters) 0   [place] (W/F in millimeters) 0   White Javier (W/F in millimeters) 0   Careless (W/F in millimeters) 0   Nettle (W/F in millimeters) 0   English Plantain (W/F in millimeters) 0   Kochia (W/F in millimeters) 0   Lamb's Quarter (W/F in millimeters) 0   Marshelder (W/F in millimeters) 0   Ragweed Mix* ALK (W/F in millimeters) 0   Russian Thistle (W/F in millimeters) 0   Sagebrush/Mugwort (W/F in millimeters) 0   Sheep Sorrel (W/F in millimeters) 0   Feather Mix* ALK (W/F in millimeters) 0   Penicillium Mix (1:10 w/v) 0   Curvularia spicifera (1:10 w/v) 0   Epicoccum (1:10 w/v) 0   Aspergillus fumigatus (1:10 w/v): 0   Alternaria tenius (1:10 w/v) 0   H. Cladosporium (1:10 w/v) 0   Phoma herbarum (1:10 w/v) 0   Other (W/F in millimeters) 0   Other (W/F in millimeters) 0      ASSESSMENT/PLAN:  Lenore Suggs is a 26 year old female seen today for your possible allergic rhinitis, food allergy concerns, as well as hives.  She did not have any hives when in the office.  The rash that she is pointing to on her arms does not look consistent with hives but there may be an additional rash that she has at times which is hives.  Also will further evaluate her asthma concerns with a breathing test.  Continue albuterol as needed.  Depending on those results may consider alternative therapies.    With the increased cough with mucus production after almost all meals sounds more reflux related rather than allergy related.  The spicy Doritos and Oswaldo's causing white flashing of her eyes is very atypical.  Recommended to see ophthalmology.    1. Start Famotidine 20 mg twice daily for possible reflux  2. Start Allegra 180 mg daily to help prevent hives  3. Follow up in 4 weeks for a breathing test    Follow-up in 4 weeks      Thank you for allowing me to participate in the care of Lenore Suggs.      A total of 40 minutes was spent on the day of the encounter performing chart review, history and exam,  documentation, and counseling and coordination of care as noted above.       Christo Lizarraga MD  Allergy/Immunology  [facility]      Per provider verbal order, placed Adult Environmental Panel scratch test.  Once panels were placed, patient was monitored for 15 minutes in clinic.  Provider read test after 15 minutes..  Pt tolerated procedure well.  All questions and concerns were addressed at office visit.     JOSE Hoover              Again, thank you for allowing me to participate in the care of your patient.        Sincerely,        Christo Lizarraga MD

## 2022-09-03 ENCOUNTER — HEALTH MAINTENANCE LETTER (OUTPATIENT)
Age: 27
End: 2022-09-03

## 2022-09-30 ENCOUNTER — MYC MEDICAL ADVICE (OUTPATIENT)
Dept: INTERNAL MEDICINE | Facility: CLINIC | Age: 27
End: 2022-09-30

## 2022-09-30 DIAGNOSIS — F90.2 ATTENTION DEFICIT HYPERACTIVITY DISORDER (ADHD), COMBINED TYPE: ICD-10-CM

## 2022-09-30 RX ORDER — METHYLPHENIDATE HYDROCHLORIDE 54 MG/1
54 TABLET ORAL DAILY
Qty: 30 TABLET | Refills: 0 | Status: SHIPPED | OUTPATIENT
Start: 2022-09-30 | End: 2022-11-15

## 2022-09-30 RX ORDER — METHYLPHENIDATE HYDROCHLORIDE 54 MG/1
54 TABLET ORAL DAILY
Qty: 30 TABLET | Refills: 0 | Status: SHIPPED | OUTPATIENT
Start: 2022-12-01 | End: 2022-10-28

## 2022-09-30 RX ORDER — METHYLPHENIDATE HYDROCHLORIDE 54 MG/1
54 TABLET ORAL EVERY MORNING
Qty: 30 TABLET | Refills: 0 | Status: CANCELLED | OUTPATIENT
Start: 2022-09-30

## 2022-09-30 RX ORDER — METHYLPHENIDATE HYDROCHLORIDE 54 MG/1
54 TABLET ORAL DAILY
Qty: 30 TABLET | Refills: 0 | Status: SHIPPED | OUTPATIENT
Start: 2022-10-31 | End: 2022-10-28

## 2022-09-30 NOTE — TELEPHONE ENCOUNTER
Dr. Rand,    Patient is requesting a refill of her Concerta. Patient last had virtual visit 8/10/22. Medication pended for review.     Josefina Hobson RN

## 2022-10-19 ENCOUNTER — NURSE TRIAGE (OUTPATIENT)
Dept: NURSING | Facility: CLINIC | Age: 27
End: 2022-10-19

## 2022-10-20 ENCOUNTER — HOSPITAL ENCOUNTER (EMERGENCY)
Facility: CLINIC | Age: 27
Discharge: HOME | End: 2022-10-20
Attending: PHYSICIAN ASSISTANT | Admitting: PHYSICIAN ASSISTANT
Payer: COMMERCIAL

## 2022-10-20 ENCOUNTER — APPOINTMENT (OUTPATIENT)
Dept: GENERAL RADIOLOGY | Facility: CLINIC | Age: 27
End: 2022-10-20
Attending: EMERGENCY MEDICINE
Payer: COMMERCIAL

## 2022-10-20 VITALS
WEIGHT: 282 LBS | TEMPERATURE: 97.6 F | BODY MASS INDEX: 45.32 KG/M2 | DIASTOLIC BLOOD PRESSURE: 80 MMHG | SYSTOLIC BLOOD PRESSURE: 131 MMHG | HEART RATE: 85 BPM | OXYGEN SATURATION: 100 % | RESPIRATION RATE: 16 BRPM | HEIGHT: 66 IN

## 2022-10-20 DIAGNOSIS — M25.512 LEFT SHOULDER PAIN: ICD-10-CM

## 2022-10-20 DIAGNOSIS — M54.12 CERVICAL RADICULOPATHY: ICD-10-CM

## 2022-10-20 PROCEDURE — 73030 X-RAY EXAM OF SHOULDER: CPT | Mod: LT

## 2022-10-20 PROCEDURE — 99284 EMERGENCY DEPT VISIT MOD MDM: CPT

## 2022-10-20 PROCEDURE — 73000 X-RAY EXAM OF COLLAR BONE: CPT | Mod: LT

## 2022-10-20 RX ORDER — METHYLPREDNISOLONE 4 MG
TABLET, DOSE PACK ORAL
Qty: 21 TABLET | Refills: 0 | Status: SHIPPED | OUTPATIENT
Start: 2022-10-20 | End: 2023-05-18

## 2022-10-20 ASSESSMENT — ENCOUNTER SYMPTOMS
TREMORS: 1
NUMBNESS: 1
FEVER: 0
ARTHRALGIAS: 1
MYALGIAS: 1
NECK PAIN: 0
WEAKNESS: 1

## 2022-10-20 NOTE — ED NOTES
Rapid Assessment Note    History:   Lenore Suggs is a 27 year old right handed female who presents with left shoulder and clavicle pain that has been ongoing for the past 5-6 months. Recently she notes nonstop throbbing in her shoulder and clavicle accompanied by decreased sleep. Describes pain as a burning sensation. Alongside this, she reports muscle weakness in her left arm. 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 denies neck pain.    Exam:   General:  Alert, interactive  Cardiovascular:  Well perfused  Lungs:  No respiratory distress, no accessory muscle use  Neuro:  Moving all 4 extremities  Skin:  Warm, dry  Psych:  Normal affect  Musculoskeletal: Pain to left clavicle and shoulder. 5/5 strength for biceps, triceps, deltoid. Supraspinatus test was normal, Spurling's test is normal    Plan of Care:   I evaluated the patient and developed an initial plan of care. I discussed this plan and explained that I, or one of my partners, would be returning to complete the evaluation.         I, Quoc Ferrell, am serving as a scribe to document services personally performed by German Godwin MD, based on my observations and the provider's statements to me.    10/20/2022  [employer]    Portions of this medical record were completed by a scribe. UPON MY REVIEW AND AUTHENTICATION BY ELECTRONIC SIGNATURE, this confirms (a) I performed the applicable clinical services, and (b) the record is accurate.        German Godwin MD  10/20/22 1902

## 2022-10-20 NOTE — TELEPHONE ENCOUNTER
"Pt calling today regarding concerns of left side shoulder and clavicle pain, for 5-6 months, been getting worst,   Works as pharm tech, was really stressed out, thinks might have over stretched arm as reaching for medications from high shelves, when stretching arm and moving it back, can hear a pop, bones rubbing against each other  Shoulder and clavicle throbs, spreads down to the top of breast area  Have hard time getting up, hurts to move arm in certain position, dressing  Pain level 8, elevating on pillow, movements hurts  Neck pain, arm shakes from weakness when trying to lift    Takes toradol for back pain, tried taking ibuprofen, tylenol, tried icing, and heat, not helping      Triage to see PCP within 24 hours, care advice given, call back if symptoms worsen. Pt verbalizes understanding.    Adonay Martin, RN, BSN  10/19/2022 at 9:33 PM  [facility]      Reason for Disposition    Shoulder pain from overuse (work, exercise, gardening) OR from self-induced lifting injury    [1] Unable to use arm at all AND [2] because of shoulder pain or stiffness    Additional Information    Negative: Passed out (i.e., lost consciousness, collapsed and was not responding)    Negative: Shock suspected (e.g., cold/pale/clammy skin, too weak to stand, low BP, rapid pulse)    Negative: [1] Similar pain previously AND [2] it was from \"heart attack\"    Negative: [1] Similar pain previously AND [2] it was from \"angina\" AND [3] not relieved by nitroglycerin    Negative: Sounds like a life-threatening emergency to the triager    Negative: Serious injury with multiple fractures (broken bones)    Negative: [1] Major bleeding (e.g., actively dripping or spurting) AND [2] can't be stopped    Negative: Bullet wound, stabbed by knife, or other serious penetrating wound    Negative: Sounds like a life-threatening emergency to the triager    Negative: Followed a shoulder injury    Negative: Chest pain    Negative: Difficulty " "breathing or unusual sweating (e.g., sweating without exertion)    Negative: [1] Pain lasting > 5 minutes AND [2] pain also present in chest  (Exception: pain is clearly made worse by movement)    Negative: [1] Age > 40 AND [2] no obvious cause AND [3] pain even when not moving the arm  (Exception: pain is clearly made worse by moving arm or bending neck)    Negative: [1] SEVERE pain AND [2] not improved 2 hours after pain medicine    Negative: [1] Red area or streak AND [2] fever    Negative: [1] Swollen joint AND [2] fever    Negative: Patient sounds very sick or weak to the triager    Negative: Entire arm is swollen    Negative: Weakness (i.e., loss of strength) in hand or fingers (Exception: not truly weak; hand feels weak because of pain)    Negative: [1] Shoulder pains with exertion (e.g., walking) AND [2] pain goes away on resting AND [3] not present now    Negative: [1] Painful rash AND [2] multiple small blisters grouped together (i.e., dermatomal distribution or \"band\" or \"stripe\")    Negative: Looks like a boil, infected sore, deep ulcer or other infected rash (spreading redness, pus)    Negative: [1] Localized rash is very painful AND [2] no fever    Negative: Numbness (i.e., loss of sensation) in hand or fingers    Protocols used: SHOULDER INJURY-A-, SHOULDER PAIN-A-AH      "

## 2022-10-20 NOTE — ED TRIAGE NOTES
Patient was told to come to the ED and have an x-ray or ultra sound of her arm and shoulder for pain in the area for the past 5 or 6 months. Patient denied any trauma to the area.     Triage Assessment     Row Name 10/20/22 4847       Triage Assessment (Adult)    Airway WDL WDL       Respiratory WDL    Respiratory WDL WDL       Skin Circulation/Temperature WDL    Skin Circulation/Temperature WDL WDL       Cardiac WDL    Cardiac WDL WDL       Peripheral/Neurovascular WDL    Peripheral Neurovascular WDL WDL       Cognitive/Neuro/Behavioral WDL    Cognitive/Neuro/Behavioral WDL WDL

## 2022-10-21 NOTE — ED PROVIDER NOTES
"  History     Chief Complaint:  Chest/Shoulder Pain     HPI:  The history is provided by the patient.      Lenore Suggs is a 27 year old female with history of schizophrenia and asthma who presents with 5-6 months of left-sided chest and shoulder pain. She states that 5-6 months ago, she was reaching upwards with her left arm to grab something from a high shelf when she felt a \"pop\" followed by sudden onset of pain to her left chest/shoulder. 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 This pain is described as a \"burning\" sensation which has been persistent since the onset 5-6 months ago. There have been no alleviating factors including applying ice and heat; however, her pain does worsen when laying on the left side and while ambulating. Felecia notes that in addition to the pain, she has had some positional numbness and tremors as well as subjective weakness of her left arm. She continues to have intermittent \"popping\" sensations when moving her left arm. Yesterday, her pain was particularly worse, prompting her presentation to the ED. No midline neck pain or fever. Denies history of IV drug use or dialysis. No numbness or weakness in legs.    Review of Systems   Constitutional: Negative for fever.   Cardiovascular: Positive for chest pain.   Musculoskeletal: Positive for arthralgias and myalgias. Negative for neck pain.   Neurological: Positive for tremors, weakness and numbness.   All other systems reviewed and are negative.    Allergies:  The patient has no known medication allergies.    Medications:  Albuterol inhaler  Toradol   Concerta  Imitrex  Vilazodone    Past Medical History:     Asthma   Suicide attempt  Complex PTSD  Schizophrenia  Bipolar affective disorder   ADHD    Family History:    Mother: mental illness    Social History:  The patient presents to the ED alone.  The patient presents to the ED via private vehicle.  The patient works as a pharmacy technician. " "    Physical Exam     Patient Vitals for the past 24 hrs:   BP Temp Temp src Pulse Resp SpO2 Height Weight   10/20/22 1845 131/80 97.6  F (36.4  C) Temporal 85 16 100 % 1.676 m (5' 6\") 127.9 kg (282 lb)     Physical Exam  General: Well appearing.  Non-toxic    Head:  Atraumatic, external ears and nose normal.    Neck:  Normal range of motion without rigidity.    Negative Spurling test in neck    CV:  Regular rate and rhythm    No pathologic murmur, rubs, or gallops.    Resp:  Breath sounds are clear bilaterally.  No crackles, wheezes, rhonchi.    Non-labored, no retractions or accessory muscle use.     MS:  TTP over trapezius, clavicle and deltoid area of left side.  Full passive ROM in shoulder and elbow.  Reproducible pain with active ROM of shoulder.  No redness or effusion seen.  There is no tenderness or deformity of sternoclavicular joint, clavicle, AC joint.  No peripheral edema to the arm.       No midline spinal ttp.   Neuro: Normal strength and sensation at elbows, and radial, median, ulnar nerve distributions of hands Bl.  Skin:  2+ brachial and radial pulses.  Normal cap refill.  No rashes, fluctuance, or crepitance.    Psych: Alert, oriented.  Appropriate interactions.      Emergency Department Course     Imaging:    XR Shoulder Left 2 Views  The left clavicle is negative. The left glenoid humeral and acromioclavicular joints are negative.  Reading per radiology     Clavicle XR, left  The left clavicle is negative. The left glenoid humeral and acromioclavicular joints are negative.  Reading per radiology     Emergency Department Course:       Reviewed:  I reviewed nursing notes, vitals, past medical history and Care Everywhere    Assessments:   I obtained history and examined the patient as noted above. I explained findings.     Disposition:  The patient was discharged to home.     Impression & Plan     Medical Decision Makin year old female presents with left shoulder pain.  Patient history " and records reviewed.  Broad differential was considered.  Patient is well appearing with stable vitals.  X-rays were obtained which demonstrates no evidence of fracture or dislocation.  Examination of joint above and below does not suggest referred pain and do not feel radiographs of these joints are indicated at this time.  Neurovascular status is intact distally and no signs of compartment syndrome.  Nothing to suggest upper extremity DVT no swelling, etc.    I suspect musculoskeletal strain vs pinched nerve is most likely. Question possible radicular component.  No indication for spinal imaging emergently.  The patient's pain is clearly mechanical in nature, reproducible with movement/palpation, and there is no associated chest pain or shortness of breath.  Suspicion for referred pain from cardiac or pulmonary cause is extremely low.  I doubt septic arthritis or inflammatory effusion such as gout or pseudogout given absence of effusion, joint redness, afebrile, normal active and passive ROM.  No clinical evidence of overlying skin or soft tissue infection such as cellulitis or NSTI.  Given significant pain with movement, the patient was given a sling and instructed to follow-up with ortho for further evaluation.  Instructed to make sure to do ROM exercises early to avoid frozen shoulder.  Return precautions given.    Diagnosis:    ICD-10-CM    1. Left shoulder pain  M25.512       2. Cervical radiculopathy  M54.12         Discharge Medications:  New Prescriptions    METHYLPREDNISOLONE (MEDROL DOSEPAK) 4 MG TABLET THERAPY PACK    Follow Package Directions     Scribe Disclosure:  I, Priya Berg, am serving as a scribe at 7:46 PM on 10/20/2022 to document services personally performed by Dominic Jones PA-C based on my observations and the provider's statements to me.      Dominic Jones PA-C  10/20/22 8020

## 2022-10-28 ENCOUNTER — OFFICE VISIT (OUTPATIENT)
Dept: ALLERGY | Facility: CLINIC | Age: 27
End: 2022-10-28
Payer: COMMERCIAL

## 2022-10-28 ENCOUNTER — LAB (OUTPATIENT)
Dept: LAB | Facility: CLINIC | Age: 27
End: 2022-10-28
Payer: COMMERCIAL

## 2022-10-28 VITALS
DIASTOLIC BLOOD PRESSURE: 82 MMHG | SYSTOLIC BLOOD PRESSURE: 133 MMHG | HEART RATE: 83 BPM | BODY MASS INDEX: 45.51 KG/M2 | WEIGHT: 281.97 LBS | OXYGEN SATURATION: 97 %

## 2022-10-28 DIAGNOSIS — T78.1XXD ADVERSE REACTION TO FOOD, SUBSEQUENT ENCOUNTER: ICD-10-CM

## 2022-10-28 DIAGNOSIS — Z91.018 ALLERGY TO KIWI FRUIT: ICD-10-CM

## 2022-10-28 DIAGNOSIS — T78.1XXD ADVERSE REACTION TO FOOD, SUBSEQUENT ENCOUNTER: Primary | ICD-10-CM

## 2022-10-28 PROCEDURE — 36415 COLL VENOUS BLD VENIPUNCTURE: CPT

## 2022-10-28 PROCEDURE — 99214 OFFICE O/P EST MOD 30 MIN: CPT | Performed by: INTERNAL MEDICINE

## 2022-10-28 PROCEDURE — 86003 ALLG SPEC IGE CRUDE XTRC EA: CPT

## 2022-10-28 RX ORDER — EPINEPHRINE 0.3 MG/.3ML
0.3 INJECTION SUBCUTANEOUS PRN
Qty: 2 EACH | Refills: 2 | Status: SHIPPED | OUTPATIENT
Start: 2022-10-28 | End: 2023-05-18

## 2022-10-28 ASSESSMENT — ASTHMA QUESTIONNAIRES
QUESTION_4 LAST FOUR WEEKS HOW OFTEN HAVE YOU USED YOUR RESCUE INHALER OR NEBULIZER MEDICATION (SUCH AS ALBUTEROL): ONCE A WEEK OR LESS
ACT_TOTALSCORE: 20
QUESTION_1 LAST FOUR WEEKS HOW MUCH OF THE TIME DID YOUR ASTHMA KEEP YOU FROM GETTING AS MUCH DONE AT WORK, SCHOOL OR AT HOME: A LITTLE OF THE TIME
QUESTION_2 LAST FOUR WEEKS HOW OFTEN HAVE YOU HAD SHORTNESS OF BREATH: THREE TO SIX TIMES A WEEK
ACT_TOTALSCORE: 20
QUESTION_3 LAST FOUR WEEKS HOW OFTEN DID YOUR ASTHMA SYMPTOMS (WHEEZING, COUGHING, SHORTNESS OF BREATH, CHEST TIGHTNESS OR PAIN) WAKE YOU UP AT NIGHT OR EARLIER THAN USUAL IN THE MORNING: ONCE OR TWICE
QUESTION_5 LAST FOUR WEEKS HOW WOULD YOU RATE YOUR ASTHMA CONTROL: COMPLETELY CONTROLLED

## 2022-10-28 NOTE — LETTER
10/28/2022         RE: Lenore Suggs  [address] [facility] [address]        Dear Colleague,    Thank you for referring your patient, Lenore Suggs, to the [facility] [place]. Please see a copy of my visit note below.    Lenore Suggs was seen in the Allergy Clinic at [facility].    Lenore Suggs is a 27 year old female being seen today for ongoing evaluation of possible food allergy.  At the last appointment there was a concern for asthma and pulmonary function tests were ordered which she did not do due to insurance concerns.  Skin testing was negative at the last appointment.  There was also concerns about hives.    Since the last visit the patient has been much better.  She noticed that when she stopped eating spicy Doritos and hot Cheetos and other chips that have dyes she had resolution of all of her symptoms.  She was having flashing lights in her vision when she ate these foods will as well as the shortness of breath and hives.  All of the symptoms have resolved.  She can eat potato chips without any problems.  She is not taking any medication such as famotidine or Allegra at this time.    She also brings up today that she did have a Kiwi reaction when she was younger that resulted in hives and would like to have that further evaluated.    Past Medical History:   Diagnosis Date     Asthma      Bipolar affective disorder (H)      Outbursts of anger      Schizophrenia (H)      Family History   Problem Relation Age of Onset     Mental Illness Mother      Paranoid behavior Mother      No Known Problems Father      History reviewed. No pertinent surgical history.      Current Outpatient Medications:      albuterol (PROAIR HFA/PROVENTIL HFA/VENTOLIN HFA) 108 (90 Base) MCG/ACT inhaler, Inhale 1-2 puffs into the lungs every 6 hours as needed for shortness of breath / dyspnea or wheezing, Disp: 18 g, Rfl: 4     EPINEPHrine (ANY BX GENERIC  EQUIV) 0.3 MG/0.3ML injection 2-pack, Inject 0.3 mLs (0.3 mg) into the muscle as needed for anaphylaxis, Disp: 2 each, Rfl: 2     ketorolac (TORADOL) 10 MG tablet, Take 1 tablet (10 mg) by mouth every 8 hours as needed for moderate pain, Disp: 30 tablet, Rfl: 3     methylphenidate (CONCERTA) 54 MG CR tablet, Take 1 tablet (54 mg) by mouth daily for 30 days, Disp: 30 tablet, Rfl: 0     methylPREDNISolone (MEDROL DOSEPAK) 4 MG tablet therapy pack, Follow Package Directions, Disp: 21 tablet, Rfl: 0     SUMAtriptan (IMITREX) 50 MG tablet, , Disp: , Rfl:      vilazodone (VIIBRYD) 40 MG TABS tablet, Take 1 tablet (40 mg) by mouth daily, Disp: 90 tablet, Rfl: 3  Allergies   Allergen Reactions     Alcohol Hives     Kiwi Hives     Seasonal Allergies Other (See Comments) and Rash     sneezing         EXAM:   /82   Pulse 83   Wt 127.9 kg (281 lb 15.5 oz)   SpO2 97%   BMI 45.51 kg/m      Constitutional:       General: She is not in acute distress.     Appearance: Normal appearance. She is not ill-appearing.   HENT:      Head: Normocephalic and atraumatic.      Eyes:      General:         Right eye: No discharge.         Left eye: No discharge.     Pulmonary:      Effort: Pulmonary effort is normal.    Skin:     General: Skin is warm.   Neurological:      General: No focal deficit present.      Mental Status: She is alert. Mental status is at baseline.   Psychiatric:         Mood and Affect: Mood normal.         Behavior: Behavior normal.       ASSESSMENT/PLAN:  Lenore Suggs is a 27 year old female seen today for possible food allergy.  She specifically interested in getting tested for food dyes.  No longer having any cough or shortness of breath or reflux symptoms.  Skin testing to environmental allergens was negative at the last appointment.  She did describe because she was using albuterol on a daily basis at the last appointment which has stopped since not eating the chips of concern.    1. History of Kiwi  allergy - we will check an IgE level.  EpiPen will be prescribed due to the history of anaphylaxis in the past.  We will also check a couple different food dyes due to the reactions you have had with processed food.  2. You can take Allegra as needed for hives.  3. Since the cough and shortness of breath is improved with no need to pursue the pulmonary function test at this time.  Continue to use albuterol 2 puffs every 4 hours as needed  4. Continue to avoid the processed chips such as the Doritos and Cheetos which cause symptoms for you in the past.  5. No need for the famotidine since the reflux symptoms have also improved.    Follow-up as needed      Thank you for allowing me to participate in the care of Lenore Suggs.      I spent 30 minutes on the date of the encounter doing chart review, history and exam, documentation and further coordination as noted above exclusive of separately reported interpretations    Christo Lizarraga MD  Allergy/Immunology  [facility]      Again, thank you for allowing me to participate in the care of your patient.        Sincerely,        Christo Lizarraga MD

## 2022-10-28 NOTE — PATIENT INSTRUCTIONS
History of Kiwi allergy - we will check an IgE level.  EpiPen will be prescribed due to the history of anaphylaxis in the past.  We will also check a couple different food dyes due to the reactions you have had with processed food.  You can take Allegra as needed for hives.  Since the cough and shortness of breath is improved with no need to pursue the pulmonary function test at this time.  Continue to avoid the processed chips such as the Doritos and Cheetos which cause symptoms for you in the past.  No need for the famotidine since the reflux symptoms have also improved.          Allergy Staff Appt Hours Shot Hours Location         Physician   Christo Lizarraga MD      Support Staff   REYNALDO Pardo MA          Mondays  Not available until January/2023 Wednesdays  Close    Tuesdays Thursdays and Fridays:  [place] 7-5                    [place]     Tuesdays: 7:40-3:20      Fridays: 7:40-4:20                [facility]  [address]  [place] [address]  Appt Line: [phone]    Pulmonary Function Scheduling:  [place]: [phone]

## 2022-10-28 NOTE — PROGRESS NOTES
Lenore Suggs was seen in the Allergy Clinic at [facility].    Lenore Suggs is a 27 year old female being seen today for ongoing evaluation of possible food allergy.  At the last appointment there was a concern for asthma and pulmonary function tests were ordered which she did not do due to insurance concerns.  Skin testing was negative at the last appointment.  There was also concerns about hives.    Since the last visit the patient has been much better.  She noticed that when she stopped eating spicy Doritos and hot Cheetos and other chips that have dyes she had resolution of all of her symptoms.  She was having flashing lights in her vision when she ate these foods will as well as the shortness of breath and hives.  All of the symptoms have resolved.  She can eat potato chips without any problems.  She is not taking any medication such as famotidine or Allegra at this time.    She also brings up today that she did have a Kiwi reaction when she was younger that resulted in hives and would like to have that further evaluated.    Past Medical History:   Diagnosis Date     Asthma      Bipolar affective disorder (H)      Outbursts of anger      Schizophrenia (H)      Family History   Problem Relation Age of Onset     Mental Illness Mother      Paranoid behavior Mother      No Known Problems Father      History reviewed. No pertinent surgical history.      Current Outpatient Medications:      albuterol (PROAIR HFA/PROVENTIL HFA/VENTOLIN HFA) 108 (90 Base) MCG/ACT inhaler, Inhale 1-2 puffs into the lungs every 6 hours as needed for shortness of breath / dyspnea or wheezing, Disp: 18 g, Rfl: 4     EPINEPHrine (ANY BX GENERIC EQUIV) 0.3 MG/0.3ML injection 2-pack, Inject 0.3 mLs (0.3 mg) into the muscle as needed for anaphylaxis, Disp: 2 each, Rfl: 2     ketorolac (TORADOL) 10 MG tablet, Take 1 tablet (10 mg) by mouth every 8 hours as needed for moderate pain, Disp: 30 tablet, Rfl: 3      methylphenidate (CONCERTA) 54 MG CR tablet, Take 1 tablet (54 mg) by mouth daily for 30 days, Disp: 30 tablet, Rfl: 0     methylPREDNISolone (MEDROL DOSEPAK) 4 MG tablet therapy pack, Follow Package Directions, Disp: 21 tablet, Rfl: 0     SUMAtriptan (IMITREX) 50 MG tablet, , Disp: , Rfl:      vilazodone (VIIBRYD) 40 MG TABS tablet, Take 1 tablet (40 mg) by mouth daily, Disp: 90 tablet, Rfl: 3  Allergies   Allergen Reactions     Alcohol Hives     Kiwi Hives     Seasonal Allergies Other (See Comments) and Rash     sneezing         EXAM:   /82   Pulse 83   Wt 127.9 kg (281 lb 15.5 oz)   SpO2 97%   BMI 45.51 kg/m      Constitutional:       General: She is not in acute distress.     Appearance: Normal appearance. She is not ill-appearing.   HENT:      Head: Normocephalic and atraumatic.      Eyes:      General:         Right eye: No discharge.         Left eye: No discharge.     Pulmonary:      Effort: Pulmonary effort is normal.    Skin:     General: Skin is warm.   Neurological:      General: No focal deficit present.      Mental Status: She is alert. Mental status is at baseline.   Psychiatric:         Mood and Affect: Mood normal.         Behavior: Behavior normal.       ASSESSMENT/PLAN:  Lenore Suggs is a 27 year old female seen today for possible food allergy.  She specifically interested in getting tested for food dyes.  No longer having any cough or shortness of breath or reflux symptoms.  Skin testing to environmental allergens was negative at the last appointment.  She did describe because she was using albuterol on a daily basis at the last appointment which has stopped since not eating the chips of concern.    1. History of Kiwi allergy - we will check an IgE level.  EpiPen will be prescribed due to the history of anaphylaxis in the past.  We will also check a couple different food dyes due to the reactions you have had with processed food.  2. You can take Allegra as needed for  hives.  3. Since the cough and shortness of breath is improved with no need to pursue the pulmonary function test at this time.  Continue to use albuterol 2 puffs every 4 hours as needed  4. Continue to avoid the processed chips such as the Doritos and Cheetos which cause symptoms for you in the past.  5. No need for the famotidine since the reflux symptoms have also improved.    Follow-up as needed      Thank you for allowing me to participate in the care of Lenore LUZMA Suggs.      I spent 30 minutes on the date of the encounter doing chart review, history and exam, documentation and further coordination as noted above exclusive of separately reported interpretations    Christo Lizarraga MD  Allergy/Immunology  [facility]

## 2022-10-29 LAB
Lab: NORMAL
Lab: NORMAL
PERFORMING LABORATORY: NORMAL
PERFORMING LABORATORY: NORMAL
TEST NAME: NORMAL
TEST NAME: NORMAL

## 2022-10-30 LAB
DEPRECATED MISC ALLERGEN IGE RAST QL: NORMAL
KIWIFRUIT IGE QN: <0.1 KU/L

## 2022-11-02 ENCOUNTER — OFFICE VISIT (OUTPATIENT)
Dept: ORTHOPEDICS | Facility: CLINIC | Age: 27
End: 2022-11-02
Payer: COMMERCIAL

## 2022-11-02 ENCOUNTER — ANCILLARY PROCEDURE (OUTPATIENT)
Dept: GENERAL RADIOLOGY | Facility: CLINIC | Age: 27
End: 2022-11-02
Attending: FAMILY MEDICINE
Payer: COMMERCIAL

## 2022-11-02 VITALS — BODY MASS INDEX: 46.21 KG/M2 | WEIGHT: 287.5 LBS | HEIGHT: 66 IN

## 2022-11-02 DIAGNOSIS — M54.2 CERVICALGIA: ICD-10-CM

## 2022-11-02 DIAGNOSIS — M25.512 CHRONIC LEFT SHOULDER PAIN: ICD-10-CM

## 2022-11-02 DIAGNOSIS — G89.29 CHRONIC LEFT SHOULDER PAIN: Primary | ICD-10-CM

## 2022-11-02 DIAGNOSIS — G89.29 CHRONIC LEFT SHOULDER PAIN: ICD-10-CM

## 2022-11-02 DIAGNOSIS — M25.512 CHRONIC LEFT SHOULDER PAIN: Primary | ICD-10-CM

## 2022-11-02 PROCEDURE — 99204 OFFICE O/P NEW MOD 45 MIN: CPT | Performed by: FAMILY MEDICINE

## 2022-11-02 PROCEDURE — 72040 X-RAY EXAM NECK SPINE 2-3 VW: CPT | Mod: TC | Performed by: RADIOLOGY

## 2022-11-02 ASSESSMENT — PAIN SCALES - GENERAL: PAINLEVEL: SEVERE PAIN (7)

## 2022-11-02 NOTE — PROGRESS NOTES
"CHIEF COMPLAINT:  Pain of the Left Shoulder       HISTORY OF PRESENT ILLNESS  Ms. Suggs is a pleasant 27 year old right hand dominant female who presents to clinic today with left sided neck and shoulder pain.  Lenore explains that she is following up from Emergency  Department on 10/22/22 for this issue.  Believes this began 6 months ago after reaching for something at work, and this precipitated shocking numbning pain to anterior left shoulder.      Since this time she has experienced anterior shoulder pain, pain with shoulder motion and overhead reaching.  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  States at times she can have anterior pain next to proximal clavicle as well.  Occasional pain next to shoulder blade.  Occasionally she experiences tremors of her arm when she brings it to her in an abducted position.  Lastly she notes catching in her shoulder and feeling of a \"clunk\" when she moves her arm in a circumducted position.    She is also been seen back on 6/2/2022 for left arm numbness.  She had an EKG completed and was diagnosed with vertigo at that time.  She does not currently experiencing any left arm numbness or tingling and this has resolved since June.    She was placed on a Medrol pack after ED visit l on 10/20/2022.  They x-rayed her shoulder and clavicle.  There was some concern for possible cervical or shoulder etiology.    She feels that Medrol pack provided exceptional relief and she is almost pain-free, but over the past couple of days pain has returning.    Onset: rapid  Location: left neck and shoulder  Quality: Sharp.  Sometimes burning sensation.  Duration: 6 months   Severity: 10/10 at worst  Timing:constant  Modifying factors:  resting and non-use makes it better, movement and use makes it worse  Associated signs & symptoms: pain, numbness and tingling   Previous similar pain: No  Treatments to date: ice, ibuprofen, Medrol dosepak, Toradol (for back), has " tried ibuprofen with no relief.    Additional history: as documented    Review of Systems:    Have you recently had a a fever, chills, weight loss? No    Do you have any vision problems? No    Do you have any chest pain or edema? No    Do you have any shortness of breath or wheezing?  Yes, patient has asthma    Do you have stomach problems? No    Do you have any numbness or focal weakness? No    Do you have diabetes? No    Do you have problems with bleeding or clotting? No    Do you have an rashes or other skin lesions? No    MEDICAL HISTORY  Patient Active Problem List   Diagnosis     History of suicide attempt     Suicidal ideation     Headache, unspecified headache type     BMI > 35     Attention deficit hyperactivity disorder (ADHD), combined type     Complex posttraumatic stress disorder       Current Outpatient Medications   Medication Sig Dispense Refill     albuterol (PROAIR HFA/PROVENTIL HFA/VENTOLIN HFA) 108 (90 Base) MCG/ACT inhaler Inhale 1-2 puffs into the lungs every 6 hours as needed for shortness of breath / dyspnea or wheezing 18 g 4     EPINEPHrine (ANY BX GENERIC EQUIV) 0.3 MG/0.3ML injection 2-pack Inject 0.3 mLs (0.3 mg) into the muscle as needed for anaphylaxis 2 each 2     ketorolac (TORADOL) 10 MG tablet Take 1 tablet (10 mg) by mouth every 8 hours as needed for moderate pain 30 tablet 3     methylPREDNISolone (MEDROL DOSEPAK) 4 MG tablet therapy pack Follow Package Directions 21 tablet 0     SUMAtriptan (IMITREX) 50 MG tablet        tiZANidine (ZANAFLEX) 4 MG tablet Take 1 tablet (4 mg) by mouth nightly as needed for muscle spasms 15 tablet 0     vilazodone (VIIBRYD) 40 MG TABS tablet Take 1 tablet (40 mg) by mouth daily 90 tablet 3       Allergies   Allergen Reactions     Alcohol Hives     Kiwi Hives     Seasonal Allergies Other (See Comments) and Rash     sneezing       Family History   Problem Relation Age of Onset     Mental Illness Mother      Paranoid behavior Mother      No Known  "Problems Father        Additional medical/Social/Surgical histories reviewed in [facility] and updated as appropriate.       PHYSICAL EXAM  Ht 1.676 m (5' 6\")   Wt 130.4 kg (287 lb 8 oz)   BMI 46.40 kg/m      General  - normal appearance, in no obvious distress  Musculoskeletal - cervical spine  - inspection: normal bone and joint alignment, no obvious kyphosis  - palpation: Left  Scalene and traps tightness, tenderness  - ROM: normal and painless flexion, extension, left and right sidebending and rotation  - strength: upper extremities 5/5 in all planes  - special tests:  (+) Spurling left with mild pain to left periscapular region.  Musculoskeletal - Left shoulder  - inspection: normal bone and joint alignment, no obvious deformity, no scapular winging, no AC step-off  - palpation: no bony or soft tissue tenderness, normal clavicle, non-tender AC  - ROM:  150 deg flexion   45 deg extension   150 deg abduction   70 deg ER   IR to low thoracic   Cross body adduction with tenderness at sternoclavicular joint  - strength: 5/5  strength, 5/5 in all shoulder planes  - special tests:  (-) Speed's  (-) Neer  (-) Hawkin's  (-) Shawn  (-) Trimble's  Neuro  - no sensory or motor deficit, grossly normal coordination, normal muscle tone  Skin  - no ecchymosis, erythema, warmth, or induration, no obvious rash  Psych  - interactive, appropriate, normal mood and affect    IMAGING : X-ray cervical 2 view. Final results and radiologist's interpretation, available in the [facility] health record. Images were reviewed with the patient/family members in the office today. My personal interpretation of the performed imaging is loss of cervical lordosis, otherwise no degenerative changes or significant to space narrowing.    EXAM: XR CLAVICLE LEFT 2 VIEWS, XR SHOULDER LEFT 2 VIEWS  LOCATION: [facility]  DATE/TIME: 10/20/2022 7:24 PM     INDICATION: pain, no trauma  COMPARISON: None.                                      "                                 IMPRESSION: The left clavicle is negative. The left glenoid humeral and acromioclavicular joints are negative.    EXAM: XR CLAVICLE LEFT 2 VIEWS, XR SHOULDER LEFT 2 VIEWS  LOCATION: [facility]  DATE/TIME: 10/20/2022 7:24 PM     INDICATION: pain, no trauma  COMPARISON: None.                                                                      IMPRESSION: The left clavicle is negative. The left glenoid humeral and acromioclavicular joints are negative.       ASSESSMENT & PLAN  Ms. Suggs is a 27 year old year old female who presents to clinic today with chronic left-sided shoulder, neck pain, burning and occasional paresthesias in the left upper extremity.    Etiology unclear.  I do think there is possibly multiple etiologies for her symptoms described.  Although her x-ray of cervical spine does look unremarkable, there may be some intermittent radiculitis present to her scapular region, lateral shoulder and down her arm during his rare instances.  Muscular component to her shoulder issues with trap trigger point and possible cuff involvement.    No red flags at this time to suggest neurologic deficits or weakness.    Diagnosis: Chronic pain of left shoulder, cervicalgia    Further diagnostic and treatment options discussed.  At this time given multiple etiologies possible, would like her to start with formal physical therapy for initial cervical protocol, left shoulder strengthening and trapezius trigger point release techniques.    Tizanidine nightly for the next week to try to reduce muscular hypertonicity  Heat such as heating pad or hot water level  May try lidocaine patches and or Voltaren gel    Follow-up in 6 weeks if persisting    It was a pleasure seeing Lenore today.    Teddy Sneed DO, CAQSM  Primary Care Sports Medicine

## 2022-11-02 NOTE — PATIENT INSTRUCTIONS
Thank you for choosing [facility]    [facility] LOCATIONS  [place] [address] [address] [facility], [address]th [place], [address] [place] [address]   [phone]       APPOINTMENTS: [phone]    CARE QUESTIONS: [phone],    BILLING QUESTIONS: [phone]    FAX NUMBER: [phone]        Follow up: as needed      1. Chronic left shoulder pain    2. Cervicalgia      Physical Therapy orders have been placed with [facility] (formally [facility])  You can call [phone] to schedule at your convenience.       CERVICAL (Neck) STRAIN    WHAT IS NECK STRAIN?    A neck strain is a stretch or tear of a muscle in your neck.    WHAT IS THE CAUSE?    Neck strains usually happen when the head and neck are hit or forcibly moved, such as during rough contact sports or a whiplash injury (like a car accident or a fall) where your head and neck are snapped back and forth. Sometimes strains happen from an awkward position during sleep or poor posture while you work at a desk.    WHAT ARE THE SYMPTOMS?    The main symptom is pain in your neck. You may have pain when you move your head to the side or when you try to move your head up or down. The neck muscles may tighten (spasm) and feel hard and very tender to the touch. You may feel pain right after an injury or not until a few hours or days after the injury.    Other symptoms may include:    Stiff neck  Dizziness  Unusual feelings, like burning or a pins-and-needles feeling  Headache    HOW IS IT DIAGNOSED?    Your healthcare provider will ask about your symptoms, medical history, and activities and examine your neck. You may have X-rays to make sure the bones in your neck (vertebrae) are not injured.    HOW IS IT TREATED?    Your healthcare provider may recommend stretching and strengthening exercises and other types of physical  therapy to help you heal.    The pain often gets better within a few weeks with self-care, but some injuries may take longer to heal. It s important to follow all of your healthcare provider s instructions.    HOW CAN I HELP TAKE CARE OF MYSELF?    To help relieve pain:    Put an ice pack, gel pack, or package of frozen vegetables wrapped in a cloth on the injured area every 3 to 4 hours for up to 20 minutes at a time for the first day or two after the injury.    Take nonprescription pain medicine, such as acetaminophen, ibuprofen, or naproxen. Read the label and take as directed. Nonsteroidal anti-inflammatory medicines (NSAIDs), such as ibuprofen or naproxen, may cause stomach bleeding and other problems. These risks increase with age. Unless recommended by your healthcare provider, do not take an NSAID for more than 10 days.    Moist heat may help relax your muscles and make it easier to move your neck. Put moist heat on the sore area for 10 to 15 minutes before you do warm-up and stretching exercises. Moist heat includes heat patches or moist heating pads that you can purchase at most [employer], a wet washcloth or towel that has been heated in a microwave or the dryer, or a hot shower. Don t use heat if you have swelling.    You may find that it helps to alternate putting heat and ice on your neck.    HOW CAN I HELP PREVENT NECK STRAIN?    Neck strain may be prevented by keeping your neck muscles strong and flexible. Ask your healthcare provider about stretching and exercises that may help. If you have a job or hobby that requires you to hold your neck in one position for long hours, like working at a computer all day, it s very important to take breaks and stretch your neck muscles.    Developed by [employer].  Published by [employer].  Copyright  2014 [employer] and/or one of its subsidiaries. All rights reserved.      Myofascial Release    Tennis balls can provide myofascial release through  myofascial therapy. You can perform self-myofascial release by placing a tennis ball on the tender area and leaning against a wall or the floor to apply light pressure until you feel the release. Or you can put two tennis balls in a sock, leaving a small space between the balls, and tying the open end closed. Lie on the floor and put the tennis balls behind your neck, one on either side of your spine. Press your neck against the tennis balls until you feel the release. Do not press hard enough to cause pain.    Neck Spasm Exercises    You may do all of these exercises right away but avoid any movements that increase your pain.    Neck rotation with flexion  Right side: Turn your head to the right and clasp your hands behind your head. Let the weight of your arms pull your chin to the right side of your chest. Relax. Hold for a count of 15. Do this 3 times.    Left side: Turn your head to the left and clasp your hands behind your head. Let the weight of your arms pull your chin to the left side of your chest. Relax. Hold for a count of 15. Do this 3 times.    Chin tuck: Place your fingertips on your chin and gently push your head straight back as if you are trying to make a double chin. Keep looking forward as your head moves back. Hold 5 seconds and repeat 5 times.  Scalene stretch: Sit or stand and clasp both hands behind your back. Lower your left shoulder and tilt your head toward the right until you feel a stretch. Hold this position for 15 to 30 seconds and then come back to the starting position. Then lower your right shoulder and tilt your head toward the left. Hold for 15 to 30 seconds. Repeat 3 times on each side.  Neck rotation stretch    Right side: Rotate your neck by looking over your right shoulder. Lift your right hand and place your palm on the left side of your chin. Push your chin with your palm toward your right shoulder. Hold for a count of 10. Do this 3 times.    Left side: Rotate your neck by  looking over your left shoulder. Lift your left hand and place your palm on the right side of your chin. Push your chin with your palm toward your left shoulder. Hold for a count of 10. Do this 3 times.    Scapular squeeze: While sitting or standing with your arms by your sides, squeeze your shoulder blades together and hold for 5 seconds. Do 2 sets of 15.  Thoracic extension: Sit in a chair and clasp both arms behind your head. Gently arch backward and look up toward the ceiling. Repeat 10 times. Do this several times each day.    Head lift with neck curl: Lie on your back with your knees bent and your feet flat on the floor. Tuck your chin and lift your head about 3 inches off the floor, keeping your shoulders flat on the floor. Hold for 10 seconds. Repeat 5 times. Try to work up to holding for 20 to 30 seconds.

## 2022-11-02 NOTE — LETTER
"    11/2/2022         RE: Lenore Suggs  [address] [facility] [address]        Dear Colleague,    Thank you for referring your patient, Lenore Suggs, to the [facility] [place]. Please see a copy of my visit note below.    CHIEF COMPLAINT:  Pain of the Left Shoulder       HISTORY OF PRESENT ILLNESS  Ms. Suggs is a pleasant 27 year old right hand dominant female who presents to clinic today with left sided neck and shoulder pain.  Lenore explains that she is following up from Emergency  Department on 10/22/22 for this issue.  Believes this began 6 months ago after reaching for something at work, and this precipitated shocking numbning pain to anterior left shoulder.      Since this time she has experienced anterior shoulder pain, pain with shoulder motion and overhead reaching.  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  States at times she can have anterior pain next to proximal clavicle as well.  Occasional pain next to shoulder blade.  Occasionally she experiences tremors of her arm when she brings it to her in an abducted position.  Lastly she notes catching in her shoulder and feeling of a \"clunk\" when she moves her arm in a circumducted position.    She is also been seen back on 6/2/2022 for left arm numbness.  She had an EKG completed and was diagnosed with vertigo at that time.  She does not currently experiencing any left arm numbness or tingling and this has resolved since June.    She was placed on a Medrol pack after ED visit l on 10/20/2022.  They x-rayed her shoulder and clavicle.  There was some concern for possible cervical or shoulder etiology.    She feels that Medrol pack provided exceptional relief and she is almost pain-free, but over the past couple of days pain has returning.    Onset: rapid  Location: left neck and shoulder  Quality: Sharp.  Sometimes burning sensation.  Duration: 6 months   Severity: 10/10 at " worst  Timing:constant  Modifying factors:  resting and non-use makes it better, movement and use makes it worse  Associated signs & symptoms: pain, numbness and tingling   Previous similar pain: No  Treatments to date: ice, ibuprofen, Medrol dosepak, Toradol (for back), has tried ibuprofen with no relief.    Additional history: as documented    Review of Systems:    Have you recently had a a fever, chills, weight loss? No    Do you have any vision problems? No    Do you have any chest pain or edema? No    Do you have any shortness of breath or wheezing?  Yes, patient has asthma    Do you have stomach problems? No    Do you have any numbness or focal weakness? No    Do you have diabetes? No    Do you have problems with bleeding or clotting? No    Do you have an rashes or other skin lesions? No    MEDICAL HISTORY  Patient Active Problem List   Diagnosis     History of suicide attempt     Suicidal ideation     Headache, unspecified headache type     BMI > 35     Attention deficit hyperactivity disorder (ADHD), combined type     Complex posttraumatic stress disorder       Current Outpatient Medications   Medication Sig Dispense Refill     albuterol (PROAIR HFA/PROVENTIL HFA/VENTOLIN HFA) 108 (90 Base) MCG/ACT inhaler Inhale 1-2 puffs into the lungs every 6 hours as needed for shortness of breath / dyspnea or wheezing 18 g 4     EPINEPHrine (ANY BX GENERIC EQUIV) 0.3 MG/0.3ML injection 2-pack Inject 0.3 mLs (0.3 mg) into the muscle as needed for anaphylaxis 2 each 2     ketorolac (TORADOL) 10 MG tablet Take 1 tablet (10 mg) by mouth every 8 hours as needed for moderate pain 30 tablet 3     methylPREDNISolone (MEDROL DOSEPAK) 4 MG tablet therapy pack Follow Package Directions 21 tablet 0     SUMAtriptan (IMITREX) 50 MG tablet        tiZANidine (ZANAFLEX) 4 MG tablet Take 1 tablet (4 mg) by mouth nightly as needed for muscle spasms 15 tablet 0     vilazodone (VIIBRYD) 40 MG TABS tablet Take 1 tablet (40 mg) by mouth daily  "90 tablet 3       Allergies   Allergen Reactions     Alcohol Hives     Kiwi Hives     Seasonal Allergies Other (See Comments) and Rash     sneezing       Family History   Problem Relation Age of Onset     Mental Illness Mother      Paranoid behavior Mother      No Known Problems Father        Additional medical/Social/Surgical histories reviewed in [facility] and updated as appropriate.       PHYSICAL EXAM  Ht 1.676 m (5' 6\")   Wt 130.4 kg (287 lb 8 oz)   BMI 46.40 kg/m      General  - normal appearance, in no obvious distress  Musculoskeletal - cervical spine  - inspection: normal bone and joint alignment, no obvious kyphosis  - palpation: Left  Scalene and traps tightness, tenderness  - ROM: normal and painless flexion, extension, left and right sidebending and rotation  - strength: upper extremities 5/5 in all planes  - special tests:  (+) Spurling left with mild pain to left periscapular region.  Musculoskeletal - Left shoulder  - inspection: normal bone and joint alignment, no obvious deformity, no scapular winging, no AC step-off  - palpation: no bony or soft tissue tenderness, normal clavicle, non-tender AC  - ROM:  150 deg flexion   45 deg extension   150 deg abduction   70 deg ER   IR to low thoracic   Cross body adduction with tenderness at sternoclavicular joint  - strength: 5/5  strength, 5/5 in all shoulder planes  - special tests:  (-) Speed's  (-) Neer  (-) Hawkin's  (-) Shawn  (-) [place]'s  Neuro  - no sensory or motor deficit, grossly normal coordination, normal muscle tone  Skin  - no ecchymosis, erythema, warmth, or induration, no obvious rash  Psych  - interactive, appropriate, normal mood and affect    IMAGING : X-ray cervical 2 view. Final results and radiologist's interpretation, available in the [facility] health record. Images were reviewed with the patient/family members in the office today. My personal interpretation of the performed imaging is loss of cervical lordosis, otherwise no " degenerative changes or significant to space narrowing.    EXAM: XR CLAVICLE LEFT 2 VIEWS, XR SHOULDER LEFT 2 VIEWS  LOCATION: [facility]  DATE/TIME: 10/20/2022 7:24 PM     INDICATION: pain, no trauma  COMPARISON: None.                                                                      IMPRESSION: The left clavicle is negative. The left glenoid humeral and acromioclavicular joints are negative.    EXAM: XR CLAVICLE LEFT 2 VIEWS, XR SHOULDER LEFT 2 VIEWS  LOCATION: [facility]  DATE/TIME: 10/20/2022 7:24 PM     INDICATION: pain, no trauma  COMPARISON: None.                                                                      IMPRESSION: The left clavicle is negative. The left glenoid humeral and acromioclavicular joints are negative.       ASSESSMENT & PLAN  Ms. Suggs is a 27 year old year old female who presents to clinic today with chronic left-sided shoulder, neck pain, burning and occasional paresthesias in the left upper extremity.    Etiology unclear.  I do think there is possibly multiple etiologies for her symptoms described.  Although her x-ray of cervical spine does look unremarkable, there may be some intermittent radiculitis present to her scapular region, lateral shoulder and down her arm during his rare instances.  Muscular component to her shoulder issues with trap trigger point and possible cuff involvement.    No red flags at this time to suggest neurologic deficits or weakness.    Diagnosis: Chronic pain of left shoulder, cervicalgia    Further diagnostic and treatment options discussed.  At this time given multiple etiologies possible, would like her to start with formal physical therapy for initial cervical protocol, left shoulder strengthening and trapezius trigger point release techniques.    Tizanidine nightly for the next week to try to reduce muscular hypertonicity  Heat such as heating pad or hot water level  May try lidocaine patches  and or Voltaren gel    Follow-up in 6 weeks if persisting    It was a pleasure seeing Lenore today.    Teddy Sneed DO, [facility]  Primary Care Sports Medicine          Again, thank you for allowing me to participate in the care of your patient.        Sincerely,        Teddy Sneed DO

## 2022-11-03 LAB
MAYO MISC RESULT: NORMAL
MAYO MISC RESULT: NORMAL

## 2022-11-15 ENCOUNTER — MYC MEDICAL ADVICE (OUTPATIENT)
Dept: INTERNAL MEDICINE | Facility: CLINIC | Age: 27
End: 2022-11-15

## 2022-11-15 DIAGNOSIS — F90.2 ATTENTION DEFICIT HYPERACTIVITY DISORDER (ADHD), COMBINED TYPE: ICD-10-CM

## 2022-11-15 RX ORDER — METHYLPHENIDATE HYDROCHLORIDE 54 MG/1
54 TABLET ORAL DAILY
Qty: 30 TABLET | Refills: 0 | Status: SHIPPED | OUTPATIENT
Start: 2022-11-15 | End: 2022-12-23

## 2022-12-12 ENCOUNTER — TELEPHONE (OUTPATIENT)
Dept: NURSING | Facility: CLINIC | Age: 27
End: 2022-12-12

## 2022-12-12 ENCOUNTER — HOSPITAL ENCOUNTER (EMERGENCY)
Facility: CLINIC | Age: 27
Discharge: HOME | End: 2022-12-12
Payer: COMMERCIAL

## 2022-12-12 VITALS
HEIGHT: 66 IN | SYSTOLIC BLOOD PRESSURE: 148 MMHG | BODY MASS INDEX: 45 KG/M2 | RESPIRATION RATE: 16 BRPM | OXYGEN SATURATION: 100 % | DIASTOLIC BLOOD PRESSURE: 89 MMHG | WEIGHT: 280 LBS | TEMPERATURE: 98.6 F | HEART RATE: 71 BPM

## 2022-12-12 NOTE — TELEPHONE ENCOUNTER
Wants to make an appointment. Attempted to transfer call to scheduling, and the caller hung up while waiting for the call to be picked up.    Merly Lopez RN on 12/12/2022 at 1:56 PM

## 2022-12-13 ENCOUNTER — OFFICE VISIT (OUTPATIENT)
Dept: URGENT CARE | Facility: URGENT CARE | Age: 27
End: 2022-12-13
Payer: COMMERCIAL

## 2022-12-13 VITALS
WEIGHT: 287 LBS | HEART RATE: 69 BPM | DIASTOLIC BLOOD PRESSURE: 83 MMHG | OXYGEN SATURATION: 99 % | BODY MASS INDEX: 46.32 KG/M2 | TEMPERATURE: 98.2 F | SYSTOLIC BLOOD PRESSURE: 118 MMHG

## 2022-12-13 DIAGNOSIS — H91.22 SUDDEN HEARING LOSS, LEFT: Primary | ICD-10-CM

## 2022-12-13 PROCEDURE — 99213 OFFICE O/P EST LOW 20 MIN: CPT | Performed by: FAMILY MEDICINE

## 2022-12-13 RX ORDER — PREDNISONE 20 MG/1
20 TABLET ORAL 2 TIMES DAILY
Qty: 10 TABLET | Refills: 0 | Status: SHIPPED | OUTPATIENT
Start: 2022-12-13 | End: 2022-12-18

## 2022-12-14 ENCOUNTER — NURSE TRIAGE (OUTPATIENT)
Dept: NURSING | Facility: CLINIC | Age: 27
End: 2022-12-14

## 2022-12-14 DIAGNOSIS — Z01.10 ENCOUNTER FOR HEARING TEST: Primary | ICD-10-CM

## 2022-12-14 NOTE — TELEPHONE ENCOUNTER
Nurse Triage SBAR    Is this a 2nd Level Triage?   Yes    Situation:  Sudden hearing loss and has a referral to see ENT    Background/Assessment:    Pt woke up 4 days ago with no hearing in her left ear and mild hearing loss in the right ear.     No cough, cold, or flu symptoms noted.  Pt mentioned she had surgery in both ears when she was 9 years old.    Pt has a referral to see ENT Provider for the hearing loss.     Please call the Pt back @ [phone] for further assistance    Renuka Ayala RN  Central Triage Red Flags/Med Refills        Pt is willing to go anywhere for a visit.      Protocol Recommended Disposition:   See in Office Today      Reason for Disposition    Patient wants to be seen    Additional Information    Negative: Followed an ear injury    Negative: Decreased hearing with nasal allergies    Negative: Part of a cold    Negative: Follows air travel or mountain driving    Negative: Earwax, questions about    Negative: Dizziness is main symptom    Negative: Tinnitus (e.g., ringing, hissing, beating) is main symptom    Negative: Patient sounds very sick or weak to the triager    Negative: Ringing in the ears (tinnitus) and taking aspirin and dosage sounds high (i.e., > 1500 mg/day)    Negative: Hearing loss in one or both ears of sudden onset and present now    Negative: Earache    Negative: Decreased hearing followed sudden, extremely loud noise (e.g., explosion, not just loud concert)    Negative: Decreased hearing and taking medication that can damage hearing (i.e., gentamycin, tobramycin, furosemide, ethacrynic acid, cisplatin, quinidine)    Protocols used: HEARING LOSS OR CHANGE-A-OH

## 2022-12-14 NOTE — PROGRESS NOTES
SUBJECTIVE:Lenore Suggs is a 27 year old female who presents with left ear hearing loss for 1-2 day(s).   No URI symptoms    Past Medical History:   Diagnosis Date     Asthma      Bipolar affective disorder (H)      Outbursts of anger      Schizophrenia (H)      Allergies   Allergen Reactions     Alcohol Hives     Kiwi Hives     Seasonal Allergies Other (See Comments) and Rash     sneezing     Social History     Tobacco Use     Smoking status: Never     Smokeless tobacco: Never   Substance Use Topics     Alcohol use: Yes     Comment: occasional       ROS: CONSTITUTIONAL:NEGATIVE for fever, chills, change in weight    OBJECTIVE:  /83   Pulse 69   Temp 98.2  F (36.8  C) (Oral)   Wt 130.2 kg (287 lb)   SpO2 99%   BMI 46.32 kg/m     The right TM is normal: no effusions, no erythema, and normal landmarks     The right auditory canal is normal and without drainage, edema or erythema  The left TM is normal: no effusions, no erythema, and normal landmarks  The left auditory canal is normal and without drainage, edema or erythema  Oropharynx exam is normal: no lesions, erythema, adenopathy or exudate.GENERAL: no acute distress  EYES: EOMI,  PERRL, conjunctiva clear  SKIN: no suspicious lesions or rashes       ICD-10-CM    1. Sudden hearing loss, left  H91.22 predniSONE (DELTASONE) 20 MG tablet     Adult ENT  Referral          F/U PCP/IM/FP/UC if worse, not any better

## 2022-12-15 ENCOUNTER — OFFICE VISIT (OUTPATIENT)
Dept: AUDIOLOGY | Facility: CLINIC | Age: 27
End: 2022-12-15
Attending: FAMILY MEDICINE
Payer: COMMERCIAL

## 2022-12-15 ENCOUNTER — DOCUMENTATION ONLY (OUTPATIENT)
Dept: OTOLARYNGOLOGY | Facility: CLINIC | Age: 27
End: 2022-12-15

## 2022-12-15 DIAGNOSIS — H90.42 SENSORINEURAL HEARING LOSS (SNHL) OF LEFT EAR WITH UNRESTRICTED HEARING OF RIGHT EAR: Primary | ICD-10-CM

## 2022-12-15 DIAGNOSIS — H91.20 SUDDEN HEARING LOSS: Primary | ICD-10-CM

## 2022-12-15 DIAGNOSIS — H91.22 SUDDEN HEARING LOSS, LEFT: ICD-10-CM

## 2022-12-15 PROCEDURE — 92550 TYMPANOMETRY & REFLEX THRESH: CPT | Performed by: AUDIOLOGIST

## 2022-12-15 PROCEDURE — 92557 COMPREHENSIVE HEARING TEST: CPT | Performed by: AUDIOLOGIST

## 2022-12-15 PROCEDURE — 92565 STENGER TEST PURE TONE: CPT | Performed by: AUDIOLOGIST

## 2022-12-15 RX ORDER — PREDNISONE 20 MG/1
TABLET ORAL
Qty: 38 TABLET | Refills: 0 | Status: SHIPPED | OUTPATIENT
Start: 2022-12-15 | End: 2022-12-30

## 2022-12-15 NOTE — PROGRESS NOTES
AUDIOLOGY REPORT     SUMMARY: Audiology visit completed. See audiogram for results.       RECOMMENDATIONS: Follow-up with ENT.     Candy Browning CCC-A  Licensed Audiologist   [place] #[id number]

## 2022-12-15 NOTE — PROGRESS NOTES
Let pt know that Dr. Harvey reviewed her audiogram. She has a sudden loss and is to start strong steroids which we went to her pharmacy. Pt should come in 2-3 weeks to have a repeat hearing test on the same day as Dr. Harvey as she reviewed the results. No further questions.    Anneliese Cason LPN

## 2022-12-21 ENCOUNTER — TELEPHONE (OUTPATIENT)
Dept: OPHTHALMOLOGY | Facility: CLINIC | Age: 27
End: 2022-12-21

## 2022-12-21 NOTE — TELEPHONE ENCOUNTER
Spoke with patient regarding offered sooner appointment. Patient requested to cancel appointment and declined offered appointments for rescheduling.Patient will call later for scheduling. -Per PT

## 2022-12-23 ENCOUNTER — MYC REFILL (OUTPATIENT)
Dept: INTERNAL MEDICINE | Facility: CLINIC | Age: 27
End: 2022-12-23

## 2022-12-23 DIAGNOSIS — F90.2 ATTENTION DEFICIT HYPERACTIVITY DISORDER (ADHD), COMBINED TYPE: ICD-10-CM

## 2022-12-23 RX ORDER — METHYLPHENIDATE HYDROCHLORIDE 54 MG/1
54 TABLET ORAL DAILY
Qty: 30 TABLET | Refills: 0 | Status: SHIPPED | OUTPATIENT
Start: 2022-12-23 | End: 2023-01-24

## 2022-12-23 NOTE — TELEPHONE ENCOUNTER
This patient is due for an IN-PERSON visit in February 2023 in order for me to continue prescribing her a controlled substance. I am currently booking out to mid-February. I have refilled her script, but please reach out to her and communicate this need/schedule an appointment.

## 2022-12-29 NOTE — TELEPHONE ENCOUNTER
Called LVM to have pt call back, Upon call back please schedule pt for annual preventative visit/ Med check follow up per providers note below.     Adilene Reno  Lead     [facility]

## 2023-01-24 ENCOUNTER — MYC REFILL (OUTPATIENT)
Dept: INTERNAL MEDICINE | Facility: CLINIC | Age: 28
End: 2023-01-24
Payer: COMMERCIAL

## 2023-01-24 DIAGNOSIS — F90.2 ATTENTION DEFICIT HYPERACTIVITY DISORDER (ADHD), COMBINED TYPE: ICD-10-CM

## 2023-01-24 RX ORDER — METHYLPHENIDATE HYDROCHLORIDE 54 MG/1
54 TABLET ORAL DAILY
Qty: 30 TABLET | Refills: 0 | Status: SHIPPED | OUTPATIENT
Start: 2023-01-24 | End: 2023-05-18

## 2023-01-24 NOTE — TELEPHONE ENCOUNTER
Due for in-person office visit prior to any additional refills, as communicated to her last month.

## 2023-02-07 ENCOUNTER — MYC MEDICAL ADVICE (OUTPATIENT)
Dept: AUDIOLOGY | Facility: CLINIC | Age: 28
End: 2023-02-07
Payer: COMMERCIAL

## 2023-02-08 DIAGNOSIS — Z01.10 ENCOUNTER FOR HEARING TEST: ICD-10-CM

## 2023-02-08 DIAGNOSIS — H91.20 SUDDEN HEARING LOSS: Primary | ICD-10-CM

## 2023-02-09 ENCOUNTER — TELEPHONE (OUTPATIENT)
Dept: OTOLARYNGOLOGY | Facility: CLINIC | Age: 28
End: 2023-02-09
Payer: COMMERCIAL

## 2023-02-09 NOTE — TELEPHONE ENCOUNTER
This patent needs a New appt with either Dr. Nissen or Mary Ellen and a ENT Audio prior to the appt.    PROCEDURES:  Breast reduction 04-May-2021 14:58:36  Louise Livingston

## 2023-02-15 ENCOUNTER — TELEPHONE (OUTPATIENT)
Dept: OTOLARYNGOLOGY | Facility: CLINIC | Age: 28
End: 2023-02-15
Payer: COMMERCIAL

## 2023-02-15 NOTE — TELEPHONE ENCOUNTER
This patent needs a New appt with either Dr. Nissen or Mary Ellen and a ENT Audio prior to the appt.

## 2023-02-16 ENCOUNTER — TELEPHONE (OUTPATIENT)
Dept: INTERNAL MEDICINE | Facility: CLINIC | Age: 28
End: 2023-02-16

## 2023-02-16 DIAGNOSIS — F43.10 COMPLEX POSTTRAUMATIC STRESS DISORDER: ICD-10-CM

## 2023-02-16 NOTE — TELEPHONE ENCOUNTER
"Per pharmacy    \"drug (vilazodone (VIIBRYD) 40 MG TABS tablet) not covered by pt plan.  Please call/fax the pharmacy to change med.\"    Ulises   "

## 2023-02-20 ENCOUNTER — TELEPHONE (OUTPATIENT)
Dept: INTERNAL MEDICINE | Facility: CLINIC | Age: 28
End: 2023-02-20
Payer: COMMERCIAL

## 2023-02-20 NOTE — TELEPHONE ENCOUNTER
"Per pharmacy    \"Plan does not cover this med (vilazodone (VIIBRYD) 40 MG TABS tablet) Please call plan at [phone] to initiate prior auth or call/fax pharmacy to change med. Pt id is [id number]\"    geoff   "

## 2023-02-22 NOTE — TELEPHONE ENCOUNTER
Central Prior Authorization Team   Phone: [phone]    PA Initiation    Medication: vilazodone (VIIBRYD) 40 MG TABS tablet  Insurance Company: [employer] - Phone [phone] Fax [phone]  Pharmacy Filling the Rx: [id number] [place], [place] - [address] AT [facility] & [facility]  Filling Pharmacy Phone: [phone]  Filling Pharmacy Fax:    Start Date: 2/22/2023

## 2023-02-23 NOTE — TELEPHONE ENCOUNTER
PRIOR AUTHORIZATION DENIED    Medication: vilazodone (VIIBRYD) 40 MG TABS tablet    Denial Date: 2/23/2023    Denial Rational:                    Appeal Information:    If you would like to appeal, please supply P/A team with a letter of medical necessity with clinical reason.

## 2023-02-27 ENCOUNTER — TELEPHONE (OUTPATIENT)
Dept: INTERNAL MEDICINE | Facility: CLINIC | Age: 28
End: 2023-02-27
Payer: COMMERCIAL

## 2023-02-27 NOTE — TELEPHONE ENCOUNTER
For appeals, please supply P/A team with a letter of medical necessity with clinical reason. Thank you.

## 2023-03-13 NOTE — TELEPHONE ENCOUNTER
Provider E-Visit time total (minutes):    (2) One of the meds listed above

## 2023-03-21 NOTE — TELEPHONE ENCOUNTER
Per Dr. Rnad: This was prescribed last month. Prior auth denied. From what I can tell, that is still under appeal. Any update?

## 2023-03-21 NOTE — TELEPHONE ENCOUNTER
An appeal was not started. PA team did not receive an appeal letter from the clinic to send to patient's insurance.

## 2023-04-06 NOTE — TELEPHONE ENCOUNTER
Central Prior Authorization Team   Phone: [phone]      Terrie from [facility] Appeals calling to with additional questions in regards to diagnosis. Relayed the Dx on Rx order and also that patient has   per chart notes as she was calling to inquire if patient also has this diagnosis.

## 2023-05-18 ENCOUNTER — OFFICE VISIT (OUTPATIENT)
Dept: INTERNAL MEDICINE | Facility: CLINIC | Age: 28
End: 2023-05-18
Payer: COMMERCIAL

## 2023-05-18 VITALS
BODY MASS INDEX: 44.37 KG/M2 | HEART RATE: 64 BPM | TEMPERATURE: 97.6 F | SYSTOLIC BLOOD PRESSURE: 124 MMHG | WEIGHT: 274.9 LBS | DIASTOLIC BLOOD PRESSURE: 82 MMHG

## 2023-05-18 DIAGNOSIS — F90.2 ATTENTION DEFICIT HYPERACTIVITY DISORDER (ADHD), COMBINED TYPE: Primary | ICD-10-CM

## 2023-05-18 DIAGNOSIS — F43.10 COMPLEX POSTTRAUMATIC STRESS DISORDER: ICD-10-CM

## 2023-05-18 DIAGNOSIS — R45.851 SUICIDAL IDEATION: ICD-10-CM

## 2023-05-18 PROCEDURE — 99214 OFFICE O/P EST MOD 30 MIN: CPT | Performed by: INTERNAL MEDICINE

## 2023-05-18 RX ORDER — METHYLPHENIDATE HYDROCHLORIDE 54 MG/1
54 TABLET ORAL DAILY
Qty: 14 TABLET | Refills: 0 | Status: SHIPPED | OUTPATIENT
Start: 2023-05-18 | End: 2023-06-13

## 2023-05-18 ASSESSMENT — ASTHMA QUESTIONNAIRES
QUESTION_4 LAST FOUR WEEKS HOW OFTEN HAVE YOU USED YOUR RESCUE INHALER OR NEBULIZER MEDICATION (SUCH AS ALBUTEROL): ONCE A WEEK OR LESS
QUESTION_5 LAST FOUR WEEKS HOW WOULD YOU RATE YOUR ASTHMA CONTROL: COMPLETELY CONTROLLED
QUESTION_1 LAST FOUR WEEKS HOW MUCH OF THE TIME DID YOUR ASTHMA KEEP YOU FROM GETTING AS MUCH DONE AT WORK, SCHOOL OR AT HOME: NONE OF THE TIME
QUESTION_2 LAST FOUR WEEKS HOW OFTEN HAVE YOU HAD SHORTNESS OF BREATH: ONCE OR TWICE A WEEK
ACT_TOTALSCORE: 23
QUESTION_3 LAST FOUR WEEKS HOW OFTEN DID YOUR ASTHMA SYMPTOMS (WHEEZING, COUGHING, SHORTNESS OF BREATH, CHEST TIGHTNESS OR PAIN) WAKE YOU UP AT NIGHT OR EARLIER THAN USUAL IN THE MORNING: NOT AT ALL
ACT_TOTALSCORE: 23

## 2023-05-18 ASSESSMENT — ANXIETY QUESTIONNAIRES
6. BECOMING EASILY ANNOYED OR IRRITABLE: NEARLY EVERY DAY
GAD7 TOTAL SCORE: 17
1. FEELING NERVOUS, ANXIOUS, OR ON EDGE: MORE THAN HALF THE DAYS
GAD7 TOTAL SCORE: 17
7. FEELING AFRAID AS IF SOMETHING AWFUL MIGHT HAPPEN: NEARLY EVERY DAY
4. TROUBLE RELAXING: NEARLY EVERY DAY
7. FEELING AFRAID AS IF SOMETHING AWFUL MIGHT HAPPEN: NEARLY EVERY DAY
5. BEING SO RESTLESS THAT IT IS HARD TO SIT STILL: NEARLY EVERY DAY
3. WORRYING TOO MUCH ABOUT DIFFERENT THINGS: MORE THAN HALF THE DAYS
IF YOU CHECKED OFF ANY PROBLEMS ON THIS QUESTIONNAIRE, HOW DIFFICULT HAVE THESE PROBLEMS MADE IT FOR YOU TO DO YOUR WORK, TAKE CARE OF THINGS AT HOME, OR GET ALONG WITH OTHER PEOPLE: SOMEWHAT DIFFICULT
GAD7 TOTAL SCORE: 17
8. IF YOU CHECKED OFF ANY PROBLEMS, HOW DIFFICULT HAVE THESE MADE IT FOR YOU TO DO YOUR WORK, TAKE CARE OF THINGS AT HOME, OR GET ALONG WITH OTHER PEOPLE?: SOMEWHAT DIFFICULT
2. NOT BEING ABLE TO STOP OR CONTROL WORRYING: SEVERAL DAYS

## 2023-05-18 NOTE — PATIENT INSTRUCTIONS
- Call the psychiatric nurse line with medication questions or concerns at [phone] or [phone] - [facility]  [address] [place] [address]

## 2023-05-18 NOTE — PROGRESS NOTES
Assessment & Plan   Attention deficit hyperactivity disorder (ADHD), combined type  She has been off Concerta for a few months. She did feel she got benefit from it, but is also open to trying Adderall again. After some discussion, we settled on a plan of her trying Concerta again for 2 weeks (rx for 14 tabs sent) and she will send me [employer] message with her response to this. She also plans to reach back out to psychiatry to re-establish. I think it is clear Felecia would benefit from regular psychiatric care and would most benefit from establishing with psychiatry for long-term care. She agrees.  - methylphenidate (CONCERTA) 54 MG CR tablet; Take 1 tablet (54 mg) by mouth daily    Complex posttraumatic stress disorder  Suicidal ideation  Felecia feels Viibryd is helpful for her mental health, but unfortunately was off of it for a few months earlier this year due to insurance issues. I discussed I'm happy to provide any documentation needed to keep her on this medication as it is clear she needs it (and has failed MANY other therapies in the past). We also discussed switching the prescription to a [facility] and her using GoodRx, something she is open to doing if insurance stops paying for it. I discussed a safety plan if these passive SI thoughts get loud/more frequent/worse, including the [facility] unit. She plans to reach back out to her psychiatry team to re-establish with them as above.    Terrance Vazquez MD  [facility]    Subjective   Felecia is a 27 year old for follow-up on her mood and medications. I last saw Felecia 9 months ago. Has been off meds off/on. Back on Viibryd. Not on Concerta. Mood worsen.    Depression Screening Follow Up      5/18/2023     7:08 AM   PHQ   PHQ-9 Total Score 16   Q9: Thoughts of better off dead/self-harm past 2 weeks Several days   F/U: Thoughts of suicide or self-harm Yes   F/U: Self harm-plan No   F/U: Self-harm action No   F/U: Safety concerns No          5/18/2023     7:08 AM   Last PHQ-9   1.  Little interest or pleasure in doing things 3   2.  Feeling down, depressed, or hopeless 1   3.  Trouble falling or staying asleep, or sleeping too much 1   4.  Feeling tired or having little energy 3   5.  Poor appetite or overeating 1   6.  Feeling bad about yourself 1   7.  Trouble concentrating 3   8.  Moving slowly or restless 2   Q9: Thoughts of better off dead/self-harm past 2 weeks 1   PHQ-9 Total Score 16   In the past two weeks have you had thoughts of suicide or self harm? Yes   Do you have concerns about your personal safety or the safety of others? No   In the past 2 weeks have you thought about a plan or had intention to harm yourself? No   In the past 2 weeks have you acted on these thoughts in any way? No     Review of Systems   Constitutional, psych systems are negative, except as otherwise noted.      Objective    /82   Pulse 64   Temp 97.6  F (36.4  C)   Wt 124.7 kg (274 lb 14.4 oz)   LMP 05/15/2023 (Exact Date)   BMI 44.37 kg/m    Body mass index is 44.37 kg/m .     Physical Exam   GENERAL: alert and in no distress.  EYES: conjunctivae/corneas clear. EOMs grossly intact  HENT: Facies symmetric.  RESP: No iWOB.  MSK: Moves all four extremities freely  SKIN: No significant ulcers, lesions, or rashes on the visualized portions of the skin  NEURO: CN II-XII grossly intact.

## 2023-05-30 ENCOUNTER — MYC MEDICAL ADVICE (OUTPATIENT)
Dept: INTERNAL MEDICINE | Facility: CLINIC | Age: 28
End: 2023-05-30
Payer: COMMERCIAL

## 2023-05-30 DIAGNOSIS — F90.2 ATTENTION DEFICIT HYPERACTIVITY DISORDER (ADHD), COMBINED TYPE: Primary | ICD-10-CM

## 2023-06-01 RX ORDER — DEXTROAMPHETAMINE SACCHARATE, AMPHETAMINE ASPARTATE MONOHYDRATE, DEXTROAMPHETAMINE SULFATE AND AMPHETAMINE SULFATE 3.75; 3.75; 3.75; 3.75 MG/1; MG/1; MG/1; MG/1
15 CAPSULE, EXTENDED RELEASE ORAL DAILY
Qty: 15 CAPSULE | Refills: 0 | Status: SHIPPED | OUTPATIENT
Start: 2023-06-01 | End: 2023-06-13

## 2023-06-02 ENCOUNTER — HEALTH MAINTENANCE LETTER (OUTPATIENT)
Age: 28
End: 2023-06-02

## 2023-06-12 ENCOUNTER — MYC MEDICAL ADVICE (OUTPATIENT)
Dept: INTERNAL MEDICINE | Facility: CLINIC | Age: 28
End: 2023-06-12
Payer: COMMERCIAL

## 2023-06-12 DIAGNOSIS — F90.2 ATTENTION DEFICIT HYPERACTIVITY DISORDER (ADHD), COMBINED TYPE: Primary | ICD-10-CM

## 2023-06-13 RX ORDER — DEXTROAMPHETAMINE SACCHARATE, AMPHETAMINE ASPARTATE MONOHYDRATE, DEXTROAMPHETAMINE SULFATE AND AMPHETAMINE SULFATE 3.75; 3.75; 3.75; 3.75 MG/1; MG/1; MG/1; MG/1
15 CAPSULE, EXTENDED RELEASE ORAL DAILY
Qty: 30 CAPSULE | Refills: 0 | Status: SHIPPED | OUTPATIENT
Start: 2023-07-14 | End: 2023-08-13

## 2023-06-13 RX ORDER — DEXTROAMPHETAMINE SACCHARATE, AMPHETAMINE ASPARTATE MONOHYDRATE, DEXTROAMPHETAMINE SULFATE AND AMPHETAMINE SULFATE 3.75; 3.75; 3.75; 3.75 MG/1; MG/1; MG/1; MG/1
15 CAPSULE, EXTENDED RELEASE ORAL DAILY
Qty: 30 CAPSULE | Refills: 0 | Status: SHIPPED | OUTPATIENT
Start: 2023-08-14 | End: 2023-09-12

## 2023-06-13 RX ORDER — DEXTROAMPHETAMINE SACCHARATE, AMPHETAMINE ASPARTATE MONOHYDRATE, DEXTROAMPHETAMINE SULFATE AND AMPHETAMINE SULFATE 3.75; 3.75; 3.75; 3.75 MG/1; MG/1; MG/1; MG/1
15 CAPSULE, EXTENDED RELEASE ORAL DAILY
Qty: 30 CAPSULE | Refills: 0 | Status: SHIPPED | OUTPATIENT
Start: 2023-06-13 | End: 2023-07-13

## 2023-07-27 ENCOUNTER — NURSE TRIAGE (OUTPATIENT)
Dept: INTERNAL MEDICINE | Facility: CLINIC | Age: 28
End: 2023-07-27
Payer: COMMERCIAL

## 2023-07-27 NOTE — TELEPHONE ENCOUNTER
For chronic condition, she should be seen in-person by next available provider. If symptoms worsening, UC or ER.

## 2023-07-27 NOTE — TELEPHONE ENCOUNTER
Called and spoke with pt. Relayed Dr Rand's message from below.     Pt stated she does not go into the ED anymore due to not caring anymore (she said this and was talking about her heart issues at the time). Pt stated she has talked with Dr Rand about this.     Pt stated its hard for her to tell if her symptoms are worsening due to a high pain tolerance. Pt stated this has been on going. Encouraged pt to go to ED/UC if she feels her symptoms are getting worse.      Next available opening in clinic 8/30/23. Pt agrees to be seen then.
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
## 2023-07-28 ENCOUNTER — APPOINTMENT (OUTPATIENT)
Dept: CT IMAGING | Facility: CLINIC | Age: 28
End: 2023-07-28
Attending: EMERGENCY MEDICINE
Payer: COMMERCIAL

## 2023-07-28 ENCOUNTER — APPOINTMENT (OUTPATIENT)
Dept: GENERAL RADIOLOGY | Facility: CLINIC | Age: 28
End: 2023-07-28
Attending: EMERGENCY MEDICINE
Payer: COMMERCIAL

## 2023-07-28 ENCOUNTER — HOSPITAL ENCOUNTER (EMERGENCY)
Facility: CLINIC | Age: 28
Discharge: HOME | End: 2023-07-29
Attending: EMERGENCY MEDICINE | Admitting: EMERGENCY MEDICINE
Payer: COMMERCIAL

## 2023-07-28 DIAGNOSIS — R55 SYNCOPE, UNSPECIFIED SYNCOPE TYPE: ICD-10-CM

## 2023-07-28 LAB
ANION GAP SERPL CALCULATED.3IONS-SCNC: 12 MMOL/L (ref 7–15)
BASOPHILS # BLD AUTO: 0 10E3/UL (ref 0–0.2)
BASOPHILS NFR BLD AUTO: 0 %
BUN SERPL-MCNC: 10.7 MG/DL (ref 6–20)
CALCIUM SERPL-MCNC: 8.9 MG/DL (ref 8.6–10)
CHLORIDE SERPL-SCNC: 106 MMOL/L (ref 98–107)
CREAT SERPL-MCNC: 0.67 MG/DL (ref 0.51–0.95)
D DIMER PPP FEU-MCNC: 0.9 UG/ML FEU (ref 0–0.5)
DEPRECATED HCO3 PLAS-SCNC: 24 MMOL/L (ref 22–29)
EOSINOPHIL # BLD AUTO: 0 10E3/UL (ref 0–0.7)
EOSINOPHIL NFR BLD AUTO: 1 %
ERYTHROCYTE [DISTWIDTH] IN BLOOD BY AUTOMATED COUNT: 13.2 % (ref 10–15)
GFR SERPL CREATININE-BSD FRML MDRD: >90 ML/MIN/1.73M2
GLUCOSE SERPL-MCNC: 126 MG/DL (ref 70–99)
HCG SERPL QL: NEGATIVE
HCT VFR BLD AUTO: 38.9 % (ref 35–47)
HGB BLD-MCNC: 12.4 G/DL (ref 11.7–15.7)
IMM GRANULOCYTES # BLD: 0 10E3/UL
IMM GRANULOCYTES NFR BLD: 0 %
LYMPHOCYTES # BLD AUTO: 1.2 10E3/UL (ref 0.8–5.3)
LYMPHOCYTES NFR BLD AUTO: 18 %
MCH RBC QN AUTO: 28.3 PG (ref 26.5–33)
MCHC RBC AUTO-ENTMCNC: 31.9 G/DL (ref 31.5–36.5)
MCV RBC AUTO: 89 FL (ref 78–100)
MONOCYTES # BLD AUTO: 0.4 10E3/UL (ref 0–1.3)
MONOCYTES NFR BLD AUTO: 6 %
NEUTROPHILS # BLD AUTO: 5 10E3/UL (ref 1.6–8.3)
NEUTROPHILS NFR BLD AUTO: 75 %
NRBC # BLD AUTO: 0 10E3/UL
NRBC BLD AUTO-RTO: 0 /100
PLATELET # BLD AUTO: 180 10E3/UL (ref 150–450)
POTASSIUM SERPL-SCNC: 3.9 MMOL/L (ref 3.4–5.3)
RBC # BLD AUTO: 4.38 10E6/UL (ref 3.8–5.2)
SODIUM SERPL-SCNC: 142 MMOL/L (ref 136–145)
TROPONIN T SERPL HS-MCNC: <6 NG/L
WBC # BLD AUTO: 6.6 10E3/UL (ref 4–11)

## 2023-07-28 PROCEDURE — 85379 FIBRIN DEGRADATION QUANT: CPT | Performed by: EMERGENCY MEDICINE

## 2023-07-28 PROCEDURE — 80048 BASIC METABOLIC PNL TOTAL CA: CPT | Performed by: EMERGENCY MEDICINE

## 2023-07-28 PROCEDURE — 71275 CT ANGIOGRAPHY CHEST: CPT

## 2023-07-28 PROCEDURE — 71046 X-RAY EXAM CHEST 2 VIEWS: CPT

## 2023-07-28 PROCEDURE — 99285 EMERGENCY DEPT VISIT HI MDM: CPT | Mod: 25

## 2023-07-28 PROCEDURE — 250N000009 HC RX 250: Performed by: EMERGENCY MEDICINE

## 2023-07-28 PROCEDURE — 84484 ASSAY OF TROPONIN QUANT: CPT | Performed by: EMERGENCY MEDICINE

## 2023-07-28 PROCEDURE — 250N000011 HC RX IP 250 OP 636: Performed by: EMERGENCY MEDICINE

## 2023-07-28 PROCEDURE — 85025 COMPLETE CBC W/AUTO DIFF WBC: CPT | Performed by: EMERGENCY MEDICINE

## 2023-07-28 PROCEDURE — 36415 COLL VENOUS BLD VENIPUNCTURE: CPT | Performed by: EMERGENCY MEDICINE

## 2023-07-28 PROCEDURE — 84703 CHORIONIC GONADOTROPIN ASSAY: CPT | Performed by: EMERGENCY MEDICINE

## 2023-07-28 RX ORDER — IOPAMIDOL 755 MG/ML
82 INJECTION, SOLUTION INTRAVASCULAR ONCE
Status: COMPLETED | OUTPATIENT
Start: 2023-07-28 | End: 2023-07-28

## 2023-07-28 RX ADMIN — SODIUM CHLORIDE 100 ML: 9 INJECTION, SOLUTION INTRAVENOUS at 23:51

## 2023-07-28 RX ADMIN — IOPAMIDOL 82 ML: 755 INJECTION, SOLUTION INTRAVENOUS at 23:51

## 2023-07-28 NOTE — ED TRIAGE NOTES
Pt stated that she was at work today and collapsed. Pt stated she has had chest pain for about a year now that sometimes is crushing and sometimes radiates to her left arm .

## 2023-07-28 NOTE — ED NOTES
PIT/Triage Evaluation    Patient presented with left-sided chest pain and syncope.  She also notes pain in her left leg.  Denies hemoptysis.  Notes pain is constant for a year.  She reports occasionally getting syncope when pain is severe.    Exam is notable for:  Speaking in full sentences    Appropriate interventions for symptom management were initiated if applicable.  Appropriate diagnostic tests were initiated if indicated.    Important information for subsequent clinician:  D-dimer ordered for left leg pain, syncope, chest pain. Vitals normal.    I briefly evaluated the patient and developed an initial plan of care. I discussed this plan and explained that this brief interaction does not constitute a full evaluation. Patient/family understands that they should wait to be fully evaluated and discuss any test results with another clinician prior to leaving the hospital.       Bakari Hernandez MD  07/28/23 1321

## 2023-07-29 VITALS
SYSTOLIC BLOOD PRESSURE: 121 MMHG | HEART RATE: 81 BPM | OXYGEN SATURATION: 98 % | WEIGHT: 274 LBS | TEMPERATURE: 96.9 F | RESPIRATION RATE: 16 BRPM | BODY MASS INDEX: 44.22 KG/M2 | DIASTOLIC BLOOD PRESSURE: 66 MMHG

## 2023-07-29 ASSESSMENT — ACTIVITIES OF DAILY LIVING (ADL): ADLS_ACUITY_SCORE: 35
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
## 2023-08-04 ENCOUNTER — HOSPITAL ENCOUNTER (OUTPATIENT)
Dept: CARDIOLOGY | Facility: CLINIC | Age: 28
Discharge: HOME | End: 2023-08-04
Attending: EMERGENCY MEDICINE | Admitting: EMERGENCY MEDICINE
Payer: COMMERCIAL

## 2023-08-04 DIAGNOSIS — R55 SYNCOPE, UNSPECIFIED SYNCOPE TYPE: ICD-10-CM

## 2023-08-04 PROCEDURE — 93225 XTRNL ECG REC<48 HRS REC: CPT

## 2023-08-04 PROCEDURE — 93227 XTRNL ECG REC<48 HR R&I: CPT | Performed by: INTERNAL MEDICINE

## 2023-08-06 ENCOUNTER — MYC MEDICAL ADVICE (OUTPATIENT)
Dept: INTERNAL MEDICINE | Facility: CLINIC | Age: 28
End: 2023-08-06
Payer: COMMERCIAL

## 2023-08-07 ENCOUNTER — NURSE TRIAGE (OUTPATIENT)
Dept: NURSING | Facility: CLINIC | Age: 28
End: 2023-08-07
Payer: COMMERCIAL

## 2023-08-07 NOTE — TELEPHONE ENCOUNTER
RN sent mychart msg reply to pt regarding request for treatment. Rn also attempted to contact via phone and t ndm for patient to check mychart.     If pt returns call, please advise.     Lindsay HART RN

## 2023-08-08 ENCOUNTER — NURSE TRIAGE (OUTPATIENT)
Dept: NURSING | Facility: CLINIC | Age: 28
End: 2023-08-08
Payer: COMMERCIAL

## 2023-08-08 NOTE — TELEPHONE ENCOUNTER
COVID Positive/Requesting COVID treatment    Patient is positive for COVID and requesting treatment options.    Date of positive COVID test (PCR or at home)? 8/6/23  Current COVID symptoms: sore throat and congestion or runny nose  Date COVID symptoms began: 8/6/23    Message should be routed to clinic RN pool. Best phone number to use for call back: [phone]    Evelyn Coyle, RN, BSN  [facility] Nurse Advisor 11:48 AM 8/8/2023    Additional Information   Negative: SEVERE difficulty breathing (e.g., struggling for each breath, speaks in single words)   Negative: Difficult to awaken or acting confused (e.g., disoriented, slurred speech)   Negative: Bluish (or gray) lips or face now   Negative: Shock suspected (e.g., cold/pale/clammy skin, too weak to stand, low BP, rapid pulse)   Negative: Sounds like a life-threatening emergency to the triager   Negative: [1] Diagnosed or suspected COVID-19 AND [2] symptoms lasting 3 or more weeks   Negative: [1] COVID-19 exposure AND [2] no symptoms   Negative: COVID-19 vaccine reaction suspected (e.g., fever, headache, muscle aches) occurring 1 to 3 days after getting vaccine   Negative: COVID-19 vaccine, questions about   Negative: [1] Lives with someone known to have influenza (flu test positive) AND [2] flu-like symptoms (e.g., cough, runny nose, sore throat, SOB; with or without fever)   Negative: [1] Adult with possible COVID-19 symptoms AND [2] triager concerned about severity of symptoms or other causes   Negative: COVID-19 and breastfeeding, questions about   Negative: SEVERE or constant chest pain or pressure  (Exception: Mild central chest pain, present only when coughing.)   Negative: MODERATE difficulty breathing (e.g., speaks in phrases, SOB even at rest, pulse 100-120)   Negative: Headache and stiff neck (can't touch chin to chest)   Negative: Oxygen level (e.g., pulse oximetry) 90 percent or lower   Negative: Chest pain or pressure  (Exception: MILD  central chest pain, present only when coughing)   Negative: Patient sounds very sick or weak to the triager   Negative: MILD difficulty breathing (e.g., minimal/no SOB at rest, SOB with walking, pulse <100)   Negative: Fever > 103 F (39.4 C)   Negative: [1] Fever > 101 F (38.3 C) AND [2] over 60 years of age   Negative: [1] Fever > 100.0 F (37.8 C) AND [2] bedridden (e.g., nursing home patient, CVA, chronic illness, recovering from surgery)    Protocols used: Coronavirus (COVID-19) Diagnosed or [id number]

## 2023-08-09 NOTE — TELEPHONE ENCOUNTER
Nurse called patient regarding positive covid diagnosis and possible treatment. Nurse left a message advising patient to call the covid line at [phone] to get connected with the paxlovid team.     Darnell Bass RN BSN  [facility]

## 2023-08-09 NOTE — TELEPHONE ENCOUNTER
RN has reached out via [employer] with patient. 2nd attempt to make contact.      Encounter started on 8/7/2023. RN had not received reply back from patient until today, 8/9. (Msg sent late 8/8) Patient was triaged by clinical staff on 8/8 but hub did not receive patient information.     Pt stated concerns over previous experience and symptoms from ED visit 7/28. RN recommended that pt seek provider care if still concerned with symptoms.     Pt will need to go through Paxlovid protocol in order to receive tx or go through vv.     Lindsay HART RN

## 2023-08-09 NOTE — TELEPHONE ENCOUNTER
Patient Contact    Attempt # 1    Was call answered?  No.  Left message on voicemail with information to call me back.    Upon callback, please complete RN Paxlovid protocol. Routed to Hub RN.

## 2023-08-30 ENCOUNTER — OFFICE VISIT (OUTPATIENT)
Dept: INTERNAL MEDICINE | Facility: CLINIC | Age: 28
End: 2023-08-30
Payer: COMMERCIAL

## 2023-08-30 VITALS
HEART RATE: 73 BPM | HEIGHT: 66 IN | BODY MASS INDEX: 40.4 KG/M2 | TEMPERATURE: 97.3 F | OXYGEN SATURATION: 99 % | DIASTOLIC BLOOD PRESSURE: 84 MMHG | WEIGHT: 251.4 LBS | SYSTOLIC BLOOD PRESSURE: 127 MMHG

## 2023-08-30 DIAGNOSIS — J45.20 MILD INTERMITTENT ASTHMA WITHOUT COMPLICATION: ICD-10-CM

## 2023-08-30 DIAGNOSIS — Z11.4 SCREENING FOR HIV (HUMAN IMMUNODEFICIENCY VIRUS): ICD-10-CM

## 2023-08-30 DIAGNOSIS — Z11.59 NEED FOR HEPATITIS C SCREENING TEST: ICD-10-CM

## 2023-08-30 DIAGNOSIS — R07.89 ATYPICAL CHEST PAIN: Primary | ICD-10-CM

## 2023-08-30 DIAGNOSIS — R55 SYNCOPE, UNSPECIFIED SYNCOPE TYPE: ICD-10-CM

## 2023-08-30 DIAGNOSIS — F90.2 ATTENTION DEFICIT HYPERACTIVITY DISORDER (ADHD), COMBINED TYPE: ICD-10-CM

## 2023-08-30 PROCEDURE — 96127 BRIEF EMOTIONAL/BEHAV ASSMT: CPT | Performed by: INTERNAL MEDICINE

## 2023-08-30 PROCEDURE — 99214 OFFICE O/P EST MOD 30 MIN: CPT | Performed by: INTERNAL MEDICINE

## 2023-08-30 RX ORDER — ALBUTEROL SULFATE 90 UG/1
2 AEROSOL, METERED RESPIRATORY (INHALATION) EVERY 6 HOURS PRN
Qty: 18 G | Refills: 3 | Status: SHIPPED | OUTPATIENT
Start: 2023-08-30

## 2023-08-30 ASSESSMENT — PATIENT HEALTH QUESTIONNAIRE - PHQ9
SUM OF ALL RESPONSES TO PHQ QUESTIONS 1-9: 21
10. IF YOU CHECKED OFF ANY PROBLEMS, HOW DIFFICULT HAVE THESE PROBLEMS MADE IT FOR YOU TO DO YOUR WORK, TAKE CARE OF THINGS AT HOME, OR GET ALONG WITH OTHER PEOPLE: SOMEWHAT DIFFICULT
SUM OF ALL RESPONSES TO PHQ QUESTIONS 1-9: 21

## 2023-08-30 NOTE — PATIENT INSTRUCTIONS
Please schedule an appointment for a Physical and Pap.    Please make appointment with Psychiatry.

## 2023-08-30 NOTE — PROGRESS NOTES
"  Assessment & Plan     Atypical chest pain  Her pain sounds mostly from anxiety  Stress test ordered    Syncope, unspecified syncope type  Echocardiogram ordered    Attention deficit hyperactivity disorder (ADHD), combined type  Continue Adderall and follow-up with psychiatry    Mild intermittent asthma without complication  Albuterol inhaler refilled    Screening for HIV (human immunodeficiency virus)  HIV ordered, to be done at patient convenience      Need for hepatitis C screening test  Hepatitis C ordered, to be done at patient convenience    Depression and anxiety  Continue Viibryd  Strongly encouraged her to see psychiatry.             BMI:   Estimated body mass index is 40.58 kg/m  as calculated from the following:    Height as of this encounter: 1.676 m (5' 6\").    Weight as of this encounter: 114 kg (251 lb 6.4 oz).   Weight management plan: Discussed healthy diet and exercise guidelines    Depression Screening Follow Up        8/30/2023    11:14 AM   PHQ   PHQ-9 Total Score 21   Q9: Thoughts of better off dead/self-harm past 2 weeks Several days   F/U: Thoughts of suicide or self-harm Yes   F/U: Self harm-plan No   F/U: Self-harm action No   F/U: Safety concerns No                 Follow Up    Follow Up Actions Taken      Discussed the following ways the patient can remain in a safe environment:    Follow-up with primary care and psychiatry.        Jose Manuel Medel MD  [facility]    Lavinia Izquierdo is a 28 year old, presenting for the following health issues:  Vascular Disease      History of Present Illness       Vascular Disease:  She presents for follow up of vascular disease.     She never takes nitroglycerin. She is not taking daily aspirin.    She eats 2-3 servings of fruits and vegetables daily.She consumes 1 sweetened beverage(s) daily.She exercises with enough effort to increase her heart rate 10 to 19 minutes per day.  She exercises with enough effort to " "increase her heart rate 4 days per week. She is missing 1 dose(s) of medications per week.  She is not taking prescribed medications regularly due to remembering to take.     Patient was in the ER for a syncopal episode.  This happened at work when she also reported chest pain.  She had a Holter monitor placed for 2 days, report not available yet.    Patient states she has been having these chest pains for a long time and also has a history of palpitations.    She has a lot of anxiety.  Has ADHD, currently not seeing a psychiatrist.    Patient states she has a lot of stress in her life.  Was raised as a foster kid and states she did not have any guidance in her life.    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  She does have a few friends.    She is currently on Adderall and Viibryd.  She feels symptoms are reasonably controlled and does not want to see a therapist.  However during the visit she stated a couple of times \"I am going to make myself DNR status\"  And \"I thought I was going to die and was thinking is this the way I am going to go and was thankful\".    She states she also has a history of asthma and is out of albuterol inhaler.        Current Outpatient Medications   Medication    albuterol (PROAIR HFA/PROVENTIL HFA/VENTOLIN HFA) 108 (90 Base) MCG/ACT inhaler    amphetamine-dextroamphetamine (ADDERALL XR) 15 MG 24 hr capsule    vilazodone (VIIBRYD) 40 MG TABS tablet     No current facility-administered medications for this visit.                     Review of Systems   Constitutional, HEENT, cardiovascular, pulmonary, GI, , musculoskeletal, neuro, skin, endocrine and psych systems are negative, except as otherwise noted.      Objective    /84   Pulse 73   Temp 97.3  F (36.3  C) (Temporal)   Ht 1.676 m (5' 6\")   Wt 114 kg (251 lb 6.4 oz)   LMP 08/29/2023   SpO2 99%   BMI 40.58 kg/m    Body mass index is 40.58 kg/m .  Physical Exam   GENERAL: healthy, " alert and no distress  NECK: no adenopathy, no asymmetry, masses, or scars and thyroid normal to palpation  RESP: lungs clear to auscultation - no rales, rhonchi or wheezes  CV: regular rate and rhythm, normal S1 S2, no S3 or S4, no murmur, click or rub, no peripheral edema and peripheral pulses strong  ABDOMEN: soft, nontender, no hepatosplenomegaly, no masses and bowel sounds normal  MS: no gross musculoskeletal defects noted, no edema

## 2023-09-11 ENCOUNTER — TELEPHONE (OUTPATIENT)
Dept: INTERNAL MEDICINE | Facility: CLINIC | Age: 28
End: 2023-09-11
Payer: COMMERCIAL

## 2023-09-11 DIAGNOSIS — F90.2 ATTENTION DEFICIT HYPERACTIVITY DISORDER (ADHD), COMBINED TYPE: ICD-10-CM

## 2023-09-11 NOTE — TELEPHONE ENCOUNTER
PRIOR AUTHORIZATION DENIED    Medication: VIIBRYD 40 MG PO TABS  Insurance Company: [employer] ([facility]) - Phone [phone] Fax [phone]  Denial Date: 9/11/2023  Denial Rational:           Appeal Information:     .        Patient Notified: No

## 2023-09-11 NOTE — TELEPHONE ENCOUNTER
Central Prior Authorization Team   Phone: [phone]    PA Initiation    Medication: VIIBRYD 40 MG PO TABS  Insurance Company: [employer] ([facility]) - Phone [phone] Fax [phone]  Pharmacy Filling the Rx: [id number] - [place], [place] - [address]  Filling Pharmacy Phone: [phone]  Filling Pharmacy Fax:    Start Date: 9/11/2023

## 2023-09-12 ENCOUNTER — OFFICE VISIT (OUTPATIENT)
Dept: CARDIOLOGY | Facility: CLINIC | Age: 28
End: 2023-09-12
Payer: COMMERCIAL

## 2023-09-12 VITALS
WEIGHT: 247 LBS | SYSTOLIC BLOOD PRESSURE: 131 MMHG | HEIGHT: 66 IN | OXYGEN SATURATION: 98 % | HEART RATE: 94 BPM | DIASTOLIC BLOOD PRESSURE: 85 MMHG | BODY MASS INDEX: 39.7 KG/M2

## 2023-09-12 DIAGNOSIS — R07.9 CHEST PAIN, UNSPECIFIED TYPE: Primary | ICD-10-CM

## 2023-09-12 DIAGNOSIS — R55 SYNCOPE, UNSPECIFIED SYNCOPE TYPE: ICD-10-CM

## 2023-09-12 PROCEDURE — 99204 OFFICE O/P NEW MOD 45 MIN: CPT | Performed by: INTERNAL MEDICINE

## 2023-09-12 NOTE — LETTER
9/12/2023    [facility]  [address] [address]    RE: Lenore Suggs       Dear Colleague,     I had the pleasure of seeing Lenore Suggs in the [facility].  CARDIOLOGY CLINIC CONSULTATION    PRIMARY CARE PHYSICIAN:  [facility]    HISTORY OF PRESENT ILLNESS:  This is a 28-year-old female who works in [employer].  She has a history of ADHD.  She takes Adderall.  Denies any prior cardiovascular history.  Denies smoking or drug use.  The patient has been referred to cardiology because she had an episode of syncope in July 2023.  This happened when she was standing.  This was associated with some squeezing sensation in her chest.  She felt like her left side of her chest got numb.  She denies any exertional symptoms.  Since then she has had intermittent episodes of lightheadedness but no catherine syncope.  She wore a Holter monitor for 48 hours that was normal.  During that time she did not have typical symptoms of lightheadedness.    PAST MEDICAL HISTORY:  Past Medical History:   Diagnosis Date    Asthma     Bipolar affective disorder (H)     Outbursts of anger     Schizophrenia (H)        MEDICATIONS:  Current Outpatient Medications   Medication    albuterol (PROAIR HFA/PROVENTIL HFA/VENTOLIN HFA) 108 (90 Base) MCG/ACT inhaler    amphetamine-dextroamphetamine (ADDERALL XR) 15 MG 24 hr capsule    vilazodone (VIIBRYD) 40 MG TABS tablet     No current facility-administered medications for this visit.       SOCIAL HISTORY:  I have reviewed this patient's social history and updated it with pertinent information if needed. Lenore Suggs  reports that she has never smoked. She has never used smokeless tobacco. She reports current alcohol use. She reports that she does not use drugs.    PHYSICAL EXAM:  Pulse:  [94] 94  BP: (131)/(85) 131/85  SpO2:  [98 %] 98 %  247 lbs 0 oz    Constitutional: alert, no distress  Respiratory:  Good bilateral air entry  Cardiovascular: Normal regular heart sounds  GI: nondistended  Neuropsychiatric: appropriate affact    ASSESSMENT: Pertinent issues addressed/ reviewed during this cardiology visit  Syncope likely vasovagal  Atypical chest discomfort    RECOMMENDATIONS:  The patient symptoms of syncope and lightheadedness are likely postural in nature.  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  She does not keep herself very hydrated through the day.  I do not think there is a cardiac etiology based on the atypical presentation however I recommend getting an echocardiogram stress ECG and a longer duration of Zio monitor to ensure we are not seeing any structural or arrhythmic issues.    If these are normal, I recommend as needed follow-up with cardiology.  Lifestyle modifications increase hydration.  Follow-up with PCP.    It was a pleasure seeing this patient in clinic today. Please do not hesitate to contact me with any future questions.     BELLO Pabon, [facility]  Cardiology - [facility]  September 12, 2023    Review of the result(s) of each unique test - Last CBC lipids BMP ECG to monitor;  last ECG shows normal sinus rhythm.     The level of medical decision making during this visit was of moderate complexity.    This note was completed in part using dictation via the Dragon voice recognition software. Some word and grammatical errors may occur and must be interpreted in the appropriate clinical context.  If there are any questions pertaining to this issue, please contact me for further clarification.      Thank you for allowing me to participate in the care of your patient.      Sincerely,     Coco Guthrie MD     [facility]  cc:   Li White MD  [facility]  [address] [place] [address]

## 2023-09-12 NOTE — PROGRESS NOTES
"2019       Birth Weight: 2170 g ( 4 lb  12.5 oz)     Weight: 2130 g (4 lb 11.1 oz) Decreased 50 grams  Date: 10/9Hea Circumference: 29.5 cm   Height: 46.5 cm (18.31")     Gestational Age: 37w4d   CGA  37w 6d  DOL  2    Physical Exam     General: active and reactive for age, non-dysmorphic, in RHW in room air swaddled  Head: mild microcephaly, anterior fontanel is open, soft and flat   Eyes: lids open, eyes clear without drainage and red reflex deferred   Nose: nares patent   Oropharynx: palate: intact and moist mucus membranes, OG in place without signs of compromise. Mild micrognathia  Chest: Breath Sounds: clear and equal, retractions: easy effort  Heart: precordium: quiet rate and rhythm: regular, S1 and S2: normal,  Murmur: none, capillary refill: <3 seconds  Abdomen: soft, non-tender, non-distended, bowel sounds: active , Umbilical Cord: clamped and drying ALESIA  Genitourinary: normal female genitalia for gestation, prominent clitoris, patent anus  Musculoskeletal/Extremities: moves all extremities, no deformities    Neurologic: active and responsive, tone normal with jitteriness on exam.  Skin: Condition: smooth and warm   Color: centrally pink    Social:  Mom is mentally challenged per OB. Mother and grandmother visit and kept updated on status and plan of care.     Rounds with Dr Villavicencio. Infant examined. Plan discussed and implemented      FEN: PO: Similac advance 15 mls every 3 hours; loss of IV access and unable to restart after many attempts     Projected  ml/kg/day   Chemstrip: 58   Intake:   99   ml/kg/day  -  45  boone/kg/day     Output:  UOP  3.8 ml/kg/hr   Stools  X   3  Plan:  Advance feeds to ad ramez w/ min 30 mls every 3 hours    Scheduled Meds:   ampicillin IV syringe (NICU/PICU/PEDS) (standard concentration)  100 mg/kg Intravenous Q12H    gentamicin IV syringe (NICU/PICU/PEDS)  4 mg/kg Intravenous Q24H     Continuous Infusions:   TPN  custom Stopped (10/11/19 2900)     " CARDIOLOGY CLINIC CONSULTATION    [facility]    HISTORY OF PRESENT ILLNESS:  This is a 28-year-old female who works in [employer].  She has a history of ADHD.  She takes Adderall.  Denies any prior cardiovascular history.  Denies smoking or drug use.  The patient has been referred to cardiology because she had an episode of syncope in July 2023.  This happened when she was standing.  This was associated with some squeezing sensation in her chest.  She felt like her left side of her chest got numb.  She denies any exertional symptoms.  Since then she has had intermittent episodes of lightheadedness but no catherine syncope.  She wore a Holter monitor for 48 hours that was normal.  During that time she did not have typical symptoms of lightheadedness.    PAST MEDICAL HISTORY:  Past Medical History:   Diagnosis Date    Asthma     Bipolar affective disorder (H)     Outbursts of anger     Schizophrenia (H)        MEDICATIONS:  Current Outpatient Medications   Medication    albuterol (PROAIR HFA/PROVENTIL HFA/VENTOLIN HFA) 108 (90 Base) MCG/ACT inhaler    amphetamine-dextroamphetamine (ADDERALL XR) 15 MG 24 hr capsule    vilazodone (VIIBRYD) 40 MG TABS tablet     No current facility-administered medications for this visit.       SOCIAL HISTORY:  I have reviewed this patient's social history and updated it with pertinent information if needed. Lenore SEGAL Ifeanyi  reports that she has never smoked. She has never used smokeless tobacco. She reports current alcohol use. She reports that she does not use drugs.    PHYSICAL EXAM:  Pulse:  [94] 94  BP: (131)/(85) 131/85  SpO2:  [98 %] 98 %  247 lbs 0 oz    Constitutional: alert, no distress  Respiratory: Good bilateral air entry  Cardiovascular: Normal regular heart sounds  GI: nondistended  Neuropsychiatric: appropriate affact    ASSESSMENT: Pertinent issues addressed/ reviewed during this cardiology visit  Syncope likely  vasovagal  Atypical chest discomfort    RECOMMENDATIONS:  The patient symptoms of syncope and lightheadedness are likely postural in nature.  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  She does not keep herself very hydrated through the day.  I do not think there is a cardiac etiology based on the atypical presentation however I recommend getting an echocardiogram stress ECG and a longer duration of Zio monitor to ensure we are not seeing any structural or arrhythmic issues.    If these are normal, I recommend as needed follow-up with cardiology.  Lifestyle modifications increase hydration.  Follow-up with PCP.    It was a pleasure seeing this patient in clinic today. Please do not hesitate to contact me with any future questions.     BELLO Pabon, [facility]  Cardiology - [facility]  September 12, 2023    Review of the result(s) of each unique test - Last CBC lipids BMP ECG to monitor;  last ECG shows normal sinus rhythm. The level of medical decision making during this visit was of moderate complexity.    Clinic chaperone Jaz GOOD    This note was completed in part using dictation via the Dragon voice recognition software. Some word and grammatical errors may occur and must be interpreted in the appropriate clinical context.  If there are any questions pertaining to this issue, please contact me for further clarification.

## 2023-09-12 NOTE — TELEPHONE ENCOUNTER
Terrance Rand MD  Ox Triage Im22 hours ago (5:53 PM)     BL  Hi team -    Can someone reach out to this patient and just ensure she's been still able to get her Viibryd (she may be using Goodrx instead of insurance)? She needs to be on it.       90 supply, she has been taking daily. Using good rx.     Pt requesting adderall refill. Routing pending med request to provider.     Pt apologizing for recent rude messages sent to provider.

## 2023-09-13 RX ORDER — DEXTROAMPHETAMINE SACCHARATE, AMPHETAMINE ASPARTATE MONOHYDRATE, DEXTROAMPHETAMINE SULFATE AND AMPHETAMINE SULFATE 3.75; 3.75; 3.75; 3.75 MG/1; MG/1; MG/1; MG/1
15 CAPSULE, EXTENDED RELEASE ORAL DAILY
Qty: 30 CAPSULE | Refills: 0 | Status: SHIPPED | OUTPATIENT
Start: 2023-09-13 | End: 2023-10-15

## 2023-09-13 NOTE — TELEPHONE ENCOUNTER
Referral is in place, it appears appointment was scheduled then cancelled. This has been discussed and recommended multiple times with patient. I would encourage her to see my involvement as a stopgap in her psychiatric care, not as the end point. Given her extensive psychiatric medication failures and chronic SI, she would greatly benefit from being under the long-term care of a psychiatrist.

## 2023-09-13 NOTE — TELEPHONE ENCOUNTER
Is patient establishing with psychiatry? That was the plan last I see. I do not see anything scheduled.

## 2023-09-13 NOTE — TELEPHONE ENCOUNTER
Reviewed provider question with pt; pt has not been able to establish care with a psychiatrist. Pt agreeable to referral if recommended.

## 2023-09-13 NOTE — TELEPHONE ENCOUNTER
Relayed provider message. Pt verbalizes understanding and agrees to plan of care. Requesting to send referral # for scheduling in mychart message. Plan to send message as requested.

## 2023-10-15 ENCOUNTER — MYC REFILL (OUTPATIENT)
Dept: INTERNAL MEDICINE | Facility: CLINIC | Age: 28
End: 2023-10-15
Payer: COMMERCIAL

## 2023-10-15 DIAGNOSIS — F90.2 ATTENTION DEFICIT HYPERACTIVITY DISORDER (ADHD), COMBINED TYPE: ICD-10-CM

## 2023-10-16 ENCOUNTER — MYC MEDICAL ADVICE (OUTPATIENT)
Dept: INTERNAL MEDICINE | Facility: CLINIC | Age: 28
End: 2023-10-16
Payer: COMMERCIAL

## 2023-10-16 RX ORDER — DEXTROAMPHETAMINE SACCHARATE, AMPHETAMINE ASPARTATE MONOHYDRATE, DEXTROAMPHETAMINE SULFATE AND AMPHETAMINE SULFATE 3.75; 3.75; 3.75; 3.75 MG/1; MG/1; MG/1; MG/1
15 CAPSULE, EXTENDED RELEASE ORAL DAILY
Qty: 30 CAPSULE | Refills: 0 | Status: SHIPPED | OUTPATIENT
Start: 2023-10-16 | End: 2023-11-17

## 2023-10-17 ENCOUNTER — NURSE TRIAGE (OUTPATIENT)
Dept: INTERNAL MEDICINE | Facility: CLINIC | Age: 28
End: 2023-10-17
Payer: COMMERCIAL

## 2023-10-17 NOTE — TELEPHONE ENCOUNTER
Patient Contact    Attempt # 1    Was call answered?  No.  Left message on voicemail with information to call me back.    Upon callback please triage symptoms. (See mychart- suicidal ideation)
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
## 2023-10-18 NOTE — TELEPHONE ENCOUNTER
Received a call back from the patient stating she missed a phone call today from the clinic. Upon chart review, no phone call today but patient was contacted a couple times yesterday (patient states she did not pay attention to when the message was left).     Upon conversing with the patient, the patient states no changes from the conversation last night.     Will route to PCP for review and any recommendations for the patient.     Patient would like a [employer] message or we can call and leave a VM.     Josefina Hobson RN

## 2023-10-18 NOTE — TELEPHONE ENCOUNTER
Very complex situation. As I've repeatedly discussed with Felecia, I cannot recommend she be under the care of a psychiatrist strong enough. I'm happy to help however I can here, but her mental health issues are really beyond my abilities/scope of practice. I agree with recommendation for her to present to [facility] unit given this decompensation.

## 2023-10-18 NOTE — TELEPHONE ENCOUNTER
Patient Contact    Attempt # 1    Was call answered?  No.  Left message on voicemail with information to call me back.    Will send [employer] message. Clarified with REYNALDO Rocha that pt did give consent to send detailed information with provider recommendations via [employer] message.

## 2023-10-18 NOTE — TELEPHONE ENCOUNTER
Patient calling clinic back. RN relayed provider recommendations to patient. Patient agreed with plan of care and stated she will go to the [facility] unit for evaluation today. Patient did not have any additional questions or concerns at this time.

## 2023-10-26 ENCOUNTER — MYC MEDICAL ADVICE (OUTPATIENT)
Dept: INTERNAL MEDICINE | Facility: CLINIC | Age: 28
End: 2023-10-26
Payer: COMMERCIAL

## 2023-11-10 ENCOUNTER — TELEPHONE (OUTPATIENT)
Dept: NURSING | Facility: CLINIC | Age: 28
End: 2023-11-10
Payer: COMMERCIAL

## 2023-11-10 NOTE — TELEPHONE ENCOUNTER
"Patient returned call and states that medical records was very rude to her and transferred her to an invalid number and states she is just trying to help herself and no one is helping her, patient became angry started to use explicit language with RN informed patient that I am here to help her and am trying to understand what she is needing, informed that Dr. Rand stated a visit is needed for forms to be filled out    Patient went on yelling and using explicit language stating \"I dont fucking care I'm sick of fucking getting the run around\"     Patient then stated dont you understand I'm suicidal and states \" forget I'm just going to kill myself\"  and ended call  Patient was with father who is phycologist per patient     Attempted to call patient back with no answer    Called 911 to have wellness check performed provided address on file and patients phone number [phone]    Huddled with Dr. Rand who agrees wellness check appropriate and nothing additional needed at this time    Received call back from [place] police, they went to patients home she is not home she is out of state, they were able to reach patient by phone and patient states she was just angry and did not mean she was going to kill herself     Wiley Moser RN    "

## 2023-11-10 NOTE — TELEPHONE ENCOUNTER
"Rcvd call stating \"If they were competent enough, they would know I am out-of-state. Again, if they were competent enough, they would know I am out-of-state. Now that means they are incompetent.\"     Shayy Nugent on 11/10/2023 at 9:17 AM  , [facility]    "

## 2023-11-10 NOTE — TELEPHONE ENCOUNTER
Patient calling asking for status of form, tried to understand what patient is needing, she states she signed a release for disability to get her medical records, patient asking who she needs to talk to to have medical records faxed     Transferred patient to medical records    Wiley Moser RN

## 2023-11-13 ENCOUNTER — HOSPITAL ENCOUNTER (EMERGENCY)
Facility: CLINIC | Age: 28
Discharge: HOME | End: 2023-11-13
Attending: STUDENT IN AN ORGANIZED HEALTH CARE EDUCATION/TRAINING PROGRAM | Admitting: STUDENT IN AN ORGANIZED HEALTH CARE EDUCATION/TRAINING PROGRAM
Payer: COMMERCIAL

## 2023-11-13 VITALS
SYSTOLIC BLOOD PRESSURE: 130 MMHG | TEMPERATURE: 99.1 F | DIASTOLIC BLOOD PRESSURE: 85 MMHG | HEIGHT: 66 IN | OXYGEN SATURATION: 98 % | BODY MASS INDEX: 39.87 KG/M2 | HEART RATE: 95 BPM | RESPIRATION RATE: 18 BRPM

## 2023-11-13 DIAGNOSIS — F33.1 MAJOR DEPRESSIVE DISORDER, RECURRENT EPISODE, MODERATE (H): ICD-10-CM

## 2023-11-13 DIAGNOSIS — F90.9 ATTENTION DEFICIT HYPERACTIVITY DISORDER (ADHD), UNSPECIFIED ADHD TYPE: ICD-10-CM

## 2023-11-13 DIAGNOSIS — F43.10 COMPLEX POSTTRAUMATIC STRESS DISORDER: ICD-10-CM

## 2023-11-13 PROCEDURE — 99283 EMERGENCY DEPT VISIT LOW MDM: CPT

## 2023-11-13 PROCEDURE — 99283 EMERGENCY DEPT VISIT LOW MDM: CPT | Performed by: NURSE PRACTITIONER

## 2023-11-13 ASSESSMENT — ACTIVITIES OF DAILY LIVING (ADL)
ADLS_ACUITY_SCORE: 35
ADLS_ACUITY_SCORE: 35

## 2023-11-13 NOTE — ED PROVIDER NOTES
History   Chief Complaint:  Suicidal       HPI:  Lenore Suggs is a   28 year old female presenting with suicidal ideation, depression and anxiety.  Patient currently denies any feelings.  She says she has chronic suicidality since the age of 6.  No current intention or plan of suicide.  Denies use of drugs or alcohol.  No current medical concerns.  No homicidal ideation, auditory or visual hallucinations.    Independent Historian:  Independent history was obtained from the patient's girlfriend. They provided information detailed above in HPI.     Review of External Notes:  None.    I personally reviewed the patient's chart, including available medication list and available past medical history, past surgical history, family history, and social history.    Physical Exam   Patient Vitals for the past 24 hrs:   BP Temp Temp src Pulse Resp SpO2   11/13/23 1712 (!) 143/78 97.8  F (36.6  C) Temporal 93 16 100 %      Physical Exam  General: Alert female sitting in chair  HENT: Atraumatic, normocephalic  Eyes: Extraocular movements intact  Cardiovascular: Regular rate  Pulmonary: Easy work of breathing  Skin: Warm and dry  Neuro: Alert and oriented  Psych: Irritable affect, depressed mood    Emergency Department Course     Imaging & ECG: Laboratory:   No ECG performed.    No orders to display        Labs Ordered and Resulted from Time of ED Arrival to Time of ED Departure - No data to display      Procedures  None performed    Interventions & Assessments:  PSS-3      Date and Time Over the past 2 weeks have you felt down, depressed, or hopeless? Over the past 2 weeks have you had thoughts of killing yourself? Have you ever attempted to kill yourself? When did this last happen? User   11/13/23 1713 yes yes yes more than 6 months ago LS                Suicide assessment completed by mental health (D.E.C., LCSW, etc.)    Interventions:  Medications - No data to display     Assessments:  5:19 PM  I obtained history and  performed initial assessment of the patient.     Independent Interpretation (X-rays, CTs, rhythm strip):  None    Consultations/Discussion of Management or Tests:  None    Social Determinants of Health affecting care:   None.      [facility]  [facility]     Impression & Plan        Medical Decision Making:     Patient presenting with depression suicidal ideation anxiety.  Vital signs are stable.   The patient has no acute medical concerns.  They do not appear intoxicated and deny recent ingestions.   They do not have a specific plan for suicide at this time.  I feel the patient is medically clear for mental health assessment.   They are appropriate for transfer to [facility] unit for further evaluation and management.       Diagnosis:    ICD-10-CM    1. Suicidal ideation  R45.851       2. Depression with suicidal ideation  F32.A     R45.851       3. Anxiety  F41.9            Discharge Medications:  New Prescriptions    No medications on file        Lance Vazquez MD  11/13/23 [id number]

## 2023-11-14 ENCOUNTER — TELEPHONE (OUTPATIENT)
Dept: CARDIOLOGY | Facility: CLINIC | Age: 28
End: 2023-11-14
Payer: COMMERCIAL

## 2023-11-14 NOTE — DISCHARGE INSTRUCTIONS
Aftercare Plan      Therapy Appointment:  Date: Wednesday, 11/15/2023 Time: 9:00 am - 10:00 am  Provider: Aleida Fregoso  MS  LMFT  Location: [facility], [address] [place], [place] [id number]  Phone: [phone]  Type: Teletherapy (virtual)  Patient Instructions: For Teletherapy, a zoom link will be sent via text and email. Please check-in to your patient portal to complete important information prior to your appointment. See our website at: [employer].       Psychiatry Appointment:  Date: Thursday, 11/16/2023 Time: 9:00 am - 10:00 am  Provider: Alicia STARKS PA-C  Location: [facility], [address] [place] [address], [address], [place] [address]  Phone: [phone]  Type: Telepsychiatry (virtual)  Patient Instructions: 24-hours + prior to your appointment date/time, please fill out our New Patient Form by going to [email or web address] and call us on [phone] to confirm that you are able to attend. We will provide you information about how to log into video call software when you call. Thank you!      Programmatic Care Assessment:  A programmatic care assessment will help determine if an intensive outpatient or partial hospitalization program would be the best fit. The team will review your insurance benefits to see what is covered but we also recommend that you contact your insurance provider to discuss coverage for programming.     Date: 12/14/2023Time: 10:30 AM Length: 120 minutes  Visit Type: ADULT MH EVAL [2693] Copay: $0.00  Provider: Federico Bradford, LPCC, LADC   Department Location: The Assessment Center [place] [facility] [place] [facility].   Department Address: [address] [place] [id number]   Department Phone: [phone]    Please come to the [facility] entrance near the Emergency Department. Follow the signs to the Emergency  Room and park in the RED or YELLOW parking ramp. Enter in the [facility].   -If getting a ride, please request drop off at the address above.  -If driving, discounted parking is available in the Red or Yellow ramp across from the [facility] entrance.   Our reception area is conveniently located as you come into the [facility] in [address], next door to [employer].  Please bring a current list of medication and contact information for your other providers.     IMPORTANT: If you need to change your appointment, please call Behavioral Access [phone]. Please note, we do ask for a minimum of a 24-hour notice for canceled or rescheduled appointments.          If I am feeling unsafe or I am in a crisis, I will:   Contact my established care providers.   Call the National Suicide Prevention Lifeline (863) or the crisis text line (271137).   Go to the nearest emergency room.   Call 212.     Warning signs that I or other people might notice when a crisis is developing for me: More difficulty functioning or completing daily tasks, increased depression, increased anxiety, thoughts of harming myself or developing a plan for harming myself or other people.     Things I am able to do on my own to cope or help me feel better:   -Take a deep breath and sit down if needed. Think before acting.   -I can try practicing square breathing when I begin to feel anxious - inhale through the nose for the count of 4 and the first line on the square. Exhale through the mouth for the count of 4 for the second line of the square. Repeat to complete the square. Repeat the square as many times as needed.  -I can also use my five senses to practice mindfulness and grounding. What are five things I can see, four things I can hear, three things I can feel, two things I can smell, and one thing I can taste.  -Download a meditation mendoza and spend 15-20 minutes per day mediating/relaxing. Some apps to download include Calm, Headspace, and  Insight Timer. All of these apps have free version.     Things that I am able to do with others to cope or help me better:   -Commit to 30 minutes of self care daily. This can be as simple as taking a shower, going for a walk, cooking a meal, reading, writing, etc.   -I can also use community resources including mental health hotlines, [facility] crisis teams, or apps.     Things I can use or do for distraction:   -Call a friend or family member.   -Spend time outside.   -Go for a walk.   -Exercise  -Do chores.   -Do a project or favorite activity.   -Listen to music.   -Read.   -Journal your feelings.   -I can also download a meditation or relaxation mendoza, like Calm, Headspace, or Insight Timer (all three offer a free version).   -A great website resource is Change to Chill with active coping skills.     Changes I can make to support my mental health and wellness:   -Get at least 6-8 hours of sleep each night.   -Eat 3 nutritious meals per day.   -Take all of your medications as prescribed.   -Attend scheduled mental health therapy and psychiatric appointments and follow all recommendations.   -Maintain a daily schedule/routine.   -Practice deep breathing skills.   -Refrain from taking mood altering chemicals not currently prescribed to me.     People in my life that I can ask for help: Daisy, my mom and dad, my outpatient therapist and psychiatrist, the crisis text line, and [facility].      Your [facility] has a mental health crisis team you can call 24/7: [facility] Mobile Crisis  [phone]     Other things that are important when I'm in crisis: Remember that the feelings I am having right now are temporary and it won't feel like this forever. It is okay and important to ask for help.       Crisis Lines  Crisis Text Line  Text 275074  You will be connected with a trained live crisis counselor to provide support.  National Hope Line  1.800.SUICIDE [3686461]  Por espanol, texto  MARTELL a 308436 o texto  "a 442-AYUDAME en Ole  The [employer] (LGBTQ Youth Crisis Line)  4.018.599.7341  text START to 940-732    Community Resources  Fast Tracker  Linking people to mental health and substance use disorder resources  Visionnaire.[employer]   [place] Mental Health Warm Line  Peer to peer support  Monday thru Saturday, 12 pm to 10 pm  777.148.3226 or 0.492.277.8676  Text \"Support\" to 33171  National [place] on Mental Illness (LOLI)  560.068.9883 or 1.888.LOLI.HELPS    Mental Health Apps (all of these apps have a free version)  My3  https://FabriQate.org/  [employer]HopeBox  https://Avimoto/apps/virtual-hope-box/  Calm  Headspace  Insight Timer  Calm Harm    Community Resources  Fast Tracker  Linking people to mental health and substance use disorder resources  Visionnaire.[employer]   [place] Mental Health Warm Line  Peer to peer support  Monday thru Saturday, 12 pm to 10 pm  198.420.3269 or 0.664.991.9935  Text \"Support\" to 16657  National [place] on Mental Illness (LOLI)  481.166.5919 or 1.888.LOLI.HELPS    Additional Information  Today you were seen by a licensed mental health professional through Triage and Transition services, [facility] ([facility])  for a crisis assessment in the Emergency Department at [facility].  It is recommended that you follow up with your established providers (psychiatrist, mental health therapist, and/or primary care doctor - as relevant) as soon as possible. Coordinators from [facility] will be calling you in the next 24-48 hours to ensure that you have the resources you need.  You can also contact [facility] coordinators directly at [phone]. You may have been scheduled for or offered an appointment with a mental health provider. [facility] maintains an extensive network of licensed behavioral health providers to connect patients with the services they need.  We do not charge providers a fee to participate in our referral network.  We match patients with " providers based on a patient's specific needs, insurance coverage, and location.  Our first effort will be to refer you to a provider within your care system, and will utilize providers outside your care system as needed.

## 2023-11-14 NOTE — TELEPHONE ENCOUNTER
Please be advised that we have attempted to reach this patient to schedule their cardiac testing however two attempts have been made to contact patient and they have not returned our call.  No further attempts to reach this patient will by made by scheduling team.  Please contact this patient to encourage them to call our office at [phone] and schedule this order if it is still clinically recommended.      Thank you for allowing [facility] to participate in this patients healthcare needs.
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
## 2023-11-14 NOTE — ED NOTES
"[facility]. Pt states she has chronic SI since the age of 6 and that this should have been noted in her chart. Pt states she \"currently has no SI and that her SI comes and goes.\" Pt states that she is here at [facility] for mental health and wants resources including a therapist, psychiatrist and possibly medications. Pt was agitated throughout the interview and was struggling with tolerance to any disruptions. Pt was tearful and upset during the interview and was asking for tissue. Staff apologized that there was no tissues in the intake room and that we would be able get them as soon as we were done. Pt was more agitated and irritable.Pt was offered to complete the intake on the unit however she declined stating \"no finish this you started this you need to finish I!\". Pt was difficulty to redirect at this time. Pt came out of the intake upset and claiming that staff triggered her. Pt was shown to her chair and offered amenities including tissues and shown briefly around the unit.Pt search completed and belongings inventoried.   "
[facility]  ED to EMPATH Checklist:    Goal for EMPATH: Depression management, Anxiety management, and Suicidality    Current Behavior: Anxious and Cooperative    Safety Concerns: Suicidal, no plan    Legal Hold Status: Voluntary    Medically Cleared by ED provider: Yes    Patient Therapeutically Searched: Not searched - Currently in triage    Belongings: Remain with patient    Independent Ambulation at Baseline: Yes/No: Yes    Participates in Care/Conversation: Yes/No: Yes    Patient Informed about EMPATH: Yes/No: Yes    DEC: Not ordered    Patient Ready to be Transferred to EMPATH? Yes/No: Yes       
Pt is feeling ready to discharge and is glad she was cathy to get the resources that hs needled. Pt apologized for her behaviors in the intake room and felt embarrassed but did recognize that she was very triggered and feeling very uneasy about being in the hospital. Discharge instructions reviewed with patient including follow-up care plan. Educated on medication regime and advised not to stop prescribed medication without consulting their physician. Reviewed safety plan and outpatient resources/appointments.Verbalized understanding of discharge instructions. Denies SI. All belongings which where brought into the hospital have been returned to patient. Escorted off the unit @ 2033. Discharged to self.   
No

## 2023-11-14 NOTE — ED PROVIDER NOTES
"[facility] Unit - Psychiatric Consultation  [facility] Emergency Department    Lenore Suggs MRN: [id number]   Age: 28 year old YOB: 1995     History     Chief Complaint   Patient presents with    Suicidal     HPI   Lenore Suggs is a 28 year old female with history notable for complex PTSD, major depressive disorder, ADHD, cluster B traits. Patient presented to the emergency department seeking resources for symptoms of depression and anxiety. Patient was medically evaluated in the emergency department and determined to be medically stable for transfer to [facility] for further psychiatric assessment.     Here at [facility], patient initially presented as anxious and emotionally dysregulated. She appeared more relaxed after meeting with the [facility]. She describes taking vilazodone and Adderall, which have historically been helpful, but seem to be waning in efficacy recently. Patient reports that she had been taking vilazodone for a couple of years, and earlier this year her insurance would no longer cover it. She spent about 3 months without it, while attempting to obtain prior authorization. She reports she restarted vilazodone earlier this year, unable to recall when, but that her results this second time have been less therapeutic. She describes that \"I used to give an F, but now I give zero Fs.\" She reports that recently, she had tried taking 80 mg of vilazodone for about 1 week to see if this would help improve symptoms. It sounds as though this may have led to some agitation and difficulty sleeping. She reports that her sleep is impaired, experiencing difficulty falling and staying asleep. Denies any nightmares. She denies suicidal or homicidal ideation. There was no indication of bijal or psychosis. Patient feels comfortable with a plan to discharge this evening with outpatient resources, continuing her current medications until she follows up.     Past Medical History  Past Medical History: " "  Diagnosis Date    Asthma     Bipolar affective disorder (H)     Outbursts of anger     Schizophrenia (H)      No past surgical history on file.  albuterol (PROAIR HFA/PROVENTIL HFA/VENTOLIN HFA) 108 (90 Base) MCG/ACT inhaler  amphetamine-dextroamphetamine (ADDERALL XR) 15 MG 24 hr capsule  vilazodone (VIIBRYD) 40 MG TABS tablet      Allergies   Allergen Reactions    Alcohol Hives    Kiwi Hives    Red Dye     Seasonal Allergies Other (See Comments) and Rash     sneezing     Family History  Family History   Problem Relation Age of Onset    Mental Illness Mother     Paranoid behavior Mother     No Known Problems Father      Social History   Social History     Tobacco Use    Smoking status: Never    Smokeless tobacco: Never   Substance Use Topics    Alcohol use: Yes     Comment: rare    Drug use: No      Past medical history, past surgical history, medications, allergies, family history, and social history were reviewed with the patient. No additional pertinent items.       Review of Systems  A medically appropriate review of systems was performed with pertinent positives and negatives noted in the HPI, and all other systems negative.    Physical Examination   BP: (!) 143/78  Pulse: 93  Temp: 97.8  F (36.6  C)  Resp: 16  Height: 167.6 cm (5' 6\")  SpO2: 100 %    Physical Exam  General: Appears stated age.   Neuro: Alert and fully oriented. Extremities appear to demonstrate normal strength on visual inspection.   Integumentary/Skin: no rash visualized, normal color    Psychiatric Examination   Appearance: awake, alert, adequately groomed, appeared as age stated, and casually dressed  Attitude:  cooperative  Eye Contact:  fair  Mood:  depressed  Affect:  mood congruent and intensity is normal  Speech:  clear, coherent  Psychomotor Behavior:  no evidence of tardive dyskinesia, dystonia, or tics  Thought Process:  circumstantial  Associations:  no loose associations  Thought Content:  no evidence of suicidal ideation or " homicidal ideation and no evidence of psychotic thought  Insight:  fair  Judgement:  fair  Oriented to:  time, person, and place  Attention Span and Concentration:  fair  Recent and Remote Memory:  fair  Language: able to name/identify objects without impairment  Fund of Knowledge: intact with awareness of current and past events    ED Course        Labs Ordered and Resulted from Time of ED Arrival to Time of ED Departure - No data to display    Assessments & Plan (with Medical Decision Making)   Patient presenting with symptoms of depression and anxiety in the context of complex PTSD related to childhood trauma. She denies suicidal or homicidal thinking. She is in agreement with continuing her PTA medications with plan to follow-up with outpatient supports. Nursing notes reviewed noting no acute issues.     I have reviewed the assessment completed by the [facility].     Preliminary diagnosis:    ICD-10-CM    1. Complex posttraumatic stress disorder  F43.10       2. Attention deficit hyperactivity disorder (ADHD), unspecified ADHD type  F90.9       3. Major depressive disorder, recurrent episode, moderate (H)  F33.1            Treatment Plan:  - Continue vilazodone 40 mg daily for antidepressant treatment  - Continue Adderall XR 15 mg daily for symptoms of ADHD  - Patient will be provided with appointments for outpatient individual psychotherapy as well as psychiatric medication management  - Patient would benefit from intensive outpatient therapy/DBT to review skills related to emotional regulation and distress tolerance  - Patient to be discharged home this evening      After a period of working with the treatment team on the EmPATH unit, the patient's mental state improved to allow a safe transition to outpatient care. After counseling on the diagnosis, work-up, and treatment plan, the patient was discharged. Close follow-up with a psychiatrist and/or therapist was recommended and community psychiatric resources were  provided. Patient is to return to the ED if any urgent or potentially life-threatening concerns.     At the time of discharge, the patient's acute suicide risk was determined to be low due to the following factors: Reduction in the intensity of mood/anxiety symptoms that preceded the admission, denial of suicidal thoughts, denies feeling helpless or helpless, not currently under the influence of alcohol or illicit substances, denies experiencing command hallucinations, no immediate access to firearms. The patient's acute risk could be higher if noncompliant with their treatment plan, medications, follow-up appointments or using illicit substances or alcohol. Protective factors include: social supports, stable housing    --  Dejan Weinberg CNP   [facility] EMERGENCY DEPT  EmPATH Unit      Dejan Weinberg CNP  11/13/23 2007

## 2023-11-17 ENCOUNTER — VIRTUAL VISIT (OUTPATIENT)
Dept: INTERNAL MEDICINE | Facility: CLINIC | Age: 28
End: 2023-11-17
Payer: COMMERCIAL

## 2023-11-17 DIAGNOSIS — F43.10 COMPLEX POSTTRAUMATIC STRESS DISORDER: ICD-10-CM

## 2023-11-17 DIAGNOSIS — F43.10 COMPLEX POSTTRAUMATIC STRESS DISORDER: Primary | ICD-10-CM

## 2023-11-17 DIAGNOSIS — F90.2 ATTENTION DEFICIT HYPERACTIVITY DISORDER (ADHD), COMBINED TYPE: ICD-10-CM

## 2023-11-17 PROBLEM — F33.0 MAJOR DEPRESSIVE DISORDER, RECURRENT, MILD (H): Status: ACTIVE | Noted: 2023-11-17

## 2023-11-17 PROBLEM — F60.3 BORDERLINE PERSONALITY DISORDER (H): Status: ACTIVE | Noted: 2023-11-17

## 2023-11-17 PROCEDURE — 99213 OFFICE O/P EST LOW 20 MIN: CPT | Mod: VID | Performed by: INTERNAL MEDICINE

## 2023-11-17 RX ORDER — DEXTROAMPHETAMINE SACCHARATE, AMPHETAMINE ASPARTATE MONOHYDRATE, DEXTROAMPHETAMINE SULFATE AND AMPHETAMINE SULFATE 3.75; 3.75; 3.75; 3.75 MG/1; MG/1; MG/1; MG/1
15 CAPSULE, EXTENDED RELEASE ORAL DAILY
Qty: 30 CAPSULE | Refills: 0 | Status: SHIPPED | OUTPATIENT
Start: 2023-11-17 | End: 2023-12-26

## 2023-11-17 RX ORDER — VILAZODONE HYDROCHLORIDE 40 MG/1
40 TABLET ORAL DAILY
Qty: 90 TABLET | Refills: 3 | Status: SHIPPED | OUTPATIENT
Start: 2023-11-17 | End: 2024-09-13

## 2023-11-17 ASSESSMENT — ANXIETY QUESTIONNAIRES
3. WORRYING TOO MUCH ABOUT DIFFERENT THINGS: NEARLY EVERY DAY
GAD7 TOTAL SCORE: 18
5. BEING SO RESTLESS THAT IT IS HARD TO SIT STILL: NEARLY EVERY DAY
GAD7 TOTAL SCORE: 18
1. FEELING NERVOUS, ANXIOUS, OR ON EDGE: NEARLY EVERY DAY
6. BECOMING EASILY ANNOYED OR IRRITABLE: NEARLY EVERY DAY
7. FEELING AFRAID AS IF SOMETHING AWFUL MIGHT HAPPEN: NOT AT ALL
2. NOT BEING ABLE TO STOP OR CONTROL WORRYING: NEARLY EVERY DAY
4. TROUBLE RELAXING: NEARLY EVERY DAY
IF YOU CHECKED OFF ANY PROBLEMS ON THIS QUESTIONNAIRE, HOW DIFFICULT HAVE THESE PROBLEMS MADE IT FOR YOU TO DO YOUR WORK, TAKE CARE OF THINGS AT HOME, OR GET ALONG WITH OTHER PEOPLE: EXTREMELY DIFFICULT

## 2023-11-17 ASSESSMENT — ASTHMA QUESTIONNAIRES: ACT_TOTALSCORE: 19

## 2023-11-17 ASSESSMENT — PATIENT HEALTH QUESTIONNAIRE - PHQ9
SUM OF ALL RESPONSES TO PHQ QUESTIONS 1-9: 21
10. IF YOU CHECKED OFF ANY PROBLEMS, HOW DIFFICULT HAVE THESE PROBLEMS MADE IT FOR YOU TO DO YOUR WORK, TAKE CARE OF THINGS AT HOME, OR GET ALONG WITH OTHER PEOPLE: EXTREMELY DIFFICULT
SUM OF ALL RESPONSES TO PHQ QUESTIONS 1-9: 21

## 2023-11-17 NOTE — PROGRESS NOTES
Felecia is a 28 year old who is being evaluated via a billable video visit.      How would you like to obtain your AVS? [employer] Mount [employer]hart  If the video visit is dropped, the invitation should be resent by: Text to cell phone: [phone]  Will anyone else be joining your video visit? No    Assessment & Plan   Complex posttraumatic stress disorder  Given the numerous difficult interactions between myself + our staff and this patient, she will be discharged from my care. I have sent her a [employer] message to this effect today. I am happy to see her for acute issues in the future if she desires, but will not be following her longitudinally anymore.    I discussed this with our clinic leadership today as well and asked them to review this case given the numerous difficult interactions Felecia has had with our staff over the last few months.    As I've discussed multiple times with Felecia, she would GREATLY benefit from being under the care of a psychiatrist.    Depression Screening Follow Up      11/17/2023    12:35 AM   PHQ   PHQ-9 Total Score 21   Q9: Thoughts of better off dead/self-harm past 2 weeks Nearly every day   F/U: Thoughts of suicide or self-harm No   F/U: Safety concerns No     Terrance Vazquez MD  [facility]    I spent between 20-29 minutes on the date of the encounter doing chart review, history and exam, documentation and further activities as noted above    Subjective   Felecia is a 28 year old presenting for the following health issues:  Depression    History of Present Illness       Mental Health Follow-up:  Patient presents to follow-up on Depression & Anxiety.Patient's depression since last visit has been:  Worse  The patient is not having other symptoms associated with depression.  Patient's anxiety since last visit has been:  Worse  The patient is not having other symptoms associated with anxiety.  Any significant life events: relationship concerns, job concerns, financial concerns,  "grief or loss and health concerns  Patient is feeling anxious or having panic attacks.  Patient has no concerns about alcohol or drug use.    She eats 2-3 servings of fruits and vegetables daily.She consumes 2 sweetened beverage(s) daily.She exercises with enough effort to increase her heart rate 20 to 29 minutes per day.  She exercises with enough effort to increase her heart rate 4 days per week.   She is taking medications regularly.     Patient has sent multiple distressing [employer] messages over the last 1-2 months. She is trying to get approved for short term disability but has yet to get our clinic the relevant forms. [facility]    Upon connecting to the visit today, I said pita to which Felecia replied with a grunt. I asked if she could hear me and said yes. I then asked her \"How's it going?\" and was met with silence. I repeated the question and Felecia then yelled \"How do you think it's going?!\" I then stated that if she's going to yell then I will end the visit, but Felecia disconnected prior to me finished that sentence.    Review of Systems   Psych system negative, except as otherwise noted.      Objective    Vitals - Patient Reported  Weight (Patient Reported): 110.7 kg (244 lb)  Vitals: No vitals were obtained today due to virtual visit.    Physical Exam   GENERAL: Healthy, alert and no distress  EYES: Eyes grossly normal to inspection.  No discharge or erythema, or obvious scleral/conjunctival abnormalities.  RESP: No audible wheeze, cough, or visible cyanosis.  No visible retractions or increased work of breathing.    SKIN: Visible skin clear. No significant rash, abnormal pigmentation or lesions.  NEURO: Cranial nerves grossly intact.  Mentation and speech appropriate for age.  PSYCH: Mentation appears normal, affect normal/bright, judgement and insight intact, normal speech and appearance well-groomed.    Video-Visit Details  Type of service:  Video Visit   Originating " Location (pt. Location): Home  Distant Location (provider location):  On-site  Platform used for Video Visit: Minerva

## 2023-11-17 NOTE — COMMUNITY RESOURCES LIST (ENGLISH)
11/17/2023   [facility]  N/A  For questions about this resource list or additional care needs, please contact your primary care clinic or care manager.  Phone: [phone]   Email: N/A   Address: [facility] [place] [address]   Hours: N/A        Financial Stability       Rent and mortgage payment assistance  1  [facility] Distance: 1.87 miles      In-Person, Phone/Virtual   [address] [place] [address]  Language: English, Occitan  Hours: Mon - Fri 9:00 AM - 4:30 PM  Fees: Free   Phone: (181) 899-9639 Ext.113 Email: info@[facility].MoboTap Website: https://[facility].MoboTap     2  [facility] - Rent and mortgage payment assistance Distance: 5.34 miles      In-Person, Phone/Virtual   [address] [place] [address]  Language: English  Hours: Mon - Thu 9:00 AM - 3:00 PM  Fees: Free   Phone: (919) 782-6518 Email: Christianity@[facility].org Website: http://www.[facility]/care-ministries/          Transportation       Free or low-cost transportation  3  [facility] Distance: 6.56 miles      In-Person   [address] [place] [address]  Language: English  Hours: Mon - Fri 8:00 AM - 3:00 PM  Fees: Free   Phone: (732) 752-2219 Ext.14 Email: [facility].MoboTap Website: http://www.[facility].org     4  [facility] Distance: 6.56 miles      In-Person   [address] [place] [address]  Language: English  Hours: Mon - Fri 9:00 AM - 12:00 PM , Mon - Fri 1:00 PM - 3:00 PM  Fees: Self Pay   Phone: (191) 463-9138 Email: info@[employer]X.org Website: https://www.[facility].org/[place]     Transportation to medical appointments  5  [facility] Distance: 3.05 miles      In-Person   [address] [place] [address]  Language: English  Hours: Mon - Sun Open 24 Hours  Fees: Insurance, Self Pay    Phone: (101) 970-1676 Website: https://www.[employer]/[facility] Hospitalmet[facility]/     6  [place] [facility] Distance: 4.34 miles      In-Person   [address] [place] [address]  Language: Spanish, English, Oromo, Malaysian  Hours: Mon - Sat 5:00 AM - 9:00 PM  Fees: Insurance, Self Pay   Phone: (144) 666-7292 Email: info@[employer] Website: http://www.[employer]/          Important Numbers & Websites       Emergency Services   911  [facility] Services   311  Poison Control   (918) 143-9348  Suicide Prevention Lifeline   (694) 121-6906 (TALK)  Child Abuse Hotline   (865) 884-6937 (4-A-Child)  Sexual Assault Hotline   (952) 419-9019 (HOPE)  National Runaway Safeline   (694) 560-9807 (RUNAWAY)  All-Options Talkline   (257) 203-5194  Substance Abuse Referral   (767) 193-3840 (HELP)

## 2023-11-28 ENCOUNTER — TELEPHONE (OUTPATIENT)
Dept: FAMILY MEDICINE | Facility: CLINIC | Age: 28
End: 2023-11-28
Payer: COMMERCIAL

## 2023-11-28 NOTE — TELEPHONE ENCOUNTER
"Daisy called regarding patient. Verbal consent obtained from patient to discuss healthcare information with Daisy     SITUATION: patient needing short term disability paperwork signed off on from Jackie for work   BACKGROUND: About 2 weeks ago pt was let go from normal PCP and advised will need to see new PCP for ongoing/future care. They called the care team previously to \"get some stuff fixed\" - states they had spoken with Dr Leo, took care of some medical concerns. Pt was advised to send message if further concerns. Unable to send message to that provider as pt has not previously done a visit with him. States Dr Leo had agreed to be the go-between provider until pt establishes with a new PCP   ASSESSMENT: Needs short term disability paperwork signed off on - needs OV or VV for this. Has not yet chosen a new PCP to establish with.   RECOMMENDATION: future VV scheduled with Dr Leo to discuss - pt will plan to est care with a new PCP after that. They will fax over short term disability paperwork to Dr Leo's office     Forest Sidhu RN  [facility]       Appointments in Next Year      Nov 29, 2023  5:00 PM  (Arrive by 4:55 PM)  Provider Visit with Davide Leo MD  [facility] ([facility] ) [phone]     Dec 14, 2023 10:30 AM  (Arrive by 10:00 AM)  Evaluation with Federico Bradford, [facility], LADC  [facility] ([facility] ) [phone]            "

## 2023-11-29 ENCOUNTER — VIRTUAL VISIT (OUTPATIENT)
Dept: INTERNAL MEDICINE | Facility: CLINIC | Age: 28
End: 2023-11-29
Payer: COMMERCIAL

## 2023-11-29 DIAGNOSIS — F90.2 ATTENTION DEFICIT HYPERACTIVITY DISORDER (ADHD), COMBINED TYPE: Primary | ICD-10-CM

## 2023-11-29 PROCEDURE — 99213 OFFICE O/P EST LOW 20 MIN: CPT | Mod: VID | Performed by: INTERNAL MEDICINE

## 2023-11-29 ASSESSMENT — PATIENT HEALTH QUESTIONNAIRE - PHQ9
10. IF YOU CHECKED OFF ANY PROBLEMS, HOW DIFFICULT HAVE THESE PROBLEMS MADE IT FOR YOU TO DO YOUR WORK, TAKE CARE OF THINGS AT HOME, OR GET ALONG WITH OTHER PEOPLE: VERY DIFFICULT
SUM OF ALL RESPONSES TO PHQ QUESTIONS 1-9: 20
SUM OF ALL RESPONSES TO PHQ QUESTIONS 1-9: 20

## 2023-11-29 NOTE — PROGRESS NOTES
Felecia is a 28 year old who is being evaluated via a billable video visit.      How would you like to obtain your AVS? Johnhart  If the video visit is dropped, the invitation should be resent by: Send to e-mail at: [email or web address]  Will anyone else be joining your video visit? No          Assessment & Plan     Attention deficit hyperactivity disorder (ADHD), combined type  Referral placed, she would like to see if she can reestablish with one of the therapist she was seeing through [facility] before.  - Adult Mental Health  Referral; Future             Depression Screening Follow Up        11/29/2023    12:34 PM   PHQ   PHQ-9 Total Score 20   Q9: Thoughts of better off dead/self-harm past 2 weeks More than half the days   F/U: Thoughts of suicide or self-harm Yes   F/U: Self harm-plan No   F/U: Self-harm action No   F/U: Safety concerns No                 Follow Up    Follow Up Actions Taken  Scheduled appointment with [facility]    Discussed the following ways the patient can remain in a safe environment:        Davide Leo MD  [facility]   Felecia is a 28 year old, presenting for the following health issues:  No chief complaint on file.    Discuss disability paperwork- (Requested from Clayton a couple of times and we have not received them as of yet).     Would like a psych referral to go see Clementine Myles through [facility]    HPI       Patient has apparently been struggling with ADHD symptoms in context of what has been termed borderline personality disorder.  Has had psychiatric follow-up through [facility], not recently, has evaluation scheduled for 12/14.  She has been off of work for the last 2+ months as a result of her psychiatric symptoms, she is wondering if she can go back.    Symptoms include anxiety, and a propensity to explode with anger.  She is very agreeable during today's visit, and expresses regret with how she handled  her last visit with Dr. Vazquez.      Review of Systems   Constitutional, HEENT, cardiovascular, pulmonary, GI, , musculoskeletal, neuro, skin, endocrine and psych systems are negative, except as otherwise noted.      Objective           Vitals:  No vitals were obtained today due to virtual visit.    Physical Exam   GENERAL: Healthy, alert and no distress  EYES: Eyes grossly normal to inspection.  No discharge or erythema, or obvious scleral/conjunctival abnormalities.  RESP: No audible wheeze, cough, or visible cyanosis.  No visible retractions or increased work of breathing.    SKIN: Visible skin clear. No significant rash, abnormal pigmentation or lesions.  NEURO: Cranial nerves grossly intact.  Mentation and speech appropriate for age.  PSYCH: Mentation appears normal, affect normal/bright, judgement and insight intact, normal speech and appearance well-groomed.                Video-Visit Details    Type of service:  Video Visit     Originating Location (pt. Location): Home    Distant Location (provider location):  On-site  Platform used for Video Visit: Interviewstreet

## 2023-11-29 NOTE — COMMUNITY RESOURCES LIST (ENGLISH)
11/29/2023   [facility]  N/A  For questions about this resource list or additional care needs, please contact your primary care clinic or care manager.  Phone: [phone]   Email: N/A   Address: [facility] [place] [address]   Hours: N/A        Transportation       Free or low-cost transportation  1  [facility] Distance: 6.56 miles      In-Person   [address] [place] [address]  Language: English  Hours: Mon - Fri 8:00 AM - 3:00 PM  Fees: Free   Phone: (968) 366-6730 Ext.14 Email: neighborhood@[facility] Hospital[facility].Newsreps Website: http://www.[facility] CenterResponsive Sports.Newsreps     2  [facility] Distance: 6.56 miles      In-Person   [address] [place] [address]  Language: English  Hours: Mon - Fri 9:00 AM - 12:00 PM , Mon - Fri 1:00 PM - 3:00 PM  Fees: Self Pay   Phone: (701) 106-2466 Email: info@[employer].org Website: https://www.[employer].org/[place]     Transportation to medical appointments  3  [place] [facility] Distance: 4.34 miles      In-Person   [address] [place] [address]  Language: Danish, English, Oromo, Cymro  Hours: Mon - Sat 5:00 AM - 9:00 PM  Fees: Insurance, Self Pay   Phone: (726) 246-1337 Email: info@[employer] Website: http://www.[employer]/     4  [facility] Distance: 4.89 miles      In-Person   [address] [place] [address]  Language: English, Nicaraguan  Hours: Mon - Sun Appt. Only  Fees: Self Pay   Phone: (224) 183-7964 Email: oleg@Viralize Website: http://www.Viralize/          Important Numbers & Websites       Emergency Services   911  City Services   311  Poison Control   (935) 589-4684  Suicide Prevention Lifeline   (154) 371-2429 (TALK)  Child Abuse Hotline   (554) 901-5900 (4-A-Child)  Sexual Assault Hotline   (637) 687-1216 (HOPE)  National Runaway Safeline   (665) 204-5349 (RUNAWAY)  All-Options Talkline    (104) 393-7975  Substance Abuse Referral   (807) 914-9788 (HELP)

## 2023-12-14 ENCOUNTER — MYC MEDICAL ADVICE (OUTPATIENT)
Dept: INTERNAL MEDICINE | Facility: CLINIC | Age: 28
End: 2023-12-14
Payer: COMMERCIAL

## 2023-12-14 ENCOUNTER — TELEPHONE (OUTPATIENT)
Dept: INTERNAL MEDICINE | Facility: CLINIC | Age: 28
End: 2023-12-14
Payer: COMMERCIAL

## 2023-12-14 NOTE — TELEPHONE ENCOUNTER
We received the [facility] paperwork via [email or web address] from pt to complete and sign. In providers folder to complete and sign.

## 2023-12-26 ENCOUNTER — MYC REFILL (OUTPATIENT)
Dept: INTERNAL MEDICINE | Facility: CLINIC | Age: 28
End: 2023-12-26
Payer: COMMERCIAL

## 2023-12-26 DIAGNOSIS — F90.2 ATTENTION DEFICIT HYPERACTIVITY DISORDER (ADHD), COMBINED TYPE: ICD-10-CM

## 2023-12-27 RX ORDER — DEXTROAMPHETAMINE SACCHARATE, AMPHETAMINE ASPARTATE MONOHYDRATE, DEXTROAMPHETAMINE SULFATE AND AMPHETAMINE SULFATE 3.75; 3.75; 3.75; 3.75 MG/1; MG/1; MG/1; MG/1
15 CAPSULE, EXTENDED RELEASE ORAL DAILY
Qty: 30 CAPSULE | Refills: 0 | Status: SHIPPED | OUTPATIENT
Start: 2023-12-27 | End: 2024-02-02

## 2024-01-10 ENCOUNTER — OFFICE VISIT (OUTPATIENT)
Dept: INTERNAL MEDICINE | Facility: CLINIC | Age: 29
End: 2024-01-10
Payer: COMMERCIAL

## 2024-01-10 VITALS
DIASTOLIC BLOOD PRESSURE: 70 MMHG | WEIGHT: 244.4 LBS | RESPIRATION RATE: 16 BRPM | HEART RATE: 108 BPM | TEMPERATURE: 97.2 F | BODY MASS INDEX: 39.45 KG/M2 | OXYGEN SATURATION: 98 % | SYSTOLIC BLOOD PRESSURE: 120 MMHG

## 2024-01-10 DIAGNOSIS — F43.10 COMPLEX POSTTRAUMATIC STRESS DISORDER: ICD-10-CM

## 2024-01-10 DIAGNOSIS — E66.01 MORBID OBESITY (H): ICD-10-CM

## 2024-01-10 DIAGNOSIS — R22.9 LOCALIZED SUPERFICIAL SWELLING, MASS, OR LUMP: ICD-10-CM

## 2024-01-10 DIAGNOSIS — F33.0 MAJOR DEPRESSIVE DISORDER, RECURRENT, MILD (H): ICD-10-CM

## 2024-01-10 DIAGNOSIS — F60.3 BORDERLINE PERSONALITY DISORDER (H): Primary | ICD-10-CM

## 2024-01-10 PROCEDURE — 99214 OFFICE O/P EST MOD 30 MIN: CPT | Performed by: INTERNAL MEDICINE

## 2024-01-10 PROCEDURE — 96127 BRIEF EMOTIONAL/BEHAV ASSMT: CPT | Performed by: INTERNAL MEDICINE

## 2024-01-10 ASSESSMENT — ASTHMA QUESTIONNAIRES
QUESTION_4 LAST FOUR WEEKS HOW OFTEN HAVE YOU USED YOUR RESCUE INHALER OR NEBULIZER MEDICATION (SUCH AS ALBUTEROL): ONCE A WEEK OR LESS
QUESTION_5 LAST FOUR WEEKS HOW WOULD YOU RATE YOUR ASTHMA CONTROL: COMPLETELY CONTROLLED
QUESTION_2 LAST FOUR WEEKS HOW OFTEN HAVE YOU HAD SHORTNESS OF BREATH: ONCE OR TWICE A WEEK
QUESTION_1 LAST FOUR WEEKS HOW MUCH OF THE TIME DID YOUR ASTHMA KEEP YOU FROM GETTING AS MUCH DONE AT WORK, SCHOOL OR AT HOME: A LITTLE OF THE TIME
ACT_TOTALSCORE: 20
QUESTION_3 LAST FOUR WEEKS HOW OFTEN DID YOUR ASTHMA SYMPTOMS (WHEEZING, COUGHING, SHORTNESS OF BREATH, CHEST TIGHTNESS OR PAIN) WAKE YOU UP AT NIGHT OR EARLIER THAN USUAL IN THE MORNING: ONCE A WEEK
ACT_TOTALSCORE: 20

## 2024-01-10 ASSESSMENT — PATIENT HEALTH QUESTIONNAIRE - PHQ9
SUM OF ALL RESPONSES TO PHQ QUESTIONS 1-9: 16
SUM OF ALL RESPONSES TO PHQ QUESTIONS 1-9: 16
10. IF YOU CHECKED OFF ANY PROBLEMS, HOW DIFFICULT HAVE THESE PROBLEMS MADE IT FOR YOU TO DO YOUR WORK, TAKE CARE OF THINGS AT HOME, OR GET ALONG WITH OTHER PEOPLE: SOMEWHAT DIFFICULT

## 2024-01-10 NOTE — PROGRESS NOTES
Assessment & Plan     Borderline personality disorder (H)  It appears to me that this patient still has significant difficulty with social interaction, and I do not feel that she can return to work at this time, in her present job.  I have completed a leave form, granting her leave until at least she has her next appointment with her psychiatric provider in early February.    Major depressive disorder, recurrent, mild (H24)  Patient seems happy subjectively with her current medication regimen, I see no reason to change this in the time between now and her next visit with psychiatry    Morbid obesity (H)  Acknowledged the importance of dietary modification and weight loss as means of helping her mood disorders    Complex posttraumatic stress disorder      Localized superficial swelling, mass, or lump  Likely lipoma, reassurance.           MED REC REQUIRED  Post Medication Reconciliation Status: discharge medications reconciled, continue medications without change  Depression Screening Follow Up        1/10/2024     9:10 AM   PHQ   PHQ-9 Total Score 16   Q9: Thoughts of better off dead/self-harm past 2 weeks Several days   F/U: Thoughts of suicide or self-harm No   F/U: Safety concerns No                 Follow Up      Follow Up Actions Taken  Scheduled appointment with [facility] [place]    Discussed the following ways the patient can remain in a safe environment:        Davide Leo MD  [facility]    Lavinia Izquierdo is a 28 year old, presenting for the following health issues:  Depression      HPI               Discuss mental health and returning to work. Therapist does not think she is ready due to PTSD.       Today is my first time meeting this patient face-to-face.  She carries a number of different psychiatric diagnoses, including complex PTSD, borderline personality disorder, major depressive disorder, and ADHD.  She is currently visiting regularly with a therapist, has an  "appointment with a psychiatric provider a little less than 1 month from now.    She is very prone to anger and angry outbursts.  These have caused difficulty with her current job as a pharmacy tech at [employer].  She has been off of work since October of this past year, and is looking for a formal release from work for an indefinite period of time.  Somewhat of a vague historian, but she states that being at work is a \"trigger\" for various behaviors, most notably angry outbursts.    She does appear occasionally very angry during the visit today.  Her subjective history is occasionally tangential, and frequently somewhat illogical.  For example, she initially expresses a great deal of concern and angry dissatisfaction that unspecified \"heart issues\" in context of previous episodes of near syncope were not initially worked up further.  However, this patient did see a cardiologist in October of this past year, during which cardiac stress testing, as well as ambulatory heart monitoring with a Zio patch, were recommended and ordered, but then not completed by the patient.  Additionally, she states that she has had more recent and vaguely-described episodes of passing out, and when I suggest that we do complete at least a transthoracic echo and ambulatory heart monitoring, she unequivocally declines.    Only other complaint today is a small subcutaneous \"lump\" on her right lower extremity pretibial region, which has been present for a number of years without significant change.  Not painful, occasionally bruises.      Review of Systems   Constitutional, HEENT, cardiovascular, pulmonary, gi and gu systems are negative, except as otherwise noted.      Objective    /70 (BP Location: Left arm, Patient Position: Sitting, Cuff Size: Adult Large)   Pulse 108   Temp 97.2  F (36.2  C) (Temporal)   Resp 16   Wt 110.9 kg (244 lb 6.4 oz)   LMP 12/17/2023   SpO2 98%   BMI 39.45 kg/m    Body mass index is 39.45 " kg/m .  Physical Exam   GENERAL: healthy, alert and no distress  RESP: lungs clear to auscultation - no rales, rhonchi or wheezes  CV: regular rate and rhythm, normal S1 S2, no S3 or S4, no murmur, click or rub, no peripheral edema and peripheral pulses strong  ABDOMEN: soft, nontender, no hepatosplenomegaly, no masses and bowel sounds normal  MS: Subcutaneous lump in right pretibial region  PSYCH: Her affect ranges during the visit from calm to very angry, with pressured and profane speech.

## 2024-01-16 ENCOUNTER — MYC MEDICAL ADVICE (OUTPATIENT)
Dept: INTERNAL MEDICINE | Facility: CLINIC | Age: 29
End: 2024-01-16
Payer: COMMERCIAL

## 2024-01-16 DIAGNOSIS — R11.0 NAUSEA: Primary | ICD-10-CM

## 2024-01-16 RX ORDER — ONDANSETRON 4 MG/1
4 TABLET, ORALLY DISINTEGRATING ORAL EVERY 8 HOURS PRN
Qty: 30 TABLET | Refills: 1 | Status: SHIPPED | OUTPATIENT
Start: 2024-01-16

## 2024-02-02 ENCOUNTER — MYC REFILL (OUTPATIENT)
Dept: INTERNAL MEDICINE | Facility: CLINIC | Age: 29
End: 2024-02-02
Payer: COMMERCIAL

## 2024-02-02 DIAGNOSIS — F90.2 ATTENTION DEFICIT HYPERACTIVITY DISORDER (ADHD), COMBINED TYPE: ICD-10-CM

## 2024-02-05 RX ORDER — DEXTROAMPHETAMINE SACCHARATE, AMPHETAMINE ASPARTATE MONOHYDRATE, DEXTROAMPHETAMINE SULFATE AND AMPHETAMINE SULFATE 3.75; 3.75; 3.75; 3.75 MG/1; MG/1; MG/1; MG/1
15 CAPSULE, EXTENDED RELEASE ORAL DAILY
Qty: 30 CAPSULE | Refills: 0 | Status: SHIPPED | OUTPATIENT
Start: 2024-02-05 | End: 2024-03-05

## 2024-02-05 NOTE — TELEPHONE ENCOUNTER
Approved and sent to pharmacy, but please remind patient that future refills of this should come from her psychiatric provider, with whom she has an appointment later this week.

## 2024-02-08 ASSESSMENT — PATIENT HEALTH QUESTIONNAIRE - PHQ9
SUM OF ALL RESPONSES TO PHQ QUESTIONS 1-9: 15
SUM OF ALL RESPONSES TO PHQ QUESTIONS 1-9: 15
10. IF YOU CHECKED OFF ANY PROBLEMS, HOW DIFFICULT HAVE THESE PROBLEMS MADE IT FOR YOU TO DO YOUR WORK, TAKE CARE OF THINGS AT HOME, OR GET ALONG WITH OTHER PEOPLE: SOMEWHAT DIFFICULT
SUM OF ALL RESPONSES TO PHQ QUESTIONS 1-9: 15
SUM OF ALL RESPONSES TO PHQ QUESTIONS 1-9: 15
10. IF YOU CHECKED OFF ANY PROBLEMS, HOW DIFFICULT HAVE THESE PROBLEMS MADE IT FOR YOU TO DO YOUR WORK, TAKE CARE OF THINGS AT HOME, OR GET ALONG WITH OTHER PEOPLE: SOMEWHAT DIFFICULT

## 2024-02-09 ENCOUNTER — VIRTUAL VISIT (OUTPATIENT)
Dept: PSYCHIATRY | Facility: CLINIC | Age: 29
End: 2024-02-09
Payer: COMMERCIAL

## 2024-02-09 ENCOUNTER — VIRTUAL VISIT (OUTPATIENT)
Dept: BEHAVIORAL HEALTH | Facility: CLINIC | Age: 29
End: 2024-02-09
Payer: COMMERCIAL

## 2024-02-09 DIAGNOSIS — F90.2 ATTENTION DEFICIT HYPERACTIVITY DISORDER (ADHD), COMBINED TYPE: ICD-10-CM

## 2024-02-09 DIAGNOSIS — F43.10 COMPLEX POSTTRAUMATIC STRESS DISORDER: Primary | ICD-10-CM

## 2024-02-09 DIAGNOSIS — F43.10 PTSD (POST-TRAUMATIC STRESS DISORDER): Primary | ICD-10-CM

## 2024-02-09 DIAGNOSIS — F90.0 ATTENTION DEFICIT HYPERACTIVITY DISORDER (ADHD), PREDOMINANTLY INATTENTIVE TYPE: ICD-10-CM

## 2024-02-09 PROCEDURE — 90791 PSYCH DIAGNOSTIC EVALUATION: CPT | Mod: 95 | Performed by: SOCIAL WORKER

## 2024-02-09 PROCEDURE — 99214 OFFICE O/P EST MOD 30 MIN: CPT | Mod: 95 | Performed by: NURSE PRACTITIONER

## 2024-02-09 RX ORDER — VILAZODONE HYDROCHLORIDE 20 MG/1
20 TABLET ORAL DAILY
Qty: 30 TABLET | Refills: 1 | Status: SHIPPED | OUTPATIENT
Start: 2024-02-09 | End: 2024-03-05

## 2024-02-09 ASSESSMENT — PAIN SCALES - GENERAL: PAINLEVEL: MODERATE PAIN (4)

## 2024-02-09 NOTE — NURSING NOTE
Is the patient currently in the state of [place]? YES    Visit mode:VIDEO    If the visit is dropped, the patient can be reconnected by: VIDEO VISIT: Text to cell phone:   Telephone Information:   Mobile [phone]       Will anyone else be joining the visit? NO  (If patient encounters technical issues they should call [phone]    How would you like to obtain your AVS? MyChart    Are changes needed to the allergy or medication list? Pt stated no changes to allergies and Pt stated no med changes    Patient agreed to having other staff present for visit.    Reason for visit: Consult    Niecy Snowden [facility]    Care team has reviewed attendance agreement with patient. Patient advised that two failed appointments within 6 months may lead to termination of current episode of care.

## 2024-02-09 NOTE — PROGRESS NOTES
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 which themes or aspects of the trauma are expressed.      - Recurrent distressing dreams of the event. Note: In children, there may be frightening dreams without recognizable content.      - Intense psychological distress at exposure to internal or external cues that symbolize or resemble an aspect of the traumatic event.      - Physiological reactivity on exposure to internal or external cues that symbolize or resemble an aspect of the traumatic event.   C. Persistent avoidance of stimuli associated with the trauma and numbing of general responsiveness (not present before the trauma), as indicated by three (or more) of the following:     - Efforts to avoid thoughts, feelings, or conversations associated with the trauma.      - Efforts to avoid activities, places, or people that arouse recollections of the trauma.      - Markedly diminished interest or participation in significant activities.      - Restricted range of affect (e.g., unable to have loving feelings).   D. Persistent symptoms of increased arousal (not present before  the trauma), as indicated by two (or more) of the following:     - Difficulty falling or staying asleep.      - Irritability or outbursts of anger.      - Difficulty concentrating.      - Hypervigilance.   E. Duration of the disturbance is more than 1 month.  F. The disturbance causes clinically significant distress or impairment in social, occupational, or other important areas of functioning.    Functional Status:  Patient reports the following functional impairments:  relationship(s) and social interactions.     Nonprogrammatic care:  Patient is requesting basic services to address current mental health concerns.    Clinical Summary:  1. Psychosocial, Cultural and Contextual Factors: hx of trauma  .  2. Principal DSM5 Diagnoses  (Sustained by DSM5 Criteria Listed Above):   309.81 (F43.10) Posttraumatic Stress Disorder (includes Posttraumatic Stress Disorder for Children 6 Years and Younger)  Without dissociative symptoms.  3. Other Diagnoses that is relevant to services:   Attention-Deficit/Hyperactivity Disorder  314.00 (F90.0) Predominantly inattentive presentation.  4. Provisional Diagnosis:  Attention-Deficit/Hyperactivity Disorder  314.00 (F90.0) Predominantly inattentive presentation  309.81 (F43.10) Posttraumatic Stress Disorder (includes Posttraumatic Stress Disorder for Children 6 Years and Younger)  Without dissociative symptoms as evidenced by ROS, NINFA, PHQ, chart review and the interview.  .  5. Prognosis: Relieve Acute Symptoms.  6. Likely consequences of symptoms if not treated: Worsening symptoms and diminishing ability to function.  .  7. Client strengths include:  employed, goal-focused, good listener, has a previous history of therapy, insightful, and intelligent .     Recommendations:     1. Plan for Safety and Risk Management:   Safety and Risk: A safety and risk management plan has been developed including: Patient consented to co-developed safety plan.  Safety and risk management plan was  completed - see below.  Patient agreed to use safety plan should any safety concerns arise.  A copy was given to the patient..          Report to child / adult protection services was NA.     2. Patient's identified mental health concerns with a cultural influence will be addressed by at the request of the pt .     3. Initial Treatment will focus on:    Anger Management - skills development .     4. Resources/Service Plan:    services are not indicated.   Modifications to assist communication are not indicated.   Additional disability accommodations are not indicated.      5. Collaboration:   Collaboration / coordination of treatment will be initiated with the following  support professionals: Collaborated with [facility] team .      6.  Referrals:   The following referral(s) will be initiated:  none .       A Release of Information has been obtained for the following:  none .     Clinical Substantiation/medical necessity for the above recommendations:  see assessment.    7. MIGUEL:    MIGUEL:   no problem use noted . Provider gave patient printed information about the  effects of chemical use on their health and well being. Recommendations:  none .     8. Records:   These were reviewed at time of assessment.   Information in this assessment was obtained from the medical record and  provided by patient who is a good historian.    Patient will have open access to their mental health medical record.    9.   Interactive Complexity: No    10. Safety Plan:    Moderate Risk is identified when the patient has one of the following:   Multiple risk factors and few protective factors  increased stress, lack of support, dysregulation    The following has been recommended:  Complete/Review/Update Safety Plan    Safety Plan:  Adult Short Safety Plan:   Name: Lenore Suggs  YOB: 1995  Date: February 9, 2024   My primary care provider: [facility]  My primary care clinic: unknown  My  prescriber: PCP, Clementine Myles DNP APRN   Other care team support:  [facility] (therapist), Riana Roque [facility]   My Triggers:  Work  difficulties mistreatment by others, understaffing     Additional People, Places, and Things that I can access for support: [facility]                  GREEN    Good Control  1. I feel good  2. No suicidal thoughts   3. Can work, sleep and play      Action Steps  1. Self-care: balanced meals, exercising, sleep practices, etc.  2. Take your medications as prescribed.  3. Continue meetings with therapist and prescriber.  4.  Do the healthy things that I enjoy.             YELLOW  Getting Worse  I have ANY of these:  1. I do not feel good  2. Difficulty Concentrating  3. Sleep is changing  4. Increase/Change in my thoughts to hurt self and/or others, but I can still manage and not act on it.   5. Not taking care of self.               Action Steps (in addition to the above):  1. Inform your therapist and psychiatric prescriber/PCP.  2. Keep taking your medications as prescribed.    3. Turn to people you can ask for help.  4. Use internal coping strategies -see below.  5. Create safe environment: lock and limit medications, notify friends/family of increase in symptoms, and reduce means to other identified method             RED  Get Help  If I have ANY of these:  1. Current and uncontrollable thoughts and/or behaviors to hurt self and/or others.      Actions to manage my safety  1. Contact your emergency person Daisy (friend)  2. Call or Text [phone]  3. Call my crisis team- [facility] [phone] [facility] Emergencies  3. Or Call 911 or go to the emergency room right away  4. Go to [facility]        My Internal Coping Strategies include the following:  go to my safe space  , change my body temperature  , and use my coping skills    [End for Brief Safety Plan]     Safety Concerns  How To Identify Situations That Make Your Mental Health Worse:  Triggers are things  that make your mental health worse.  Look at the list below to help you find your triggers and what you can do about them.     1. Identify Early Warning Signs:    Sometimes symptoms return, even when people do their best to stay well. Symptoms can develop over a short period of time with little or no warning, but most of the time they emerge gradually over several weeks.  Early warning signs are changes that people experience when a relapse is starting. Some early warning signs are common and others are not as common.   Common Early Warning Signs:    Feeling depressed or low, Feeling irritable, and Urges to harm self     2. Identify action steps to take when warning signs are noticed:    Taking Action- It is important to take action if you are experiencing early warning signs of a relapse.  The faster you act, the more likely it is that you can avoid a full relapse.  It is helpful to identify several specific ways to cope with symptoms.      The following is my list of symptoms and coping strategies that I can use when they are present:    Symptom Coping Strategies   Anxiety -Talk with someone in your support system and let him or her know how you are feeling.  -Use relaxation techniques such as deep breathing or imagery.  -Use positive affirmations to counteract negative self-talk such as  I am learning to let go of worry.    Depression - Schedule your day; include activities you have to do and activities you enjoy doing.  - Get some exercise - walk, run, bike, or swim.  - Give yourself credit for even the smallest things you get done.   Sleep Difficulties   - Go to sleep at the same time every day.  - Do something relaxing before bed, such as drinking herbal tea or listening to music.  - Avoid having discussions about upsetting topics before going to bed.   Delusions   - Distract yourself from the disturbing thought by doing something that requires your attention such as a puzzle.  - Check out your beliefs by  talking to someone you trust.    Hallucinations   - Use headphones to listen to music.  - Tell voices to  stop  or say to yourself,  I am safe.   - Ignore the hallucinations as much as possible; focus on other things.   Concentration Difficulties - Minimize distractions so there is only one thing for you to focus on at a time.    - Ask the person you are having a conversation with to slow down or repeat things you are unsure of.      Crisis Resources    Refer to the resources below as needed.    Steps to care for yourself    If you are currently in counseling, call your counselor for an appointment  Call the local crisis resources below if needed.  Contact friends or family for support.  Get more exercise.  Do activities you enjoy.  Eat a well-balanced diet and drink plenty of fluids.  Rest as needed.  Limit alcohol and recreational drugs. These can worsen depression.  Properly store or remove fire arms.     When to contact your primary care provider     You have thoughts of harming or killing yourself but have not made a plan to carry it out.  Your depression gets in the way of daily activities.  You are often unable to sleep.  You need help cutting back on alcohol or recreational drugs.    When to call 911 or go to the Emergency Room     Get emergency help right away if you have any of the following:  You are planning to harm or kill yourself and you have a way to carry out the plan.   You have injured yourself or others. Or, you think you will.  You feel confused or are having trouble thinking or remembering.  You are having delusions (false beliefs).  You are hearing voices or seeing things that aren t there.  You are feeling psychotic (paranoid, fearful, restless, agitated, nervous, racing thoughts or speech)    Crisis Resources   The [facility] is an adults only unit located at [facility] is a short term (generally less than 23 hour stay) designed for crisis intervention and stabilization. Pts  have the opportunity to meet quickly with a behavioral health team for evaluation in a calm and peaceful therapuetic environment. To be evaluated for admission pts are triaged throught the [facility] ED.     The [facility] Center (B[facility]) is located at the [facility].The BEC is open to ages and provides a comprehensive behavioral health evaluation to those in crisis. Patients typically stay 24-36 hours.     The following hotlines are for both adults and children. The and are open 24 hours a day, 7 days a week unless noted otherwise.      Crisis Lines        Gambling Hotline  6.432.422.6882 [hope]        línea de crisis española  [phone]        [facility] Helpline  [phone]        National Hope Line  1.519.778.6512 [hope]        National Suicide Prevention Lifeline: text 988        The [employer] (LGBTQ Youth Crisis Line)  5.364.851.5463  text START to 272-857        's Crisis Line  5.701.781.6355 (Press 1)  or text 758844        [facility] Crisis  [phone]      [facility] Mobile Crisis  [phone]      [facility] Crisis  [phone]      [facility] Mobile Crisis  [phone] (adults)  [phone] (children)      [facility] Mobile Crisis  [phone] (adults)  [phone] (children)      [facility] Mobile Crisis  [phone]      [facility] Mobile Crisis  [phone]    Community Resources      Fast Tracker  Linking people to mental health and substance use disorder resources  fasttrackermn.org        National [place] on Mental Illness (LOLI)  221.312.6000 or 1.888.LOLI.HELPS  https://namimn.org/        [facility]  [facility] residents only   [address] [facility]  [phone]        [facility]  Free mental health counseling  https://walkin.org/  [phone] X2    Mental Health Apps      Calm Harm   https://calmharm.co.uk/      My3  https://my3app.org/      Ty Safety Plan  https://www.mysafetyplan.org/          Provider Name/ Credentials:  Riana Roque [facility]  She/her   February 9, 2024

## 2024-02-09 NOTE — PROGRESS NOTES
Virtual Visit Details    Type of service:  Video Visit     Originating Location (pt. Location): Home    Distant Location (provider location):  On-site  Platform used for Video Visit: [employer]       PSYCHIATRIC MEDICATION FOLLOW UP APPT     Name:  Lenore Suggs  : 1995    Referred by: Terrance Rand, [facility]    Patient Care Team:  Jakub Dunbar Macey Greene as PCP - Claudia Austin DO as Assigned Behavioral Health Provider  Terrance Rand MD as Assigned PCP  Therapist: [facility] ([place] weekly  History of using DBT skills     Patient attended the phone/video session alone.     Last seen for outpatient psychiatry Return Visit on 22.      Patient attended the phone/video session alone.    Last seen for outpatient psychiatry Return Visit on 2022. Now rereferred to [facility] due to acute symptoms     FOLLOWING PLAN PUT INTO PLACE:     Psychiatrically stable at present. Will return to PCP for ongoing care, medication prescribing and future medication refills.   3 months of medications fills provided.  Follow up with [facility], [facility] in about  3 months. Patient can be re-referred back to this service for further consultation in the future if needed but a new referral will need to be placed.     Form completed regarding emotional service animal        INTERIM HISTORY     COMMUNICATIONS FROM PATIENT VIA:  none    RECORDS AVAILABLE FOR REVIEW: EHR records through [facility] .  In addition, reviewed the assessment completed by CALVIN Bauer, dated today        HISTORY OF PRESENT ILLNESS   Initial [facility] referral for psychiatric medication consult in , graduated in 2022 and rereffered in 2024.  Reports history of multiple diagnosis throughout her childhood primarily in the context of traumatic events.  Previously psychiatrically hospitalized as an adolescent  on [facility] occassions, typcially in the context of dysregulation, SI/SA.   Hx of suicidal ideation and attempts. Last hospitalization was when she was 18 yo.   Genetically loaded for  schizophrenia (biomother) and unknown paternal genetic load.      Reports she first sought treatment at a very young age.    Reports symptoms of depression and anxiety emerged in early childhood. She reported she experienced her first panic attack at the age of 7 years old and attempted to end her life at the age of 8 years old. Started medications when she was 9 years old.  She highlighted that at that age, her foster parents wanted to adopt her but not her brothers. She asserted she  didn t want to be without [her] brothers  so she tried to end her life. She disclosed that the family  still wanted to adopt [her]  so she began engaging in behaviors to get them to  change their mind  such as throwing violent tantrums, urinating in her bed and refusing to shower. She indicated she was hospitalized for 3 months and after 2 years of out of control behaviors, the family rescinded their intent to adopt her. She reported she then  bounced around from [foster] home to home  until she was 11 when she settled in a home, she  liked.  She added that her  father  then attempted to get custody of her and she relocated with him to the [facility] when she was 15 years old. She acknowledged being a  really angry  teenager because she  couldn t express [herself].  She asserted she was diagnosed with several disorders including Schizophrenia and Bipolar I Disorder and prescribed psychiatric medications. She also disclosed she attempted to end her life on several occasions. Ms. Suggs stated that her last suicide attempt occurred in April of 2018. She asserted that in her twenties she has detached from others and experienced  no interest in anyone.  She outlined episodes of disassociation, obsessive thoughts, compulsive acts and fantasies of  death. She admitted she does not like being touched and fears being contaminated. She reported she  washes [her] hands too much  and likes things to feel  the right way.   She shares a previous diagnosis of bipolar and schizophrenia. Reports she had been off all medication from age 15 through 24 years old.      Meets criteria for multiple diagnosis. When evaluating closer, symptoms are primarily in the context of chronic trauma they has experienced as a child, adolescent and adult.   Primary diagnosis is complex PTSD.  In individuals who are exposed to chronic repeated, or long-standing traumatic events, the classical symptoms of PTSD often fail to completely describe the psychological harm, emotional problems, and changes in how people view themselves and the world around them.  As a result, some professionals believe that we should distinguish between the type of PTSD that developed from chronic, long-lasting traumatic events as compared to PTSD from short-lived event.  The diagnosis of  complex PTSD  refers to the set of symptoms that commonly follow exposure to a chronic, prolonged long lasting traumatic event.  These  generally involve some form of physical or emotional captivity, such as childhood sexual and/or physical abuse or domestic violence.  In these types of events, the victim is under the control of another person and doesn t have the ability to easily escape.      Symptoms of complex PTSD:      Emotion regulation problems- People with complex PTSD experience difficulties managing their emotions.  They may experience severe depression, thoughts of suicide, or have difficulty controlling their anger.      Changes in consciousness: following exposure to a chronic traumatic event, a person may repress memories of the traumatic event or experience flashbacks.  In rare cases, a person may experience disassociation.      Symptoms in this category include feelings of helplessness, shame, guilt, or feeling  detached and different from others      Changes in how the victim views the perpetrator: a person with complex PTSD may feel like he has no power over perpetrator (the perpetrator has complete power in a relationship).  In complex PTSD, people may also become preoccupied with their relationship with the perpetrator.  For example, constant thoughts of wanting revenge.      Changes in personal relationships:  These symptoms include problems with relationships, such as isolating oneself or being distrusting of others      Changes in how one views the world: People exposed to chronic or repeated traumatic events may also lose jose in humanity or have a sense of hopelessness.      Psychiatric testing by Lisa Petit, PhD LP Psychologist, documented date 6/8/2021 available for review with the following identified diagnoses:     Major Depressive Disorder, Recurrent, Mild   Posttraumatic Stress Disorder, With disassociation   Features of Borderline Personality Disorder  Attention Deficit Hyperactivity Disorder      Not formally diagnosed with autism and does not want to wait for the six month waiting list.      FAMILY, MEDICAL, SURGICAL HISTORY REVIEWED.  MEDICATION HAVE BEEN REVIEWED AND ARE CURRENT TO THE BEST OF MY KNOWLEDGE AND ABILITY.  Pharmacy tech, [facility]      MEDICATIONS                                                                                                Current Outpatient Medications   Medication Sig    albuterol (PROAIR HFA/PROVENTIL HFA/VENTOLIN HFA) 108 (90 Base) MCG/ACT inhaler Inhale 2 puffs into the lungs every 6 hours as needed for shortness of breath, wheezing or cough    amphetamine-dextroamphetamine (ADDERALL XR) 15 MG 24 hr capsule Take 1 capsule (15 mg) by mouth daily    ondansetron (ZOFRAN ODT) 4 MG ODT tab Take 1 tablet (4 mg) by mouth every 8 hours as needed for nausea    vilazodone (VIIBRYD) 40 MG TABS tablet Take 1 tablet (40 mg) by mouth daily     No current  "facility-administered medications for this visit.       NOTES ABOUT CURRENT PSYCHOTROPIC MEDICATIONS:   Adderall XR 15 mg  Viibryd 40 mg, off for a couple of months (April through July) due to issues obtaining but now back on.  Increased to 80 mg for two weeks on her own.  Went back down to 40 until seen by this provider     PAST PSYCHOTROPIC MEDICATIONS:  Risperidone   Olanzapine   [place]  Ziprasidone   Quetiapine   Guanfacine  Trazodone   Latuda   Citalopram   Escitalopram   Fluoxetine   Adderall IR 10 mg once daily  Bupropion  mg   Sertraline 100 mg, discontinued due to increase in fatigued   Vyvanse 40 mg, doesn't take on her days off.  Feedback from employer indicate improvement in attention and focus.    Concerta 54 mg       TODAY PATIENT REPORTS THE FOLLOWING PSYCHIATRIC ROS:   Per [facility], Riana Roque, during today's team-based visit:  \"MH update: [facility] assessed SI. Pt denies intent, plan or safety concerns. States that she gets frustrated, angry and overwhelmed. Acknowledges that she is a people pleaser so when others mistreat her she internalizes this.  Pt reports that she was frustrated with PCP's lack of knowledge or communication around her medication. She was only able to get 30 day supply which was $500. Pt has been off work since 10/2023. She would like to RTW because she feels being off exacerbates her sx. The sleep study revealed that while she doesn't have sleep apnea but did reveal a cardiac issue. She went to see the cardiologist who stated she's unlikely to have cardia issues due to her age.   Stresses: short staffed at work  Appetite: unremarkable  Sleep: poor  Therapy: [facility] ([place]), weekly  MIGUEL:   Preg: No  Interventions:  Most important: medication update, pt would like to return to work because she's bored which exacerbates her sx \"     EXERCISE: Adequate  SIDE EFFECTS:   tolerating medications without reported side effects  COMPLIANCE:   states " Adherent to medication regimen  REPORTS THE FOLLOWING NEW MEDICAL ISSUES: fainting started over the holidays, last in two weeks.  History of fainting off and on throughout her life.  Holter monitor for two days.  Not completed due to the pads not staying on.      Refusing cardiology consult per records     Was doing well but then ran out of the Viibryd due to insurance changes.  Finding herself more irritable, quick to react to minimal triggers.      PROBLEM: DEPRESSION: Worsening        2/8/2024     7:23 PM   Last PHQ-9   1.  Little interest or pleasure in doing things 3   2.  Feeling down, depressed, or hopeless 2   3.  Trouble falling or staying asleep, or sleeping too much 1   4.  Feeling tired or having little energy 3   5.  Poor appetite or overeating 1   6.  Feeling bad about yourself 1   7.  Trouble concentrating 2   8.  Moving slowly or restless 1   Q9: Thoughts of better off dead/self-harm past 2 weeks 1   PHQ-9 Total Score 15    15   In the past two weeks have you had thoughts of suicide or self harm? Yes   Do you have concerns about your personal safety or the safety of others? No   In the past 2 weeks have you thought about a plan or had intention to harm yourself? Yes   In the past 2 weeks have you acted on these thoughts in any way? No         11/29/2023    12:34 PM 1/10/2024     9:10 AM 2/8/2024     7:23 PM   PHQ-9 SCORE   PHQ-9 Total Score MyChart 20 (Severe depression) 16 (Moderately severe depression) 15 (Moderately severe depression)   PHQ-9 Total Score 20 16 15    15     PHQ9 score is 15 indicating moderately severe depression.  Suicidal ideation:   Endorses passive SI, no intent or plan.       PROBLEM: ANXIETY: Worsening.    GAD7 score is Not completed today      2/7/2023     9:17 PM 5/18/2023     7:13 AM 11/17/2023    12:37 AM   NINFA-7 SCORE   Total Score 16 (severe anxiety) 17 (severe anxiety) 18 (severe anxiety)   Total Score 16    16 17 18          PERTINENT PAST MEDICAL AND SURGICAL HISTORY  "    Past Medical History:   Diagnosis Date    Asthma     Bipolar affective disorder (H)     Outbursts of anger     Schizophrenia (H)        VITALS     BP Readings from Last 1 Encounters:   01/10/24 120/70     Pulse Readings from Last 1 Encounters:   01/10/24 108     Wt Readings from Last 1 Encounters:   01/10/24 110.9 kg (244 lb 6.4 oz)     Ht Readings from Last 1 Encounters:   11/13/23 1.676 m (5' 6\")     Estimated body mass index is 39.45 kg/m  as calculated from the following:    Height as of 11/13/23: 1.676 m (5' 6\").    Weight as of 1/10/24: 110.9 kg (244 lb 6.4 oz).    LABS & IMAGING                                                                                                                   Recent Labs   Lab Test 07/28/23  1903   WBC 6.6   HGB 12.4   HCT 38.9   MCV 89        Recent Labs   Lab Test 07/28/23  1903 06/23/21  1138    139   POTASSIUM 3.9 3.8   CHLORIDE 106 108   CO2 24 28   * 95   KIKE 8.9 9.0   BUN 10.7 9   CR 0.67 0.75   GFRESTIMATED >90 >90   ALBUMIN  --  3.7   PROTTOTAL  --  7.3   AST  --  23   ALT  --  44   ALKPHOS  --  83   BILITOTAL  --  0.3     Recent Labs   Lab Test 06/23/21  1138   CHOL 164   LDL 98   HDL 52   TRIG 71     Recent Labs   Lab Test 10/25/21  1138   TSH 1.36        ALLERGY & IMMUNIZATIONS       Allergies   Allergen Reactions    Alcohol Hives    Kiwi Hives    Red Dye     Seasonal Allergies Other (See Comments) and Rash     sneezing       MEDICAL REVIEW OF SYSTEMS:   Ten system review was completed with pertinent positives noted     MENTAL STATUS EXAM:      General/Constitutional:  Appearance:   awake, alert, adequately groomed, appeared stated age and no apparent distress  Attitude:    cooperative   Eye Contact:  good  Musculoskeletal:  Psychomotor Behavior:  no evidence of tardive dyskinesia, dystonia, or tics from the head up  Psychiatric:  Speech:  clear, coherent, regular rate, rhythm, and volume,   No pressure speech noted.  Associations:  no loose " associations  Thought Process:   logical, linear and goal oriented  Thought Content:   Endorses passive SI, no intent or plan. No homicidal ideation, no evidence of psychotic thought, no auditory hallucinations present and no visual hallucinations present  Mood:  emotionally labile, depression and anxiety   Affect:  full range and was congruent to speech content.  Insight:  good  Judgment:  intact, adequate for safety  Impulse Control:  intact  Neurological:  Oriented to:  person, place, time, and situation  Attention Span and Concentration:  normal    Language: intact     Recent and Remote Memory:  Intact to interview. Not formally assessed. No amnesia.    Fund of Knowledge: appropriate        SAFETY   Feels safe in home: Yes   Suicidal ideation: passive, no intent or plan  History of suicide attempts:  Yes   Hx of impulsivity: Yes   Hope for the future: present    Hx of Command hallucinations or current psychosis: No  History of Self-injurious behaviors: Yes Current:  No  Family member  by suicide:  No        SAFETY ASSESSMENT:   Based on all available evidence including the factors cited above, overall Risk for harm is low and is appropriate for outpatient level of care.   Recommended that patient call 911 or go to the local ED should there be a change in any of these risk factors.    DSM 5 DIAGNOSIS:   Major Depressive Disorder, Recurrent, Mild, per psych testing 2021  Complex Posttraumatic Stress Disorder, With disassociation (Primary)  Features of Borderline Personality Disorder  Attention Deficit Hyperactivity Disorder, per psych testing 2021        MEDICAL COMORBIDITY IMPACTING CLINICAL PICTURE: None noted      ASSESSMENT AND PLAN      Problem List as of 2024 Reviewed: 2022  4:36 PM by Christo Lizarraga MD            Noted       Behavioral    Last System Assessment & Plan 2024 Virtual Visit Written 2024  9:58 AM by Clementine Myles DNP     Exacerbation of depression,  anxiety and irritable, quick to react to minimal triggers.  Viibryd has been helpful and will further increase to 60 mg at this time.  Continue Adderall XR at current dosing, does not need scripts.  Follow-up in six weeks while waiting for this to become therapeutic.           1. Attention deficit hyperactivity disorder (ADHD), combined type 8/3/2021    2. Complex posttraumatic stress disorder - Primary 9/10/2021     Overview Signed 9/10/2021  2:45 PM by Clementine Myles APRN CNP      Complex PTSD explains affective instability, mood symptoms, anxiety and maladaptive behaviors more parsimoniously than separate diagnosis of anxiety, depression, bipolar, OCD and/or personality disorder.  Medications ultimately will not provide definitive treatment, but nonpharmacological interventions like dialectical behavior therapy (DBT) and eye movement desensitization and reprocessing (EMDR).             Relevant Medications     vilazodone (VIIBRYD) 20 MG TABS tablet        CONSULTS/REFERRALS:   Continue therapy   Coordinate care with therapist as needed     MEDICAL:   None at this time  Coordinate care with PCP ([facility], [facility]) as needed     FOLLOW UP: Schedule an appointment with me in four weeks or sooner as needed.   Call the psychiatric nurse line with medication questions or concerns at [phone] or [phone]  Follow up with primary care provider as planned or for acute medical concerns.     PSYCHOEDUCATION:  Medication side effects and alternatives reviewed. Health promotion activities recommended and reviewed today. All questions addressed. Education and counseling completed regarding risks and benefits of medications and psychotherapy options.  Consent provided by patient/guardian  Call the psychiatric nurse line with medication questions or concerns at [phone].  MyChart may be used to communicate with your provider, but this is not intended to be used for emergencies.  STIMULANT  THERAPY: Side effects discussed including but not limited to cardiac (including HTN, tachycardia, sudden death), motor/tic, appetite/growth, mood lability and sleep disruption. This is a controlled substance with risk for abuse, need to keep in a safe keep place and cannot replace lost scripts  Medlineplus.gov is information for patients.  It is run by the [employer] of Medicine and it contains information about all disorders, diseases and all medications.       COMMUNITY RESOURCES:    CRISIS NUMBERS: Provided in AVS 8/5/2021  National Suicide Prevention Lifeline: 7-123-505-TALK (418-480-1047)  Spoonity/resources for a list of additional resources (SOS)            [facility] - [phone]   Urgent Care Adult Mental [facility]-[phone] mobile unit/ 24/7 crisis line  [facility] -[phone]   COPE 24/7 [place] Mobile Team -[phone] (adults)/ [phone] (child)  Poison Control Center - 1-724.116.3131    OR  go to [facility] ER  Crisis Text Line for any crisis 24/7 send this-   To: 345930   [facility] ([facility]  [phone]  National Suicide Prevention Lifeline: 472.250.6756 (TTY: 877.212.4170). Call anytime for help.  (www.suicidepreventionlifeline.org)  National [place] [employer] Mental Illness (www.negrita.org): 689.873.1541 or 493-573-0965.   Mental Health Association (www.mentalhealth.org): 414.207.1648 or 943-302-7449.  [place] Crisis Text Line: Text [place] to 080052  Suicide LifeLine Chat: suicideJustInvesting.org/chat    ADMINISTRATIVE BILLING:   Level of Medical Decision Making:   - At least 1 chronic problem that is not stable  - Engaged in prescription drug management during visit (discussed any medication benefits, side effects, alternatives, etc.)             Patient Status:  Patient will continue to be seen for ongoing consultation and stabilization.    Signed:   Clementine Myles, MSN, APRN, FMHNP-[facility]  Psychiatry Service ([facility])   Chart documentation done in part with Dragon Voice Recognition software.  Although reviewed after completion, some word and grammatical errors may remain.

## 2024-02-19 ENCOUNTER — MYC MEDICAL ADVICE (OUTPATIENT)
Dept: PSYCHIATRY | Facility: CLINIC | Age: 29
End: 2024-02-19
Payer: COMMERCIAL

## 2024-02-19 NOTE — TELEPHONE ENCOUNTER
Reviewed patient's [employer] message. Patient has return to work forms that she needs completed.

## 2024-02-19 NOTE — LETTER
March 5, 2024      Lenore Suggs  [address] [place] [id number]        To Whom It May Concern:    Lenore Suggs was is a patient under my care. She may return to work with the following restrictions/accomodations: limited to the 2 hours of register time per day, split into 2 separate 1 hour chunks of time starting 03/05/24.      Sincerely,    Clementine Myles DNP, [facility]-BC  Mental Health and Addiction Services, Supervisor  [facility]

## 2024-02-20 NOTE — ASSESSMENT & PLAN NOTE
Exacerbation of depression, anxiety and irritable, quick to react to minimal triggers.  Viibryd has been helpful and will further increase to 60 mg at this time.  Continue Adderall XR at current dosing, does not need scripts.  Follow-up in six weeks while waiting for this to become therapeutic.

## 2024-02-20 NOTE — PATIENT INSTRUCTIONS
**For crisis resources, please see the information at the end of this document**     Thank you for coming to the [facility] [place] [facility].    TREATMENT PLAN:    MEDICATIONS:   - increase the Viibryd to 60 mg    -PSYCHOEDUCATION:      CONSULTS/REFERRALS:   Continue therapy     LABS/PROCEDURES:     Please call your [place] clinic and ask for a lab only appointment at your earliest convenience.  If your results are reassuring or normal they will be mailed to you or sent through [employer] within 7 days. If the lab tests need quick action we will call you with the results. The phone number we will call with results is # [phone].      FOLLOW UP: Schedule an appointment with me in six weeks  or sooner as needed.  The intake team should be calling you to schedule.  If you dont hear from them, or they were unable to reach you, please call [phone] to schedule.  Follow up with primary care provider as planned or for acute medical concerns.  Call the psychiatric nurse line with medication questions or concerns at [phone].      Medication Refills:  If you need any refills please call your pharmacy and they will contact us. Our fax number for refills is [phone]. Please allow three business for refill processing. If you need to  your refill at a new pharmacy, please contact the new pharmacy directly. The new pharmacy will help you get your medications transferred.     [facility] [employer] Assistance [phone]  Financial Assistance [phone]  [facility] Billing [phone]  Central Billing Office, ealth: [phone]  [place] Billing [phone]  Medical Records [phone]  [facility] Patient Bill of Rights [email or web address]       MENTAL HEALTH CRISIS RESOURCES:  For a emergency help, please call 911 or go to the nearest Emergency Department.     Emergency Walk-In Options:   [facility] @ [facility]  ([place]): [phone] - Specialized mental health emergency area designed to be calming  [facility] ([place]): [phone]  [facility] ([place]): [phone]  [facility] ([place]): [phone]    National Crisis Information: Call 988 Suicide and Crisis Lifeline  Crisis Text Line (free 24/7):  call **CRISIS (**647546) Crisis or use the texting option by texting 622951.   National Suicide Prevention Lifeline: Call 988  Poison Control Center: 5-803-579-6591  Trans Lifeline: 3-561-500-8332 - Hotline for transgender people of all ages  The Navid Project: 8-648-840-7005 - Hotline for LGBT youth   List of all [facility] resources:   https://[place].gov/dhs/people-we-serve/adults/health-care/mental-health/resources/crisis-contacts.jsp    For Non-Emergency Support:   Fast Tracker: Mental Health & Substance Use Disorder Resources -   https://www.fasttrackermn.org/       Again thank you for choosing [facility] [place] [facility] and please let us know how we can best partner with you to improve you and your family's health.    You may be receiving a survey regarding this appointment. We would love to have your feedback, both positive and negative. The survey is done by an external company, so your answers are anonymous.  Patient Education   Collaborative Care Psychiatry Service  What to Expect  Here's what to expect from your Collaborative Care Psychiatry Service (CCPS).   About CCPS  CCPS means 2 people work together to help you get better. You'll meet with a behavioral health clinician and a psychiatric doctor. A behavioral health clinician helps people with mental health problems by talking with them. A psychiatric doctor helps people by giving them medicine.  How it works  At every visit, you'll see the behavioral health clinician (BHC) first. They'll talk with you about how you're doing and teach you how to feel better.   Then you'll  "see the psychiatric doctor. This doctor can help you deal with troubling thoughts and feelings by giving you medicine. They'll make sure you know the plan for your care.   CCPS usually takes 3 to 6 visits. If you need more visits, we may have you start seeing a different psychiatric doctor for ongoing care.  If you have any questions or concerns, we'll be glad to talk with you.  About visits  Be open  At your visits, please talk openly about your problems. It may feel hard, but it's the best way for us to help you.  Cancelling visits  If you can't come to your visit, please call us right away at [phone]. If you don't cancel at least 24 hours (1 full day) before your visit, that's \"late cancellation.\"  Being late to visits  Being very late is the same as not showing up. You will be a \"no show\" if:  Your appointment starts with a [facility], and you're more than 15 minutes late for a 30-minute (half hour) visit. This will also cancel your appointment with the psychiatric doctor.  Your appointment is with a psychiatric doctor only, and you're more than 15 minutes late for a 30-minute (half hour) visit.  Your appointment is with a psychiatric doctor only, and you're more than 30 minutes late for a 60-minute (full hour) visit.  If you cancel late or don't show up 2 times within 6 months, we may end your care.   Getting help between visits  If you need help between visits, you can call us Monday to Friday from 8 a.m. to 4:30 p.m. at [phone].  Emergency care  Call 911 or go to the nearest emergency department if your life or someone else's life is in danger.  Call 988 anytime to reach the national Suicide and Crisis hotline.  Medicine refills  To refill your medicine, call your pharmacy. You can also call [facility]'s Behavioral Access at [phone], Monday to Friday, 8 a.m. to 4:30 p.m. It can take 1 to 3 business days to get a refill.   Forms, letters, and tests  You may have papers to fill out, like " FMLA, short-term disability, and workability. We can help you with these forms at your visits, but you must have an appointment. You may need more than 1 visit for this, to be in an intensive therapy program, or both.  Before we can give you medicine for ADHD, we may refer you to get tested for it or confirm it another way.  We may not be able to give you an emotional support animal letter.  We don't do mental health checks ordered by the court.   We don't do mental health testing, but we can refer you to get tested.   Thank you for choosing us for your care.  For informational purposes only. Not to replace the advice of your health care provider. Copyright   2022 [facility]. All rights reserved. [employer] [id number] - 12/22.

## 2024-02-23 ENCOUNTER — NURSE TRIAGE (OUTPATIENT)
Dept: NURSING | Facility: CLINIC | Age: 29
End: 2024-02-23
Payer: COMMERCIAL

## 2024-02-23 NOTE — TELEPHONE ENCOUNTER
"This morning around 10:53 is having chest pain.  Patient had chest issue for years.  Now is feeling tightness on her left side and dizzy.  Patient states that \"no one takes me seriously\" and feels the health care system makes her afraid to come into ER or be seen due to her age.   Patient does not know her history and she states that I am an \"orphan\".  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  Patient states that she is currently having pain in shoulder and FNA advised ER.  Patient unsure what she is going to do.  Patient requesting documentation be put in her chart regarding her conversation.        Reason for Disposition   Pain also in shoulder(s) or arm(s) or jaw    Additional Information   Negative: SEVERE difficulty breathing (e.g., struggling for each breath, speaks in single words)   Negative: Difficult to awaken or acting confused (e.g., disoriented, slurred speech)   Negative: Shock suspected (e.g., cold/pale/clammy skin, too weak to stand, low BP, rapid pulse)   Negative: Passed out (i.e., lost consciousness, collapsed and was not responding)   Negative: Chest pain lasting longer than 5 minutes and ANY of the following:         Pain is crushing, pressure-like, or heavy         Over 44 years old          Over 30 years old and one cardiac risk factor (e.g diabetes, high blood pressure, high cholesterol, smoker, or family history of heart disease)         History of heart disease (e.g. angina, heart attack, heart failure, bypass surgery, takes nitroglycerin)   Negative: Heart beating < 50 beats per minute OR > 140 beats per minute   Negative: Visible sweat on face or sweat dripping down face   Negative: Sounds like a life-threatening emergency to the triager   Negative: SEVERE chest pain    Protocols used: Chest Pain-A-[place]    "

## 2024-02-26 NOTE — TELEPHONE ENCOUNTER
RN reviewed forms and NATHALIE has now been sighed by patient to be able to release completed forms to employers accommodations firm [employer].  Patient would also like a copy for herself as well.     RN acknowledged receipt of forms via MyC.     Routing to provider for review upon her return to office.    Olivia Leong RN on 2/26/2024 at 1:19 PM

## 2024-02-28 NOTE — TELEPHONE ENCOUNTER
"RN reviewed additional MyC message    \"Good morning. I did not realize so this is entirely on me for not sending the signed forms but these forms are due today, if it would be possible to potentially have it expedited? Please let me or Daisy know if there's anything we can do in the meantime.\"    RN forwarding to provider for high priority review.    Lolis Krause RN on 2/28/2024 at 10:08 AM    "

## 2024-02-28 NOTE — TELEPHONE ENCOUNTER
5pm call to patient to discuss return to work forms.  Requesting to return to work 3/5/2024 with accomodations.  Form completed.  Release of Information on file.  Form faxed to appropriate number and SV will get a copy to patient on 2/29 also.     Clementine Myles, PRINCESS, APRN, FMHNP-BC,

## 2024-02-28 NOTE — TELEPHONE ENCOUNTER
Printed forms.  Call to patient as will need input on exact dates.  Left message that I will call again between 430-5 pm today to discuss.    Clementine Myles, PRINCESS, APRN, FMHNP-BC,

## 2024-02-29 ENCOUNTER — TELEPHONE (OUTPATIENT)
Dept: PSYCHIATRY | Facility: CLINIC | Age: 29
End: 2024-02-29
Payer: COMMERCIAL

## 2024-02-29 NOTE — TELEPHONE ENCOUNTER
Receive a copy of Accommodation Substantiation form through provider's email      Send a copy to PT's email [email or web address]    Fax to [place] [employer] [place] at [phone]    Fax to HIM for scanning.

## 2024-03-01 NOTE — TELEPHONE ENCOUNTER
"Pt MyC message -     \"Good morning,   Thank you for your help. I spoke with tyrel this morning and they are all set for my return to work on Tuesday. 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 Thanks in advance,      Felecia\"      Forwarding to provider, Clementine Myles NP.    "

## 2024-03-05 ENCOUNTER — MYC REFILL (OUTPATIENT)
Dept: INTERNAL MEDICINE | Facility: CLINIC | Age: 29
End: 2024-03-05
Payer: COMMERCIAL

## 2024-03-05 ENCOUNTER — MYC REFILL (OUTPATIENT)
Dept: PSYCHIATRY | Facility: CLINIC | Age: 29
End: 2024-03-05
Payer: COMMERCIAL

## 2024-03-05 DIAGNOSIS — F90.2 ATTENTION DEFICIT HYPERACTIVITY DISORDER (ADHD), COMBINED TYPE: ICD-10-CM

## 2024-03-05 DIAGNOSIS — F43.10 COMPLEX POSTTRAUMATIC STRESS DISORDER: ICD-10-CM

## 2024-03-05 RX ORDER — VILAZODONE HYDROCHLORIDE 20 MG/1
20 TABLET ORAL DAILY
Qty: 90 TABLET | Refills: 0 | Status: SHIPPED | OUTPATIENT
Start: 2024-03-05 | End: 2024-05-16

## 2024-03-05 RX ORDER — DEXTROAMPHETAMINE SACCHARATE, AMPHETAMINE ASPARTATE MONOHYDRATE, DEXTROAMPHETAMINE SULFATE AND AMPHETAMINE SULFATE 3.75; 3.75; 3.75; 3.75 MG/1; MG/1; MG/1; MG/1
15 CAPSULE, EXTENDED RELEASE ORAL DAILY
Qty: 30 CAPSULE | Refills: 0 | Status: SHIPPED | OUTPATIENT
Start: 2024-03-05 | End: 2024-04-11

## 2024-03-05 NOTE — TELEPHONE ENCOUNTER
1) Reviewed 90 day refill request for vilazodone (VIIBRYD) 20 MG TABS tablet     2) Patient Comment: Can I please have this one sent to [facility], as of right now it's $80 dollars cheaper to get a 90 days supply at [facility]. Please and thank you     3) 90 Day Request pended to [facility]. If authourized prescription will need to be modified by Provider.       MELONIE CHAMBERS RN on 3/5/2024 at 11:22 AM     Submitted PA for INSULIN  Via CMM Key: [id number] STATUS: DENIED.     See denial letter scanned into the chart

## 2024-03-05 NOTE — TELEPHONE ENCOUNTER
Letter completed and available for patient via [employer] message     Clementine Myles, PRINCESS, APRN, FMHNP-BC,
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
## 2024-03-29 ENCOUNTER — VIRTUAL VISIT (OUTPATIENT)
Dept: PSYCHIATRY | Facility: CLINIC | Age: 29
End: 2024-03-29
Payer: COMMERCIAL

## 2024-03-29 ENCOUNTER — VIRTUAL VISIT (OUTPATIENT)
Dept: BEHAVIORAL HEALTH | Facility: CLINIC | Age: 29
End: 2024-03-29
Payer: COMMERCIAL

## 2024-03-29 DIAGNOSIS — F43.10 COMPLEX POSTTRAUMATIC STRESS DISORDER: ICD-10-CM

## 2024-03-29 DIAGNOSIS — F60.3 BORDERLINE PERSONALITY DISORDER (H): Primary | ICD-10-CM

## 2024-03-29 DIAGNOSIS — F90.0 ATTENTION DEFICIT HYPERACTIVITY DISORDER (ADHD), PREDOMINANTLY INATTENTIVE TYPE: ICD-10-CM

## 2024-03-29 DIAGNOSIS — F41.1 GENERALIZED ANXIETY DISORDER: ICD-10-CM

## 2024-03-29 DIAGNOSIS — F90.2 ATTENTION DEFICIT HYPERACTIVITY DISORDER (ADHD), COMBINED TYPE: ICD-10-CM

## 2024-03-29 DIAGNOSIS — F33.1 DEPRESSION, MAJOR, RECURRENT, MODERATE (H): ICD-10-CM

## 2024-03-29 DIAGNOSIS — F43.10 PTSD (POST-TRAUMATIC STRESS DISORDER): Primary | ICD-10-CM

## 2024-03-29 PROCEDURE — 90832 PSYTX W PT 30 MINUTES: CPT | Mod: 95 | Performed by: SOCIAL WORKER

## 2024-03-29 PROCEDURE — 99214 OFFICE O/P EST MOD 30 MIN: CPT | Mod: 95 | Performed by: NURSE PRACTITIONER

## 2024-03-29 RX ORDER — LAMOTRIGINE 25 MG/1
TABLET ORAL
Qty: 60 TABLET | Refills: 1 | Status: SHIPPED | OUTPATIENT
Start: 2024-03-29 | End: 2024-06-12

## 2024-03-29 ASSESSMENT — PATIENT HEALTH QUESTIONNAIRE - PHQ9
SUM OF ALL RESPONSES TO PHQ QUESTIONS 1-9: 9
10. IF YOU CHECKED OFF ANY PROBLEMS, HOW DIFFICULT HAVE THESE PROBLEMS MADE IT FOR YOU TO DO YOUR WORK, TAKE CARE OF THINGS AT HOME, OR GET ALONG WITH OTHER PEOPLE: NOT DIFFICULT AT ALL
SUM OF ALL RESPONSES TO PHQ QUESTIONS 1-9: 9

## 2024-03-29 ASSESSMENT — PAIN SCALES - GENERAL: PAINLEVEL: SEVERE PAIN (7)

## 2024-03-29 NOTE — PROGRESS NOTES
Virtual Visit Details    Type of service:  Video Visit     Originating Location (pt. Location): Home    Distant Location (provider location):  Off-site  Platform used for Video Visit: SurfEasy         PSYCHIATRIC MEDICATION FOLLOW UP APPT     Name:  Lenore Suggs  : 1995    Referred by: Terrance Rand, [facility]    Patient Care Team:  [facility] Macey Camas as PCP - Claudia Austin DO as Assigned Behavioral Health Provider  Terrance Rand MD as Assigned PCP  Therapist: [facility] ([place]), weekly  History of using DBT skills     Patient attended the phone/video session alone.     Last seen for outpatient psychiatry Return Visit on 2024.       Patient attended the phone/video session alone.         FOLLOWING PLAN PUT INTO PLACE:      Exacerbation of depression, anxiety and irritable, quick to react to minimal triggers.  Viibryd has been helpful and will further increase to 60 mg at this time.  Continue Adderall XR at current dosing, does not need scripts.  Follow-up in six weeks while waiting for this to become therapeutic.            INTERIM HISTORY     COMMUNICATIONS FROM PATIENT VIA:  paper work completed for work accomodations      RECORDS AVAILABLE FOR REVIEW: EHR records through [employer] .  In addition, reviewed the assessment completed by Riana Roque [facility], dated today        HISTORY OF PRESENT ILLNESS   Initial [facility]PS referral for psychiatric medication consult in , graduated in 2022 and rereffered in 2024.  Reports history of multiple diagnosis throughout her childhood primarily in the context of traumatic events.  [facility], typcially in the context of dysregulation, SI/SA.   Hx of suicidal ideation and attempts. Last hospitalization was when she was 20 yo.   Genetically loaded for  schizophrenia (biomother) and unknown  paternal genetic load.      Reports she first sought treatment at a very young age.    Reports symptoms of depression and anxiety emerged in early childhood. She reported she experienced her first panic attack at the age of 7 years old and attempted to end her life at the age of 8 years old. Started medications when she was 9 years old.  She highlighted that at that age, her foster parents wanted to adopt her but not her brothers. She asserted she  didn t want to be without [her] brothers  so she tried to end her life. She disclosed that the family  still wanted to adopt [her]  so she began engaging in behaviors to get them to  change their mind  such as throwing violent tantrums, urinating in her bed and refusing to shower. She indicated she was hospitalized for 3 months and after 2 years of out of control behaviors, the family rescinded their intent to adopt her. She reported she then  bounced around from [foster] home to home  until she was 11 when she settled in a home, she  liked.  She added that her  father  then attempted to get custody of her and she relocated with him to the [facility] when she was 15 years old. She acknowledged being a  really angry  teenager because she  couldn t express [herself].  She asserted she was diagnosed with several disorders including Schizophrenia and Bipolar I Disorder and prescribed psychiatric medications. She also disclosed she attempted to end her life on several occasions. Ms. Suggs stated that her last suicide attempt occurred in April of 2018. She asserted that in her twenties she has detached from others and experienced  no interest in anyone.  She outlined episodes of disassociation, obsessive thoughts, compulsive acts and fantasies of death. She admitted she does not like being touched and fears being contaminated. She reported she  washes [her] hands too much  and likes things to feel  the right way.   She shares a previous diagnosis of bipolar and  schizophrenia. Reports she had been off all medication from age 15 through 24 years old.      Meets criteria for multiple diagnosis. When evaluating closer, symptoms are primarily in the context of chronic trauma they has experienced as a child, adolescent and adult.   Primary diagnosis is complex PTSD.  In individuals who are exposed to chronic repeated, or long-standing traumatic events, the classical symptoms of PTSD often fail to completely describe the psychological harm, emotional problems, and changes in how people view themselves and the world around them.  As a result, some professionals believe that we should distinguish between the type of PTSD that developed from chronic, long-lasting traumatic events as compared to PTSD from short-lived event.  The diagnosis of  complex PTSD  refers to the set of symptoms that commonly follow exposure to a chronic, prolonged long lasting traumatic event.  These  generally involve some form of physical or emotional captivity, such as childhood sexual and/or physical abuse or domestic violence.  In these types of events, the victim is under the control of another person and doesn t have the ability to easily escape.      Symptoms of complex PTSD:      Emotion regulation problems- People with complex PTSD experience difficulties managing their emotions.  They may experience severe depression, thoughts of suicide, or have difficulty controlling their anger.      Changes in consciousness: following exposure to a chronic traumatic event, a person may repress memories of the traumatic event or experience flashbacks.  In rare cases, a person may experience disassociation.      Symptoms in this category include feelings of helplessness, shame, guilt, or feeling detached and different from others      Changes in how the victim views the perpetrator: a person with complex PTSD may feel like he has no power over perpetrator (the perpetrator has complete power in a relationship).   In complex PTSD, people may also become preoccupied with their relationship with the perpetrator.  For example, constant thoughts of wanting revenge.      Changes in personal relationships:  These symptoms include problems with relationships, such as isolating oneself or being distrusting of others      Changes in how one views the world: People exposed to chronic or repeated traumatic events may also lose jose in humanity or have a sense of hopelessness.      Psychiatric testing by Lisa Petit, PhD LP Psychologist, documented date 6/8/2021 available for review with the following identified diagnoses:     Major Depressive Disorder, Recurrent, Mild   Posttraumatic Stress Disorder, With disassociation   Features of Borderline Personality Disorder  Attention Deficit Hyperactivity Disorder      Not formally diagnosed with autism and does not want to wait for the six month waiting list.      FAMILY, MEDICAL, SURGICAL HISTORY REVIEWED.  MEDICATION HAVE BEEN REVIEWED AND ARE CURRENT TO THE BEST OF MY KNOWLEDGE AND ABILITY.  Pharmacy tech, [facility]      MEDICATIONS                                                                                                Current Outpatient Medications   Medication Sig    albuterol (PROAIR HFA/PROVENTIL HFA/VENTOLIN HFA) 108 (90 Base) MCG/ACT inhaler Inhale 2 puffs into the lungs every 6 hours as needed for shortness of breath, wheezing or cough    amphetamine-dextroamphetamine (ADDERALL XR) 15 MG 24 hr capsule Take 1 capsule (15 mg) by mouth daily    ondansetron (ZOFRAN ODT) 4 MG ODT tab Take 1 tablet (4 mg) by mouth every 8 hours as needed for nausea    vilazodone (VIIBRYD) 20 MG TABS tablet Take 1 tablet (20 mg) by mouth daily Take with 40 mg for total daily dose of 60 mg    vilazodone (VIIBRYD) 40 MG TABS tablet Take 1 tablet (40 mg) by mouth daily     No current facility-administered medications for this visit.       NOTES ABOUT CURRENT PSYCHOTROPIC MEDICATIONS:   Adderall  "XR 15 mg  Viibryd 60 mg     PAST PSYCHOTROPIC MEDICATIONS:  Risperidone   Olanzapine   Shipman  Ziprasidone   Quetiapine   Guanfacine  Trazodone   Latuda   Citalopram   Escitalopram   Fluoxetine   Adderall IR 10 mg once daily  Bupropion  mg   Sertraline 100 mg, discontinued due to increase in fatigued   Vyvanse 40 mg, doesn't take on her days off.  Feedback from employer indicate improvement in attention and focus.    Concerta 54 mg     TODAY PATIENT REPORTS THE FOLLOWING PSYCHIATRIC ROS:   Per [facility], Riana Roque, during today's team-based visit:  \"MH update: [facility] assessed SI. Pt denies intent, plan or safety concerns. States that she gets frustrated, angry and overwhelmed. Acknowledges that she is a people pleaser so when others mistreat her she internalizes this.  Pt reports that she was frustrated with PCP's lack of knowledge or communication around her medication. She was only able to get 30 day supply which was $500. Pt has been off work since 10/2023. She would like to RTW because she feels being off exacerbates her sx. The sleep study revealed that while she doesn't have sleep apnea but did reveal a cardiac issue. She went to see the cardiologist who stated she's unlikely to have cardia issues due to her age.   Stresses: short staffed at work  Appetite: unremarkable  Sleep: poor  Therapy: [facility] ([place]), weekly  MIGUEL:   Preg: No  Interventions:  Most important: medication update, pt would like to return to work because she's bored which exacerbates her sx \"     EXERCISE: Adequate  SIDE EFFECTS:   tolerating medications without reported side effects  COMPLIANCE:   states Adherent to medication regimen  REPORTS THE FOLLOWING NEW MEDICAL ISSUES: fainting started over the holidays, last in two weeks.  History of fainting off and on throughout her life.  Holter monitor for two days.  Not completed due to the pads not staying on.      Refusing cardiology consult per records "     Was doing well but then ran out of the Viibryd due to insurance changes.  Finding herself more irritable, quick to react to minimal triggers.      PROBLEM: DEPRESSION: Worsening        3/29/2024     9:28 AM   Last PHQ-9   1.  Little interest or pleasure in doing things 1   2.  Feeling down, depressed, or hopeless 1   3.  Trouble falling or staying asleep, or sleeping too much 1   4.  Feeling tired or having little energy 1   5.  Poor appetite or overeating 1   6.  Feeling bad about yourself 1   7.  Trouble concentrating 1   8.  Moving slowly or restless 1   Q9: Thoughts of better off dead/self-harm past 2 weeks 1   PHQ-9 Total Score 9   In the past two weeks have you had thoughts of suicide or self harm? Yes   Do you have concerns about your personal safety or the safety of others? No   In the past 2 weeks have you thought about a plan or had intention to harm yourself? No   In the past 2 weeks have you acted on these thoughts in any way? No         1/10/2024     9:10 AM 2/8/2024     7:23 PM 3/29/2024     9:28 AM   PHQ-9 SCORE   PHQ-9 Total Score MyChart 16 (Moderately severe depression) 15 (Moderately severe depression) 9 (Mild depression)   PHQ-9 Total Score 16 15    15 9     PHQ9 score is 15 indicating moderately severe depression.  Suicidal ideation:   Endorses passive SI, no intent or plan.       PROBLEM: ANXIETY: Worsening.    GAD7 score is Not completed today      2/7/2023     9:17 PM 5/18/2023     7:13 AM 11/17/2023    12:37 AM   NINFA-7 SCORE   Total Score 16 (severe anxiety) 17 (severe anxiety) 18 (severe anxiety)   Total Score 16    16 17 18          PERTINENT PAST MEDICAL AND SURGICAL HISTORY     Past Medical History:   Diagnosis Date    Asthma     Bipolar affective disorder (H)     Outbursts of anger     Schizophrenia (H)        VITALS     BP Readings from Last 1 Encounters:   01/10/24 120/70     Pulse Readings from Last 1 Encounters:   01/10/24 108     Wt Readings from Last 1 Encounters:   01/10/24  "110.9 kg (244 lb 6.4 oz)     Ht Readings from Last 1 Encounters:   11/13/23 1.676 m (5' 6\")     Estimated body mass index is 39.45 kg/m  as calculated from the following:    Height as of 11/13/23: 1.676 m (5' 6\").    Weight as of 1/10/24: 110.9 kg (244 lb 6.4 oz).    LABS & IMAGING                                                                                                                   Recent Labs   Lab Test 07/28/23  1903   WBC 6.6   HGB 12.4   HCT 38.9   MCV 89        Recent Labs   Lab Test 07/28/23  1903 06/23/21  1138    139   POTASSIUM 3.9 3.8   CHLORIDE 106 108   CO2 24 28   * 95   KIKE 8.9 9.0   BUN 10.7 9   CR 0.67 0.75   GFRESTIMATED >90 >90   ALBUMIN  --  3.7   PROTTOTAL  --  7.3   AST  --  23   ALT  --  44   ALKPHOS  --  83   BILITOTAL  --  0.3     Recent Labs   Lab Test 06/23/21  1138   CHOL 164   LDL 98   HDL 52   TRIG 71     Recent Labs   Lab Test 10/25/21  1138   TSH 1.36        ALLERGY & IMMUNIZATIONS       Allergies   Allergen Reactions    Alcohol Hives    Kiwi Hives    Red Dye     Seasonal Allergies Other (See Comments) and Rash     sneezing       MEDICAL REVIEW OF SYSTEMS:   Ten system review was completed with pertinent positives noted     MENTAL STATUS EXAM:      General/Constitutional:  Appearance:   awake, alert, adequately groomed, appeared stated age and no apparent distress  Attitude:    cooperative   Eye Contact:  good  Musculoskeletal:  Psychomotor Behavior:  no evidence of tardive dyskinesia, dystonia, or tics from the head up  Psychiatric:  Speech:  clear, coherent, regular rate, rhythm, and volume,   No pressure speech noted.  Associations:  no loose associations  Thought Process:   logical, linear and goal oriented  Thought Content:   Endorses passive SI, no intent or plan. No homicidal ideation, no evidence of psychotic thought, no auditory hallucinations present and no visual hallucinations present  Mood:  emotionally labile, depression and anxiety "   Affect:  full range and was congruent to speech content.  Insight:  good  Judgment:  intact, adequate for safety  Impulse Control:  intact  Neurological:  Oriented to:  person, place, time, and situation  Attention Span and Concentration:  normal    Language: intact     Recent and Remote Memory:  Intact to interview. Not formally assessed. No amnesia.    Fund of Knowledge: appropriate        SAFETY   Feels safe in home: Yes   Suicidal ideation: passive, no intent or plan  History of suicide attempts:  Yes   Hx of impulsivity: Yes   Hope for the future: present    Hx of Command hallucinations or current psychosis: No  History of Self-injurious behaviors: Yes Current:  No  Family member  by suicide:  No        SAFETY ASSESSMENT:   Based on all available evidence including the factors cited above, overall Risk for harm is low and is appropriate for outpatient level of care.   Recommended that patient call 911 or go to the local ED should there be a change in any of these risk factors.    DSM 5 DIAGNOSIS:   Major Depressive Disorder, Recurrent, Mild, per psych testing 2021  Complex Posttraumatic Stress Disorder, With disassociation (Primary)  Features of Borderline Personality Disorder  Attention Deficit Hyperactivity Disorder, per psych testing 2021        MEDICAL COMORBIDITY IMPACTING CLINICAL PICTURE: None noted      ASSESSMENT AND PLAN      Problem List as of 3/29/2024 Reviewed: 2024 10:18 AM by Clementine Myles DNP            Noted       Behavioral    Last System Assessment & Plan 3/29/2024 Virtual Visit Edited 4/3/2024  6:02 PM by Clementine Myles DNP     Continue Viibryd at 60 mg and augment with lamotrigine titrated to 50 mg to target ongoing mood instability and depression.  Follow-up in six weeks           1. Attention deficit hyperactivity disorder (ADHD), combined type 8/3/2021    2. Complex posttraumatic stress disorder 9/10/2021     Overview Signed 9/10/2021  2:45 PM by Shar  ANAYELI Oneil CNP      Complex PTSD explains affective instability, mood symptoms, anxiety and maladaptive behaviors more parsimoniously than separate diagnosis of anxiety, depression, bipolar, OCD and/or personality disorder.  Medications ultimately will not provide definitive treatment, but nonpharmacological interventions like dialectical behavior therapy (DBT) and eye movement desensitization and reprocessing (EMDR).             Relevant Medications     lamoTRIgine (LAMICTAL) 25 MG tablet    3. Borderline personality disorder (H) - Primary 11/17/2023          CONSULTS/REFERRALS:   Continue therapy   Coordinate care with therapist as needed     MEDICAL:   None at this time  Coordinate care with PCP ([facility], [facility]) as needed     FOLLOW UP: Schedule an appointment with me in four weeks or sooner as needed.   Call the psychiatric nurse line with medication questions or concerns at [phone] or [phone]  Follow up with primary care provider as planned or for acute medical concerns.     PSYCHOEDUCATION:  Medication side effects and alternatives reviewed. Health promotion activities recommended and reviewed today. All questions addressed. Education and counseling completed regarding risks and benefits of medications and psychotherapy options.  Consent provided by patient/guardian  Call the psychiatric nurse line with medication questions or concerns at [phone].  MyChart may be used to communicate with your provider, but this is not intended to be used for emergencies.  STIMULANT THERAPY: Side effects discussed including but not limited to cardiac (including HTN, tachycardia, sudden death), motor/tic, appetite/growth, mood lability and sleep disruption. This is a controlled substance with risk for abuse, need to keep in a safe keep place and cannot replace lost scripts  Rackspace.gov is information for patients.  It is run by the [employer] and it contains  information about all disorders, diseases and all medications.       COMMUNITY RESOURCES:    CRISIS NUMBERS: Provided in AVS 8/5/2021  National Suicide Prevention Lifeline: 9-024-580-TALK (646-620-2239)  Strava/resources for a list of additional resources (SOS)            [facility] - [phone]   Urgent Care Adult Mental Odnbzt-[phone] mobile unit/ 24/7 crisis line  [facility] -[phone]   COPE 24/7 [place] Mobile Team -[phone] (adults)/ [phone] (child)  Poison Control Center - 1-603.392.4171    OR  go to nearest ER  Crisis Text Line for any crisis 24/7 send this-   To: 584745   [facility] ([facility]  [phone]  National Suicide Prevention Lifeline: 820.979.9167 (TTY: 555.865.5388). Call anytime for help.  (www.suicidepreventionlifeline.org)  National [place] [employer] Mental Illness (www.negrita.org): 770-726-5308 or 321-867-4477.   Mental Health Association (www.mentalhealth.org): 837.818.5960 or 214-223-4132.  [place] Crisis Text Line: Text [place] to 410767  Suicide LifeLine Chat: suicideAveillant.org/chat    ADMINISTRATIVE BILLING:   Level of Medical Decision Making:   - At least 1 chronic problem that is not stable  - Engaged in prescription drug management during visit (discussed any medication benefits, side effects, alternatives, etc.)               Patient Status:  Patient will continue to be seen for ongoing consultation and stabilization.     Signed:   Clementine Myles, MSN, APRN, FMHNP-[facility]iat[facility] ([facility]PS)   Chart documentation done in part with Dragon Voice Recognition software.  Although reviewed after completion, some word and grammatical errors may remain.

## 2024-03-29 NOTE — ASSESSMENT & PLAN NOTE
Continue Viibryd at 60 mg and augment with lamotrigine titrated to 50 mg to target ongoing mood instability and depression.  Follow-up in six weeks

## 2024-03-29 NOTE — NURSING NOTE
Is the patient currently in the state of [place]? YES    Visit mode:VIDEO    If the visit is dropped, the patient can be reconnected by: VIDEO VISIT: Text to cell phone:   Telephone Information:   Mobile [phone]       Will anyone else be joining the visit? NO  (If patient encounters technical issues they should call [phone])    How would you like to obtain your AVS? MyChart    Are changes needed to the allergy or medication list? Pt stated no changes to allergies and Pt stated no med changes    Reason for visit: CANDICE ACUNA

## 2024-04-03 ENCOUNTER — ANCILLARY PROCEDURE (OUTPATIENT)
Dept: GENERAL RADIOLOGY | Facility: CLINIC | Age: 29
End: 2024-04-03
Attending: PHYSICIAN ASSISTANT
Payer: COMMERCIAL

## 2024-04-03 ENCOUNTER — OFFICE VISIT (OUTPATIENT)
Dept: URGENT CARE | Facility: URGENT CARE | Age: 29
End: 2024-04-03
Payer: COMMERCIAL

## 2024-04-03 VITALS
HEART RATE: 83 BPM | RESPIRATION RATE: 16 BRPM | SYSTOLIC BLOOD PRESSURE: 132 MMHG | OXYGEN SATURATION: 100 % | DIASTOLIC BLOOD PRESSURE: 90 MMHG | TEMPERATURE: 98.2 F

## 2024-04-03 DIAGNOSIS — S69.91XA HAND INJURY, RIGHT, INITIAL ENCOUNTER: Primary | ICD-10-CM

## 2024-04-03 DIAGNOSIS — S69.91XA HAND INJURY, RIGHT, INITIAL ENCOUNTER: ICD-10-CM

## 2024-04-03 PROCEDURE — 73130 X-RAY EXAM OF HAND: CPT | Mod: TC | Performed by: RADIOLOGY

## 2024-04-03 PROCEDURE — 99214 OFFICE O/P EST MOD 30 MIN: CPT | Performed by: PHYSICIAN ASSISTANT

## 2024-04-03 NOTE — PATIENT INSTRUCTIONS
**For crisis resources, please see the information at the end of this document**     Thank you for coming to the [facility] [place] [facility].    TREATMENT PLAN:    MEDICATIONS:   - continue Viibryd at 60 mg and augment with lamotrigine titrated to 50 mg    -PSYCHOEDUCATION: Lamictal (lamotrigine) can cause serious rashes including Hernández-Toney syndrome which may requiring hospitalization and discontinuation of treatment. If any signs of a rash occur, please see your Primary Care Provider or a dermatologist immediately.  Do not miss more than 5 days as we will need to restart the titration at a lower dose.            CONSULTS/REFERRALS:   Continue therapy     LABS/PROCEDURES:    Please call your [place] clinic and ask for a lab only appointment at your earliest convenience.  If your results are reassuring or normal they will be mailed to you or sent through [employer] within 7 days. If the lab tests need quick action we will call you with the results. The phone number we will call with results is # [phone].      FOLLOW UP: Schedule an appointment with me in six weeks  or sooner as needed.  The intake team should be calling you to schedule.  If you dont hear from them, or they were unable to reach you, please call [phone] to schedule.  Follow up with primary care provider as planned or for acute medical concerns.  Call the psychiatric nurse line with medication questions or concerns at [phone].      Medication Refills:  If you need any refills please call your pharmacy and they will contact us. Our fax number for refills is [phone]. Please allow three business for refill processing. If you need to  your refill at a new pharmacy, please contact the new pharmacy directly. The new pharmacy will help you get your medications transferred.     [employer] Assistance [phone]  Financial Assistance [phone]  [facility] [phone]  Central Billing Office,  [facility]: [phone]  [place] Billing [phone]  Medical Records [phone]  [place] Patient Bill of Rights [email or web address]       MENTAL HEALTH CRISIS RESOURCES:  For a emergency help, please call 911 or go to the nearest Emergency Department.     Emergency Walk-In Options:   EmPATH Unit @ [facility] ([place]): [phone] - Specialized mental health emergency area designed to be calming  [facility] ([place]): [phone]  [facility] ([place]): [phone]  [facility]): [phone]    National Crisis Information: Call 988 Suicide and Crisis Lifeline  Crisis Text Line (free 24/7):  call **CRISIS (**524456) Crisis or use the texting option by texting 886104.   National Suicide Prevention Lifeline: Call 988  Poison Control Center: 4-824-650-1503  Trans Lifeline: 5-599-381-1367 - Hotline for transgender people of all ages  The [employer] Project: 3-244-515-0994 - Hotline for LGBT youth   List of all [facility] resources:   https://[place].gov/dhs/people-we-serve/adults/health-care/mental-health/resources/crisis-contacts.jsp    For Non-Emergency Support:   Fast Tracker: Mental Health & Substance Use Disorder Resources -   https://www.fasttrackermn.org/       Again thank you for choosing [facility] [place] [facility] and please let us know how we can best partner with you to improve you and your family's health.    You may be receiving a survey regarding this appointment. We would love to have your feedback, both positive and negative. The survey is done by an external company, so your answers are anonymous.

## 2024-04-03 NOTE — PROGRESS NOTES
WORK CMP: [place]  DOI: 4/1/24 started swelling at about 2pm  LUCI: unclear, hypermobility baseline    SUBJECTIVE:  Chief Complaint   Patient presents with    Urgent Care     Pt presents with swelling of the right thumb onset yesterday.     Lenore Suggs is a 28 year old female presents with a chief complaint of right finger  first swelling.  The injury occurred 2 day(s) ago.   The injury happened while at work. How: likely hyperextended.  The patient complained of no pain  and has not had decreased ROM.  Pain exacerbated by none.  Relieved by rest.  She treated it initially with no therapy. This is the first time this type of injury has occurred to this patient.     Past Medical History:   Diagnosis Date    Asthma     Bipolar affective disorder (H)     Outbursts of anger     Schizophrenia (H)      Current Outpatient Medications   Medication Sig Dispense Refill    albuterol (PROAIR HFA/PROVENTIL HFA/VENTOLIN HFA) 108 (90 Base) MCG/ACT inhaler Inhale 2 puffs into the lungs every 6 hours as needed for shortness of breath, wheezing or cough 18 g 3    amphetamine-dextroamphetamine (ADDERALL XR) 15 MG 24 hr capsule Take 1 capsule (15 mg) by mouth daily 30 capsule 0    ondansetron (ZOFRAN ODT) 4 MG ODT tab Take 1 tablet (4 mg) by mouth every 8 hours as needed for nausea 30 tablet 1    vilazodone (VIIBRYD) 20 MG TABS tablet Take 1 tablet (20 mg) by mouth daily Take with 40 mg for total daily dose of 60 mg 90 tablet 0    vilazodone (VIIBRYD) 40 MG TABS tablet Take 1 tablet (40 mg) by mouth daily 90 tablet 3    lamoTRIgine (LAMICTAL) 25 MG tablet take 25 mg orally daily for two weeks then increase to 50 mg daily until follow-up appointment (Patient not taking: Reported on 4/3/2024) 60 tablet 1     Social History     Tobacco Use    Smoking status: Never    Smokeless tobacco: Never   Substance Use Topics    Alcohol use: Yes     Comment: rare       ROS:  Review of systems negative except as stated above.    EXAM:   BP (!)  132/90   Pulse 83   Temp 98.2  F (36.8  C) (Tympanic)   Resp 16   SpO2 100%   Gen: healthy,alert,no distress  Extremity: finger  first has swelling.   There is not compromise to the distal circulation.  Pulses are +2 and CRT is brisk  GENERAL APPEARANCE: healthy, alert and no distress  CHEST: clear to auscultation  CV: regular rate and rhythm  SKIN: no suspicious lesions or rashes  NEURO: Normal strength and tone, sensory exam grossly normal, mentation intact and speech normal    Results for orders placed or performed in visit on 04/03/24   XR Hand Right G/E 3 Views     Status: None    Narrative    Examination:  XR HAND RIGHT G/E 3 VIEWS    Date:  4/3/2024 4:20 PM     Clinical Information: Right hand pain after injury.    Comparison: none.      Impression    Impression:    1.  Normal joint alignment and spacing. No fracture or erosion. Mild  soft tissue swelling over the dorsal aspect of the hand.    JULIO VALLE MD         SYSTEM ID:  [id number]         ASSESSMENT:   (S69.91XA) Hand injury, right, initial encounter  (primary encounter diagnosis)  Comment: No evidence of fracture or dislocation  Plan: XR Hand Right G/E 3 Views, Wrist/Arm/Hand         Bracking Supplies Order Wrist Brace; Right;         with thumb spica, Orthopedic  Referral      Red flags and emergent follow up discussed, and understood by patient  Follow up with PCP if symptoms worsen or fail to improve

## 2024-04-11 ENCOUNTER — MYC REFILL (OUTPATIENT)
Dept: INTERNAL MEDICINE | Facility: CLINIC | Age: 29
End: 2024-04-11
Payer: COMMERCIAL

## 2024-04-11 DIAGNOSIS — F90.2 ATTENTION DEFICIT HYPERACTIVITY DISORDER (ADHD), COMBINED TYPE: ICD-10-CM

## 2024-04-15 RX ORDER — DEXTROAMPHETAMINE SACCHARATE, AMPHETAMINE ASPARTATE MONOHYDRATE, DEXTROAMPHETAMINE SULFATE AND AMPHETAMINE SULFATE 3.75; 3.75; 3.75; 3.75 MG/1; MG/1; MG/1; MG/1
15 CAPSULE, EXTENDED RELEASE ORAL DAILY
Qty: 30 CAPSULE | Refills: 0 | Status: SHIPPED | OUTPATIENT
Start: 2024-04-15 | End: 2024-05-21

## 2024-05-15 NOTE — PROGRESS NOTES
[facility]  May 16, 2024      Behavioral Health Clinician Progress Note    Patient Name: Lenore Suggs           Service Type:  Individual      Service Location:   [facility] / Email (patient reached)     Session Start Time: 1000 am  Session End Time: 1022 am      Session Length: 16 - 37      Attendees: Patient     Service Modality:  Video Visit:      Provider verified identity through the following two step process.  Patient provided:  Patient photo and Patient is known previously to provider    Telemedicine Visit: The patient's condition can be safely assessed and treated via synchronous audio and visual telemedicine encounter.      Reason for Telemedicine Visit: Patient convenience (e.g. access to timely appointments / distance to available provider)    Originating Site (Patient Location): Patient's home    Distant Site (Provider Location): Provider Remote Setting- Home Office    Consent:  The patient/guardian has verbally consented to: the potential risks and benefits of telemedicine (video visit) versus in person care; bill my insurance or make self-payment for services provided; and responsibility for payment of non-covered services.     Patient would like the video invitation sent by:  My Chart    Mode of Communication:  Video Conference via [facility]    Distant Location (Provider):  Off-site    As the provider I attest to compliance with applicable laws and regulations related to telemedicine.    Visit Activities (Refresh list every visit): [facility] Only    Diagnostic Assessment Date: 01/09/2024  Treatment Plan Review Date: 06/29/2024  See Flowsheets for today's PHQ-9 and NINFA-7 results  Previous PHQ-9:       2/8/2024     7:23 PM 3/29/2024     9:28 AM 5/16/2024     9:35 AM   PHQ-9 SCORE   PHQ-9 Total Score Marcelino 15 (Moderately severe depression) 9 (Mild depression) 7 (Mild depression)   PHQ-9 Total Score 15    15 9 7     Previous NINFA-7:       5/18/2023     7:13  AM 11/17/2023    12:37 AM 5/16/2024     9:35 AM   NINFA-7 SCORE   Total Score 17 (severe anxiety) 18 (severe anxiety) 12 (moderate anxiety)   Total Score 17 18 12    12       DORINA LEVEL:       No data to display                DATA  Extended Session (60+ minutes): No  Interactive Complexity: No  Crisis: No  [facility] Patient: No    Treatment Objective(s) Addressed in This Session:  Target Behavior(s):  enhance motivation    Depressed Mood: Increase interest, engagement, and pleasure in doing things  Decrease frequency and intensity of feeling down, depressed, hopeless    Current Stressors / Issues:  MH update: Pt reports that her memory has returned to normal mostly but she there are certain things she doesn't remember. Pt reports that she's experiencing some irritability and anger rather than just being angry. States that she has not interest in anything, low energy. Feeling discouraged that things will get better. Little aspiration for life since childhood. Work is going reasonably well. Sense of having a foreshortened future. [facility] attempted to provide psycho-education on trauma sx. Pt feels that her sx are not related to trauma. Some skin picking related to anxiety.  Pt became increasingly dysregulated during session, swearing, yelling, hitting self. Pt left visit abruptly. [facility] consulted Clementine GUADALUPE. [facility] activated [facility] but during th course of that call [facility] was informed by Clementine GUADALUPE who was seeing pt that pt was thinking about buying a gun so [facility] called [facility][1] for assistance. Emergency nurse liaison was initiated who assisted.    Stresses: none  Appetite: unremarkable  Sleep: unremarkable  Therapy: She had he last appointment with [facility] 2 weeks ago she has decided that she doesn't want to continue therapy because she doesn't thinkg things will get any better.   MIGUEL: No  Preg: No  Interventions: psycho-education on trauma  Most important: medication update,  medication compliant      Progress on Treatment Objective(s) / Homework:  Satisfactory progress - ACTION (Actively working towards change); Intervened by reinforcing change plan / affirming steps taken    Motivational Interviewing    MI Intervention: Co-Developed Goal: improve motivation, Expressed Empathy/Understanding, Open-ended questions, Reflections: simple and complex, Change talk (evoked), and Reframe     Change Talk Expressed by the Patient: Desire to change Ability to change Reasons to change Need to change Committment to change Activation Taking steps    Provider Response to Change Talk: E - Evoked more info from patient about behavior change, A - Affirmed patient's thoughts, decisions, or attempts at behavior change, R - Reflected patient's change talk, and S - Summarized patient's change talk statements    Also provided psychoeducation about behavioral health condition, symptoms, and treatment options    Assessments completed prior to visit:  The following assessments were completed by patient for this visit:  PROMIS 10-Global Health (all questions and answers displayed):       4/6/2022    11:19 AM 2/5/2024    11:36 AM 5/16/2024     9:36 AM   PROMIS 10   In general, would you say your health is:  Good Fair   In general, would you say your quality of life is:  Excellent Fair   In general, how would you rate your physical health?  Good Fair   In general, how would you rate your mental health, including your mood and your ability to think?  Good Fair   In general, how would you rate your satisfaction with your social activities and relationships?  Good Fair   In general, please rate how well you carry out your usual social activities and roles  Good Fair   To what extent are you able to carry out your everyday physical activities such as walking, climbing stairs, carrying groceries, or moving a chair?  Completely Completely   In the past 7 days, how often have you been bothered by emotional problems such as  feeling anxious, depressed, or irritable?  Always Never   In the past 7 days, how would you rate your fatigue on average?  Mild Very severe   In the past 7 days, how would you rate your pain on average, where 0 means no pain, and 10 means worst imaginable pain?  7 7   In general, would you say your health is: 3 3 2   In general, would you say your quality of life is: 3 5 2   In general, how would you rate your physical health? 2 3 2   In general, how would you rate your mental health, including your mood and your ability to think? 3 3 2   In general, how would you rate your satisfaction with your social activities and relationships? 2 3 2   In general, please rate how well you carry out your usual social activities and roles. (This includes activities at home, at work and in your community, and responsibilities as a parent, child, spouse, employee, friend, etc.) 1 3 2   To what extent are you able to carry out your everyday physical activities such as walking, climbing stairs, carrying groceries, or moving a chair? 5 5 5   In the past 7 days, how often have you been bothered by emotional problems such as feeling anxious, depressed, or irritable? 3 5 1   In the past 7 days, how would you rate your fatigue on average? 3 2 5   In the past 7 days, how would you rate your pain on average, where 0 means no pain, and 10 means worst imaginable pain? 5 7 7   Global Mental Health Score 11 12    12 11    11   Global Physical Health Score 13 14    14 10    10   PROMIS TOTAL - SUBSCORES 24 26    26 21    21     PROMIS 10-Global Health (only subscores and total score):       4/6/2022    11:19 AM 2/5/2024    11:36 AM 5/16/2024     9:36 AM   PROMIS-10 Scores Only   Global Mental Health Score 11 12    12 11    11   Global Physical Health Score 13 14    14 10    10   PROMIS TOTAL - SUBSCORES 24 26    26 21    21       Care Plan review completed: Yes    Medication Review:  No changes to current psychiatric medication(s)    Medication  Compliance:  Yes    Changes in Health Issues:   None reported    Chemical Use Review:   Substance Use: Chemical use reviewed, no active concerns identified      Tobacco Use: No current tobacco use.      Assessment: Current Emotional / Mental Status (status of significant symptoms):  Risk status (Self / Other harm or suicidal ideation)  Patient has had a history of suicide attempts: in teen years, Pt has safety plan in DA dated 01/09/2024  Patient denies current fears or concerns for personal safety.  Patient denies current or recent suicidal ideation or behaviors.  Patient denies current or recent homicidal ideation or behaviors.  Patient denies current or recent self injurious behavior or ideation.  Patient denies other safety concerns.  A safety and risk management plan has been developed including: Patient consented to co-developed safety plan.  A safety and risk management plan was completed.  Patient agreed to use safety plan should any safety concerns arise.  A copy was given to the patient.    Appearance:   Appropriate   Eye Contact:   Good   Psychomotor Behavior: Agitated   Attitude:   Hostile  Orientation:   All  Speech   Rate / Production: Normal/ Responsive   Volume:  Normal   Mood:    Anxious  Elevated  Irritable   Affect:    congruent   Thought Content:  Clear   Thought Form:  Coherent  Logical   Insight:    Good     Diagnoses:  1. PTSD (post-traumatic stress disorder)    2. Attention deficit hyperactivity disorder (ADHD), predominantly inattentive type        Collateral Reports Completed:  Collaborated with [facility] team     Plan: (Homework, other):  Patient was given information about behavioral services and encouraged to schedule a follow up appointment with the clinic [facility] in 1 month.  She was also given information about mental health symptoms and treatment options .  CD Recommendations: No indications of CD issues.       Riana Roque,  LICSW    ______________________________________________________________________    Integrated Primary Care Behavioral Health Treatment Plan    Patient's Name: Lenore Suggs  YOB: 1995    Date of Creation: 03/29/2024  Date Treatment Plan Last Reviewed/Revised: pending    DSM5 Diagnoses: Attention-Deficit/Hyperactivity Disorder  314.01 (F90.2) Combined presentation, 296.31 (F33.0) Major Depressive Disorder, Recurrent Episode, Mild With anxious distress, or 309.81 (F43.10) Posttraumatic Stress Disorder (includes Posttraumatic Stress Disorder for Children 6 Years and Younger)  Without dissociative symptoms  Psychosocial / Contextual Factors: chronic pain  PROMIS (reviewed every 90 days):   The following assessments were completed by patient for this visit:  PROMIS 10-Global Health (all questions and answers displayed):       4/6/2022    11:19 AM 2/5/2024    11:36 AM 5/16/2024     9:36 AM   PROMIS 10   In general, would you say your health is:  Good Fair   In general, would you say your quality of life is:  Excellent Fair   In general, how would you rate your physical health?  Good Fair   In general, how would you rate your mental health, including your mood and your ability to think?  Good Fair   In general, how would you rate your satisfaction with your social activities and relationships?  Good Fair   In general, please rate how well you carry out your usual social activities and roles  Good Fair   To what extent are you able to carry out your everyday physical activities such as walking, climbing stairs, carrying groceries, or moving a chair?  Completely Completely   In the past 7 days, how often have you been bothered by emotional problems such as feeling anxious, depressed, or irritable?  Always Never   In the past 7 days, how would you rate your fatigue on average?  Mild Very severe   In the past 7 days, how would you rate your pain on average, where 0 means no pain, and 10 means worst  imaginable pain?  7 7   In general, would you say your health is: 3 3 2   In general, would you say your quality of life is: 3 5 2   In general, how would you rate your physical health? 2 3 2   In general, how would you rate your mental health, including your mood and your ability to think? 3 3 2   In general, how would you rate your satisfaction with your social activities and relationships? 2 3 2   In general, please rate how well you carry out your usual social activities and roles. (This includes activities at home, at work and in your community, and responsibilities as a parent, child, spouse, employee, friend, etc.) 1 3 2   To what extent are you able to carry out your everyday physical activities such as walking, climbing stairs, carrying groceries, or moving a chair? 5 5 5   In the past 7 days, how often have you been bothered by emotional problems such as feeling anxious, depressed, or irritable? 3 5 1   In the past 7 days, how would you rate your fatigue on average? 3 2 5   In the past 7 days, how would you rate your pain on average, where 0 means no pain, and 10 means worst imaginable pain? 5 7 7   Global Mental Health Score 11 12    12 11    11   Global Physical Health Score 13 14    14 10    10   PROMIS TOTAL - SUBSCORES 24 26    26 21    21     PROMIS 10-Global Health (only subscores and total score):       4/6/2022    11:19 AM 2/5/2024    11:36 AM 5/16/2024     9:36 AM   PROMIS-10 Scores Only   Global Mental Health Score 11 12    12 11    11   Global Physical Health Score 13 14    14 10    10   PROMIS TOTAL - SUBSCORES 24 26    26 21    21     Referral / Collaboration:  Collaborated with [facility] team .    Anticipated number of session for this episode of care: 10-12  Anticipation frequency of session: Monthly  Anticipated Duration of each session: 16-37 minutes  Treatment plan will be reviewed in 90 days or when goals have been changed.       MeasurableTreatment Goal(s) related to diagnosis / functional  impairment(s)  Goal 1: Patient will experience     I will know I've met my goal when I like to have more drive, motivation to do things.      Objective #A (Patient Action)    Patient will Increase interest, engagement, and pleasure in doing things  Decrease frequency and intensity of feeling down, depressed, hopeless.  Status: New - Date: 03/29/2024      Intervention(s)  [facility] provided psycho-education on anxiety management including the following:     https://www.youSedimapube.com/watch?v=Ug2meJzTsI7  https://www.Flowgearube.com/watch?v=7-5X2KrXxtR  https://[facility]/for-[facility]/mindfulness-programs/mindfulness-based-stress-reduction       Slow breathing. When you're anxious, your breathing becomes faster and shallower. Try deliberately slowing down your breathing. Count to three as you breathe in slowly - then count to three as you breathe out slowly.    Progressive muscle relaxation. Find a quiet location. Close your eyes and slowly tense and then relax each of your muscle groups from your toes to your head. Hold the tension for three seconds and then release quickly. This can help reduce the feelings of muscle tension that often comes with anxiety.    Stay in the present moment. Anxiety can make your thoughts live in a terrible future that hasn't happened yet. Try to bring yourself back to where you are. Practising meditation can help.    Healthy lifestyle. Keeping active, eating well, going out into nature, spending time with family and friends, reducing stress and doing the activities you enjoy are all effective in reducing anxiety and improving your wellbeing.       Take small acts of bravery. Avoiding what makes you anxious provides some relief in the short term, but can make you more anxious in the long term. Try approaching something that makes you anxious - even in a small way. The way through anxiety is by learning that what you fear isn't likely to happen - and if it does, you'll be able to cope with  it.    Challenge your self-talk. How you think affects how you feel. Anxiety can make you overestimate the danger in a situation and underestimate your ability to handle it. Try to think of different interpretations to a situation that's making you anxious, rather than jumping to the worst-case scenario. Look at the facts for and against your thought being true.    Plan worry time. It's hard to stop worrying entirely so set aside some time to indulge your worries. Even 10 minutes each evening to write them down or go over them in your head can help stop your worries from taking over at other times.    Get to know your anxiety. Keep a diary of when it's at it's best - and worst. Find the patterns and plan your week - or day - to proactively manage your anxiety.    Learn from others. Talking with others who also experience anxiety - or are going through something similar - can help you feel less alone. Visit our Online Forums to connect with others.    Be kind to yourself. Remember that you are not your anxiety. You are not weak. You are not inferior. You have a mental health condition. It's called anxiety.     Body based skills: Change body temperature by holding ice cube, running hands under cold water, drinking cold water. Eat an altoid mint or other strongly flavored mint candy or sour candy like Warheads or lemon drops. The sour taste will quickly distract from the anxiety and the sweet flavor at the end is rewarding to the brain.     Box Breathing:  Step 1: Breathe in, counting to four slowly. Feel the air enter your lungs.  Step 2: Hold your breath for 4 seconds. Try to avoid inhaling or exhaling for 4 seconds.  Step 3: Slowly exhale through your mouth for 4 seconds.  Step 4: Repeat steps 1 to 3 until you feel re-centered.    5 Things exercise: find 5 things in the environment that are a certain color, shape, size etc or 5 different smells.      [facility] provided psycho-education on Acceptance and Commitment skills  including:  -values clarification  -behavioral activation planning including overcoming barriers  -mindfulness  -cognitive diffusion skill  -facilitate self acceptance    [facility] provided psycho-education on sleep skills including:  -sleep hygiene  -tracking sleep schedule  -creating sleep routine    [facility] provided psycho-education on DBT skills  -mindfulness  -mood regulation techniques    -5 things exercise    -somatic regulation skills (taste of something sweet or sour, notice smells, touch/feel of things in the environment, physical activity such as dancing, walking)    [facility] provided psycho-education of CBT skills including the following:  -challenging destructive thinking  -re-framing  -behavioral activation  -journaling  -mood/behavioral tracking  -exposure response  -mood regulation (deep breathing, somatic skills)    Patient has reviewed and agreed to the above plan.      Riana Roque, MaineGeneral Medical CenterSW  May 16, 2024

## 2024-05-16 ENCOUNTER — MYC MEDICAL ADVICE (OUTPATIENT)
Dept: PSYCHIATRY | Facility: CLINIC | Age: 29
End: 2024-05-16
Payer: COMMERCIAL

## 2024-05-16 ENCOUNTER — MYC MEDICAL ADVICE (OUTPATIENT)
Dept: INTERNAL MEDICINE | Facility: CLINIC | Age: 29
End: 2024-05-16
Payer: COMMERCIAL

## 2024-05-16 ENCOUNTER — VIRTUAL VISIT (OUTPATIENT)
Dept: BEHAVIORAL HEALTH | Facility: CLINIC | Age: 29
End: 2024-05-16
Payer: COMMERCIAL

## 2024-05-16 ENCOUNTER — VIRTUAL VISIT (OUTPATIENT)
Dept: PSYCHIATRY | Facility: CLINIC | Age: 29
End: 2024-05-16
Payer: COMMERCIAL

## 2024-05-16 DIAGNOSIS — F43.10 PTSD (POST-TRAUMATIC STRESS DISORDER): Primary | ICD-10-CM

## 2024-05-16 DIAGNOSIS — F90.0 ATTENTION DEFICIT HYPERACTIVITY DISORDER (ADHD), PREDOMINANTLY INATTENTIVE TYPE: ICD-10-CM

## 2024-05-16 DIAGNOSIS — F43.10 COMPLEX POSTTRAUMATIC STRESS DISORDER: ICD-10-CM

## 2024-05-16 DIAGNOSIS — F90.2 ATTENTION DEFICIT HYPERACTIVITY DISORDER (ADHD), COMBINED TYPE: ICD-10-CM

## 2024-05-16 PROCEDURE — 90832 PSYTX W PT 30 MINUTES: CPT | Mod: 95 | Performed by: SOCIAL WORKER

## 2024-05-16 PROCEDURE — 99214 OFFICE O/P EST MOD 30 MIN: CPT | Mod: 95 | Performed by: NURSE PRACTITIONER

## 2024-05-16 RX ORDER — DEXTROAMPHETAMINE SACCHARATE, AMPHETAMINE ASPARTATE MONOHYDRATE, DEXTROAMPHETAMINE SULFATE AND AMPHETAMINE SULFATE 3.75; 3.75; 3.75; 3.75 MG/1; MG/1; MG/1; MG/1
15 CAPSULE, EXTENDED RELEASE ORAL DAILY
Qty: 30 CAPSULE | Refills: 0 | Status: CANCELLED | OUTPATIENT
Start: 2024-05-16

## 2024-05-16 RX ORDER — VILAZODONE HYDROCHLORIDE 20 MG/1
20 TABLET ORAL DAILY
Qty: 90 TABLET | Refills: 0 | Status: SHIPPED | OUTPATIENT
Start: 2024-05-16 | End: 2024-09-13

## 2024-05-16 ASSESSMENT — ANXIETY QUESTIONNAIRES
7. FEELING AFRAID AS IF SOMETHING AWFUL MIGHT HAPPEN: NOT AT ALL
2. NOT BEING ABLE TO STOP OR CONTROL WORRYING: NOT AT ALL
IF YOU CHECKED OFF ANY PROBLEMS ON THIS QUESTIONNAIRE, HOW DIFFICULT HAVE THESE PROBLEMS MADE IT FOR YOU TO DO YOUR WORK, TAKE CARE OF THINGS AT HOME, OR GET ALONG WITH OTHER PEOPLE: SOMEWHAT DIFFICULT
4. TROUBLE RELAXING: NEARLY EVERY DAY
3. WORRYING TOO MUCH ABOUT DIFFERENT THINGS: NOT AT ALL
2. NOT BEING ABLE TO STOP OR CONTROL WORRYING: NOT AT ALL
3. WORRYING TOO MUCH ABOUT DIFFERENT THINGS: NOT AT ALL
6. BECOMING EASILY ANNOYED OR IRRITABLE: NEARLY EVERY DAY
7. FEELING AFRAID AS IF SOMETHING AWFUL MIGHT HAPPEN: NOT AT ALL
6. BECOMING EASILY ANNOYED OR IRRITABLE: NEARLY EVERY DAY
GAD7 TOTAL SCORE: 12
GAD7 TOTAL SCORE: 12
8. IF YOU CHECKED OFF ANY PROBLEMS, HOW DIFFICULT HAVE THESE MADE IT FOR YOU TO DO YOUR WORK, TAKE CARE OF THINGS AT HOME, OR GET ALONG WITH OTHER PEOPLE?: SOMEWHAT DIFFICULT
GAD7 TOTAL SCORE: 12
1. FEELING NERVOUS, ANXIOUS, OR ON EDGE: NEARLY EVERY DAY
GAD7 TOTAL SCORE: 12
5. BEING SO RESTLESS THAT IT IS HARD TO SIT STILL: NEARLY EVERY DAY
4. TROUBLE RELAXING: NEARLY EVERY DAY
IF YOU CHECKED OFF ANY PROBLEMS ON THIS QUESTIONNAIRE, HOW DIFFICULT HAVE THESE PROBLEMS MADE IT FOR YOU TO DO YOUR WORK, TAKE CARE OF THINGS AT HOME, OR GET ALONG WITH OTHER PEOPLE: SOMEWHAT DIFFICULT
GAD7 TOTAL SCORE: 12
GAD7 TOTAL SCORE: 12
7. FEELING AFRAID AS IF SOMETHING AWFUL MIGHT HAPPEN: NOT AT ALL
7. FEELING AFRAID AS IF SOMETHING AWFUL MIGHT HAPPEN: NOT AT ALL
5. BEING SO RESTLESS THAT IT IS HARD TO SIT STILL: NEARLY EVERY DAY
1. FEELING NERVOUS, ANXIOUS, OR ON EDGE: NEARLY EVERY DAY
8. IF YOU CHECKED OFF ANY PROBLEMS, HOW DIFFICULT HAVE THESE MADE IT FOR YOU TO DO YOUR WORK, TAKE CARE OF THINGS AT HOME, OR GET ALONG WITH OTHER PEOPLE?: SOMEWHAT DIFFICULT

## 2024-05-16 ASSESSMENT — PATIENT HEALTH QUESTIONNAIRE - PHQ9
SUM OF ALL RESPONSES TO PHQ QUESTIONS 1-9: 7
10. IF YOU CHECKED OFF ANY PROBLEMS, HOW DIFFICULT HAVE THESE PROBLEMS MADE IT FOR YOU TO DO YOUR WORK, TAKE CARE OF THINGS AT HOME, OR GET ALONG WITH OTHER PEOPLE: SOMEWHAT DIFFICULT
SUM OF ALL RESPONSES TO PHQ QUESTIONS 1-9: 7

## 2024-05-16 NOTE — TELEPHONE ENCOUNTER
Address: [address] [place], [place] [address]  [place]  Reason for call:  Other   Patient called regarding (reason for call): call back  Additional comments: Phone call with patient- girlfriend calling regarding concern of where we were sending help    Phone number to reach patient:  Home number on file [phone] (home)    Best Time:  any    Can we leave a detailed message on this number?  YES    Travel screening: Not Applicable

## 2024-05-16 NOTE — PROGRESS NOTES
Virtual Visit Details    Type of service:  Video Visit     Originating Location (pt. Location): Other presumed home, but she was evasive about where she was    Distant Location (provider location):  Off-site  Platform used for Video Visit: [email or web address]    PSYCHIATRIC MEDICATION FOLLOW UP APPT     Name:  Lenore Suggs  : 1995    Referred by: Terrance Rand, [facility]    Patient Care Team:  [facility] Macey Decatur as PCP - Claudia Austin DO as Assigned Behavioral Health Provider  Terrance Rand MD as Assigned PCP  Therapist: [facility] weekly  History of using DBT skills     Patient attended the phone/video session alone.     Last seen for outpatient psychiatry Return Visit on 2024.       Patient attended the phone/video session alone.         FOLLOWING PLAN PUT INTO PLACE:      Exacerbation of depression, anxiety and irritable, quick to react to minimal triggers.  Viibryd has been helpful and will further increase to 60 mg at this time.  Continue Adderall XR at current dosing, does not need scripts.  Follow-up in six weeks while waiting for this to become therapeutic.            INTERIM HISTORY     COMMUNICATIONS FROM PATIENT VIA:  paper work completed for work accomodations      RECORDS AVAILABLE FOR REVIEW: EHR records through "[employer] .  In addition, reviewed the assessment completed by CALVIN Bauer, dated today        HISTORY OF PRESENT ILLNESS   Initial [facility] referral for psychiatric medication consult in , graduated in 2022 and rereffered in 2024.  Reports history of multiple diagnosis throughout her childhood primarily in the context of traumatic events.  Previously psychiatrically hospitalized as an adolescent on [facility] occassions, typcially in the context of dysregulation, SI/SA.   Hx of suicidal ideation and attempts. Last hospitalization was when she was 18 yo.   Genetically  loaded for  schizophrenia (biomother) and unknown paternal genetic load.      Reports she first sought treatment at a very young age.    Reports symptoms of depression and anxiety emerged in early childhood. She reported she experienced her first panic attack at the age of 7 years old and attempted to end her life at the age of 8 years old. Started medications when she was 9 years old.  She highlighted that at that age, her foster parents wanted to adopt her but not her brothers. She asserted she  didn t want to be without [her] brothers  so she tried to end her life. She disclosed that the family  still wanted to adopt [her]  so she began engaging in behaviors to get them to  change their mind  such as throwing violent tantrums, urinating in her bed and refusing to shower. She indicated she was hospitalized for 3 months and after 2 years of out of control behaviors, the family rescinded their intent to adopt her. She reported she then  bounced around from [foster] home to home  until she was 11 when she settled in a home, she  liked.  She added that her  father  then attempted to get custody of her and she relocated with him to the [facility] when she was 15 years old. She acknowledged being a  really angry  teenager because she  couldn t express [herself].  She asserted she was diagnosed with several disorders including Schizophrenia and Bipolar I Disorder and prescribed psychiatric medications. She also disclosed she attempted to end her life on several occasions. Ms. Suggs stated that her last suicide attempt occurred in April of 2018. She asserted that in her twenties she has detached from others and experienced  no interest in anyone.  She outlined episodes of disassociation, obsessive thoughts, compulsive acts and fantasies of death. She admitted she does not like being touched and fears being contaminated. She reported she  washes [her] hands too much  and likes things to feel  the right way.   She  shares a previous diagnosis of bipolar and schizophrenia. Reports she had been off all medication from age 15 through 24 years old.      Meets criteria for multiple diagnosis. When evaluating closer, symptoms are primarily in the context of chronic trauma they has experienced as a child, adolescent and adult.   Primary diagnosis is complex PTSD.  In individuals who are exposed to chronic repeated, or long-standing traumatic events, the classical symptoms of PTSD often fail to completely describe the psychological harm, emotional problems, and changes in how people view themselves and the world around them.  As a result, some professionals believe that we should distinguish between the type of PTSD that developed from chronic, long-lasting traumatic events as compared to PTSD from short-lived event.  The diagnosis of  complex PTSD  refers to the set of symptoms that commonly follow exposure to a chronic, prolonged long lasting traumatic event.  These  generally involve some form of physical or emotional captivity, such as childhood sexual and/or physical abuse or domestic violence.  In these types of events, the victim is under the control of another person and doesn t have the ability to easily escape.      Symptoms of complex PTSD:      Emotion regulation problems- People with complex PTSD experience difficulties managing their emotions.  They may experience severe depression, thoughts of suicide, or have difficulty controlling their anger.      Changes in consciousness: following exposure to a chronic traumatic event, a person may repress memories of the traumatic event or experience flashbacks.  In rare cases, a person may experience disassociation.      Symptoms in this category include feelings of helplessness, shame, guilt, or feeling detached and different from others      Changes in how the victim views the perpetrator: a person with complex PTSD may feel like he has no power over perpetrator (the  perpetrator has complete power in a relationship).  In complex PTSD, people may also become preoccupied with their relationship with the perpetrator.  For example, constant thoughts of wanting revenge.      Changes in personal relationships:  These symptoms include problems with relationships, such as isolating oneself or being distrusting of others      Changes in how one views the world: People exposed to chronic or repeated traumatic events may also lose jose in humanity or have a sense of hopelessness.      Psychiatric testing by Lisa Pteit, PhD LP Psychologist, documented date 6/8/2021 available for review with the following identified diagnoses:     Major Depressive Disorder, Recurrent, Mild   Posttraumatic Stress Disorder, With disassociation   Features of Borderline Personality Disorder  Attention Deficit Hyperactivity Disorder      Not formally diagnosed with autism and does not want to wait for the six month waiting list.      FAMILY, MEDICAL, SURGICAL HISTORY REVIEWED.  MEDICATION HAVE BEEN REVIEWED AND ARE CURRENT TO THE BEST OF MY KNOWLEDGE AND ABILITY.  Now working at [employer]      MEDICATIONS                                                                                                Current Outpatient Medications   Medication Sig Dispense Refill    amphetamine-dextroamphetamine (ADDERALL XR) 15 MG 24 hr capsule Take 1 capsule (15 mg) by mouth daily 30 capsule 0    vilazodone (VIIBRYD) 20 MG TABS tablet Take 1 tablet (20 mg) by mouth daily Take with 40 mg for total daily dose of 60 mg 90 tablet 0    albuterol (PROAIR HFA/PROVENTIL HFA/VENTOLIN HFA) 108 (90 Base) MCG/ACT inhaler Inhale 2 puffs into the lungs every 6 hours as needed for shortness of breath, wheezing or cough 18 g 3    lamoTRIgine (LAMICTAL) 25 MG tablet take 25 mg orally daily for two weeks then increase to 50 mg daily until follow-up appointment (Patient not taking: Reported on 4/3/2024) 60 tablet 1    ondansetron  "(ZOFRAN ODT) 4 MG ODT tab Take 1 tablet (4 mg) by mouth every 8 hours as needed for nausea 30 tablet 1    vilazodone (VIIBRYD) 40 MG TABS tablet Take 1 tablet (40 mg) by mouth daily 90 tablet 3     No current facility-administered medications for this visit.       NOTES ABOUT CURRENT PSYCHOTROPIC MEDICATIONS:   Adderall XR 15 mg  Viibryd 60 mg     PAST PSYCHOTROPIC MEDICATIONS:  Risperidone   Olanzapine   [place]  Ziprasidone   Quetiapine   Guanfacine  Trazodone   Latuda   Citalopram   Escitalopram   Fluoxetine   Adderall IR 10 mg once daily  Bupropion  mg   Sertraline 100 mg, discontinued due to increase in fatigued   Vyvanse 40 mg, doesn't take on her days off.  Feedback from employer indicate improvement in attention and focus.    Concerta 54 mg     TODAY PATIENT REPORTS THE FOLLOWING PSYCHIATRIC ROS:   Per [facility], Riana Roque, during today's team-based visit:  \" update: Pt reports that her memory has returned to normal mostly but she there are certain things she doesn't remember. Pt reports that she's experiencing some irritability and anger rather than just being angry. States that she has not interest in anything, low energy. Feeling discouraged that things will get better. Little aspiration for life since childhood. Work is going reasonably well. Sense of having a foreshortened future. [facility] attempted to provide psycho-education on trauma sx. Pt feels that her sx are not related to trauma. Some skin picking related to anxiety.  Pt became increasingly dysregulated during session, swearing, yelling, hitting self. Pt left visit abruptly. [facility] consulted Clementine GUADALUPE. [facility] activated [facility] but during th course of that call [facility] was informed by Clementine GUADALUPE who was seeing pt that pt was thinking about buying a gun so [facility] called [facility] for assistance. Emergency nurse liaison was initiated who assisted.    Stresses: none  Appetite: unremarkable  Sleep: " "unremarkable  Therapy: She had he last appointment with [facility] 2 weeks ago she has decided that she doesn't want to continue therapy because she doesn't thinkg things will get any better.   MIGUEL: No  Preg: No  Interventions: psycho-education on trauma  Most important: medication update, medication compliant\"     EXERCISE: Adequate  SIDE EFFECTS:   tolerating medications without reported side effects  COMPLIANCE:   states Adherent to medication regimen  REPORTS THE FOLLOWING NEW MEDICAL ISSUES: None endorsed       Refusing cardiology consult per records     Irritable, quick to react to minimal triggers.  Frustrated with noone understanding her mental health.  Made statements that she has the money to buy a gun and she could easily get due to the fact that she would not need a psychology review.  Perseverative on this.  Endorses mood lability and suicidal ideation.  Reports she doesn't want to go to the emergency department as they always lay hands on her and she will hurt them.      PROBLEM: DEPRESSION: Worsening        5/16/2024     9:35 AM   Last PHQ-9   1.  Little interest or pleasure in doing things 3   2.  Feeling down, depressed, or hopeless 0   3.  Trouble falling or staying asleep, or sleeping too much 0   4.  Feeling tired or having little energy 3   5.  Poor appetite or overeating 0   6.  Feeling bad about yourself 0   7.  Trouble concentrating 1   8.  Moving slowly or restless 0   Q9: Thoughts of better off dead/self-harm past 2 weeks 0   PHQ-9 Total Score 7         2/8/2024     7:23 PM 3/29/2024     9:28 AM 5/16/2024     9:35 AM   PHQ-9 SCORE   PHQ-9 Total Score MyChart 15 (Moderately severe depression) 9 (Mild depression) 7 (Mild depression)   PHQ-9 Total Score 15    15 9 7         PROBLEM: ANXIETY: Worsening.  Irritable, quick to react to minimal triggers   GAD7 score is is 12 indicating moderate anxiety.      5/18/2023     7:13 AM 11/17/2023    12:37 AM 5/16/2024     9:35 AM   NINFA-7 SCORE   Total " "Score 17 (severe anxiety) 18 (severe anxiety) 12 (moderate anxiety)   Total Score 17 18 12    12          PERTINENT PAST MEDICAL AND SURGICAL HISTORY     Past Medical History:   Diagnosis Date    Asthma     Bipolar affective disorder (H)     Outbursts of anger     Schizophrenia (H)        VITALS     BP Readings from Last 1 Encounters:   04/03/24 (!) 132/90     Pulse Readings from Last 1 Encounters:   04/03/24 83     Wt Readings from Last 1 Encounters:   01/10/24 110.9 kg (244 lb 6.4 oz)     Ht Readings from Last 1 Encounters:   11/13/23 1.676 m (5' 6\")     Estimated body mass index is 39.45 kg/m  as calculated from the following:    Height as of 11/13/23: 1.676 m (5' 6\").    Weight as of 1/10/24: 110.9 kg (244 lb 6.4 oz).    LABS & IMAGING                                                                                                                   Recent Labs   Lab Test 07/28/23  1903   WBC 6.6   HGB 12.4   HCT 38.9   MCV 89        Recent Labs   Lab Test 07/28/23  1903 06/23/21  1138    139   POTASSIUM 3.9 3.8   CHLORIDE 106 108   CO2 24 28   * 95   KIKE 8.9 9.0   BUN 10.7 9   CR 0.67 0.75   GFRESTIMATED >90 >90   ALBUMIN  --  3.7   PROTTOTAL  --  7.3   AST  --  23   ALT  --  44   ALKPHOS  --  83   BILITOTAL  --  0.3     Recent Labs   Lab Test 06/23/21  1138   CHOL 164   LDL 98   HDL 52   TRIG 71     Recent Labs   Lab Test 10/25/21  1138   TSH 1.36        ALLERGY & IMMUNIZATIONS       Allergies   Allergen Reactions    Alcohol Hives    Kiwi Hives    Red Dye     Seasonal Allergies Other (See Comments) and Rash     sneezing       MEDICAL REVIEW OF SYSTEMS:   Ten system review was completed with pertinent positives noted     MENTAL STATUS EXAM:      General/Constitutional:  Appearance:   awake, alert, adequately groomed, appeared stated age    Attitude:    guarded  Eye Contact:  intense  Musculoskeletal:  Psychomotor Behavior:  no evidence of tardive dyskinesia, dystonia, or tics from the head " up  Psychiatric:  Speech:  clear, coherent, regular rate, rhythm, and volume,     Associations:  no loose associations  Thought Process:   concrete  Thought Content:   Endorses passive SI, Reports she has money to get a gun. No homicidal ideation, no evidence of psychotic thought, no auditory hallucinations present and no visual hallucinations present  Mood:  emotionally labile, depression and anxiety   Affect:  angry, labile  Insight:  fair  Judgment: limited  Impulse Control: limited  Neurological:  Oriented to:  person, place, time, and situation  Attention Span and Concentration:  normal    Language: intact     Recent and Remote Memory:  Intact to interview. Not formally assessed. No amnesia.    Fund of Knowledge: appropriate        SAFETY   Feels safe in home: Yes   Suicidal ideation: passive, no intent or plan  History of suicide attempts:  Yes   Hx of impulsivity: Yes   Hope for the future: present    Hx of Command hallucinations or current psychosis: No  History of Self-injurious behaviors: Yes Current:  No  Family member  by suicide:  No        SAFETY ASSESSMENT:   Based on all available evidence including the factors cited above, overall Risk for harm is high and recommend higher level of care with welfare check initiated.        DSM 5 DIAGNOSIS:   Major Depressive Disorder, Recurrent, Mild, per psych testing 2021  Complex Posttraumatic Stress Disorder, With disassociation (Primary)  Features of Borderline Personality Disorder  Attention Deficit Hyperactivity Disorder, per psych testing 2021        MEDICAL COMORBIDITY IMPACTING CLINICAL PICTURE: None noted      ASSESSMENT AND PLAN      Problem List Items Addressed This Visit          Behavioral     Primary goal of the appointment is to ascertain safety.  Coordinated with Behavioral Health Consultant and RN to have welfare check completed.  See their documentation.    No medication changes.  Was able to eventual communicate to see patient in person  in [place] on May 31.2024.              Attention deficit hyperactivity disorder (ADHD), combined type    Complex posttraumatic stress disorder    Relevant Medications    vilazodone (VIIBRYD) 20 MG TABS tablet             CONSULTS/REFERRALS:   Continue therapy   Coordinate care with therapist as needed     MEDICAL:   None at this time  [facility]     FOLLOW UP: Schedule an appointment with me in four weeks or sooner as needed.   Call the psychiatric nurse line with medication questions or concerns at [phone] or [phone]  Follow up with primary care provider as planned or for acute medical concerns.     PSYCHOEDUCATION:  Medication side effects and alternatives reviewed. Health promotion activities recommended and reviewed today. All questions addressed. Education and counseling completed regarding risks and benefits of medications and psychotherapy options.  Consent provided by patient/guardian  Call the psychiatric nurse line with medication questions or concerns at [phone].  R[place]hart may be used to communicate with your provider, but this is not intended to be used for emergencies.  STIMULANT THERAPY: Side effects discussed including but not limited to cardiac (including HTN, tachycardia, sudden death), motor/tic, appetite/growth, mood lability and sleep disruption. This is a controlled substance with risk for abuse, need to keep in a safe keep place and cannot replace lost scripts  Medlineplus.gov is information for patients.  It is run by the National Library of Medicine and it contains information about all disorders, diseases and all medications.       COMMUNITY RESOURCES:    CRISIS NUMBERS: Provided in AVS 8/5/2021  National Suicide Prevention Lifeline: 3-781-186-TALK (908-926-9595)  Recommind/resources for a list of additional resources (SOS)            [facility] - [phone]   Urgent Care Adult Mental  Csyjhr-[phone] mobile unit/ 24/7 crisis line  [facility] -[phone]   COPE 24/7 [place] Mobile Team -[phone] (adults)/ [phone] (child)  Poison Control Center - [phone]    OR  go to nearest ER  Crisis Text Line for any crisis 24/7 send this-   To: 154500   [facility] ([facility]) [facility]  [phone]  National Suicide Prevention Lifeline: 312.711.9955 (TTY: 451.126.1721). Call anytime for help.  (www.suicidepreventionlifeline.org)  National [place] on Mental Illness (www.negrita.org): 911.239.2538 or 299-188-7911.   Mental Health Association (www.mentalhealth.org): 970.723.8770 or 703-726-1761.  [place] Crisis Text Line: Text [place] to 123974  Suicide LifeLine Chat: [employer].org/chat    ADMINISTRATIVE BILLING:   Level of Medical Decision Making:   - At least 1 chronic problem that is not stable  - Engaged in prescription drug management during visit (discussed any medication benefits, side effects, alternatives, etc.)               Patient Status:  Patient will continue to be seen for ongoing consultation and stabilization.     Signed:   Clementine Myles, MSN, APRN, FMP-[facility] ([facility]PS)   Chart documentation done in part with Dragon Voice Recognition software.  Although reviewed after completion, some word and grammatical errors may remain.

## 2024-05-16 NOTE — NURSING NOTE
Is the patient currently in the state of [place]? YES    Visit mode:VIDEO    If the visit is dropped, the patient can be reconnected by: VIDEO VISIT: Text to cell phone:   Telephone Information:   Mobile [phone]       Will anyone else be joining the visit? NO  (If patient encounters technical issues they should call [phone] :[phone])    How would you like to obtain your AVS? MyChart    Are changes needed to the allergy or medication list? Pt stated no changes to allergies and Pt stated no med changes    Are refills needed on medications prescribed by this physician? YES    Reason for visit: CANDICE ACUNA

## 2024-05-16 NOTE — TELEPHONE ENCOUNTER
"    1) At 10:38 AM, provider notified Nursing via Teams Emergency Chat that [facility], Riana Roque was in the process of contacting PD for a welfare check. Welfare check was initiated in [facility] appointment and event continued into provider appointment immediately after.     2) Per provider and [facility] report:     - Pt left provider appointment very dysregulated, making suicidal statements, having intrusive thoughts to buy a gun.   - Stated she does not currently have a gun.   - Has talked about dying by suicide via  in the past.   - Is aware that welfare check is being called in and was very angry. Was yelling \"I will fight them.\"   - Pt has history of violence and suicide attempts.     3) Actions taken:     - [facility] initiated call to [facility] Dispatch for a welfare check based on pt's address on file: [address] [place]. [place] [address].   - RN called [facility] Crisis Response to see if they could support PD in deescalating pt. They put out the call to crisis team.   - PD called - they went out to the address we have on file and stated house is \"abandoned.\" The officer said someone else had called regarding her earlier this week or last. [facility] noted that this may have been pt's therapist, [facility].   - Pt's partner called and spoke with RN. Stated that the address clinic has is partner's parents' permanent address, as she and patient have moved around a lot.  - Partner stated current address is: [address] [place] [address] (temporary address)    - Partner stated that she doesn't know what pt said at her appointment, but that she thinks this is an overreaction and doesn't think a crisis check is needed.   - Partner stated that pt is Autistic, has neck/back problems, and doesn't like to be touched.   - Partner was leaving work to return home to be with patient.   - RN called [facility] Dispatch and provided the about information including updated address. Dispatch states they will update Seng " "[facility] Dispatch.   - RN called [facility] Crisis Response to see if they could support PD in deescalating patient if needed. They said they would contact and coordinate with police.   - RN called [facility] Crisis to cancel welfare check.   - At 11:46 AM, [facility] Crisis worker called RN back. States they just got off the phone with pt. They state pt was initially angry and they were able to calm her down. Pt told them it was a \"misunderstanding\" and that she was \"joking.\" Crisis Worker stated that pt was talking about how a \"red flag law\" didn't pass. Partner was present. Pt reports being safe at this time and has [facility] Crisis Response phone number if that changes. Crisis worker states she will update PD because pt is calm, safe, and will not need to be brought to the hospital at this time.   - Pt sent [employer] message to the provider at 11:55 AM. RN responded and routed to provider.   - Provider continued to exchange messages with pt.     4) Routing to provider and [facility] for review.    5) [facility] OBC's please add pt's temporary address in chart.     Anya Mireles RN on 5/16/2024 at 2:12 PM    "

## 2024-05-17 NOTE — TELEPHONE ENCOUNTER
RN sent the blank NATHALIE url link to the patient via MyC     Lolis Krause RN on 5/17/2024 at 8:55 AM

## 2024-05-21 ENCOUNTER — MYC REFILL (OUTPATIENT)
Dept: INTERNAL MEDICINE | Facility: CLINIC | Age: 29
End: 2024-05-21
Payer: COMMERCIAL

## 2024-05-21 DIAGNOSIS — F90.2 ATTENTION DEFICIT HYPERACTIVITY DISORDER (ADHD), COMBINED TYPE: ICD-10-CM

## 2024-05-21 NOTE — TELEPHONE ENCOUNTER
Per Clementine Myles DNP the patient has an in-person appt scheduled at 9:30 AM on 5/31/2024.    Lolis Krause RN on 5/21/2024 at 8:53 AM

## 2024-05-22 RX ORDER — DEXTROAMPHETAMINE SACCHARATE, AMPHETAMINE ASPARTATE MONOHYDRATE, DEXTROAMPHETAMINE SULFATE AND AMPHETAMINE SULFATE 3.75; 3.75; 3.75; 3.75 MG/1; MG/1; MG/1; MG/1
15 CAPSULE, EXTENDED RELEASE ORAL DAILY
Qty: 30 CAPSULE | Refills: 0 | Status: SHIPPED | OUTPATIENT
Start: 2024-05-22 | End: 2024-06-12

## 2024-05-23 NOTE — PATIENT INSTRUCTIONS
**For crisis resources, please see the information at the end of this document**     Thank you for coming to the [facility].    TREATMENT PLAN:    MEDICATIONS:   No medication changes    -PSYCHOEDUCATION:      CONSULTS/REFERRALS:   Continue therapy     LABS/PROCEDURES:    Please call your [facility] clinic and ask for a lab only appointment at your earliest convenience.  If your results are reassuring or normal they will be mailed to you or sent through [employer] within 7 days. If the lab tests need quick action we will call you with the results. The phone number we will call with results is # [phone].      FOLLOW UP: plan to meet in person 5/31/2024 in person in [facility] or sooner as needed.  The intake team should be calling you to schedule.  If you dont hear from them, or they were unable to reach you, please call [phone] to schedule.  Follow up with primary care provider as planned or for acute medical concerns.  Call the psychiatric nurse line with medication questions or concerns at [phone].      Medication Refills:  If you need any refills please call your pharmacy and they will contact us. Our fax number for refills is [phone]. Please allow three business for refill processing. If you need to  your refill at a new pharmacy, please contact the new pharmacy directly. The new pharmacy will help you get your medications transferred.     [facility] Assistance [phone]  Financial Assistance [phone]  [employer] Billing [phone]  Central Billing Office, ealth: [phone]  [facility]illing [phone]  Medical Records [phone]  [facility] Patient Bill of Rights [email or web address]       MENTAL HEALTH CRISIS RESOURCES:  For a emergency help, please call 911 or go to the nearest Emergency Department.     Emergency Walk-In Options:   [facility] Unit @ [facility]  ([place]): [phone] - Specialized mental health emergency area designed to be calming  [facility] ([place]): [phone]  [facility] ([place]): [phone]  [facility] ([place]): [phone]    National Crisis Information: Call 988 Suicide and Crisis Lifeline  Crisis Text Line (free 24/7):  call **CRISIS (**786840) Crisis or use the texting option by texting 599144.   National Suicide Prevention Lifeline: Call 988  Poison Control Center: 8-154-730-8206  Trans Lifeline: 5-337-454-9192 - Hotline for transgender people of all ages  The Nav[employer] Project: 2-601-578-5556 - Hotline for LGBT youth   List of all [facility] resources:   https://[place].gov/dhs/people-we-serve/adults/health-care/mental-health/resources/crisis-contacts.jsp    For Non-Emergency Support:   Fast Tracker: Mental Health & Substance Use Disorder Resources -   https://www.fasttrackermn.org/       Again thank you for choosing [facility] and please let us know how we can best partner with you to improve you and your family's health.    You may be receiving a survey regarding this appointment. We would love to have your feedback, both positive and negative. The survey is done by an external company, so your answers are anonymous.    Patient Education   Collaborative Care Psychiatry Service  What to Expect  Here's what to expect from your Collaborative Care Psychiatry Service (CCPS).   About CCPS  CCPS means 2 people work together to help you get better. You'll meet with a behavioral health clinician and a psychiatric doctor. A behavioral health clinician helps people with mental health problems by talking with them. A psychiatric doctor helps people by giving them medicine.  How it works  At every visit, you'll see the behavioral health clinician (BHC) first. They'll talk with you about how you're doing and teach you how to feel better.   Then you'll  "see the psychiatric doctor. This doctor can help you deal with troubling thoughts and feelings by giving you medicine. They'll make sure you know the plan for your care.   CCPS usually takes 3 to 6 visits. If you need more visits, we may have you start seeing a different psychiatric doctor for ongoing care.  If you have any questions or concerns, we'll be glad to talk with you.  About visits  Be open  At your visits, please talk openly about your problems. It may feel hard, but it's the best way for us to help you.  Cancelling visits  If you can't come to your visit, please call us right away at [phone]. If you don't cancel at least 24 hours (1 full day) before your visit, that's \"late cancellation.\"  Being late to visits  Being very late is the same as not showing up. You will be a \"no show\" if:  Your appointment starts with a [facility], and you're more than 15 minutes late for a 30-minute (half hour) visit. This will also cancel your appointment with the psychiatric doctor.  Your appointment is with a psychiatric doctor only, and you're more than 15 minutes late for a 30-minute (half hour) visit.  Your appointment is with a psychiatric doctor only, and you're more than 30 minutes late for a 60-minute (full hour) visit.  If you cancel late or don't show up 2 times within 6 months, we may end your care.   Getting help between visits  If you need help between visits, you can call us Monday to Friday from 8 a.m. to 4:30 p.m. at [phone].  Emergency care  Call 911 or go to the nearest emergency department if your life or someone else's life is in danger.  Call 988 anytime to reach the national Suicide and Crisis hotline.  Medicine refills  To refill your medicine, call your pharmacy. You can also call [facility]'s Behavioral Access at [phone], Monday to Friday, 8 a.m. to 4:30 p.m. It can take 1 to 3 business days to get a refill.   Forms, letters, and tests  You may have papers to fill out, like " FMLA, short-term disability, and workability. We can help you with these forms at your visits, but you must have an appointment. You may need more than 1 visit for this, to be in an intensive therapy program, or both.  Before we can give you medicine for ADHD, we may refer you to get tested for it or confirm it another way.  We may not be able to give you an emotional support animal letter.  We don't do mental health checks ordered by the court.   We don't do mental health testing, but we can refer you to get tested.   Thank you for choosing us for your care.  For informational purposes only. Not to replace the advice of your health care provider. Copyright   2022 [facility]. All rights reserved. Qitio [id number] - 12/22.

## 2024-05-23 NOTE — ASSESSMENT & PLAN NOTE
Primary goal of the appointment is to ascertain safety.  Coordinated with Behavioral Health Consultant and RN to have welfare check completed.  See their documentation.    No medication changes.  Was able to eventual communicate to see patient in person in [place] on May 31.2024.

## 2024-05-31 ENCOUNTER — OFFICE VISIT (OUTPATIENT)
Dept: PSYCHIATRY | Facility: CLINIC | Age: 29
End: 2024-05-31
Payer: COMMERCIAL

## 2024-05-31 DIAGNOSIS — F90.2 ATTENTION DEFICIT HYPERACTIVITY DISORDER (ADHD), COMBINED TYPE: ICD-10-CM

## 2024-05-31 DIAGNOSIS — F43.10 COMPLEX POSTTRAUMATIC STRESS DISORDER: ICD-10-CM

## 2024-05-31 PROCEDURE — G2211 COMPLEX E/M VISIT ADD ON: HCPCS | Performed by: NURSE PRACTITIONER

## 2024-05-31 PROCEDURE — 99214 OFFICE O/P EST MOD 30 MIN: CPT | Performed by: NURSE PRACTITIONER

## 2024-05-31 ASSESSMENT — PATIENT HEALTH QUESTIONNAIRE - PHQ9
10. IF YOU CHECKED OFF ANY PROBLEMS, HOW DIFFICULT HAVE THESE PROBLEMS MADE IT FOR YOU TO DO YOUR WORK, TAKE CARE OF THINGS AT HOME, OR GET ALONG WITH OTHER PEOPLE: NOT DIFFICULT AT ALL
SUM OF ALL RESPONSES TO PHQ QUESTIONS 1-9: 11
SUM OF ALL RESPONSES TO PHQ QUESTIONS 1-9: 11

## 2024-05-31 NOTE — PROGRESS NOTES
Mental Health and Collaborative Care Psychiatry Service Rooming Note      Most pressing mental health concern at this time: The patient is traumatized by [facility], she does not like coming to any [facility] clinic or hospital.  She has had very traumatic experiences with this healthcare system.  Patient was not able to be roomed properly due to significant trauma and not wanting to come back to patient room.  Patient would prefer to change healthcare systems given the history she has with this organization       Any new physical health conditions or diagnoses affecting you that we should be aware of: has hypermobility     No refills needed at this time.    Lolis Krause RN on 2024 at 10:45 AM    Answers submitted by the patient for this visit:  Patient Health Questionnaire (Submitted on 2024)  If you checked off any problems, how difficult have these problems made it for you to do your work, take care of things at home, or get along with other people?: Not difficult at all  PHQ9 TOTAL SCORE: 11      Virtual Visit Details    Type of service:  Video Visit     Originating Location (pt. Location): Home    Distant Location (provider location):  Off-site  Platform used for Video Visit: [place] [employer]         PSYCHIATRIC MEDICATION FOLLOW UP APPT     Name:  Lenore Bergeronz  : 1995    Referred by: Terrance Rand, [facility]    Patient Care Team:  [facility] Macey Clarendon as PCP - Claudia Austin DO as Assigned Behavioral Health Provider  Terrance Rand MD as Assigned PCP  [facility] weekly  History of using DBT skills     Patient attended the phone/video session alone.     Last seen for outpatient psychiatry Return Visit on 2024.       Patient attended the phone/video session alone.         FOLLOWING PLAN PUT INTO PLACE:      Primary goal of the appointment is to ascertain safety.   Coordinated with Behavioral Health Consultant and RN to have welfare check completed.  See their documentation.     No medication changes.  Was able to eventual communicate to see patient in person in [place] on May 31.2024.           INTERIM HISTORY     COMMUNICATIONS FROM PATIENT VIA:  none       RECORDS AVAILABLE FOR REVIEW: EHR records through [employer] .          HISTORY OF PRESENT ILLNESS   Initial CCPS referral for psychiatric medication consult in August, 2021, graduated in 4/2022 and rereffered in February 2024.  Reports history of multiple diagnosis throughout her childhood primarily in the context of traumatic events.  [facility], typcially in the context of dysregulation, SI/SA.   Hx of suicidal ideation and attempts. Last hospitalization was when she was 20 yo.   Genetically loaded for  schizophrenia (biomother) and unknown paternal genetic load.      Reports she first sought treatment at a very young age.    Reports symptoms of depression and anxiety emerged in early childhood. She reported she experienced her first panic attack at the age of 7 years old and attempted to end her life at the age of 8 years old. Started medications when she was 9 years old.  She highlighted that at that age, her foster parents wanted to adopt her but not her brothers. She asserted she  didn t want to be without [her] brothers  so she tried to end her life. She disclosed that the family  still wanted to adopt [her]  so she began engaging in behaviors to get them to  change their mind  such as throwing violent tantrums, urinating in her bed and refusing to shower. She indicated she was hospitalized for 3 months and after 2 years of out of control behaviors, the family rescinded their intent to adopt her. She reported she then  bounced around from [foster] home to home  until she was 11 when she settled in a home, she  liked.  She added that her  father  then  attempted to get custody of her and she relocated with him to the [facility] when she was 15 years old. She acknowledged being a  really angry  teenager because she  couldn t express [herself].  She asserted she was diagnosed with several disorders including Schizophrenia and Bipolar I Disorder and prescribed psychiatric medications. She also disclosed she attempted to end her life on several occasions. Ms. Suggs stated that her last suicide attempt occurred in April of 2018. She asserted that in her twenties she has detached from others and experienced  no interest in anyone.  She outlined episodes of disassociation, obsessive thoughts, compulsive acts and fantasies of death. She admitted she does not like being touched and fears being contaminated. She reported she  washes [her] hands too much  and likes things to feel  the right way.   She shares a previous diagnosis of bipolar and schizophrenia. Reports she had been off all medication from age 15 through 24 years old.      Meets criteria for multiple diagnosis. When evaluating closer, symptoms are primarily in the context of chronic trauma they has experienced as a child, adolescent and adult.   Primary diagnosis is complex PTSD.  In individuals who are exposed to chronic repeated, or long-standing traumatic events, the classical symptoms of PTSD often fail to completely describe the psychological harm, emotional problems, and changes in how people view themselves and the world around them.  As a result, some professionals believe that we should distinguish between the type of PTSD that developed from chronic, long-lasting traumatic events as compared to PTSD from short-lived event.  The diagnosis of  complex PTSD  refers to the set of symptoms that commonly follow exposure to a chronic, prolonged long lasting traumatic event.  These  generally involve some form of physical or emotional captivity, such as childhood sexual and/or physical abuse or  domestic violence.  In these types of events, the victim is under the control of another person and doesn t have the ability to easily escape.      Symptoms of complex PTSD:      Emotion regulation problems- People with complex PTSD experience difficulties managing their emotions.  They may experience severe depression, thoughts of suicide, or have difficulty controlling their anger.      Changes in consciousness: following exposure to a chronic traumatic event, a person may repress memories of the traumatic event or experience flashbacks.  In rare cases, a person may experience disassociation.      Symptoms in this category include feelings of helplessness, shame, guilt, or feeling detached and different from others      Changes in how the victim views the perpetrator: a person with complex PTSD may feel like he has no power over perpetrator (the perpetrator has complete power in a relationship).  In complex PTSD, people may also become preoccupied with their relationship with the perpetrator.  For example, constant thoughts of wanting revenge.      Changes in personal relationships:  These symptoms include problems with relationships, such as isolating oneself or being distrusting of others      Changes in how one views the world: People exposed to chronic or repeated traumatic events may also lose jose in humanity or have a sense of hopelessness.      Psychiatric testing by Lisa Petit, PhD LP Psychologist, documented date 6/8/2021 available for review with the following identified diagnoses:     Major Depressive Disorder, Recurrent, Mild   Posttraumatic Stress Disorder, With disassociation   Features of Borderline Personality Disorder  Attention Deficit Hyperactivity Disorder      Not formally diagnosed with autism and does not want to wait for the six month waiting list.      FAMILY, MEDICAL, SURGICAL HISTORY REVIEWED.  MEDICATION HAVE BEEN REVIEWED AND ARE CURRENT TO THE BEST OF MY KNOWLEDGE AND  ABILITY.  Pharmacy tech, [employer]      MEDICATIONS                                                                                                Current Outpatient Medications   Medication Sig Dispense Refill    albuterol (PROAIR HFA/PROVENTIL HFA/VENTOLIN HFA) 108 (90 Base) MCG/ACT inhaler Inhale 2 puffs into the lungs every 6 hours as needed for shortness of breath, wheezing or cough 18 g 3    amphetamine-dextroamphetamine (ADDERALL XR) 15 MG 24 hr capsule Take 1 capsule (15 mg) by mouth daily 30 capsule 0    lamoTRIgine (LAMICTAL) 25 MG tablet take 25 mg orally daily for two weeks then increase to 50 mg daily until follow-up appointment (Patient not taking: Reported on 4/3/2024) 60 tablet 1    ondansetron (ZOFRAN ODT) 4 MG ODT tab Take 1 tablet (4 mg) by mouth every 8 hours as needed for nausea 30 tablet 1    vilazodone (VIIBRYD) 20 MG TABS tablet Take 1 tablet (20 mg) by mouth daily Take with 40 mg for total daily dose of 60 mg 90 tablet 0    vilazodone (VIIBRYD) 40 MG TABS tablet Take 1 tablet (40 mg) by mouth daily 90 tablet 3     No current facility-administered medications for this visit.       NOTES ABOUT CURRENT PSYCHOTROPIC MEDICATIONS:   Adderall XR 15 mg  Viibryd 60 mg     PAST PSYCHOTROPIC MEDICATIONS:  Risperidone   Olanzapine   Comobabi  Ziprasidone   Quetiapine   Guanfacine  Trazodone   Latuda   Citalopram   Escitalopram   Fluoxetine   Adderall IR 10 mg once daily  Bupropion  mg   Sertraline 100 mg, discontinued due to increase in fatigued   Vyvanse 40 mg, doesn't take on her days off.  Feedback from employer indicate improvement in attention and focus.    Concerta 54 mg     TODAY PATIENT REPORTS THE FOLLOWING PSYCHIATRIC ROS:   Patient came to appointment 45 minutes late.  The  SCV was out of the office and a note to ring bell was present.  Patient started banging and yelling while RN and I were in the back office.  Screaming obscenities.  Angry there was noone in the lobby.  Did  not ring the bell and kept yelling.  When we came out she indicated she did not want to stay for the appointment and left.  No indication a danger to herself or others.  She did leave and returned 10 minutes later.  She started sharing trauma she had with providers and that this is what set her off.  Specifically when discussing an episode with her PCP she started hitting her head hard with her hands.  Staring off to the upper right and shaking.  Appeared to be disassociating.  Was able to calm and deescalate.  Reports she feels her mood is fine except when coming into a [facility] clinic or hospital related to negative experiences she has had.    EXERCISE: Adequate  SIDE EFFECTS:   tolerating medications without reported side effects  COMPLIANCE:   states Adherent to medication regimen  REPORTS THE FOLLOWING NEW MEDICAL ISSUES: History of fainting off and on throughout her life.  Holter monitor for two days.  Not completed due to the pads not staying on.      Refusing cardiology consult per records     PROBLEM: DEPRESSION:  does endorse irritable, quick to react to minimal triggers but today is worse.  Does not want to change medication at this time         5/31/2024    10:09 AM   Last PHQ-9   1.  Little interest or pleasure in doing things 2   2.  Feeling down, depressed, or hopeless 0   3.  Trouble falling or staying asleep, or sleeping too much 1   4.  Feeling tired or having little energy 2   5.  Poor appetite or overeating 1   6.  Feeling bad about yourself 0   7.  Trouble concentrating 2   8.  Moving slowly or restless 0   Q9: Thoughts of better off dead/self-harm past 2 weeks 3   PHQ-9 Total Score 11   In the past two weeks have you had thoughts of suicide or self harm? Yes   Do you have concerns about your personal safety or the safety of others? No   In the past 2 weeks have you thought about a plan or had intention to harm yourself? No   In the past 2 weeks have you acted on these thoughts in any way? No  "        3/29/2024     9:28 AM 5/16/2024     9:35 AM 5/31/2024    10:09 AM   PHQ-9 SCORE   PHQ-9 Total Score MyChart 9 (Mild depression) 7 (Mild depression) 11 (Moderate depression)   PHQ-9 Total Score 9 7 11     PHQ9 score is 11 indicating moderate depression.  Suicidal ideation:   Endorses passive SI, no intent or plan.       PROBLEM: ANXIETY: Worsening.  Related to psychosocial stressors   GAD7 score is Not completed today      5/18/2023     7:13 AM 11/17/2023    12:37 AM 5/16/2024     9:35 AM   NINFA-7 SCORE   Total Score 17 (severe anxiety) 18 (severe anxiety) 12 (moderate anxiety)   Total Score 17 18 12    12          PERTINENT PAST MEDICAL AND SURGICAL HISTORY     Past Medical History:   Diagnosis Date    Asthma     Bipolar affective disorder (H)     Outbursts of anger     Schizophrenia (H)        VITALS     BP Readings from Last 1 Encounters:   04/03/24 (!) 132/90     Pulse Readings from Last 1 Encounters:   04/03/24 83     Wt Readings from Last 1 Encounters:   01/10/24 110.9 kg (244 lb 6.4 oz)     Ht Readings from Last 1 Encounters:   11/13/23 1.676 m (5' 6\")     Estimated body mass index is 39.45 kg/m  as calculated from the following:    Height as of 11/13/23: 1.676 m (5' 6\").    Weight as of 1/10/24: 110.9 kg (244 lb 6.4 oz).    LABS & IMAGING                                                                                                                   Recent Labs   Lab Test 07/28/23  1903   WBC 6.6   HGB 12.4   HCT 38.9   MCV 89        Recent Labs   Lab Test 07/28/23  1903 06/23/21  1138    139   POTASSIUM 3.9 3.8   CHLORIDE 106 108   CO2 24 28   * 95   KIKE 8.9 9.0   BUN 10.7 9   CR 0.67 0.75   GFRESTIMATED >90 >90   ALBUMIN  --  3.7   PROTTOTAL  --  7.3   AST  --  23   ALT  --  44   ALKPHOS  --  83   BILITOTAL  --  0.3     Recent Labs   Lab Test 06/23/21  1138   CHOL 164   LDL 98   HDL 52   TRIG 71     Recent Labs   Lab Test 10/25/21  1138   TSH 1.36        ALLERGY & IMMUNIZATIONS  "      Allergies   Allergen Reactions    Alcohol Hives    Kiwi Hives    Red Dye     Seasonal Allergies Other (See Comments) and Rash     sneezing       MEDICAL REVIEW OF SYSTEMS:   Ten system review was completed with pertinent positives noted     MENTAL STATUS EXAM:      General/Constitutional:  Appearance:   awake, alert, adequately groomed, appeared stated age   Attitude:    irritable and angry in the initial interactions   Eye Contact:  intense  Musculoskeletal:  Psychomotor Behavior:  no evidence of tardive dyskinesia, dystonia, or tics from the head up  Psychiatric:  Speech:  angry, yelling  Associations:  no loose associations  Thought Process:   concrete  Thought Content:   Endorses passive SI, no intent or plan. No homicidal ideation, no evidence of psychotic thought, no auditory hallucinations present and no visual hallucinations present.  Did appear to dissociate while hitting her self and screaming.  Able to accept help in grounding  Mood:  emotionally labile, depression and anxiety. Irritable   Affect:  angry and congruent to speech content.  Insight:  limited  Judgment:  intact, adequate for safety  Impulse Control:  limited  Neurological:  Oriented to:  person, place, time, and situation  Attention Span and Concentration:  normal    Language: intact     Recent and Remote Memory:  Intact to interview. Not formally assessed. No amnesia.    Fund of Knowledge: appropriate        SAFETY   Feels safe in home: Yes   Suicidal ideation: passive, no intent or plan  History of suicide attempts:  Yes   Hx of impulsivity: Yes   Hope for the future: present    Hx of Command hallucinations or current psychosis: No  History of Self-injurious behaviors: Yes Current:  No  Family member  by suicide:  No        SAFETY ASSESSMENT:   Based on all available evidence including the factors cited above, overall Risk for harm is low and is appropriate for outpatient level of care.   Recommended that patient call 911 or go to  the local ED should there be a change in any of these risk factors.    DSM 5 DIAGNOSIS:   Major Depressive Disorder, Recurrent, Mild, per psych testing 6/2021  Complex Posttraumatic Stress Disorder, With disassociation (Primary)  Features of Borderline Personality Disorder  Attention Deficit Hyperactivity Disorder, per psych testing 6/2021        MEDICAL COMORBIDITY IMPACTING CLINICAL PICTURE: None noted      ASSESSMENT AND PLAN      Problem List Items Addressed This Visit          Behavioral     In person patient made to evaluate after wellness check on 5/16/2024.  Patient presented 45 minutes late, very dysregulated.  As we were deescalating, it became apparent she experiences a trauma reaction from coming into [facility] related to medical experiences including psychiatric care.  Plan will be to help transition her PCP to a new hospital system and establish long term psychiatry outside of [facility].  The following list was sent via 6connect message:    [facility] Psychiatry options:    [facility] Wellness    [email or web address]    [facility]  [email or web address]    [facility]  [email or web address].[email or web address]    [facility]  [email or web address]    [facility]  [email or web address]      [place]:  [email or web address]    [facility] [email or web address]    [facility]          At this time she feels her mood is overall stable, primary intervention is in trauma response and irritable, quick to react to minimal triggers which she will work on with therapy.  Refills provided.         Attention deficit hyperactivity disorder (ADHD), combined type    Relevant Medications    amphetamine-dextroamphetamine (ADDERALL XR) 15 MG 24 hr capsule (Start on 6/20/2024)    Complex posttraumatic stress disorder             CONSULTS/REFERRALS:   Continue therapy    Coordinate care with therapist as needed     MEDICAL:   None at this time  Coordinate care with PCP (Jakub, Leander [place]) as needed     FOLLOW UP: Schedule an appointment with me in four weeks or sooner as needed.   Call the psychiatric nurse line with medication questions or concerns at [phone] or [phone]  Follow up with primary care provider as planned or for acute medical concerns.     PSYCHOEDUCATION:  Medication side effects and alternatives reviewed. Health promotion activities recommended and reviewed today. All questions addressed. Education and counseling completed regarding risks and benefits of medications and psychotherapy options.  Consent provided by patient/guardian  Call the psychiatric nurse line with medication questions or concerns at [phone].  [employer] may be used to communicate with your provider, but this is not intended to be used for emergencies.  STIMULANT THERAPY: Side effects discussed including but not limited to cardiac (including HTN, tachycardia, sudden death), motor/tic, appetite/growth, mood lability and sleep disruption. This is a controlled substance with risk for abuse, need to keep in a safe keep place and cannot replace lost scripts  Medlineplus.gov is information for patients.  It is run by the [employer] of [employer] and it contains information about all disorders, diseases and all medications.       COMMUNITY RESOURCES:    CRISIS NUMBERS: Provided in AVS 8/5/2021  National Suicide Prevention Lifeline: 0-314-190-TALK (958-183-4246)  [employer]/resources for a list of additional resources (SOS)            [facility] - [phone]   Urgent Care Adult Mental Yretvl-[phone] mobile unit/ 24/7 crisis line  [facility] -[phone]   COPE 24/7 [place] Mobile Team -[phone] (adults)/ [phone] (child)  Poison Control Center - [phone]    OR  go to nearest ER  Crisis Text Line for any  crisis 24/7 send this-   To: 528553   [facility] ([facility]) [facility] ER  [phone]  National Suicide Prevention Lifeline: 877.620.4296 (TTY: 222.320.3083). Call anytime for help.  (www.suicidepreventionlifeline.org)  National [place] on Mental Illness (www.negrita.org): 610.372.2793 or 170-485-8847.   Mental Health Association (www.mentalhealth.org): 802.398.5052 or 173-370-7592.  [place] Crisis Text Line: Text [place] to 316423  Suicide LifeLine Chat: suicideAYLIEN.org/chat    ADMINISTRATIVE BILLING:   Level of Medical Decision Making:   - At least 1 chronic problem that is not stable  - Engaged in prescription drug management during visit (discussed any medication benefits, side effects, alternatives, etc.)           The longitudinal plan of care for the diagnosis(es)/condition(s) as documented were addressed during this visit. Due to the added complexity in care, I will continue to support Felecia in the subsequent management and with ongoing continuity of care.    Patient Status:  The patient is being referred to long term community psychiatry care and provider will provide bridging until patient is established with new community provider.      Signed:   Clementine Myles, MSN, APRN, FMHNP-[facility] Collaborative Care Psychiatry Service (CCPS)   Chart documentation done in part with Dragon Voice Recognition software.  Although reviewed after completion, some word and grammatical errors may remain.

## 2024-06-12 RX ORDER — DEXTROAMPHETAMINE SACCHARATE, AMPHETAMINE ASPARTATE MONOHYDRATE, DEXTROAMPHETAMINE SULFATE AND AMPHETAMINE SULFATE 3.75; 3.75; 3.75; 3.75 MG/1; MG/1; MG/1; MG/1
15 CAPSULE, EXTENDED RELEASE ORAL DAILY
Qty: 30 CAPSULE | Refills: 0 | Status: SHIPPED | OUTPATIENT
Start: 2024-06-20 | End: 2024-07-31

## 2024-06-12 NOTE — PATIENT INSTRUCTIONS
**For crisis resources, please see the information at the end of this document**      [facility] MENTAL HEALTH & ADDICTION [place] [facility].    You have been referred for long-term psychiatric care due to the complexity, chronicity, and severity of your psychiatric case.  It is my opinion that your care will be more optimally managed by a long-term psychiatric prescriber versus returning your psychiatric care back to your primary care provider for ongoing management.    [facility] Psychiatry options:    [facility]    http://Captimo.nanoMR/[email or web address]-team/    [facility]  Https://Ekso Bionics.nanoMR/providers/    Healing Connections  https://www.healingconnectionsonline.nanoMR/    [facility]  https://Across The Universe.nanoMR    [facility]  https://[facility].nanoMR/      [place]:  https://AntCornacCooler Planet.nanoMR/    The Remedy https://Tag'By/    [facility]          MEDICATIONS:   - continue current medication regimen.   MyChart Assistance [phone]  Financial Assistance [phone]  [facility] [phone]  Central Billing Office, [facility]: [phone]  [facility]illing [phone]  Medical Records [phone]  [facility] Patient Bill of Rights [email or web address]         MENTAL HEALTH CRISIS RESOURCES:  For a emergency help, please call 911 or go to the nearest Emergency Department.      Emergency Walk-In Options:   EmPATH Unit @ [facility] ([place]): [phone] - Specialized mental health emergency area designed to be calming  [facility] ([place]): [phone]  [facility] ([place]): [phone]  [facility] ([place]): [phone]     National Crisis Information: Call 988 Suicide and Crisis Lifeline  Crisis Text Line (free 24/7):  call **CRISIS (**698082) Crisis or  use the texting option by texting 431670.   National Suicide Prevention Lifeline: Call 988  Poison Control Center: 0-243-828-8272  Trans Lifeline: 5-223-198-3379 - Hotline for transgender people of all ages  The Navid Project: 5-581-940-7566 - Hotline for LGBT youth   List of all [facility] resources:   [place]://[place].gov/dhs/people-we-serve/adults/health-care/mental-health/resources/crisis-contacts.jsp     For Non-Emergency Support:   Fast Tracker: Mental Health & Substance Use Disorder Resources -   https://www.Queerfeed MediatrackChrendsn.org/         Again thank you for choosing [facility] MENTAL HEALTH & ADDICTION [place] [facility] and please let us know how we can best partner with you to improve you and your family's health.     You may be receiving a survey regarding this appointment. We would love to have your feedback, both positive and negative. The survey is done by an external company, so your answers are anonymous.        Patient Education   Collaborative Care Psychiatry Service  What to Expect  Here's what to expect from your Collaborative Care Psychiatry Service (CCPS).   About CCPS  CCPS means 2 people work together to help you get better. You'll meet with a behavioral health clinician and a psychiatric doctor. A behavioral health clinician helps people with mental health problems by talking with them. A psychiatric doctor helps people by giving them medicine.  How it works  At every visit, you'll see the behavioral health clinician (BHC) first. They'll talk with you about how you're doing and teach you how to feel better.   Then you'll see the psychiatric doctor. This doctor can help you deal with troubling thoughts and feelings by giving you medicine. They'll make sure you know the plan for your care.   CCPS usually takes 3 to 6 visits. If you need more visits, we may have you start seeing a different psychiatric doctor for ongoing care.  If you have any questions or concerns, we'll be glad to talk with  "you.  About visits  Be open  At your visits, please talk openly about your problems. It may feel hard, but it's the best way for us to help you.  Cancelling visits  If you can't come to your visit, please call us right away at [phone]. If you don't cancel at least 24 hours (1 full day) before your visit, that's \"late cancellation.\"  Being late to visits  Being very late is the same as not showing up. You will be a \"no show\" if:  Your appointment starts with a [facility], and you're more than 15 minutes late for a 30-minute (half hour) visit. This will also cancel your appointment with the psychiatric doctor.  Your appointment is with a psychiatric doctor only, and you're more than 15 minutes late for a 30-minute (half hour) visit.  Your appointment is with a psychiatric doctor only, and you're more than 30 minutes late for a 60-minute (full hour) visit.  If you cancel late or don't show up 2 times within 6 months, we may end your care.   Getting help between visits  If you need help between visits, you can call us Monday to Friday from 8 a.m. to 4:30 p.m. at [phone].  Emergency care  Call 911 or go to the nearest emergency department if your life or someone else's life is in danger.  Call 988 anytime to reach the national Suicide and Crisis hotline.  Medicine refills  To refill your medicine, call your pharmacy. You can also call [facility]'s Behavioral Access at [phone], Monday to Friday, 8 a.m. to 4:30 p.m. It can take 1 to 3 business days to get a refill.   Forms, letters, and tests  You may have papers to fill out, like FMLA, short-term disability, and workability. We can help you with these forms at your visits, but you must have an appointment. You may need more than 1 visit for this, to be in an intensive therapy program, or both.  Before we can give you medicine for ADHD, we may refer you to get tested for it or confirm it another way.  We may not be able to give you an emotional support " animal letter.  We don't do mental health checks ordered by the court.   We don't do mental health testing, but we can refer you to get tested.   Thank you for choosing us for your care.  For informational purposes only. Not to replace the advice of your health care provider. Copyright   2022 [facility]. All rights reserved. [employer] 132784 - 12/22.

## 2024-06-12 NOTE — ASSESSMENT & PLAN NOTE
In person patient made to evaluate after wellness check on 5/16/2024.  Patient presented 45 minutes late, very dysregulated.  As we were deescalating, it became apparent she experiences a trauma reaction from coming into [facility] related to medical experiences including psychiatric care.  Plan will be to help transition her PCP to a new hospital system and establish long term psychiatry outside of [facility].  The following list was sent via [employer] message:    [facility] Psychiatry options:    [facility]al Health and Wellness    http://Domin[facility] HospitalNanushka.Empower Energies Inc./our-team/    [facility]  Https://Coalfire.[employer] E[employer] Inc./providers/    [facility] Connections  https://www.healingconnectionsonline.[employer] Inc./    [facility]  https://PinPay.Empower Energies Inc.    [facility]  https://[facility].[employer] Energies Inc./      [place]:  https://Vorstack Corporation/    The Remedy https://Microtask/    [facility]          At this time she feels her mood is overall stable, primary intervention is in trauma response and irritable, quick to react to minimal triggers which she will work on with therapy.  Refills provided.

## 2024-07-06 ENCOUNTER — HEALTH MAINTENANCE LETTER (OUTPATIENT)
Age: 29
End: 2024-07-06

## 2024-07-31 ENCOUNTER — MYC MEDICAL ADVICE (OUTPATIENT)
Dept: PSYCHIATRY | Facility: CLINIC | Age: 29
End: 2024-07-31

## 2024-07-31 DIAGNOSIS — F90.2 ATTENTION DEFICIT HYPERACTIVITY DISORDER (ADHD), COMBINED TYPE: ICD-10-CM

## 2024-07-31 NOTE — TELEPHONE ENCOUNTER
Date of Last Office Visit: 5/31/24  Date of Next Office Visit: None; routing for [facility] to assist pt with scheduling.    No shows since last visit: No  More than one patient-initiated cancellation (with reschedule) since last seen in clinic? No    []Medication refilled per  Medication Refill in Ambulatory Care  policy.  [x]Medication unable to be refilled by RN due to criteria not met as indicated below:    []Eligibility: has not had a provider visit within last 6 months   [x]Supervision: no future appointment; < 7 days before next appointment   []Compliance: no shows; cancellations; lapse in therapy   []Verification: order discrepancy; may need modification...   [] > 30-day supply request   []Advanced refill request: > 7 days before refill date   [x]Controlled medication   []Medication not included in policy   []Review: new med; med adjusted ? 30 days; safety alert; requires lab monitoring...   []Scope of Practice: refill request processed by LPN/MA   []Other:      Medication(s) requested:     -  amphetamine-dextroamphetamine (ADDERALL XR) 15 MG 24 hr capsule   Date last ordered: 6/20/24  Qty: 30  Refills: 0  Appropriate for refill? Yes    Any Controlled Substance(s)? Yes   [place]  checked? Yes    was last sold on 7/2/24 for quantity of 30.  Other controlled substance on [place] ?: No      Requested medication(s) verified as identical to current order? Yes    Any lapse in adherence to medication(s) greater than 5 days? No      Additional action taken? contacted patient via [employer] to enquire if she has set up a long term psych appt yet and routed encounter to [facility] to assist pt with scheduling an appointment.      Last visit treatment plan:     In person patient made to evaluate after wellness check on 5/16/2024.  Patient presented 45 minutes late, very dysregulated.  As we were deescalating, it became apparent she experiences a trauma reaction from coming into [facility] related to medical experiences including  psychiatric care.  Plan will be to help transition her PCP to a new hospital system and establish long term psychiatry outside of [facility].       At this time she feels her mood is overall stable, primary intervention is in trauma response and irritable, quick to react to minimal triggers which she will work on with therapy.  Refills provided.            Attention deficit hyperactivity disorder (ADHD), combined type     Relevant Medications     amphetamine-dextroamphetamine (ADDERALL XR) 15 MG 24 hr capsule (Start on 6/20/2024)     Complex posttraumatic stress disorder       Any medication(s) require lab monitoring? No

## 2024-07-31 NOTE — TELEPHONE ENCOUNTER
RN reviewed Clementine Myles DNP directive -    RN sen a MyC message to the patient.    Lolis Krause RN on 7/31/2024 at 4:12 PM

## 2024-07-31 NOTE — TELEPHONE ENCOUNTER
Reason for call:  Medication   If this is a refill request, has the caller requested the refill from the pharmacy already? No  Will the patient be using a [facility]? No  Name of the pharmacy and phone number for the current request:     [id number] [place], [place] - [address] AT [facility] [address]     Name of the medication requested:   amphetamine-dextroamphetamine (ADDERALL XR) 15 MG 24 hr capsule     Other request: no    Phone number to reach patient:  Home number on file [phone] (home)    Best Time:  any    Can we leave a detailed message on this number?  YES    Travel screening: Not Applicable

## 2024-08-01 NOTE — TELEPHONE ENCOUNTER
"RN called the patient at [phone], she was very calm and polite.     She only has 3 capsules remaining of the  amphetamine-dextroamphetamine (ADDERALL XR) 15 MG 24 hr capsule  - Take 1 capsule (15 mg) by mouth daily     She has not had a chance to establish care with a long-term psychiatry provider yet, but does have a new PCP through [facility] at the clinic in [place].   She is okay with scheduling a follow up visit with Clementine as long as it is virtual if Clementine would prefer this.  RN reminded her to read her [employer] message that was sent yesterday with a list of long-term psychiatry providers.     The patient indicated that the above medication is somewhat helpful but not always, \"some days it works and some days it does not.\"  RN asked how many days of the week it is helpful and the patient replied, \"2-3/7 days\".  Overall she thinks the medication is helpful for her focus but is wondering if it could be increased so it could be helpful more days of the week.    RN forwarding to Clementine Myles DNP for review and recommendation regarding dose change/refill and if the patient should schedule an follow up appointment.      The patient prefers a call back and if she does not answer it is okay to leave a detailed message.    Lolis Krause RN on 8/1/2024 at 12:01 PM              "

## 2024-08-02 ENCOUNTER — MYC REFILL (OUTPATIENT)
Dept: PSYCHIATRY | Facility: CLINIC | Age: 29
End: 2024-08-02
Payer: COMMERCIAL

## 2024-08-02 DIAGNOSIS — F90.2 ATTENTION DEFICIT HYPERACTIVITY DISORDER (ADHD), COMBINED TYPE: ICD-10-CM

## 2024-08-02 RX ORDER — DEXTROAMPHETAMINE SACCHARATE, AMPHETAMINE ASPARTATE MONOHYDRATE, DEXTROAMPHETAMINE SULFATE AND AMPHETAMINE SULFATE 3.75; 3.75; 3.75; 3.75 MG/1; MG/1; MG/1; MG/1
15 CAPSULE, EXTENDED RELEASE ORAL DAILY
Qty: 30 CAPSULE | Refills: 0 | OUTPATIENT
Start: 2024-08-02

## 2024-08-02 RX ORDER — DEXTROAMPHETAMINE SACCHARATE, AMPHETAMINE ASPARTATE MONOHYDRATE, DEXTROAMPHETAMINE SULFATE AND AMPHETAMINE SULFATE 5; 5; 5; 5 MG/1; MG/1; MG/1; MG/1
20 CAPSULE, EXTENDED RELEASE ORAL DAILY
Qty: 30 CAPSULE | Refills: 0 | Status: SHIPPED | OUTPATIENT
Start: 2024-08-02

## 2024-08-02 RX ORDER — DEXTROAMPHETAMINE SACCHARATE, AMPHETAMINE ASPARTATE MONOHYDRATE, DEXTROAMPHETAMINE SULFATE AND AMPHETAMINE SULFATE 3.75; 3.75; 3.75; 3.75 MG/1; MG/1; MG/1; MG/1
15 CAPSULE, EXTENDED RELEASE ORAL DAILY
Qty: 30 CAPSULE | Refills: 0 | Status: SHIPPED | OUTPATIENT
Start: 2024-08-02 | End: 2024-08-02

## 2024-08-02 NOTE — TELEPHONE ENCOUNTER
Sent patient a my chart message letting her know she should follow up in 4 weeks and also set up a long term psychiatry appt.     Rosa Ulloa RN on 8/2/2024 at 4:05 PM

## 2024-08-02 NOTE — TELEPHONE ENCOUNTER
Addressed in 7/31/24 refill encounter.     Routed 7/31/24 refill encounter to Clementine Myles high priority .     MELONIE CHAMBERS RN on 8/2/2024 at 3:21 PM

## 2024-08-02 NOTE — TELEPHONE ENCOUNTER
Increased the Adderall XR to 20 mg.  Would like to set up a virtual visit in 4 weeks so I can make sure she has medications to get to long term psychiatry     Clementine Myles, DNP, APRN, FMHNP-BC,

## 2024-08-18 ENCOUNTER — MYC MEDICAL ADVICE (OUTPATIENT)
Dept: PSYCHIATRY | Facility: CLINIC | Age: 29
End: 2024-08-18
Payer: COMMERCIAL

## 2024-09-12 NOTE — TELEPHONE ENCOUNTER
RN called the patient at [phone]   writer did have to call twice as the patient screens her calls to make sure the 2nd call matches the 1st call in terms of phone numbers coming across her caller ID.    The patient indicated that she is out of medication, but would like to wait until she sees Clementine tomorrow morning at 8 AM to discuss as sometimes she feels like the Adderall works and then other times she feels like it does not work.    RN forwarding to Clementine Myles DNP for awareness..    Lolis Krause RN on 9/12/2024 at 2:38 PM

## 2024-09-12 NOTE — TELEPHONE ENCOUNTER
Reason for call:  Other   Patient called regarding (reason for call): prescription  Additional comments: On the 15 mg I've noticed I'm more awake than on the 20 mg - but I crash at end of night and I like being more productive at work.    Out of meds - [facility] [place], [place] P: [phone]  F: [phone]  Appt scheduled for tomorrow 9/13/24    NOTE if you call her back call back right away (two time) she ignores most calls because they are spam    Phone number to reach patient:  Home number on file [phone] (home)    Best Time:  any    Can we leave a detailed message on this number?  YES    Travel screening: Not Applicable

## 2024-09-13 ENCOUNTER — VIRTUAL VISIT (OUTPATIENT)
Dept: PSYCHIATRY | Facility: CLINIC | Age: 29
End: 2024-09-13
Payer: COMMERCIAL

## 2024-09-13 DIAGNOSIS — F90.2 ATTENTION DEFICIT HYPERACTIVITY DISORDER (ADHD), COMBINED TYPE: Primary | ICD-10-CM

## 2024-09-13 DIAGNOSIS — F43.10 COMPLEX POSTTRAUMATIC STRESS DISORDER: ICD-10-CM

## 2024-09-13 PROCEDURE — 99214 OFFICE O/P EST MOD 30 MIN: CPT | Mod: 95 | Performed by: NURSE PRACTITIONER

## 2024-09-13 RX ORDER — DEXTROAMPHETAMINE SACCHARATE, AMPHETAMINE ASPARTATE MONOHYDRATE, DEXTROAMPHETAMINE SULFATE, AMPHETAMINE SULFATE 6.25; 6.25; 6.25; 6.25 MG/1; MG/1; MG/1; MG/1
25 CAPSULE, EXTENDED RELEASE ORAL DAILY
Qty: 30 CAPSULE | Refills: 0 | Status: SHIPPED | OUTPATIENT
Start: 2024-10-11

## 2024-09-13 RX ORDER — VILAZODONE HYDROCHLORIDE 40 MG/1
40 TABLET ORAL DAILY
Qty: 90 TABLET | Refills: 0 | Status: SHIPPED | OUTPATIENT
Start: 2024-09-13

## 2024-09-13 RX ORDER — DEXTROAMPHETAMINE SACCHARATE, AMPHETAMINE ASPARTATE MONOHYDRATE, DEXTROAMPHETAMINE SULFATE, AMPHETAMINE SULFATE 6.25; 6.25; 6.25; 6.25 MG/1; MG/1; MG/1; MG/1
25 CAPSULE, EXTENDED RELEASE ORAL DAILY
Qty: 30 CAPSULE | Refills: 0 | Status: SHIPPED | OUTPATIENT
Start: 2024-09-13

## 2024-09-13 RX ORDER — VILAZODONE HYDROCHLORIDE 20 MG/1
20 TABLET ORAL DAILY
Qty: 90 TABLET | Refills: 0 | Status: SHIPPED | OUTPATIENT
Start: 2024-09-13

## 2024-09-13 RX ORDER — DEXTROAMPHETAMINE SACCHARATE, AMPHETAMINE ASPARTATE MONOHYDRATE, DEXTROAMPHETAMINE SULFATE, AMPHETAMINE SULFATE 6.25; 6.25; 6.25; 6.25 MG/1; MG/1; MG/1; MG/1
25 CAPSULE, EXTENDED RELEASE ORAL DAILY
Qty: 30 CAPSULE | Refills: 0 | Status: SHIPPED | OUTPATIENT
Start: 2024-11-08

## 2024-09-13 ASSESSMENT — PATIENT HEALTH QUESTIONNAIRE - PHQ9: SUM OF ALL RESPONSES TO PHQ QUESTIONS 1-9: 8

## 2024-09-13 NOTE — ASSESSMENT & PLAN NOTE
Viibryd is effective in targeting mood at 60 mg.  Feels the Adderall XR is partially effective in targeting executive functioning impairment (inability to focus, impaired organization and planning, reduced working memory).   Will increase to 25 mg.  Three months provided and patient is going to establish with long term psychiatry
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
## 2024-09-13 NOTE — PATIENT INSTRUCTIONS
**For crisis resources, please see the information at the end of this document**      [facility] [place] [facility].    You have been referred for long-term psychiatric care due to the complexity, chronicity, and severity of your psychiatric case.  It is my opinion that your care will be more optimally managed by a long-term psychiatric prescriber versus returning your psychiatric care back to your primary care provider for ongoing management.  please establish with one of the providers provided to you in May through [employer] message         MEDICATIONS:   -   The current medical regimen is effective; continue present plan and medications with the exception of increasing the Adderall XR to 25 mg     MyChart Assistance [phone]  Financial Assistance [phone]  [facility] Billing [phone]  Central Billing Office, [facility]: [phone]  [place] Billing [phone]  Medical Records [phone]  [place] Patient Bill of Rights [email or web address]         MENTAL HEALTH CRISIS RESOURCES:  For a emergency help, please call 911 or go to the nearest Emergency Department.      Emergency Walk-In Options:   EmPATH Unit @ [facility] ([place]): [phone] - Specialized mental health emergency area designed to be calming  [facility] ([place]): [phone]  [facility] ([place]): [phone]  [facility]): [phone]     National Crisis Information: Call 988 Suicide and Crisis Lifeline  Crisis Text Line (free 24/7):  call **CRISIS (**211872) Crisis or use the texting option by texting 035798.   National Suicide Prevention Lifeline: Call 988  Poison Control Center: 3-074-669-7191  Trans Lifeline: 3-970-333-7609 - Hotline for transgender people of all ages  The Navid Project: 2-912-872-1069 - Hotline for LGBT youth   List of all [facility] resources:    https://[place].gov/dhs/people-we-serve/adults/health-care/mental-health/resources/crisis-contacts.jsp     For Non-Emergency Support:   Fast Tracker: Mental Health & Substance Use Disorder Resources -   https://www.fasttrackermn.org/         Again thank you for choosing [facility] MENTAL HEALTH & ADDICTION [facility] and please let us know how we can best partner with you to improve you and your family's health.     You may be receiving a survey regarding this appointment. We would love to have your feedback, both positive and negative. The survey is done by an external company, so your answers are anonymous.

## 2024-12-13 ENCOUNTER — TELEPHONE (OUTPATIENT)
Dept: PSYCHIATRY | Facility: CLINIC | Age: 29
End: 2024-12-13
Payer: COMMERCIAL

## 2024-12-13 NOTE — TELEPHONE ENCOUNTER
Reason for call:  Form   Our goal is to have forms completed within 72 hours, however some forms may require a visit or additional information.     Who is the form from? Insurance comp  Where did the form come from? form was faxed in  What clinic location was the form placed at? [facility]    Phone call message - patient request for a letter, form or note:     Date needed: as soon as possible  Call came from Americo at [facility] [employer]       Type of letter, form or note:  Caller stated a psychiatric form was sent for Clementine Myles to complete and is checking on the status     Phone number to reach patient:  Other phone number:  Americo with [employer] insurance # [phone]*    Best Time:  As soon as possible     Can we leave a detailed message on this number?  YES    Travel screening: Not Applicable

## 2024-12-13 NOTE — TELEPHONE ENCOUNTER
See telephone encounters from 12/6 and 12/5    Forms were emailed to provider.     Forwarding to provider as reminder.     Rosa Ulloa RN on 12/13/2024 at 12:38 PM

## 2024-12-16 DIAGNOSIS — F90.2 ATTENTION DEFICIT HYPERACTIVITY DISORDER (ADHD), COMBINED TYPE: ICD-10-CM

## 2024-12-16 NOTE — TELEPHONE ENCOUNTER
Americo from [employer] called again today to check on the status of this paperwork.  Please call back to [phone]  ext [phone]

## 2024-12-16 NOTE — TELEPHONE ENCOUNTER
Reason for call:  Medication   If this is a refill request, has the caller requested the refill from the pharmacy already? Yes  Will the patient be using a [facility]? No  Name of the pharmacy and phone number for the current request: [facility] and [phone]    Name of the medication requested: Adderall     Other request: pt is going out of town on 12/20/24 so was hoping to refill med before then. pt wanted you to know she has been feeling better and less angry. She went to the trauma psychiatrist who stated they could not do anything else for her and were going to email you. And next year the med might need a PA    Phone number to reach patient:  Cell number on file:    Telephone Information:   Mobile [phone]       Best Time:  anytime    Can we leave a detailed message on this number?  YES    Travel screening: Not Applicable

## 2024-12-17 RX ORDER — DEXTROAMPHETAMINE SACCHARATE, AMPHETAMINE ASPARTATE MONOHYDRATE, DEXTROAMPHETAMINE SULFATE AND AMPHETAMINE SULFATE 5; 5; 5; 5 MG/1; MG/1; MG/1; MG/1
20 CAPSULE, EXTENDED RELEASE ORAL DAILY
Qty: 30 CAPSULE | Refills: 0 | Status: CANCELLED | OUTPATIENT
Start: 2024-12-17

## 2024-12-17 NOTE — TELEPHONE ENCOUNTER
Date of Last Office Visit: 9/13/24  Date of Next Office Visit: None; routing for A to assist pt with scheduling.    No shows since last visit: No  More than one patient-initiated cancellation (with reschedule) since last seen in clinic? No    []Medication refilled per  Medication Refill in Ambulatory Care  policy.  [x]Medication unable to be refilled by RN due to criteria not met as indicated below:    []Eligibility: has not had a provider visit within last 6 months   [x]Supervision: no future appointment; < 7 days before next appointment   []Compliance: no shows; cancellations; lapse in therapy   []Verification: order discrepancy; may need modification...   [] > 30-day supply request   []Advanced refill request: > 7 days before refill date   [x]Controlled medication   []Medication not included in policy   []Review: new med; med adjusted <= 30 days; safety alert; requires lab monitoring...   []Scope of Practice: refill request processed by LPN/MA   []Other:      Medication(s) requested:     -  amphetamine-dextroamphetamine 25 MG CP24   Date last ordered: 11/8/24  Qty: 30  Refills: 0  Appropriate for refill? Yes    Any Controlled Substance(s)? Yes   [place]  checked? N/A      Requested medication(s) verified as identical to current order? Yes    Any lapse in adherence to medication(s) greater than 5 days? No      Additional action taken? cued up medication/order(s).      Last visit treatment plan:     Viibryd is effective in targeting mood at 60 mg.  Feels the Adderall XR is partially effective in targeting executive functioning impairment (inability to focus, impaired organization and planning, reduced working memory).   Will increase to 25 mg.  Three months provided and patient is going to establish with long term psychiatry             Attention deficit hyperactivity disorder (ADHD), combined type - Primary     Relevant Medications     amphetamine-dextroamphetamine 25 MG CP24     amphetamine-dextroamphetamine 25  MG CP24 (Start on 10/11/2024)     amphetamine-dextroamphetamine 25 MG CP24 (Start on 11/8/2024)     Complex posttraumatic stress disorder     Relevant Medications     vilazodone (VIIBRYD) 40 MG TABS tablet     vilazodone (VIIBRYD) 20 MG TABS tablet       Any medication(s) require lab monitoring? No

## 2024-12-18 RX ORDER — DEXTROAMPHETAMINE SACCHARATE, AMPHETAMINE ASPARTATE MONOHYDRATE, DEXTROAMPHETAMINE SULFATE, AMPHETAMINE SULFATE 6.25; 6.25; 6.25; 6.25 MG/1; MG/1; MG/1; MG/1
25 CAPSULE, EXTENDED RELEASE ORAL DAILY
Qty: 30 CAPSULE | Refills: 0 | Status: SHIPPED | OUTPATIENT
Start: 2024-12-18

## 2024-12-18 NOTE — TELEPHONE ENCOUNTER
Reason for call:  Other   Patient called regarding (reason for call): call back  Additional comments:     Americo from [employer] called again today to check on the status of this paperwork.    Please call back to [phone]  ext [phone]     Phone number to reach patient:  Other phone number:  see above.    Best Time:  any    Can we leave a detailed message on this number?  YES    Travel screening: Not Applicable

## 2024-12-20 ENCOUNTER — TELEPHONE (OUTPATIENT)
Dept: PSYCHIATRY | Facility: CLINIC | Age: 29
End: 2024-12-20
Payer: COMMERCIAL

## 2024-12-20 NOTE — TELEPHONE ENCOUNTER
[email or web address]  - [facility] ([employer] forms and records - emailed to provider - faxed to medical records - original in mail file.

## 2024-12-20 NOTE — TELEPHONE ENCOUNTER
Paperwork filled out. We need a NATHALIE before it can be sent. Sent message and instructions in my chart message.     Rosa Ulloa RN on 12/20/2024 at 9:54 AM

## 2024-12-23 NOTE — TELEPHONE ENCOUNTER
Americo from [email or web address] called re: [employer] forms.   She is asking for call back @ [phone] x [phone].

## 2024-12-23 NOTE — TELEPHONE ENCOUNTER
RN received notification from [facility] Coordinator that Americo from [email or web address] called re: [employer]. She is asking for call back @ [phone] x [phone].     RN phoned and spoke to Americo.  She indicated that they are waiting for the completed forms from Clementine Myles DNP.      2. RN instructed Americo that RN colleague has reached out to the patient to sign a NATHALIE to be able to communicated with [employer] and that this is not completed yet.      3.  Americo asked if they could send this clinic a signed NATHALIE from [employer] authorizing communication with this clinic.  RN provided Americo with a fax number of [phone].  RN awaiting fax.  RN will postpone this encounter for 12/24/24 with instructions for NEXT RN TO PLEASE SEE IF THE FAXED NATHALIE HAD ARRIVED.      Gabriel Fry RN on 12/23/2024 at 2:53 PM

## 2025-02-03 ENCOUNTER — MYC REFILL (OUTPATIENT)
Dept: PSYCHIATRY | Facility: CLINIC | Age: 30
End: 2025-02-03
Payer: COMMERCIAL

## 2025-02-03 DIAGNOSIS — F43.10 COMPLEX POSTTRAUMATIC STRESS DISORDER: ICD-10-CM

## 2025-02-03 NOTE — TELEPHONE ENCOUNTER
Reviewed patient's MyChart message:    Patient Comment: Insurance wants a 90 day supply, annual appointment set up for February 25th, asked for an earlier date.                 MELONIE CHAMBERS RN on 2/3/2025 at 2:46 PM

## 2025-02-05 RX ORDER — VILAZODONE HYDROCHLORIDE 20 MG/1
20 TABLET ORAL DAILY
Qty: 90 TABLET | Refills: 0 | Status: SHIPPED | OUTPATIENT
Start: 2025-02-05

## 2025-02-05 RX ORDER — VILAZODONE HYDROCHLORIDE 40 MG/1
40 TABLET ORAL DAILY
Qty: 90 TABLET | Refills: 0 | Status: SHIPPED | OUTPATIENT
Start: 2025-02-05

## 2025-02-24 SDOH — HEALTH STABILITY: PHYSICAL HEALTH: ON AVERAGE, HOW MANY DAYS PER WEEK DO YOU ENGAGE IN MODERATE TO STRENUOUS EXERCISE (LIKE A BRISK WALK)?: 2 DAYS

## 2025-02-24 SDOH — HEALTH STABILITY: PHYSICAL HEALTH: ON AVERAGE, HOW MANY MINUTES DO YOU ENGAGE IN EXERCISE AT THIS LEVEL?: 40 MIN

## 2025-02-24 ASSESSMENT — SOCIAL DETERMINANTS OF HEALTH (SDOH): HOW OFTEN DO YOU GET TOGETHER WITH FRIENDS OR RELATIVES?: NEVER

## 2025-02-24 ASSESSMENT — ASTHMA QUESTIONNAIRES
QUESTION_5 LAST FOUR WEEKS HOW WOULD YOU RATE YOUR ASTHMA CONTROL: COMPLETELY CONTROLLED
QUESTION_3 LAST FOUR WEEKS HOW OFTEN DID YOUR ASTHMA SYMPTOMS (WHEEZING, COUGHING, SHORTNESS OF BREATH, CHEST TIGHTNESS OR PAIN) WAKE YOU UP AT NIGHT OR EARLIER THAN USUAL IN THE MORNING: ONCE A WEEK
QUESTION_1 LAST FOUR WEEKS HOW MUCH OF THE TIME DID YOUR ASTHMA KEEP YOU FROM GETTING AS MUCH DONE AT WORK, SCHOOL OR AT HOME: NONE OF THE TIME
QUESTION_2 LAST FOUR WEEKS HOW OFTEN HAVE YOU HAD SHORTNESS OF BREATH: ONCE OR TWICE A WEEK
ACT_TOTALSCORE: 21
QUESTION_4 LAST FOUR WEEKS HOW OFTEN HAVE YOU USED YOUR RESCUE INHALER OR NEBULIZER MEDICATION (SUCH AS ALBUTEROL): ONCE A WEEK OR LESS
ACT_TOTALSCORE: 21

## 2025-02-24 ASSESSMENT — PATIENT HEALTH QUESTIONNAIRE - PHQ9
SUM OF ALL RESPONSES TO PHQ QUESTIONS 1-9: 9
10. IF YOU CHECKED OFF ANY PROBLEMS, HOW DIFFICULT HAVE THESE PROBLEMS MADE IT FOR YOU TO DO YOUR WORK, TAKE CARE OF THINGS AT HOME, OR GET ALONG WITH OTHER PEOPLE: SOMEWHAT DIFFICULT
SUM OF ALL RESPONSES TO PHQ QUESTIONS 1-9: 9

## 2025-02-25 ENCOUNTER — OFFICE VISIT (OUTPATIENT)
Dept: PEDIATRICS | Facility: CLINIC | Age: 30
End: 2025-02-25
Payer: COMMERCIAL

## 2025-02-25 VITALS
BODY MASS INDEX: 32.72 KG/M2 | SYSTOLIC BLOOD PRESSURE: 108 MMHG | DIASTOLIC BLOOD PRESSURE: 76 MMHG | HEART RATE: 87 BPM | WEIGHT: 196.4 LBS | HEIGHT: 65 IN | TEMPERATURE: 97 F | RESPIRATION RATE: 16 BRPM | OXYGEN SATURATION: 99 %

## 2025-02-25 DIAGNOSIS — Z11.3 ROUTINE SCREENING FOR STI (SEXUALLY TRANSMITTED INFECTION): ICD-10-CM

## 2025-02-25 DIAGNOSIS — F60.3 BORDERLINE PERSONALITY DISORDER (H): ICD-10-CM

## 2025-02-25 DIAGNOSIS — Z00.00 ROUTINE GENERAL MEDICAL EXAMINATION AT A HEALTH CARE FACILITY: Primary | ICD-10-CM

## 2025-02-25 DIAGNOSIS — E66.01 MORBID OBESITY (H): ICD-10-CM

## 2025-02-25 LAB
HIV 1+2 AB+HIV1 P24 AG SERPL QL IA: NONREACTIVE
T PALLIDUM AB SER QL: NONREACTIVE

## 2025-02-25 PROCEDURE — 90710 MMRV VACCINE SC: CPT | Performed by: STUDENT IN AN ORGANIZED HEALTH CARE EDUCATION/TRAINING PROGRAM

## 2025-02-25 PROCEDURE — 36415 COLL VENOUS BLD VENIPUNCTURE: CPT | Performed by: STUDENT IN AN ORGANIZED HEALTH CARE EDUCATION/TRAINING PROGRAM

## 2025-02-25 PROCEDURE — 87389 HIV-1 AG W/HIV-1&-2 AB AG IA: CPT | Performed by: STUDENT IN AN ORGANIZED HEALTH CARE EDUCATION/TRAINING PROGRAM

## 2025-02-25 PROCEDURE — 87591 N.GONORRHOEAE DNA AMP PROB: CPT | Performed by: STUDENT IN AN ORGANIZED HEALTH CARE EDUCATION/TRAINING PROGRAM

## 2025-02-25 PROCEDURE — 90651 9VHPV VACCINE 2/3 DOSE IM: CPT | Performed by: STUDENT IN AN ORGANIZED HEALTH CARE EDUCATION/TRAINING PROGRAM

## 2025-02-25 PROCEDURE — 90471 IMMUNIZATION ADMIN: CPT | Performed by: STUDENT IN AN ORGANIZED HEALTH CARE EDUCATION/TRAINING PROGRAM

## 2025-02-25 PROCEDURE — 99395 PREV VISIT EST AGE 18-39: CPT | Mod: 25 | Performed by: STUDENT IN AN ORGANIZED HEALTH CARE EDUCATION/TRAINING PROGRAM

## 2025-02-25 PROCEDURE — 87491 CHLMYD TRACH DNA AMP PROBE: CPT | Performed by: STUDENT IN AN ORGANIZED HEALTH CARE EDUCATION/TRAINING PROGRAM

## 2025-02-25 PROCEDURE — 96127 BRIEF EMOTIONAL/BEHAV ASSMT: CPT | Performed by: STUDENT IN AN ORGANIZED HEALTH CARE EDUCATION/TRAINING PROGRAM

## 2025-02-25 PROCEDURE — 86780 TREPONEMA PALLIDUM: CPT | Performed by: STUDENT IN AN ORGANIZED HEALTH CARE EDUCATION/TRAINING PROGRAM

## 2025-02-25 PROCEDURE — 90472 IMMUNIZATION ADMIN EACH ADD: CPT | Performed by: STUDENT IN AN ORGANIZED HEALTH CARE EDUCATION/TRAINING PROGRAM

## 2025-02-25 ASSESSMENT — PAIN SCALES - GENERAL: PAINLEVEL_OUTOF10: MODERATE PAIN (6)

## 2025-02-25 NOTE — PATIENT INSTRUCTIONS
Crisis resources  988: nationwide suicide and mental health crisis counseling    211: [employer] free and confidential health and human services information for people in [place]. Can help with mental health, eviction resources, alcohol and drug resources, food and housing instability.    Text HOME to 703291 from anywhere in the [place], anytime. Crisis Text Line is here for any crisis. A live, trained Crisis Counselor receives the text and responds, all from our secure online platform. The volunteer Crisis Counselor will help you move from a hot moment to a cool moment.    [facility]): a calming, living room-style environment. The shared, open layout is equipped with comfortable recliners, self-serve refreshment stations, and sensory rooms to put patients at ease while our care teams address their mental health needs.    [facility]:   24/7 Suicide Hotline - 8-602-667-2744  Non-emergent Mental Health Hotline - 9-947-231-0721  www.loli.org      [facility] Same Day or Next Day Provider:  -CATHLEEN Avila  -Send Guided Interventions message or call clinic  -Her appointments are often not visible on Guided Interventions       Patient Education   Preventive Care Advice   This is general advice given by our system to help you stay healthy. However, your care team may have specific advice just for you. Please talk to your care team about your preventive care needs.  Nutrition  Eat 5 or more servings of fruits and vegetables each day.  Try wheat bread, brown rice and whole grain pasta (instead of white bread, rice, and pasta).  Get enough calcium and vitamin D. Check the label on foods and aim for 100% of the RDA (recommended daily allowance).  Lifestyle  Exercise at least 150 minutes each week  (30 minutes a day, 5 days a week).  Do muscle strengthening activities 2 days a week. These help control your weight and prevent disease.  No smoking.  Wear sunscreen to prevent skin cancer.  Have a dental exam and cleaning  every 6 months.  Yearly exams  See your health care team every year to talk about:  Any changes in your health.  Any medicines your care team has prescribed.  Preventive care, family planning, and ways to prevent chronic diseases.  Shots (vaccines)   HPV shots (up to age 26), if you've never had them before.  Hepatitis B shots (up to age 59), if you've never had them before.  COVID-19 shot: Get this shot when it's due.  Flu shot: Get a flu shot every year.  Tetanus shot: Get a tetanus shot every 10 years.  Pneumococcal, hepatitis A, and RSV shots: Ask your care team if you need these based on your risk.  Shingles shot (for age 50 and up)  General health tests  Diabetes screening:  Starting at age 35, Get screened for diabetes at least every 3 years.  If you are younger than age 35, ask your care team if you should be screened for diabetes.  Cholesterol test: At age 39, start having a cholesterol test every 5 years, or more often if advised.  Bone density scan (DEXA): At age 50, ask your care team if you should have this scan for osteoporosis (brittle bones).  Hepatitis C: Get tested at least once in your life.  STIs (sexually transmitted infections)  Before age 24: Ask your care team if you should be screened for STIs.  After age 24: Get screened for STIs if you're at risk. You are at risk for STIs (including HIV) if:  You are sexually active with more than one person.  You don't use condoms every time.  You or a partner was diagnosed with a sexually transmitted infection.  If you are at risk for HIV, ask about PrEP medicine to prevent HIV.  Get tested for HIV at least once in your life, whether you are at risk for HIV or not.  Cancer screening tests  Cervical cancer screening: If you have a cervix, begin getting regular cervical cancer screening tests starting at age 21.  Breast cancer scan (mammogram): If you've ever had breasts, begin having regular mammograms starting at age 40. This is a scan to check for  breast cancer.  Colon cancer screening: It is important to start screening for colon cancer at age 45.  Have a colonoscopy test every 10 years (or more often if you're at risk) Or, ask your provider about stool tests like a FIT test every year or Cologuard test every 3 years.  To learn more about your testing options, visit:   .  For help making a decision, visit:   https://bit.ly/gd48156.  Prostate cancer screening test: If you have a prostate, ask your care team if a prostate cancer screening test (PSA) at age 55 is right for you.  Lung cancer screening: If you are a current or former smoker ages 50 to 80, ask your care team if ongoing lung cancer screenings are right for you.  For informational purposes only. Not to replace the advice of your health care provider. Copyright   2023 [employer]. All rights reserved. Clinically reviewed by the [facility]. Conject 753660 - REV 01/24.  Learning About Stress  What is stress?     Stress is your body's response to a hard situation. Your body can have a physical, emotional, or mental response. Stress is a fact of life for most people, and it affects everyone differently. What causes stress for you may not be stressful for someone else.  A lot of things can cause stress. You may feel stress when you go on a job interview, take a test, or run a race. This kind of short-term stress is normal and even useful. It can help you if you need to work hard or react quickly. For example, stress can help you finish an important job on time.  Long-term stress is caused by ongoing stressful situations or events. Examples of long-term stress include long-term health problems, ongoing problems at work, or conflicts in your family. Long-term stress can harm your health.  How does stress affect your health?  When you are stressed, your body responds as though you are in danger. It makes hormones that speed up your heart, make you breathe faster,  and give you a burst of energy. This is called the fight-or-flight stress response. If the stress is over quickly, your body goes back to normal and no harm is done.  But if stress happens too often or lasts too long, it can have bad effects. Long-term stress can make you more likely to get sick, and it can make symptoms of some diseases worse. If you tense up when you are stressed, you may develop neck, shoulder, or low back pain. Stress is linked to high blood pressure and heart disease.  Stress also harms your emotional health. It can make you mckeon, tense, or depressed. Your relationships may suffer, and you may not do well at work or school.  What can you do to manage stress?  You can try these things to help manage stress:   Do something active. Exercise or activity can help reduce stress. Walking is a great way to get started. Even everyday activities such as housecleaning or yard work can help.  Try yoga or nitish chi. These techniques combine exercise and meditation. You may need some training at first to learn them.  Do something you enjoy. For example, listen to music or go to a movie. Practice your hobby or do volunteer work.  Meditate. This can help you relax, because you are not worrying about what happened before or what may happen in the future.  Do guided imagery. Imagine yourself in any setting that helps you feel calm. You can use online videos, books, or a teacher to guide you.  Do breathing exercises. For example:  From a standing position, bend forward from the waist with your knees slightly bent. Let your arms dangle close to the floor.  Breathe in slowly and deeply as you return to a standing position. Roll up slowly and lift your head last.  Hold your breath for just a few seconds in the standing position.  Breathe out slowly and bend forward from the waist.  Let your feelings out. Talk, laugh, cry, and express anger when you need to. Talking with supportive friends or family, a counselor, or a  "jose leader about your feelings is a healthy way to relieve stress. Avoid discussing your feelings with people who make you feel worse.  Write. It may help to write about things that are bothering you. This helps you find out how much stress you feel and what is causing it. When you know this, you can find better ways to cope.  What can you do to prevent stress?  You might try some of these things to help prevent stress:  Manage your time. This helps you find time to do the things you want and need to do.  Get enough sleep. Your body recovers from the stresses of the day while you are sleeping.  Get support. Your family, friends, and community can make a difference in how you experience stress.  Limit your news feed. Avoid or limit time on social media or news that may make you feel stressed.  Do something active. Exercise or activity can help reduce stress. Walking is a great way to get started.  Where can you learn more?  Go to https://www.[employer].net/patiented  Enter N032 in the search box to learn more about \"Learning About Stress.\"  Current as of: October 24, 2023  Content Version: 14.3    2024 [employer].   Care instructions adapted under license by your healthcare professional. If you have questions about a medical condition or this instruction, always ask your healthcare professional. [employer] disclaims any warranty or liability for your use of this information.    Learning About Depression Screening  What is depression screening?  Depression screening is a way to see if you have depression symptoms. It may be done by a doctor or counselor. It's often part of a routine checkup. That's because your mental health is just as important as your physical health.  Depression is a mental health condition that affects how you feel, think, and act. You may:  Have less energy.  Lose interest in your daily activities.  Feel sad and grouchy for a long time.  Depression is very common. It " "affects people of all ages.  Many things can lead to depression. Some people become depressed after they have a stroke or find out they have a major illness like cancer or heart disease. The death of a loved one or a breakup may lead to depression. It can run in families. Most experts believe that a combination of inherited genes and stressful life events can cause it.  What happens during screening?  You may be asked to fill out a form about your depression symptoms. You and the doctor will discuss your answers. The doctor may ask you more questions to learn more about how you think, act, and feel.  What happens after screening?  If you have symptoms of depression, your doctor will talk to you about your options.  Doctors usually treat depression with medicines or counseling. Often, combining the two works best. Many people don't get help because they think that they'll get over the depression on their own. But people with depression may not get better unless they get treatment.  The cause of depression is not well understood. There may be many factors involved. But if you have depression, it's not your fault.  A serious symptom of depression is thinking about death or suicide. If you or someone you care about talks about this or about feeling hopeless, get help right away.  It's important to know that depression can be treated. Medicine, counseling, and self-care may help.  Where can you learn more?  Go to https://www.[employer].net/patiented  Enter T185 in the search box to learn more about \"Learning About Depression Screening.\"  Current as of: July 31, 2024  Content Version: 14.3    2024 [employer].   Care instructions adapted under license by your healthcare professional. If you have questions about a medical condition or this instruction, always ask your healthcare professional. [employer] disclaims any warranty or liability for your use of this information.       "

## 2025-02-25 NOTE — PROGRESS NOTES
"Preventive Care Visit  [facility] Deirdre E. Milligan, MD, Internal Medicine - Pediatrics  Feb 25, 2025      Assessment & Plan     Routine general medical examination at a health care facility    Borderline personality disorder (H)  Follows with psychiatry.    Morbid obesity (H)  On stimulant medication. Has actually lost weight in setting of depression and I would be concerned that future weight loss could signal depression     Routine screening for STI (sexually transmitted infection)  - HIV Antigen Antibody Combo; Future  - Treponema Abs w Reflex to RPR and Titer; Future  - NEISSERIA GONORRHOEA PCR; Future  - CHLAMYDIA TRACHOMATIS PCR; Future  - HIV Antigen Antibody Combo  - Treponema Abs w Reflex to RPR and Titer  - NEISSERIA GONORRHOEA PCR  - CHLAMYDIA TRACHOMATIS PCR            BMI  Estimated body mass index is 33.04 kg/m  as calculated from the following:    Height as of this encounter: 1.642 m (5' 4.65\").    Weight as of this encounter: 89.1 kg (196 lb 6.4 oz).       Depression Screening Follow Up        2/24/2025     5:03 PM   PHQ   PHQ-9 Total Score 9    Q9: Thoughts of better off dead/self-harm past 2 weeks Several days   F/U: Thoughts of suicide or self-harm No   F/U: Safety concerns No       Patient-reported                     Follow Up Actions Taken  Crisis resource information provided in the After Visit Summary    Discussed the following ways the patient can remain in a safe environment:  be around others  Counseling  Appropriate preventive services were addressed with this patient via screening, questionnaire, or discussion as appropriate for fall prevention, nutrition, physical activity, Tobacco-use cessation, social engagement, weight loss and cognition.  Checklist reviewing preventive services available has been given to the patient.  Reviewed patient's diet, addressing concerns and/or questions.   She is at risk for lack of exercise and has been provided with information to " "increase physical activity for the benefit of her well-being.   Patient is at risk for social isolation and has been provided with information about the benefit of social connection.   She is at risk for psychosocial distress and has been provided with information to reduce risk.   The patient's PHQ-9 score is consistent with mild depression. She was provided with information regarding depression.           Subjective   Felecia is a 29 year old, presenting for the following:  Physical and Establish Care        2/25/2025     7:54 AM   Additional Questions   Roomed by [facility]   Accompanied by self          HPI  Establishing care    Prefers \"Felecia\"    Diagnosis of ADHD, possible autism (no official diagnosis due to wait list)  Depressive disorder about a year ago  Last hospitalization was as a child before that  Works with psychiatry  Has seen therapist    Works at [employer]    Rare use of albuterol  Zofran (ondansetron) as needed for decrease in appetite  Adderall: takes daily  Chronic pain in neck   -has seen sports specicalist, chiro, rehab specialist, massage   -possible diagnosis of EDS by specialist? Orthorehab specialist              Health Care Directive  Patient does not have a Health Care Directive: Discussed advance care planning with patient; however, patient declined at this time.        2/24/2025   General Health   How would you rate your overall physical health? Good   Feel stress (tense, anxious, or unable to sleep) Rather much   (!) STRESS CONCERN      2/24/2025   Nutrition   Three or more servings of calcium each day? Yes   Diet: Regular (no restrictions)   How many servings of fruit and vegetables per day? (!) 2-3   How many sweetened beverages each day? 0-1         2/24/2025   Exercise   Days per week of moderate/strenous exercise 2 days   Average minutes spent exercising at this level 40 min   (!) EXERCISE CONCERN      2/24/2025   Social Factors   Frequency of gathering with friends or relatives Never " "  Worry food won't last until get money to buy more No   Food not last or not have enough money for food? No   Do you have housing? (Housing is defined as stable permanent housing and does not include staying ouside in a car, in a tent, in an abandoned building, in an overnight shelter, or couch-surfing.) Yes   Are you worried about losing your housing? No   Lack of transportation? No   Unable to get utilities (heat,electricity)? No   (!) SOCIAL CONNECTIONS CONCERN      2/24/2025   Dental   Dentist two times every year? Yes          Today's PHQ-9 Score:       2/24/2025     5:03 PM   PHQ-9 SCORE   PHQ-9 Total Score MyChart 9 (Mild depression)   PHQ-9 Total Score 9        Patient-reported         2/24/2025   Substance Use   Alcohol more than 3/day or more than 7/wk No   Do you use any other substances recreationally? No     Social History     Tobacco Use    Smoking status: Passive Smoke Exposure - Never Smoker    Smokeless tobacco: Never   Vaping Use    Vaping status: Every Day    Substances: [facility]   Substance Use Topics    Alcohol use: Not Currently     Comment: Alcohol allergy    Drug use: Never                    2/24/2025   One time HIV Screening   Previous HIV test? No         2/24/2025   STI Screening   New sexual partner(s) since last STI/HIV test? No     History of abnormal Pap smear: No - age 21-29 PAP every 3 years recommended; patient declined due to history of trauma             2/24/2025   Contraception/Family Planning   Questions about contraception or family planning No        Reviewed and updated as needed this visit by Provider                             Objective    Exam  /76 (BP Location: Right arm, Patient Position: Sitting, Cuff Size: Adult Regular)   Pulse 87   Temp 97  F (36.1  C) (Temporal)   Resp 16   Ht 1.642 m (5' 4.65\")   Wt 89.1 kg (196 lb 6.4 oz)   LMP 02/09/2025 (Exact Date)   SpO2 99%   BMI 33.04 kg/m     Estimated body mass index is 33.04 kg/m  as calculated from the " "following:    Height as of this encounter: 1.642 m (5' 4.65\").    Weight as of this encounter: 89.1 kg (196 lb 6.4 oz).    Physical Exam  GENERAL: alert and no distress  EYES: Eyes grossly normal to inspection, and conjunctivae and sclerae normal  HENT: ear canals and TM's normal, nose and mouth without ulcers or lesions  NECK: no adenopathy, no asymmetry, masses, or scars  RESP: lungs clear to auscultation - no rales, rhonchi or wheezes  CV: regular rate and rhythm, normal S1 S2, no S3 or S4, no murmur, click or rub, no peripheral edema  ABDOMEN: soft, nontender, no hepatosplenomegaly, no masses  MS: no gross musculoskeletal defects noted, no edema  SKIN: no suspicious lesions or rashes  NEURO: Normal strength and tone, mentation intact and speech normal  PSYCH: mentation appears normal, affect normal/bright        Signed Electronically by: Deirdre E. Milligan, MD    "

## 2025-02-26 LAB
C TRACH DNA SPEC QL NAA+PROBE: NEGATIVE
N GONORRHOEA DNA SPEC QL NAA+PROBE: NEGATIVE
SPECIMEN TYPE: NORMAL
SPECIMEN TYPE: NORMAL

## 2025-03-03 ENCOUNTER — MYC MEDICAL ADVICE (OUTPATIENT)
Dept: PEDIATRICS | Facility: CLINIC | Age: 30
End: 2025-03-03
Payer: COMMERCIAL

## 2025-03-03 DIAGNOSIS — F43.10 COMPLEX POSTTRAUMATIC STRESS DISORDER: ICD-10-CM

## 2025-03-03 DIAGNOSIS — F90.2 ATTENTION DEFICIT HYPERACTIVITY DISORDER (ADHD), COMBINED TYPE: Primary | ICD-10-CM

## 2025-03-03 DIAGNOSIS — F33.0 MAJOR DEPRESSIVE DISORDER, RECURRENT, MILD: ICD-10-CM

## 2025-03-03 DIAGNOSIS — F60.3 BORDERLINE PERSONALITY DISORDER (H): ICD-10-CM

## 2025-03-03 NOTE — TELEPHONE ENCOUNTER
My understanding from our visit was that patient was going to continue to follow with a psychiatrist for prescription management of medications given multiple diagnoses.  I did not agree to fill a stimulant medication.    Looks like there is an ongoing discussion with previous psychiatrist.      Deirdre Milligan, MD  Internal Medicine & Pediatrics  [facility] [place]  She/her

## 2025-03-03 NOTE — TELEPHONE ENCOUNTER
Please see MC and advise.      Chart review 2/25/25 OV:  Rare use of albuterol  Zofran (ondansetron) as needed for decrease in appetite  Adderall: takes daily  Chronic pain in neck   -has seen sports specicalist, chiro, rehab specialist, massage   -possible diagnosis of EDS by specialist? Orthorehab specialist    Would you like VV to discuss Adderall?    Ame Wing RN, BSN  [facility] [place]

## 2025-03-10 ENCOUNTER — MYC REFILL (OUTPATIENT)
Dept: PSYCHIATRY | Facility: CLINIC | Age: 30
End: 2025-03-10
Payer: COMMERCIAL

## 2025-03-10 DIAGNOSIS — F90.2 ATTENTION DEFICIT HYPERACTIVITY DISORDER (ADHD), COMBINED TYPE: ICD-10-CM

## 2025-03-10 RX ORDER — DEXTROAMPHETAMINE SACCHARATE, AMPHETAMINE ASPARTATE MONOHYDRATE, DEXTROAMPHETAMINE SULFATE, AMPHETAMINE SULFATE 6.25; 6.25; 6.25; 6.25 MG/1; MG/1; MG/1; MG/1
25 CAPSULE, EXTENDED RELEASE ORAL DAILY
Qty: 30 CAPSULE | Refills: 0 | Status: CANCELLED | OUTPATIENT
Start: 2025-03-10

## 2025-03-10 NOTE — TELEPHONE ENCOUNTER
Referral for mental health was placed. Looks like patient was supposed to be transferring to long-term psych to manage meds. Please review and advise on refill request for adderall.     Yonathan VALENZUELA RN 3/10/2025 at 2:12 PM

## 2025-03-10 NOTE — TELEPHONE ENCOUNTER
See patient's MyChart message   - Patient requesting a referral to psychiatry     - Pended psychiatry referral     Dr. Milligan, please review and order as appropriate.     Heather ALVES RN   [facility]

## 2025-03-11 ENCOUNTER — MYC MEDICAL ADVICE (OUTPATIENT)
Dept: PSYCHIATRY | Facility: CLINIC | Age: 30
End: 2025-03-11
Payer: COMMERCIAL

## 2025-03-11 ENCOUNTER — MYC REFILL (OUTPATIENT)
Dept: PSYCHIATRY | Facility: CLINIC | Age: 30
End: 2025-03-11
Payer: COMMERCIAL

## 2025-03-11 DIAGNOSIS — F90.2 ATTENTION DEFICIT HYPERACTIVITY DISORDER (ADHD), COMBINED TYPE: ICD-10-CM

## 2025-03-11 RX ORDER — DEXTROAMPHETAMINE SACCHARATE, AMPHETAMINE ASPARTATE MONOHYDRATE, DEXTROAMPHETAMINE SULFATE, AMPHETAMINE SULFATE 6.25; 6.25; 6.25; 6.25 MG/1; MG/1; MG/1; MG/1
25 CAPSULE, EXTENDED RELEASE ORAL DAILY
Qty: 30 CAPSULE | OUTPATIENT
Start: 2025-03-11

## 2025-03-11 NOTE — TELEPHONE ENCOUNTER
Patient still struggling to get a provider to order her medication. RN sent MyC to see if her new LT psychiatry provider can take over prescribing as Dr. Myles has not seen the patient since 8/2024.     Awaiting MyC reply.     Routing to Dr. Myles for review.     Olivia Leong RN on 3/11/2025 at 4:28 PM

## 2025-03-11 NOTE — TELEPHONE ENCOUNTER
Per chart review, patient saw long term psychiatry on 2/25/2025 and was to ask this new provider to refill Amphet-Dextroamphet 3-Bead ER 25 MG CP24.     Received a refill request for the above medication on 3/10/2025 to Dr. Myles's clinic.     Patient is also unable to refill vilazodone (VIIBRYD) 20 and 40 MG TABS tablets because per pharmacy she used a coupon and filled only 1 month of the 90-day supply. There are only 2 months left on the script and the pharmacy cannot help with this. It must be a 90-day supply.     RN attempted to call patient to see if her LT psychiatry provider can refill her medications. The call went to . RN left a message to call the clinic back or send a  MyC message regarding if her new LT provider is refilling her medications.     Awaiting a response.     Routing to providers for review and further recommendation during interim.    Olivia Leong RN on 3/11/2025 at 9:18 AM

## 2025-03-11 NOTE — TELEPHONE ENCOUNTER
Vilazodone (VIIBRYD) 20 mg tabs   Take 1 tablet (20 mg) by mouth daily. Take with 40 mg for total daily dose of 60 mg   Qty 90 filled 2/5/25    Vilazodone (VIIBRYD) 40 mg tabs  Take 1 tablet (40 mg) by mouth daily.   Qty 90 filled 2/5/25    Adult mental health referral scheduling number [phone]     Chandrika Hewitt RN

## 2025-03-11 NOTE — TELEPHONE ENCOUNTER
Date of Last Office Visit: 9/13/25  Date of Next Office Visit: Appointment with Cyndi Johnson APRN CNP (05/19/2025)   No shows since last visit: No  More than one patient-initiated cancellation (with reschedule) since last seen in clinic? No    []Medication refilled per  Medication Refill in Ambulatory Care  policy.  []Scope of Practice: refill request processed by LPN/MA  [x]Medication unable to be refilled by RN due to criteria not met as indicated below:    []Eligibility: has not had a provider visit within last 6 months   []Supervision: no future appointment; < 7 days before next appointment   []Compliance: no shows; cancellations; lapse in therapy   []Verification: order discrepancy; may need modification...   [] > 30-day supply request   []Advanced refill request: > 7 days before refill date   [x]Controlled medication   []Medication not included in policy   []Review: new med; med adjusted <= 30 days; safety alert; requires lab monitoring...   [x]Other:         Medication(s) requested:     -  Amphet-Dextroamphet 3-Bead ER 25 MG CP24  Date last ordered: 1/17/25  Qty: 30  Refills: 0  Appropriate for refill? Yes    Any Controlled Substance(s)? No      Requested medication(s) verified as identical to current order? Yes    Any lapse in adherence to medication(s) greater than 5 days? No      Additional action taken?  RN attempted outreach to patient to confirm whether patient attempted to reach her Long term psychiatry provider for refills and cued up medication/order(s).    Per call with Felecia. She reports:  -  She is completely out of Adderall. She run out 2 days ago.   -  She reports her Long term psychiatry provider that she saw for intake in February did not agree to refill her Adderall she is requesting bridging until she sees Mireya CHRISTIAN on 5/19/25.   -  She denies any concerns or updates for Petar regarding her mental health prior to next appointment.     Last visit treatment plan:        9/13/24      ASSESSMENT AND PLAN       Problem List Items Addressed This Visit                  Behavioral       Viibryd is effective in targeting mood at 60 mg.  Feels the Adderall XR is partially effective in targeting executive functioning impairment (inability to focus, impaired organization and planning, reduced working memory).   Will increase to 25 mg.  Three months provided and patient is going to establish with long term psychiatry            Attention deficit hyperactivity disorder (ADHD), combined type - Primary     Relevant Medications     amphetamine-dextroamphetamine 25 MG CP24     amphetamine-dextroamphetamine 25 MG CP24 (Start on 10/11/2024)     amphetamine-dextroamphetamine 25 MG CP24 (Start on 11/8/2024)     Complex posttraumatic stress disorder     Relevant Medications     vilazodone (VIIBRYD) 40 MG TABS tablet     vilazodone (VIIBRYD) 20 MG TABS tablet           Any medication(s) require lab monitoring? No    Walter Valentin, RN on 3/11/2025 at 2:32 PM

## 2025-03-11 NOTE — TELEPHONE ENCOUNTER
Reason for call:  Medication   If this is a refill request, has the caller requested the refill from the pharmacy already? Yes  Will the patient be using a [facility]? No  Name of the pharmacy and phone number for the current request:     [facility] [phone]    Name of the medication requested:     amphetamine-dextroamphetamine (ADDERALL XR)        Other request: pt needs med refill and also scheduled future LT psych services     Phone number to reach patient:  Home number on file [phone] (home)    Best Time:  asap    Can we leave a detailed message on this number?  YES    Travel screening: Not Applicable

## 2025-03-11 NOTE — TELEPHONE ENCOUNTER
Patient needs to have psychiatrist fill these medications.    Deirdre Milligan, MD  Internal Medicine & Pediatrics  [facility]  She/her

## 2025-03-12 NOTE — TELEPHONE ENCOUNTER
"Called patient to discuss the last MyChart message. Patient was frustrated with the fact that her med refill ws denies. She said that she does not have the medication because \"my insurance would not pay for it.\" Patient said that she \"is mentally ill but not crazy,\" and is frustrated with the process. She said she would like to discontinue the med and will no longer take it. Confirmed with patient that she has an appointment in May with another provider. This writer asked if the patient felt safe, and the patient said \"I am not going to hurt myself.\" Patient discontinued the conversation, saying \"have a good day\" and ended the call.    "

## 2025-04-06 ENCOUNTER — TELEPHONE (OUTPATIENT)
Dept: BEHAVIORAL HEALTH | Facility: CLINIC | Age: 30
End: 2025-04-06

## 2025-04-06 ENCOUNTER — HOSPITAL ENCOUNTER (OUTPATIENT)
Facility: CLINIC | Age: 30
Setting detail: OBSERVATION
Discharge: OTHER INSTITUTION | End: 2025-04-07
Attending: EMERGENCY MEDICINE | Admitting: PSYCHIATRY & NEUROLOGY
Payer: COMMERCIAL

## 2025-04-06 VITALS
RESPIRATION RATE: 24 BRPM | HEIGHT: 66 IN | OXYGEN SATURATION: 100 % | WEIGHT: 194.5 LBS | BODY MASS INDEX: 31.26 KG/M2 | SYSTOLIC BLOOD PRESSURE: 122 MMHG | DIASTOLIC BLOOD PRESSURE: 85 MMHG | TEMPERATURE: 98.7 F | HEART RATE: 96 BPM

## 2025-04-06 DIAGNOSIS — F43.10 COMPLEX POSTTRAUMATIC STRESS DISORDER: ICD-10-CM

## 2025-04-06 DIAGNOSIS — F60.3 BORDERLINE PERSONALITY DISORDER (H): ICD-10-CM

## 2025-04-06 DIAGNOSIS — F23 ACUTE PSYCHOSIS (H): ICD-10-CM

## 2025-04-06 DIAGNOSIS — F90.2 ATTENTION DEFICIT HYPERACTIVITY DISORDER (ADHD), COMBINED TYPE: ICD-10-CM

## 2025-04-06 DIAGNOSIS — F22 PARANOIA (H): ICD-10-CM

## 2025-04-06 PROBLEM — F29 PSYCHOSIS (H): Status: ACTIVE | Noted: 2025-04-06

## 2025-04-06 PROCEDURE — 250N000013 HC RX MED GY IP 250 OP 250 PS 637

## 2025-04-06 PROCEDURE — 250N000011 HC RX IP 250 OP 636: Performed by: EMERGENCY MEDICINE

## 2025-04-06 PROCEDURE — 96372 THER/PROPH/DIAG INJ SC/IM: CPT

## 2025-04-06 PROCEDURE — 250N000011 HC RX IP 250 OP 636: Mod: JZ

## 2025-04-06 PROCEDURE — 96372 THER/PROPH/DIAG INJ SC/IM: CPT | Performed by: EMERGENCY MEDICINE

## 2025-04-06 PROCEDURE — 99223 1ST HOSP IP/OBS HIGH 75: CPT

## 2025-04-06 PROCEDURE — G0378 HOSPITAL OBSERVATION PER HR: HCPCS

## 2025-04-06 PROCEDURE — 99285 EMERGENCY DEPT VISIT HI MDM: CPT

## 2025-04-06 RX ORDER — TRAZODONE HYDROCHLORIDE 50 MG/1
50 TABLET ORAL
Status: DISCONTINUED | OUTPATIENT
Start: 2025-04-06 | End: 2025-04-07 | Stop reason: HOSPADM

## 2025-04-06 RX ORDER — OLANZAPINE 5 MG/1
5-10 TABLET, ORALLY DISINTEGRATING ORAL 2 TIMES DAILY PRN
Status: DISCONTINUED | OUTPATIENT
Start: 2025-04-06 | End: 2025-04-06

## 2025-04-06 RX ORDER — ACETAMINOPHEN 325 MG/1
650 TABLET ORAL EVERY 4 HOURS PRN
Status: DISCONTINUED | OUTPATIENT
Start: 2025-04-06 | End: 2025-04-07 | Stop reason: HOSPADM

## 2025-04-06 RX ORDER — OLANZAPINE 5 MG/1
5 TABLET, FILM COATED ORAL AT BEDTIME
Status: DISCONTINUED | OUTPATIENT
Start: 2025-04-06 | End: 2025-04-07

## 2025-04-06 RX ORDER — OLANZAPINE 5 MG/1
5 TABLET, FILM COATED ORAL 2 TIMES DAILY
Status: ON HOLD | COMMUNITY
End: 2025-04-14

## 2025-04-06 RX ORDER — OLANZAPINE 5 MG/1
5-10 TABLET, ORALLY DISINTEGRATING ORAL 2 TIMES DAILY PRN
Status: DISCONTINUED | OUTPATIENT
Start: 2025-04-06 | End: 2025-04-07 | Stop reason: HOSPADM

## 2025-04-06 RX ORDER — LORAZEPAM 2 MG/ML
INJECTION INTRAMUSCULAR
Status: DISCONTINUED
Start: 2025-04-06 | End: 2025-04-06 | Stop reason: WASHOUT

## 2025-04-06 RX ORDER — LORAZEPAM 2 MG/ML
2 INJECTION INTRAMUSCULAR ONCE
Status: COMPLETED | OUTPATIENT
Start: 2025-04-06 | End: 2025-04-06

## 2025-04-06 RX ORDER — LORAZEPAM 1 MG/1
1 TABLET ORAL 2 TIMES DAILY PRN
Status: DISCONTINUED | OUTPATIENT
Start: 2025-04-06 | End: 2025-04-07 | Stop reason: HOSPADM

## 2025-04-06 RX ORDER — LORAZEPAM 2 MG/ML
2 INJECTION INTRAMUSCULAR ONCE
Status: DISCONTINUED | OUTPATIENT
Start: 2025-04-06 | End: 2025-04-06

## 2025-04-06 RX ORDER — IBUPROFEN 600 MG/1
600 TABLET, FILM COATED ORAL EVERY 6 HOURS PRN
Status: DISCONTINUED | OUTPATIENT
Start: 2025-04-06 | End: 2025-04-07 | Stop reason: HOSPADM

## 2025-04-06 RX ORDER — OLANZAPINE 10 MG/2ML
10 INJECTION, POWDER, FOR SOLUTION INTRAMUSCULAR 2 TIMES DAILY PRN
Status: DISCONTINUED | OUTPATIENT
Start: 2025-04-06 | End: 2025-04-07 | Stop reason: HOSPADM

## 2025-04-06 RX ADMIN — OLANZAPINE 5 MG: 5 TABLET, ORALLY DISINTEGRATING ORAL at 13:38

## 2025-04-06 RX ADMIN — OLANZAPINE 10 MG: 10 INJECTION, POWDER, FOR SOLUTION INTRAMUSCULAR at 15:49

## 2025-04-06 RX ADMIN — LORAZEPAM 2 MG: 2 INJECTION INTRAMUSCULAR; INTRAVENOUS at 16:20

## 2025-04-06 ASSESSMENT — COLUMBIA-SUICIDE SEVERITY RATING SCALE - C-SSRS
2. HAVE YOU ACTUALLY HAD ANY THOUGHTS OF KILLING YOURSELF IN THE PAST MONTH?: NO
1. IN THE PAST MONTH, HAVE YOU WISHED YOU WERE DEAD OR WISHED YOU COULD GO TO SLEEP AND NOT WAKE UP?: NO
6. HAVE YOU EVER DONE ANYTHING, STARTED TO DO ANYTHING, OR PREPARED TO DO ANYTHING TO END YOUR LIFE?: NO

## 2025-04-06 ASSESSMENT — ACTIVITIES OF DAILY LIVING (ADL)
ADLS_ACUITY_SCORE: 41

## 2025-04-06 NOTE — TELEPHONE ENCOUNTER
S: [facility] ED , DEC  Mariangel  calling at 4:09 PM about 29 year old/female presenting with acute psychosis     B: Pt arrived via Friend. Presenting problem, stressors: Pt stopped taking her psych medications. Pt believes there is network of people killing kids with autism; Pt also believes that her coworker and ex-GF are being trafficked, that her younger brother is dead and that the world belongs to her.     Pt affect in ED: Labile  Pt Dx: PTSD and Unspecified Psychosis  Previous IPMH hx? Yes: 2011  Pt denies SI but collateral reports pt has been talking about wanting to slit her throat  Hx of suicide attempt? No  Pt has a remote hx of SIB via unclear  Pt denies HI   Pt endorses auditory hallucinations .   Pt RARS Score: 12    Hx of aggression/violence, sexual offenses, legal concerns, Epic care plan? describe: No aggression prior to ED visit; No sexual offenses, legal concerns nor Epic Care plan  Current concerns for aggression this visit? Yes: tore pages out of magazines on [facility]; Head banging, banging window and slamming the door in ED. Required IM Zyprexa and restraints    Does pt have a history of Civil Commitment? No  Is Pt their own guardian? Yes    Pt is prescribed medication. Is patient medication compliant? No  Pt denies OP services   CD concerns: Actively using/consuming synthetic marijuana daily; occasional alcohol use   Acute or chronic medical concerns: Asthma   Does Pt present with specific needs, assistive devices, or exclusionary criteria? None      Pt is ambulatory  Pt is able to perform ADLs independently      A: Pt to be reviewed for [facility] admission. Pt is on a 72HH, initiated 4/6/25 @ 3:12 PM  Preferred placement: Statewide    COVID Symptoms: No  If yes, COVID test required   Utox: Ordered, not yet collected   CMP: N/A  CBC: N/A  HCG: Ordered, not yet collected    R: Patient cleared and ready for behavioral bed placement: Yes  Pt placed on [facility] worklist? Yes    Does Patient need a  Transfer Center request created? Yes, writer completed Transfer Center request at: 4:19 PM

## 2025-04-06 NOTE — ED NOTES
"RN attempted to get pt out of restraints;  pt states, \"you're killing me.  This is what my step parents did.  Fine; I'll just kill myself.  Allah.\"  Pt then rocked back and forth on the bed; muttering words under her breath.  "

## 2025-04-06 NOTE — ED NOTES
Pt noticed on camera continuing to thrash around in bed. MD informed and witnessed this on camera. MD advised to give additional IM medication (see MAR). Pt given IM medication with additional staff and security. When in room pt continues to pull at restraints stating you cannot give me this medication

## 2025-04-06 NOTE — ED NOTES
Bed: [facility]  Expected date:   Expected time:   Means of arrival:   Comments:  EMPATH 13

## 2025-04-06 NOTE — ED NOTES
Writer responded to a code 21 in the EMPATH unit. Patient found yelling and threatening staff and security. Patient was somewhat redirectable at times. So, after much de-scalation, she was brought back to the ED in a wheelchair. Upon her arrival, the patient came out of the room and began banging on the staff windows. She then continued to make threats towards staff and saying that she was going to hurt herself. At this point, the patient was not redirectable and had poor insight. The patient refused medication administraion ordered by the ED provider. At some point, the patient tried to choke herself with her hands so security and staff went hands on. Restraints were applied and the patient struggled with staff during this process. She also began spitting at staff. No injuries noted from the restraining process, though redness and swelling to both wrists were noted after the patient was banging at the walls and the staff windows.    ...............................................  ED Charge Nurse

## 2025-04-06 NOTE — ED NOTES
"Pt banging her head against the bed, trying to hit the railing.  Seizure pads placed in an attempt to minimize risk to self.  Pt states, \"I don't trust him and I no longer trust you.\"    "

## 2025-04-06 NOTE — ED NOTES
Pt banging on the staff window and then slamming her door.  Pt would then come out and hit the window again.  MD at bedside.  PAULINO plazayprexa ordered.  Staff at bedside.

## 2025-04-06 NOTE — ED NOTES
[facility]  ED to EMPATH Checklist:      Goal for EMPATH: Paranoia management, anxiety    Current Behavior: Cooperative    Safety Concerns: anxiety    Legal Hold Status: Voluntary    Medically Cleared by ED provider: Yes    Patient Therapeutically Searched: N/A    Belongings: Remain with patient    Independent Ambulation at Baseline: Yes/No: Yes    Participates in Care/Conversation: Yes/No: Yes    Patient Informed about EMPATH: Yes/No: Yes    DEC: Ordered and pending    Patient Ready to be Transferred to EMPATH? Yes/No: Yes

## 2025-04-06 NOTE — PROGRESS NOTES
"29 year old female with history of bipolar disorder and schizophrenia received from ED due to paranoia. Reports she stopped her medications about a month ago and has been experiencing \"revelations\" about the world and herself that have lead her to voluntary come to the ED. She presents as paranoid and preoccupied by internal stimuli, although she says she only hears her own voice in her head. She has become notably more agitated since first contact in her room in . She will stare at cameras and go on tangents about what seems to be past trauma, inserting delusional and grandeur statements, and requires frequent redirection to conversation. Stares intensely at staff and patients and creates delusions based on who she is looking at. Drinks alcohol occasionally, uses a nicotine vape, and uses synthetic marijuana daily. Denies SI/HI. Reports having outside providers, writer unable to determine location of therapist. Nursing and risk assessments completed. Assessments reviewed with LMHP and physician. Admission information reviewed with patient. Patient given a tour of Em[facility] and instructions on using the facility. Questions regarding Em[facility] addressed. Pt safety search completed.    "

## 2025-04-06 NOTE — ED NOTES
"Pt was observed to be hitting self in chest with fists and pacing around the unit. Pt was approached while on the exercise bike and was offered ativan to help with symptoms. Pt started to become agitated and stated that she was not going to be \"drugged\" This writer attempted to de-escalate patient who started to yell and swear at this writer. Pt continued to escalate, stating \"NO, you are not the boss of me, I'm the boss of you\". Provider placed  patient on a 72 hour hold and a code 21 was called so patient could be transferred back to the ED. Pt started screaming \"what is happening\" and when this writer raised their voice to inform patient that she was on a hold and be moving back to the emergency room, patient regulated and stated \"okay, I just need to know\". Pt had stacked multiple items on the table and stated \"this is part of my evidence\". Pt needed multiple redirection to get into wheelchair and went to the emergency room without issue.   "

## 2025-04-06 NOTE — TELEPHONE ENCOUNTER
R: [place]  Access Inpatient Bed Call Log 04/06/2025 @ 3:05 PM:  Intake has called facilities that have not updated the bed status within the last 12 hours.     [facility]         [facility] is posting 0 beds.  [facility] is posting 4 beds. [phone]. Per Kirstie @ 4:28 PM, 1 low acuity F bed in a shared room   [facility] ([facility]) is posting 0 bed.  [facility] is posting  0 beds. No high school or soila psych. [phone]. Per Viviana @ 4:29 PM, they are full   [facility] ([facility]) is posting  0 beds.  [facility] () is posting  1 bed. [phone]. Per Dangelo @ 4:29 PM, they are [facility] is posting  6 beds. Ages 18-35, labs required. [phone]. Per Deanne @ 4:39 PM, no YA beds available  [facility] ([facility]) is posting  0 beds.  [facility] ([facility]) is posting  1 bed. [phone]. Per Unique @ 4:31 PM, LOW acuity beds      [facility] () is posting  5 beds. Mixed unit (12+), low acuity. [phone]   [facility] ([facility]) is posting  2 beds. [phone]. No current aggression, low acuity. Per Nuique @ 4:31 PM, LOW acuity beds   [facility] is posting  0 beds. [phone] ext. [phone]  [facility] 2[facility] is posting  0 beds. [phone]. Per Crissy 4:40 PM, no beds available  [facility] ([facility]) is posting  1 bed. [phone]. No current aggression, low acuity.  Per Unique @ 4:31 PM, no beds available  [facility] ([place]) is posting  3 beds. [phone]. Per Crissy 4:40 PM, only very low acuity beds on mood disorder unit  [facility] is posting  1 bed. Low acuity, 72HH preferred. [phone]    [facility] ([place]) is posting  2 beds. [phone]. Per Crissy 4:40 PM, only very low acuity beds      [facility] () is posting  3 beds. VOL only, no hx of aggression/violence/assault. No sexual offenders, no 72HH. [phone]  [facility] is  posting  0 beds. Must have cognitive ability to program. No aggression or violent hx in the last 2 years. [phone]  [facility] is posting  4 beds. Low acuity, violence and aggression capped. [phone]. Per staff @ 4:42 PM, they are on divert  [facility] is posting 7 beds. [phone]. Per Miguelangel @ 4:43 PM, they are full and have a wait list  [facility] is posting  3 beds. [phone]. Per Pamela @ 4:44 PM, they are full tonight [facility] is posting  4 beds. No lines, drains, tubes, oxygen, IV or CPAP. Needs a confirmed ride home after discharge. [phone]   [facility] is posting 7 beds. MIXED UNIT. No lines, drains, tubes, oxygen, IV or CPAP. [phone]. Per Bhavana @ 4:45 PM, beds on general unit; no high acuity  [facility] is posting 6 beds. OUT OF STATE. [phone]. Per  Intake policy, patients must be voluntary for out of state placement - Pt is on a 72 HH      Pt not appropriate for current beds available d/t acuity/requiring restraints in the ED  Pt remains on work list pending appropriate bed availability.

## 2025-04-06 NOTE — PLAN OF CARE
Lenore Suggs  April 6, 2025  Plan of Care Hand-off Note     Patient Recommended Care Path: inpatient mental health    Clinical Substantiation:  It is the recommendation of this writer that pt be admitted to an inpatient psychiatric unit on the basis of her acute psychosis with prominent paranoia, delusional thought content, and auditory hallucinations. She stopped taking her psychiatric medication approximately a month ago, coinciding with this increase in symptoms. She has also been describing a vivid plan to slit her throat to collateral contact. Pt became highly escalated at Empath, yelling at staff and attacking jobs. She was transferred back to the ED and placed on a 72 HH, expiring on 5/9/25 @ 15:12.    Goals for crisis stabilization:  decrease psychosis, restart psychiatric medication    Next steps for Care Team:  decrease psychosis, restart psychiatric medication    Treatment Objectives Addressed:  processing feelings, assessing safety    Therapeutic Interventions:  Engaged in guided discovery, explored patient's perspectives and helped expand them through socratic dialogue.    Has a specific means been identified for suicidal.homicide actions: Yes  If yes, describe: Per collateral report, pt has been reporting suicidal plans to slit her throat.  Explain action steps toward mitigation: Pt is being recommended for inpatient hospitalization.  Document completion of mitigation action: Pt is being recommended for inpatient hospitalization.  The follow up action still needed prior to discharge: Pt will need to engage in safety planning around her suicidal ideation prior to discharge.    Patient coping skills attempted to reduce the crisis:  Pt requested to go to the ED today.       Imminent risk of harm: Suicidal Behavior  Severe psychiatric, behavioral or other comorbid conditions are appropriate for management at inpatient mental health as indicated by at least one of the following: Psychiatric Symptoms,  Impaired impulse control, judgement, or insight, Symptoms of impact to function  Severe dysfunction in daily living is present as indicated by at least one of the following: Incapacitation because of grave disability, Extreme disruption in vegetative function, Complete inability to maintain any appropriate aspect of personal responsibility in any adult roles, Extreme deterioration in social interactions  Situation and expectations are appropriate for inpatient care: Around-the-clock medical and nursing care to address symptoms and initiate intervention is required, Voluntary treatment at lower level of care is not feasible, Patient is unwilling to participate in treatment voluntarily and requires treatment, Patient management/treatment at lower level of care is not feasible or is inappropriate  Inpatient mental health services are necessary to meet patient needs and at least one of the following: Specific condition related to admission diagnosis is present and judged likely to deteriorate in absence of treatment at proposed level of care      Collateral contact information:  Daisy Callahan, friend, PH: [phone]    Legal Status: 72 Hour Hold                         72 Hour Hold - Date/Time Initiated: 4/6/25 @ 15:12                         72 Hour Hold - Date/Time Ends: 4/9/25 @ 15:12                                                                             Reviewed court records: yes     Psychiatry Consult: A psychiatry consult has been placed for pt given transfer back to the ED.    Ana Feliciano, LESIASW

## 2025-04-06 NOTE — ED NOTES
IP MH Referral Acuity Rating Score (RARS)    LMHP complete at referral to IP MH, with DEC; and, daily while awaiting IP MH placement. Call score to PPS.  CRITERIA SCORING   New 72 HH and Involuntary for IP MH (not adolescent) 3/3   Boarding over 24 hours 0/1   Vulnerable adult at least 55+ with multiple co morbidities; or, Patient age 11 or under 0/1   Suicide ideation without relief of precipitating factors 1/1   Current plan for suicide 1/1   Current plan for homicide 0/1   Imminent risk or actual attempt to seriously harm another without relief of factors precipitating the attempt 0/1   Severe dysfunction in daily living (ex: complete neglect for self care, extreme disruption in vegetative function, extreme deterioration in social interactions) 1/1   Recent (last 2 weeks) or current physical aggression in the ED 1/1   Restraints or seclusion episode in ED 1/1   Verbal aggression, agitation, yelling, etc., while in the ED 1/1   Active psychosis with psychomotor agitation or catatonia 1/1   Need for constant or near constant redirection (from leaving, from others, etc).  1/1   Intrusive or disruptive behaviors 1/1   TOTAL Acuity Total Score: 12     CALVIN Ahmadi
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
## 2025-04-06 NOTE — ED NOTES
"Pt states that she will be safe out of restraints; states, \"I am just tired and want to sleep.\"  When security was in there taking the last of the restraints off pt states, \"You know, I am so strong and so fast, I could wrap myself around you and snap your neck before you even knew it.\"  RN reminded pt that she stated she would be safe to herself and others out of restraints.  Pt states, \"Nothing around me is ever really safe.\"  RN educated pt that restraints can go back on if pt is deemed unsafe.  "

## 2025-04-06 NOTE — ED NOTES
"Went in to place pt on pulse oximeter;  asked pt about coming out of restraints.  \"Well I'm going to kill myself as soon as I get out.  Have you ever seen anyone kill themselves with their bare hands?  I should have killed myself when I was a toddler.  I am going to slit my wrists and my femoral arteries.\"  Redirection given multiple times on getting out of restraints.  Pt states, \" I don't fucking care.\"  "
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
## 2025-04-06 NOTE — ED PROVIDER NOTES
Patient assessed for initial psychiatry evaluation at [facility] earlier this afternoon. Patient presenting with concerns for bijal and psychosis including prominent paranoid delusions. At that time, patient was agreeable to remaining at [facility], taking medications, and expressed desire to avoid inpatient hospitalization given past trauma she has experienced in the healthcare system. Following assessment patient continued to decompensate in the milieu. Patient became increasing behaviorally dysregulated, hitting self, and attempting to destroy milieu property. Patient unable to be redirected by staff. Patient refusing medications. Code 21 called. Patient placed on 72HH and transferred back to the ED. Patient to be added to inpatient admission list.            Saida Arroyo, ANAYELI CNP  04/06/25 1527
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
## 2025-04-06 NOTE — ED NOTES
"Pt sitting in the open milieu, writing on books and ripping pages out of magazines. This writer attempted to redirect patient and ask her not to destroy items on the unit and patient asked \"why are you interferring?\". Pt continues to be paranoid, grandiose.   "
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
## 2025-04-07 ENCOUNTER — TELEPHONE (OUTPATIENT)
Dept: BEHAVIORAL HEALTH | Facility: CLINIC | Age: 30
End: 2025-04-07
Payer: COMMERCIAL

## 2025-04-07 ENCOUNTER — HOSPITAL ENCOUNTER (INPATIENT)
Facility: CLINIC | Age: 30
End: 2025-04-07
Attending: PSYCHIATRY & NEUROLOGY | Admitting: PSYCHIATRY & NEUROLOGY
Payer: COMMERCIAL

## 2025-04-07 DIAGNOSIS — E55.9 VITAMIN D DEFICIENCY: ICD-10-CM

## 2025-04-07 DIAGNOSIS — F22 PARANOIA (H): ICD-10-CM

## 2025-04-07 DIAGNOSIS — R46.89 AGGRESSION: Primary | ICD-10-CM

## 2025-04-07 DIAGNOSIS — F29 PSYCHOSIS, UNSPECIFIED PSYCHOSIS TYPE (H): ICD-10-CM

## 2025-04-07 LAB
ALBUMIN UR-MCNC: 20 MG/DL
AMPHETAMINES UR QL SCN: ABNORMAL
APPEARANCE UR: ABNORMAL
BARBITURATES UR QL SCN: ABNORMAL
BENZODIAZ UR QL SCN: ABNORMAL
BILIRUB UR QL STRIP: NEGATIVE
BZE UR QL SCN: ABNORMAL
CANNABINOIDS UR QL SCN: ABNORMAL
COLOR UR AUTO: YELLOW
FENTANYL UR QL: ABNORMAL
GLUCOSE UR STRIP-MCNC: NEGATIVE MG/DL
HCG UR QL: NEGATIVE
HGB UR QL STRIP: NEGATIVE
KETONES UR STRIP-MCNC: NEGATIVE MG/DL
LEUKOCYTE ESTERASE UR QL STRIP: ABNORMAL
MUCOUS THREADS #/AREA URNS LPF: PRESENT /LPF
NITRATE UR QL: NEGATIVE
OPIATES UR QL SCN: ABNORMAL
PCP QUAL URINE (ROCHE): ABNORMAL
PH UR STRIP: 6.5 [PH] (ref 5–7)
RBC URINE: 2 /HPF
SP GR UR STRIP: 1.02 (ref 1–1.03)
SQUAMOUS EPITHELIAL: 4 /HPF
UROBILINOGEN UR STRIP-MCNC: NORMAL MG/DL
WBC URINE: 19 /HPF

## 2025-04-07 PROCEDURE — 80307 DRUG TEST PRSMV CHEM ANLYZR: CPT

## 2025-04-07 PROCEDURE — 81001 URINALYSIS AUTO W/SCOPE: CPT

## 2025-04-07 PROCEDURE — 250N000013 HC RX MED GY IP 250 OP 250 PS 637: Performed by: NURSE PRACTITIONER

## 2025-04-07 PROCEDURE — 124N000002 HC R&B MH UMMC

## 2025-04-07 PROCEDURE — 250N000013 HC RX MED GY IP 250 OP 250 PS 637: Performed by: PSYCHIATRY & NEUROLOGY

## 2025-04-07 PROCEDURE — 87086 URINE CULTURE/COLONY COUNT: CPT

## 2025-04-07 PROCEDURE — 250N000013 HC RX MED GY IP 250 OP 250 PS 637

## 2025-04-07 PROCEDURE — 81025 URINE PREGNANCY TEST: CPT | Performed by: EMERGENCY MEDICINE

## 2025-04-07 PROCEDURE — G0378 HOSPITAL OBSERVATION PER HR: HCPCS

## 2025-04-07 RX ORDER — OLANZAPINE 5 MG/1
5 TABLET, FILM COATED ORAL EVERY MORNING
Status: DISCONTINUED | OUTPATIENT
Start: 2025-04-08 | End: 2025-04-08

## 2025-04-07 RX ORDER — ACETAMINOPHEN 325 MG/1
650 TABLET ORAL EVERY 4 HOURS PRN
Status: DISCONTINUED | OUTPATIENT
Start: 2025-04-07 | End: 2025-04-15 | Stop reason: HOSPADM

## 2025-04-07 RX ORDER — MAGNESIUM HYDROXIDE/ALUMINUM HYDROXICE/SIMETHICONE 120; 1200; 1200 MG/30ML; MG/30ML; MG/30ML
30 SUSPENSION ORAL EVERY 4 HOURS PRN
Status: DISCONTINUED | OUTPATIENT
Start: 2025-04-07 | End: 2025-04-15 | Stop reason: HOSPADM

## 2025-04-07 RX ORDER — OLANZAPINE 5 MG/1
10 TABLET, FILM COATED ORAL AT BEDTIME
Status: DISCONTINUED | OUTPATIENT
Start: 2025-04-07 | End: 2025-04-07 | Stop reason: HOSPADM

## 2025-04-07 RX ORDER — HALOPERIDOL 2 MG/ML
5 SOLUTION ORAL 3 TIMES DAILY PRN
Status: DISCONTINUED | OUTPATIENT
Start: 2025-04-07 | End: 2025-04-08

## 2025-04-07 RX ORDER — OLANZAPINE 10 MG/1
10 TABLET, FILM COATED ORAL AT BEDTIME
Status: DISCONTINUED | OUTPATIENT
Start: 2025-04-07 | End: 2025-04-08

## 2025-04-07 RX ORDER — OLANZAPINE 10 MG/1
10 TABLET, FILM COATED ORAL 3 TIMES DAILY PRN
Status: DISCONTINUED | OUTPATIENT
Start: 2025-04-07 | End: 2025-04-15 | Stop reason: HOSPADM

## 2025-04-07 RX ORDER — OLANZAPINE 10 MG/2ML
10 INJECTION, POWDER, FOR SOLUTION INTRAMUSCULAR 3 TIMES DAILY PRN
Status: DISCONTINUED | OUTPATIENT
Start: 2025-04-07 | End: 2025-04-15 | Stop reason: HOSPADM

## 2025-04-07 RX ORDER — POLYETHYLENE GLYCOL 3350 17 G/17G
17 POWDER, FOR SOLUTION ORAL DAILY PRN
Status: DISCONTINUED | OUTPATIENT
Start: 2025-04-07 | End: 2025-04-15 | Stop reason: HOSPADM

## 2025-04-07 RX ORDER — LORAZEPAM 2 MG/ML
2 INJECTION INTRAMUSCULAR EVERY 8 HOURS PRN
Status: DISCONTINUED | OUTPATIENT
Start: 2025-04-07 | End: 2025-04-08

## 2025-04-07 RX ORDER — VILAZODONE HYDROCHLORIDE 10 MG/1
40 TABLET ORAL DAILY
Status: DISCONTINUED | OUTPATIENT
Start: 2025-04-07 | End: 2025-04-08

## 2025-04-07 RX ORDER — HALOPERIDOL 5 MG/ML
5 INJECTION INTRAMUSCULAR EVERY 8 HOURS PRN
Status: DISCONTINUED | OUTPATIENT
Start: 2025-04-07 | End: 2025-04-08

## 2025-04-07 RX ORDER — TRAZODONE HYDROCHLORIDE 50 MG/1
50 TABLET ORAL
Status: DISCONTINUED | OUTPATIENT
Start: 2025-04-07 | End: 2025-04-15 | Stop reason: HOSPADM

## 2025-04-07 RX ORDER — VILAZODONE HYDROCHLORIDE 10 MG/1
20 TABLET ORAL DAILY
Status: DISCONTINUED | OUTPATIENT
Start: 2025-04-07 | End: 2025-04-08

## 2025-04-07 RX ORDER — DIPHENHYDRAMINE HYDROCHLORIDE 50 MG/ML
50 INJECTION, SOLUTION INTRAMUSCULAR; INTRAVENOUS EVERY 8 HOURS PRN
Status: DISCONTINUED | OUTPATIENT
Start: 2025-04-07 | End: 2025-04-08

## 2025-04-07 RX ORDER — POLYETHYLENE GLYCOL 3350 17 G
2-4 POWDER IN PACKET (EA) ORAL
Status: DISCONTINUED | OUTPATIENT
Start: 2025-04-07 | End: 2025-04-15 | Stop reason: HOSPADM

## 2025-04-07 RX ORDER — ALBUTEROL SULFATE 90 UG/1
2 INHALANT RESPIRATORY (INHALATION) EVERY 6 HOURS PRN
Status: DISCONTINUED | OUTPATIENT
Start: 2025-04-07 | End: 2025-04-15 | Stop reason: HOSPADM

## 2025-04-07 RX ORDER — LORAZEPAM 1 MG/1
1 TABLET ORAL 3 TIMES DAILY PRN
Status: DISCONTINUED | OUTPATIENT
Start: 2025-04-07 | End: 2025-04-08

## 2025-04-07 RX ORDER — ONDANSETRON 4 MG/1
4 TABLET, ORALLY DISINTEGRATING ORAL EVERY 8 HOURS PRN
Status: DISCONTINUED | OUTPATIENT
Start: 2025-04-07 | End: 2025-04-15 | Stop reason: HOSPADM

## 2025-04-07 RX ORDER — HALOPERIDOL 2 MG/ML
5 SOLUTION ORAL 3 TIMES DAILY PRN
Status: DISCONTINUED | OUTPATIENT
Start: 2025-04-07 | End: 2025-04-07

## 2025-04-07 RX ORDER — NICOTINE 21 MG/24HR
1 PATCH, TRANSDERMAL 24 HOURS TRANSDERMAL DAILY PRN
Status: DISCONTINUED | OUTPATIENT
Start: 2025-04-07 | End: 2025-04-15 | Stop reason: HOSPADM

## 2025-04-07 RX ORDER — POLYETHYLENE GLYCOL 3350 17 G
2-4 POWDER IN PACKET (EA) ORAL
Status: DISCONTINUED | OUTPATIENT
Start: 2025-04-07 | End: 2025-04-07

## 2025-04-07 RX ORDER — LORAZEPAM 1 MG/1
1 TABLET ORAL 3 TIMES DAILY PRN
Status: DISCONTINUED | OUTPATIENT
Start: 2025-04-07 | End: 2025-04-07

## 2025-04-07 RX ORDER — HYDROXYZINE HYDROCHLORIDE 25 MG/1
25 TABLET, FILM COATED ORAL EVERY 4 HOURS PRN
Status: DISCONTINUED | OUTPATIENT
Start: 2025-04-07 | End: 2025-04-15 | Stop reason: HOSPADM

## 2025-04-07 RX ADMIN — HALOPERIDOL 5 MG: 2 SOLUTION ORAL at 18:20

## 2025-04-07 RX ADMIN — NICOTINE POLACRILEX 4 MG: 2 GUM, CHEWING BUCCAL at 17:48

## 2025-04-07 RX ADMIN — NICOTINE POLACRILEX 4 MG: 2 GUM, CHEWING BUCCAL at 20:06

## 2025-04-07 RX ADMIN — OLANZAPINE 10 MG: 5 TABLET, ORALLY DISINTEGRATING ORAL at 14:23

## 2025-04-07 RX ADMIN — NICOTINE 1 PATCH: 21 PATCH, EXTENDED RELEASE TRANSDERMAL at 17:44

## 2025-04-07 RX ADMIN — OLANZAPINE 10 MG: 10 TABLET, FILM COATED ORAL at 20:51

## 2025-04-07 ASSESSMENT — ACTIVITIES OF DAILY LIVING (ADL)
ADLS_ACUITY_SCORE: 41
ORAL_HYGIENE: INDEPENDENT
LAUNDRY: WITH SUPERVISION
ADLS_ACUITY_SCORE: 41
HYGIENE/GROOMING: INDEPENDENT
ADLS_ACUITY_SCORE: 41
DRESS: SCRUBS (BEHAVIORAL HEALTH);INDEPENDENT
ADLS_ACUITY_SCORE: 41

## 2025-04-07 NOTE — TELEPHONE ENCOUNTER
R: [place]  Access Inpatient Bed Call Log 04/06/2025 @ 12:00 AM:  Intake has called facilities that have not updated the bed status within the last 12 hours.     (ADULT):        [facility] is posting 0 beds.  [facility] is posting 5 beds. [phone] Per Zen @ 11:49.  [facility] ([facility]) is posting 0 bed.   [facility] is posting 0 beds. No high school or [place] psych. [phone] Per Melanie @ 11:50 PM.  [facility] ([facility]) is posting 0 beds.  [facility] () is posting 1 bed. [phone] Phone rang, no answer. Contact facility for bed availability.   [facility] is posting 0 beds. Ages 18-35, labs required. [phone] Per Deanne @ 7:22 AM, 2 adolescent beds (1M, 1F), no thelma beds, no child beds, no YA beds.  Per call with Alicia @11:23 AM there are no open beds until Monday.  [facility] ([facility]) is posting 0 beds.  [facility] ([facility]) is posting 0 beds. [phone].      [facility] () is posting 3 beds. Mixed unit (12+), low acuity. [phone] Per Deven @ 12:00 AM, open for reviews.  [facility] ([facility]) is posting 0 beds. [phone]. No current aggression, low acuity. Per Jaren @ 1:06 AM no beds available  [facility] is posting 0 beds. [phone] ext. [phone]   [facility] ([place]) is posting 0 beds. [phone]   [facility] ([facility]) is posting 0 beds. [phone]. No current aggression, low acuity.    [facility] ([place]) is posting 3 beds. [phone] Per Quin @ 12:00 AM - low acuity beds only  [facility] is posting 0 beds. Low acuity, 72HH preferred [phone]. Per Lemuel, @ 12:26 AM, call back after 8:00 AM.    [facility] ([place]) is posting 2 beds. [phone] - Per Quin @ 12:00 AM    [facility] ([facility]) is posting 0 beds. VOL only, no hx of aggression/violence/assault. No sexual offenders, no 72HH. [phone] P:zaira Bustamante @ 12:29 AM, call back after 8:00 AM.   [facility] is posting 0 beds. Must have cognitive ability to program. No aggression or violent hx in the last 2 years. [phone]   [facility] is posting 0 beds. Low acuity, violence and aggression capped. [phone]   [facility] is posting 0 beds. [phone] Per Rody @ 12:50 AM, no openings.  [facility] is posting 0 beds. [phone]. Per Phil @ 12:53 AM.  [facility] is posting 2 beds. No lines, drains, tubes, oxygen, IV or CPAP. [phone]   [facility] is posting 5 beds. MIXED UNIT. No lines, drains, tubes, oxygen, IV or CPAP. [phone]   [facility] is posting 4 beds. OUT OF STATE. [phone] Per Harish @ 12:47 AM.     Pt remains on work list pending appropriate bed availability.       R: [place]  Access Inpatient Bed Call Log 04/06/2025 @ 12:00 AM:  Intake has called facilities that have not updated the bed status within the last 12 hours.         (ADOLESCENTS)    [facility] is posting 0 beds.  [facility] ([facility]) is posting 0 beds. [phone]  [facility] is posting 0 beds. [phone] Per Ada @ 1:04 AM, call back after 7:00 AM.  [facility] is posting 4 beds. [phone] Per call @ 12:57 AM, call back for bed availability.  [facility] is posting 3 beds. Mixed unit (12+), low acuity. [phone] Per Deven @ 12:00 AM, open for reviews.  [facility] is posting 0 beds. [phone] ext. [phone]   [facility] ([facility]) is posting 0 beds. No current aggression, low acuity.   [facility] ([place]) is posting 9 beds. [phone].  Per Quin @ 12:00 AM.    [facility] is posting 2 beds. Low acuity, violence and aggression capped. [phone].   [facility] is posting 5 beds. MIXED UNIT. No lines, drains, tubes, oxygen, IV or CPAP. [phone].  [facility] is posting 3 beds. OUT OF STATE. [phone] Per Harish @  12:47 AM.    Pt remains on work list pending appropriate bed availability.       R: [place]  Access Inpatient Bed Call Log 04/06/2025 @ 12:00 AM:  Intake has called facilities that have not updated the bed status within the last 12 hours.         (KIDS <12)    [facility] is posting 0 beds.  [facility] ([facility]) is posting 0 beds. [phone]  [facility] is posting 0 beds. [phone] Per Ada @ 1:04 AM, call back after 7:00 AM.  [facility] is posting 2 beds. [phone] Per call @ 12:57 AM, call back for bed availability.  [facility] ([place]) is posting 4 beds. [phone] Per Quin @ 12:00 AM.  [facility] is posting 2 beds. Low acuity, violence and aggression capped. [phone]   [facility] is posting 1 bed. OUT OF STATE. [phone] Per Harish @ 12:47 AM.     Pt remains on work list pending appropriate bed availability.

## 2025-04-07 NOTE — ED NOTES
Patient woke to staff, declined her breakfast. Banana left in her room in case, and patient went back to sleeping.

## 2025-04-07 NOTE — PROGRESS NOTES
"  Triage & Transition Services, Extended Care     Therapy Progress Note    Patient: Felecia goes by \"Felecia,\" uses she/her pronouns  Date of Service: April 7, 2025  Site of Service: [facility] Emergency Dept                             BH2  Patient was seen yes  Mode of Assessment: In person    Presentation Summary: Writer met with Pt in her room with security present. Pt had a blanket covering her face but eventually sat up and spoke with writer. Pt presented with an anxious affect. Pts mood was congruent with this presentation. Pt continues to present with paranoid ideations and delusions. Pt does appear to have limited insight into her current mental health sx. Pt did not endorse any SI/SIB/HI or AH/VH. Pt endorsed she \"hears my own voice.\" Pt endorsed that all she wants is to \"protect the children.\" Pt endorsed not feeling safe in the ED and was suspicious of other Pts in the ED. Pt discussed a family hx of schizophrenia and stated \"but that is not what is happening with me.\"    Therapeutic Intervention(s) Provided: Reviewed healthy living that supports positive mental health, including looking at sleep hygiene, regular movement, nutrition, and regular socialization.    Current Symptoms: anxious, racing thoughts, excessive worry sadness, negativistic anxious, excessive worry, racing thoughts forgetful, grandiosity, distractability      Mental Status Exam   Affect: Dramatic  Appearance: Appropriate  Attention Span/Concentration: Attentive  Eye Contact: Intense, Variable    Fund of Knowledge: Appropriate   Language /Speech Content: Fluent  Language /Speech Volume: Normal  Language /Speech Rate/Productions: Hyperverbal  Recent Memory: Poor  Remote Memory: Variable  Mood: Irritable, Anxious  Orientation to Person: Yes   Orientation to Place: Yes  Orientation to Time of Day: Yes  Orientation to Date: Yes     Situation (Do they understand why they are here?): No  Psychomotor Behavior: Normal  Thought " Content: Delusions, Paranoia  Thought Form: Tangential, Paranoia    Treatment Objective(s) Addressed: rapport building, identifying treatment goals, assessing safety    Patient Response to Interventions: needs reinforcement, unacceptance expressed    Progress Towards Goals: Patient Reports Symptoms Are: ongoing  Patient Progress Toward Goals: is making progress  Comment: Pt has been somewhat receptive to ED interventions.  Next Step to Work Toward Discharge: symptom stabilization  Symptom Stabilization Comment: Pt did not endorse any SI/SIB/HI or AH/VH. Pt does present with significant paranoia.    Case Management: Summary of Interaction: None at time of assessment    Plan: inpatient mental health  yes provider, RN    yes    Clinical Substantiation: At this time IP MH admission is being recommended due to increased psychosis sx and disorganized behavior. Pts current sx appear to be impacting her ability to safety and appropriately function in the community. Pt was not able to fully safety plan with writer. Pt does appear to be at higher risk of death by suicide accidental or intentional due to mental health hx and substance use hx. If Pt is able to effectively safety plan and/or Pts sx improve it would be beneficial to pursue a less restrictive alternative. Pt was placed on a 72 hour hold on 4/6/25 at 1512. Hospital staff have not petitioned on Pt, with hopes of medication compliance and sx improvement.    Legal Status: Legal Status: 72 Hour Hold  72 Hour Hold - Date/Time Initiated: 4/6/25 at 1512  72 Hour Hold - Date/Time Ends: 4/10/25 at 0000    Session Status: Time session started: 1220  Time session ended: 1236  Session Duration (minutes): 16 minutes  Session Number: 1  Anticipated number of sessions or this episode of care: 3    Time Spent: 16 minutes    CPT Code: CPT Codes: 63326 - Psychotherapy (with patient) - 30 (16-37*) min    Diagnosis:   Patient Active Problem List   Diagnosis Code    History of suicide  attempt Z91.51    Suicidal ideation R45.851    Headache, unspecified headache type R51.9    Attention deficit hyperactivity disorder (ADHD), combined type F90.2    Complex posttraumatic stress disorder F43.10    Borderline personality disorder (H) F60.3    Major depressive disorder, recurrent, mild F33.0    Paranoia (H) F22    Psychosis (H) F29       Primary Problem This Admission: Active Hospital Problems    Psychosis (H)      Complex posttraumatic stress disorder        Kristel Borges, [facility]   Licensed Mental Health Professional (LMHP), [facility]  [phone]

## 2025-04-07 NOTE — ED NOTES
"Pt requesting a bible, a remote and highlighter.  This RN stated that we do have a bible but it is in Bhutanese; pt states, \"anai for you, I can read in Bhutanese.\"  When pt was given the bible, she stated, \"this better come with a .\"  Pt did not want the bible.  Pt then went back to her room and put the blanket over her head; RN went to give pt remote.  Pt states, \" I don't want the remote right now, I'm not very happy right now, I'd rather just kill myself and die.\"  RN asked if I could give her medication; pt stated that she did not want any because she had not had food.   "

## 2025-04-07 NOTE — PROGRESS NOTES
Psychiatry Cross Cover Note    Writer paged by RN that patient escalating in agitation and aggression upon admission to unit, making threats towards staff, CHRISTIAN team called. Pt requesting nicotine gum instead of lozenges and staff requesting additional PRN options as pt recently received PRN olanzapine however nicotine gum, PO benadryl and PO haldol pill showing allergy flag for pts red dye allergy. Discussed with PharmD, nicotine gum does not have red dye in it and therefore this medication added. Haldol solution does not have red dye noted so this was linked to PO Ativan as oral options for agitation linked to IM Haldol (no red dye), IM benadryl (no red dye) and IM lorazepam as secondary medication for agitation if olanzapine ineffective. PharmD checking to see if other PO Haldol pills or Benadryl PO forms available in stock to use that do not contain red dye.     Addendum: appears pt willing to take PO haldol but not wanting controlled substance (ativan), specified in linked order for PO forms pt may decline the Ativan.     Second addendum: notified by PharmD that PO Haldol tablets do not contain red dye, will defer to primary team in AM if wanting to change from solution.     Michelle Naranjo, DO

## 2025-04-07 NOTE — CONSULTS
Initial Psychiatric Consult   Consult date: April 7, 2025         Reason for Consult, requesting source:      Requesting source: Samuel Garrett    Labs and imaging reviewed. Patient seen and evaluated by Paulo Kennedy PA-C          HPI:   On approach patient offers little interaction    Chart review reflects grossly psychotic behavior, and disorganized thought processes.   Previous disagnoses of PTSD, borderline personality disorder, ADHD, and suicidal ideation. No hx of psychosis prior to this episode. Biological mother had schizophrenia. One previous admission in 2011 for depression and HI.   Past medications include lexapro, sertraline, Seroquel, trazodone, Geodon, Latuda, lithium, wellbutrin, adderal, vilazidone.         Past Psychiatric History:           Substance Use and History:           Past Medical History:   PAST MEDICAL HISTORY:   Past Medical History:   Diagnosis Date    Asthma     Bipolar affective disorder (H)     Depressive disorder     Outbursts of anger     Schizophrenia (H)        PAST SURGICAL HISTORY:   Past Surgical History:   Procedure Laterality Date    ENT SURGERY  2005    Unknown object in ear             Family History:   FAMILY HISTORY:   Family History   Problem Relation Age of Onset    Mental Illness Mother     Paranoid behavior Mother     Other Cancer Mother         Bio-mother, surgery on stomach cancer    No Known Problems Father        Family Psychiatric History:         Social History:   SOCIAL HISTORY:   Social History     Tobacco Use    Smoking status: Passive Smoke Exposure - Never Smoker    Smokeless tobacco: Never   Substance Use Topics    Alcohol use: Not Currently     Comment: Alcohol allergy                Physical ROS:   The 10 point Review of Systems is negative other than noted in the HPI or here.           Medications:     Current Facility-Administered Medications   Medication Dose Route Frequency Provider Last Rate Last Admin    OLANZapine (zyPREXA) tablet 5 mg  " 5 mg Oral At Bedtime Saida Arroyo, ANAYELI CNP                  Allergies:     Allergies   Allergen Reactions    Alcohol Hives    Kiwi Hives    Red Dye #40 (Allura Red)     Seasonal Allergies Other (See Comments) and Rash     sneezing          Labs:     Recent Results (from the past 48 hours)   UA with Microscopic reflex to Culture    Collection Time: 04/07/25 12:58 PM    Specimen: Urine, Midstream   Result Value Ref Range    Color Urine Yellow Colorless, Straw, Light Yellow, Yellow    Appearance Urine Slightly Cloudy (A) Clear    Glucose Urine Negative Negative mg/dL    Bilirubin Urine Negative Negative    Ketones Urine Negative Negative mg/dL    Specific Gravity Urine 1.025 1.003 - 1.035    Blood Urine Negative Negative    pH Urine 6.5 5.0 - 7.0    Protein Albumin Urine 20 (A) Negative mg/dL    Urobilinogen Urine Normal Normal mg/dL    Nitrite Urine Negative Negative    Leukocyte Esterase Urine Small (A) Negative    Mucus Urine Present (A) None Seen /LPF    RBC Urine 2 <=2 /HPF    WBC Urine 19 (H) <=5 /HPF    Squamous Epithelials Urine 4 (H) <=1 /HPF   HCG qualitative urine    Collection Time: 04/07/25 12:58 PM   Result Value Ref Range    hCG Urine Qualitative Negative Negative   Urine Drug Screen Panel    Collection Time: 04/07/25 12:59 PM   Result Value Ref Range    Amphetamines Urine Screen Negative Screen Negative    Barbituates Urine Screen Negative Screen Negative    Benzodiazepine Urine Screen Positive (A) Screen Negative    Cannabinoids Urine Screen Positive (A) Screen Negative    Cocaine Urine Screen Negative Screen Negative    Fentanyl Qual Urine Screen Negative Screen Negative    Opiates Urine Screen Negative Screen Negative    PCP Urine Screen Negative Screen Negative          Physical and Psychiatric Examination:     /85   Pulse 96   Temp 98.7  F (37.1  C) (Oral)   Resp 24   Ht 1.676 m (5' 6\")   Wt 88.2 kg (194 lb 8 oz)   LMP 02/09/2025 (Exact Date)   SpO2 100%   BMI 31.39 kg/m    Weight " is 194 lbs 8 oz  Body mass index is 31.39 kg/m .    Physical Exam:  I have reviewed the physical exam as documented by by the medical team and agree with findings and assessment and have no additional findings to add at this time.    Mental Status Exam:    Appearance: Sleeping  Attitude:    Eye Contact:    Mood:    Affect:    Speech:    Language: Fluent in english   Psychomotor Behavior:    Thought Process:    Associations:    Thought Content:    Insight:    Judgement:    Oriented to:    Attention Span and Concentration:    Recent and Remote Memory:    Fund of Knowledge: Appropriate   Gait and Station:                DSM-5 Diagnosis:   Psychosis NOS          Assessment:   Patient remains psychotic and disorganized. Not currently able to care for self.   Today I will increase at bedtime zyprexa to 10mg, and continue the plan for admission to inpatient psychiatry.           Summary of Recommendations:   Increase to zyprexa 10mg at bedtime, continue PRN zyprexa 10mg BID  Admit to inpatient pscyhiatry, 72 hour hold.       Paulo Kennedy PA-C

## 2025-04-07 NOTE — TELEPHONE ENCOUNTER
R: Woo Can at 11:05 am, pt was in seclusion room yest and before they will consider her, she needs to see updated nursing notes re: pt's current presentation. Author informed Radhika in intake.

## 2025-04-07 NOTE — TELEPHONE ENCOUNTER
9:02 AM Pamela provided MRN for review.    10:26 AM Paged Kendrick.    10:34 AM [place] declined d/t too high acuity. Wl and admit board updated.    11:20 AM Per  REYNALDO Shin currently no behaviors to note d/t pt has been sleeping. Per RN will update on presentation once patient is awake.    1:24 PM Paged Kendrick.    1:35 PM Pt accepted to 10/[place] by Kendrick.    1:42 PM 10 CRN informed of pt in queue. Will call Nik for report. Intake provided #[phone].    1:45 PM Indicia complete.    1:46 PM Nik RN notified. Pt added to admit board.    R: [place]  Access Inpatient Bed Call Log 04/07/2025 @ 8:40 AM:  Intake has called facilities that have not updated the bed status within the last 12 hours.     (ADULT):        [facility] is posting 0 beds.  [facility] is posting 5 beds. [phone]   call after 11 for update.  [facility] ([facility]) is posting 0 bed.  [facility] is posting 0 beds. No high school or [place] psych. [phone]   [facility] ([facility]) is posting 0 beds.  [facility] () is posting 0 bed. [phone] Phone rang, no answer. Contact facility for bed availability.  [facility] is posting 0 beds. Ages 18-35, labs required. [phone] Per Derian @ 8:22 AM,  no YA beds.  .  [facility] ([facility]) is posting 0 beds.  [facility] ([facility]) is posting 0 beds. [phone].      [facility] () is posting 1 beds. Mixed unit (12+), low acuity. [phone]   [facility] ([facility]) is posting 0 beds. [phone]. No current aggression, low acuity.   [facility] is posting 0 beds. [phone] ext. [phone]  [facility] ([place]) is posting 0 beds. [phone]  [facility] ([facility]) is posting 1 beds. [phone]. No current aggression, low acuity.   [facility] ([place]) is posting 0 beds. [phone]   [facility] is posting 1 beds. Low acuity, 72HH preferred [phone]. .    [facility] ([place]) is  posting 2 beds. [phone] -      [facility] [place] ([facility]) is posting 4 beds. VOL only, no hx of aggression/violence/assault. No sexual offenders, no 72HH. [phone]   [facility] is posting 0 beds. Must have cognitive ability to program. No aggression or violent hx in the last 2 years. [phone]  [facility] is posting  1  beds. Low acuity, violence and aggression capped. [phone]  [facility] is posting 0 beds. [phone] Per .  [facility] [place] [facility] is posting 3 beds. [phone]. .  [facility] is posting 2 beds. No lines, drains, tubes, oxygen, IV or CPAP. [phone]  [facility] is posting 4 beds. MIXED UNIT. No lines, drains, tubes, oxygen, IV or CPAP. [phone]    [facility] is posting 5 beds. OUT OF STATE. [phone]      Pt remains on work list pending appropriate bed availability.

## 2025-04-07 NOTE — ED PROVIDER NOTES
ED course:  Patient received as a handoff from my partner Dr. Villagomez.  No issues during my care.  Excepted for mental health admission at [facility].    Impression:    ICD-10-CM    1. Paranoia (H)  F22       2. Attention deficit hyperactivity disorder (ADHD), combined type  F90.2       3. Borderline personality disorder (H)  F60.3       4. Complex posttraumatic stress disorder  F43.10       5. Acute psychosis (H)  F23             Disposition:  Signed out to my colleague pending EMS transport to [facility] mental health unit.       Tin Daily,   04/07/25 1421

## 2025-04-08 LAB
ALBUMIN SERPL BCG-MCNC: 4.2 G/DL (ref 3.5–5.2)
ALP SERPL-CCNC: 50 U/L (ref 40–150)
ALT SERPL W P-5'-P-CCNC: 9 U/L (ref 0–50)
ANION GAP SERPL CALCULATED.3IONS-SCNC: 11 MMOL/L (ref 7–15)
AST SERPL W P-5'-P-CCNC: 14 U/L (ref 0–45)
BACTERIA UR CULT: NORMAL
BASOPHILS # BLD AUTO: 0 10E3/UL (ref 0–0.2)
BASOPHILS NFR BLD AUTO: 1 %
BILIRUB SERPL-MCNC: 0.4 MG/DL
BUN SERPL-MCNC: 10 MG/DL (ref 6–20)
CALCIUM SERPL-MCNC: 9 MG/DL (ref 8.8–10.4)
CHLORIDE SERPL-SCNC: 105 MMOL/L (ref 98–107)
CHOLEST SERPL-MCNC: 158 MG/DL
CREAT SERPL-MCNC: 0.73 MG/DL (ref 0.51–0.95)
EGFRCR SERPLBLD CKD-EPI 2021: >90 ML/MIN/1.73M2
EOSINOPHIL # BLD AUTO: 0.1 10E3/UL (ref 0–0.7)
EOSINOPHIL NFR BLD AUTO: 2 %
ERYTHROCYTE [DISTWIDTH] IN BLOOD BY AUTOMATED COUNT: 13 % (ref 10–15)
EST. AVERAGE GLUCOSE BLD GHB EST-MCNC: 94 MG/DL
FOLATE SERPL-MCNC: 8.3 NG/ML (ref 4.6–34.8)
GLUCOSE SERPL-MCNC: 86 MG/DL (ref 70–99)
HBA1C MFR BLD: 4.9 %
HCG INTACT+B SERPL-ACNC: <1 MIU/ML
HCO3 SERPL-SCNC: 23 MMOL/L (ref 22–29)
HCT VFR BLD AUTO: 36.6 % (ref 35–47)
HDLC SERPL-MCNC: 57 MG/DL
HGB BLD-MCNC: 12.3 G/DL (ref 11.7–15.7)
IMM GRANULOCYTES # BLD: 0 10E3/UL
IMM GRANULOCYTES NFR BLD: 0 %
LDLC SERPL CALC-MCNC: 88 MG/DL
LYMPHOCYTES # BLD AUTO: 1.5 10E3/UL (ref 0.8–5.3)
LYMPHOCYTES NFR BLD AUTO: 30 %
MCH RBC QN AUTO: 29.5 PG (ref 26.5–33)
MCHC RBC AUTO-ENTMCNC: 33.6 G/DL (ref 31.5–36.5)
MCV RBC AUTO: 88 FL (ref 78–100)
MONOCYTES # BLD AUTO: 0.4 10E3/UL (ref 0–1.3)
MONOCYTES NFR BLD AUTO: 8 %
NEUTROPHILS # BLD AUTO: 2.9 10E3/UL (ref 1.6–8.3)
NEUTROPHILS NFR BLD AUTO: 59 %
NONHDLC SERPL-MCNC: 101 MG/DL
NRBC # BLD AUTO: 0 10E3/UL
NRBC BLD AUTO-RTO: 0 /100
PLATELET # BLD AUTO: 212 10E3/UL (ref 150–450)
POTASSIUM SERPL-SCNC: 3.4 MMOL/L (ref 3.4–5.3)
PROT SERPL-MCNC: 6.9 G/DL (ref 6.4–8.3)
RBC # BLD AUTO: 4.17 10E6/UL (ref 3.8–5.2)
SODIUM SERPL-SCNC: 139 MMOL/L (ref 135–145)
TRIGL SERPL-MCNC: 64 MG/DL
TSH SERPL DL<=0.005 MIU/L-ACNC: 2.16 UIU/ML (ref 0.3–4.2)
VIT B12 SERPL-MCNC: 362 PG/ML (ref 232–1245)
VIT D+METAB SERPL-MCNC: 16 NG/ML (ref 20–50)
WBC # BLD AUTO: 4.9 10E3/UL (ref 4–11)

## 2025-04-08 PROCEDURE — 82746 ASSAY OF FOLIC ACID SERUM: CPT | Performed by: NURSE PRACTITIONER

## 2025-04-08 PROCEDURE — 99223 1ST HOSP IP/OBS HIGH 75: CPT | Mod: AI | Performed by: NURSE PRACTITIONER

## 2025-04-08 PROCEDURE — 84443 ASSAY THYROID STIM HORMONE: CPT | Performed by: NURSE PRACTITIONER

## 2025-04-08 PROCEDURE — 83036 HEMOGLOBIN GLYCOSYLATED A1C: CPT | Performed by: NURSE PRACTITIONER

## 2025-04-08 PROCEDURE — 97150 GROUP THERAPEUTIC PROCEDURES: CPT | Mod: GO

## 2025-04-08 PROCEDURE — 82310 ASSAY OF CALCIUM: CPT | Performed by: NURSE PRACTITIONER

## 2025-04-08 PROCEDURE — 82607 VITAMIN B-12: CPT | Performed by: NURSE PRACTITIONER

## 2025-04-08 PROCEDURE — H2032 ACTIVITY THERAPY, PER 15 MIN: HCPCS

## 2025-04-08 PROCEDURE — 250N000013 HC RX MED GY IP 250 OP 250 PS 637: Performed by: NURSE PRACTITIONER

## 2025-04-08 PROCEDURE — 82947 ASSAY GLUCOSE BLOOD QUANT: CPT | Performed by: NURSE PRACTITIONER

## 2025-04-08 PROCEDURE — 85004 AUTOMATED DIFF WBC COUNT: CPT | Performed by: NURSE PRACTITIONER

## 2025-04-08 PROCEDURE — 124N000002 HC R&B MH UMMC

## 2025-04-08 PROCEDURE — 36415 COLL VENOUS BLD VENIPUNCTURE: CPT | Performed by: NURSE PRACTITIONER

## 2025-04-08 PROCEDURE — 82306 VITAMIN D 25 HYDROXY: CPT | Performed by: NURSE PRACTITIONER

## 2025-04-08 PROCEDURE — 250N000013 HC RX MED GY IP 250 OP 250 PS 637: Performed by: PSYCHIATRY & NEUROLOGY

## 2025-04-08 PROCEDURE — 80061 LIPID PANEL: CPT | Performed by: NURSE PRACTITIONER

## 2025-04-08 PROCEDURE — 84702 CHORIONIC GONADOTROPIN TEST: CPT | Performed by: NURSE PRACTITIONER

## 2025-04-08 RX ORDER — DIPHENHYDRAMINE HYDROCHLORIDE 50 MG/ML
50 INJECTION, SOLUTION INTRAMUSCULAR; INTRAVENOUS EVERY 8 HOURS PRN
Status: DISCONTINUED | OUTPATIENT
Start: 2025-04-08 | End: 2025-04-08

## 2025-04-08 RX ORDER — DIPHENHYDRAMINE HCL 50 MG
50 CAPSULE ORAL EVERY 8 HOURS PRN
Status: DISCONTINUED | OUTPATIENT
Start: 2025-04-08 | End: 2025-04-09

## 2025-04-08 RX ORDER — HALOPERIDOL 5 MG/ML
10 INJECTION INTRAMUSCULAR EVERY 8 HOURS PRN
Status: DISCONTINUED | OUTPATIENT
Start: 2025-04-08 | End: 2025-04-15 | Stop reason: HOSPADM

## 2025-04-08 RX ORDER — HALOPERIDOL 5 MG/ML
5 INJECTION INTRAMUSCULAR ONCE
Status: COMPLETED | OUTPATIENT
Start: 2025-04-08 | End: 2025-04-08

## 2025-04-08 RX ORDER — LORAZEPAM 2 MG/ML
2 INJECTION INTRAMUSCULAR EVERY 8 HOURS PRN
Status: DISCONTINUED | OUTPATIENT
Start: 2025-04-08 | End: 2025-04-09

## 2025-04-08 RX ORDER — OLANZAPINE 10 MG/1
10 TABLET, FILM COATED ORAL 2 TIMES DAILY
Status: DISCONTINUED | OUTPATIENT
Start: 2025-04-08 | End: 2025-04-09

## 2025-04-08 RX ORDER — HALOPERIDOL 5 MG/1
5 TABLET ORAL ONCE
Status: DISCONTINUED | OUTPATIENT
Start: 2025-04-08 | End: 2025-04-08

## 2025-04-08 RX ORDER — HALOPERIDOL 5 MG/ML
2 INJECTION INTRAMUSCULAR ONCE
Status: DISCONTINUED | OUTPATIENT
Start: 2025-04-08 | End: 2025-04-08

## 2025-04-08 RX ORDER — DIPHENHYDRAMINE HYDROCHLORIDE 50 MG/ML
50 INJECTION, SOLUTION INTRAMUSCULAR; INTRAVENOUS EVERY 8 HOURS PRN
Status: DISCONTINUED | OUTPATIENT
Start: 2025-04-08 | End: 2025-04-09

## 2025-04-08 RX ORDER — HALOPERIDOL 5 MG/ML
5 INJECTION INTRAMUSCULAR EVERY 8 HOURS PRN
Status: DISCONTINUED | OUTPATIENT
Start: 2025-04-08 | End: 2025-04-08

## 2025-04-08 RX ORDER — OLANZAPINE 10 MG/2ML
10 INJECTION, POWDER, FOR SOLUTION INTRAMUSCULAR 2 TIMES DAILY
Status: DISCONTINUED | OUTPATIENT
Start: 2025-04-08 | End: 2025-04-09

## 2025-04-08 RX ORDER — LORAZEPAM 2 MG/ML
2 INJECTION INTRAMUSCULAR EVERY 8 HOURS PRN
Status: DISCONTINUED | OUTPATIENT
Start: 2025-04-08 | End: 2025-04-08

## 2025-04-08 RX ORDER — HALOPERIDOL 5 MG/1
5 TABLET ORAL ONCE
Status: COMPLETED | OUTPATIENT
Start: 2025-04-08 | End: 2025-04-08

## 2025-04-08 RX ORDER — DIPHENHYDRAMINE HCL 50 MG
50 CAPSULE ORAL EVERY 8 HOURS PRN
Status: DISCONTINUED | OUTPATIENT
Start: 2025-04-08 | End: 2025-04-08

## 2025-04-08 RX ORDER — ERGOCALCIFEROL 1.25 MG/1
50000 CAPSULE, LIQUID FILLED ORAL
Status: DISCONTINUED | OUTPATIENT
Start: 2025-04-08 | End: 2025-04-15 | Stop reason: HOSPADM

## 2025-04-08 RX ORDER — LORAZEPAM 2 MG/1
2 TABLET ORAL EVERY 8 HOURS PRN
Status: DISCONTINUED | OUTPATIENT
Start: 2025-04-08 | End: 2025-04-09

## 2025-04-08 RX ORDER — LORAZEPAM 2 MG/1
2 TABLET ORAL EVERY 8 HOURS PRN
Status: DISCONTINUED | OUTPATIENT
Start: 2025-04-08 | End: 2025-04-08

## 2025-04-08 RX ORDER — HALOPERIDOL 5 MG/1
5 TABLET ORAL EVERY 8 HOURS PRN
Status: DISCONTINUED | OUTPATIENT
Start: 2025-04-08 | End: 2025-04-08

## 2025-04-08 RX ORDER — OLANZAPINE 10 MG/1
10 TABLET, FILM COATED ORAL EVERY MORNING
Status: DISCONTINUED | OUTPATIENT
Start: 2025-04-09 | End: 2025-04-09

## 2025-04-08 RX ORDER — HALOPERIDOL 5 MG/1
10 TABLET ORAL EVERY 8 HOURS PRN
Status: DISCONTINUED | OUTPATIENT
Start: 2025-04-08 | End: 2025-04-15 | Stop reason: HOSPADM

## 2025-04-08 RX ADMIN — NICOTINE POLACRILEX 4 MG: 2 GUM, CHEWING BUCCAL at 07:58

## 2025-04-08 RX ADMIN — NICOTINE POLACRILEX 4 MG: 2 GUM, CHEWING BUCCAL at 14:09

## 2025-04-08 RX ADMIN — NICOTINE POLACRILEX 4 MG: 2 GUM, CHEWING BUCCAL at 10:14

## 2025-04-08 RX ADMIN — ERGOCALCIFEROL 50000 UNITS: 1.25 CAPSULE, LIQUID FILLED ORAL at 14:07

## 2025-04-08 RX ADMIN — OLANZAPINE 5 MG: 5 TABLET, FILM COATED ORAL at 07:47

## 2025-04-08 RX ADMIN — NICOTINE POLACRILEX 4 MG: 2 GUM, CHEWING BUCCAL at 12:04

## 2025-04-08 RX ADMIN — HALOPERIDOL 5 MG: 5 TABLET ORAL at 15:24

## 2025-04-08 RX ADMIN — OLANZAPINE 10 MG: 10 TABLET, FILM COATED ORAL at 22:23

## 2025-04-08 RX ADMIN — HALOPERIDOL 5 MG: 5 TABLET ORAL at 15:53

## 2025-04-08 ASSESSMENT — ACTIVITIES OF DAILY LIVING (ADL)
DRESS: INDEPENDENT
ADLS_ACUITY_SCORE: 41
ORAL_HYGIENE: INDEPENDENT
ADLS_ACUITY_SCORE: 41
ADLS_ACUITY_SCORE: 41
ORAL_HYGIENE: INDEPENDENT
ADLS_ACUITY_SCORE: 41
HYGIENE/GROOMING: INDEPENDENT
ADLS_ACUITY_SCORE: 41
HYGIENE/GROOMING: INDEPENDENT
ADLS_ACUITY_SCORE: 41
DRESS: SCRUBS (BEHAVIORAL HEALTH)
ADLS_ACUITY_SCORE: 41
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
## 2025-04-08 NOTE — PLAN OF CARE
BEH IP Unit Acuity Rating Score (UARS)  Patient is given one point for every criteria they meet.    CRITERIA SCORING   On a 72 hour hold, court hold, committed, stay of commitment, or revocation. 1    Patient LOS on BEH unit exceeds 20 days. 0  LOS: 1   Patient under guardianship, 55+, otherwise medically complex, or under age 11. 0   Suicide ideation without relief of precipitating factors. 1   Current plan for suicide. 1   Current plan for homicide. 0   Imminent risk or actual attempt to seriously harm another without relief of factors precipitating the attempt. 0   Severe dysfunction in daily living (ex: complete neglect for self care, extreme disruption in vegetative function, extreme deterioration in social interactions). 1   Recent (last 7 days) or current physical aggression in the ED or on unit. 1   Restraints or seclusion episode in past 72 hours. 1   Recent (last 7 days) or current verbal aggression, agitation, yelling, etc., while in the ED or unit. 1   Active psychosis. 1   Need for constant or near constant redirection (from leaving, from others, etc).  1   Intrusive or disruptive behaviors. 1   Patient requires 3 or more hours of individualized nursing care per 8-hour shift (i.e. for ADLs, meds, therapeutic interventions). 0   TOTAL 10
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
## 2025-04-08 NOTE — PLAN OF CARE
04/08/25 0133   Patient Belongings   Patient Belongings locker;remains with patient   Patient Belongings Remaining with Patient clothing;medication(s)  (Shirt, underwear, bra on patient)   Patient Belongings Put in Hospital Secure Location (Security or Locker, etc.) cash/credit card;cell phone/electronics;medication(s);purse/wallet;shoes;watch   Belongings Search Yes   Clothing Search Yes   Second Staff Lizy Ramírez     Patient Belongings:Blk , digital watch, airpods, pouch w/ books, sunglasses, x2 vapes, phone, book, pouch w/ toothpaste and toothbrush, gray LS, blue tshirt, red tshirt, blk jeans w belt, notebook, x4 socks, green jacket, , nintendo switch, blue tshirt, blk sweats, , letter, x2 underwear, another blue shirt, yellow shirt, x2 white shirt, 2 gray shirts, tennis shoes w pink socks    SECURITY: ID Card, United Health Insurance Card, Primary Technician Registration # Card x2, [employer] Card. [employer] Card, Visa Debit 1023, [employer] 5711, [employer] 1648           ..A               Admission:  I am responsible for any personal items that are not sent to the safe or pharmacy.  [facility] is not responsible for loss, theft or damage of any property in my possession.    Signature:  _________________________________ Date: _______  Time: _____                                              Staff Signature:  ____________________________ Date: ________  Time: _____      2nd Staff person, if patient is unable/unwilling to sign:    Signature: ________________________________ Date: ________  Time: _____     Discharge:  [facility] has returned all of my personal belongings:    Signature: _________________________________ Date: ________  Time: _____                                          Staff Signature:  ____________________________ Date: ________  Time: _____

## 2025-04-08 NOTE — PROGRESS NOTES
Rehab Group    Start time: 1600  End time: 1645  Patient time total: 30 minutes    attended partial group     #3 attended   Group Type: recreation   Group Topic Covered: activity therapy, cognitive activities, and healthy leisure time       Group Session Detail:  TR leisure group     Patient Response/Contribution:  cooperative with task, attentive, and actively engaged       Patient Detail:    Pt participated in Therapeutic Recreation group with focus on leisure participation, communication skills, and critical thinking. Pt entered group late, but participated for approximately 30 minutes. Engaged and focused in the group recreational activity via a group game.  Pt was a full participant throughout her time in group. Pt interacted appropriately in the group and would help give clues in part of the activity to help others out.       Activity Therapy Per 15 min ()      Patient Active Problem List   Diagnosis    History of suicide attempt    Suicidal ideation    Headache, unspecified headache type    Attention deficit hyperactivity disorder (ADHD), combined type    Complex posttraumatic stress disorder    Borderline personality disorder (H)    Major depressive disorder, recurrent, mild    Paranoia (H)    Psychosis (H)    Aggression

## 2025-04-08 NOTE — PROGRESS NOTES
Rehab Group    Start time: 11:15  End time: 12:15  Patient time total: 45 minutes    attended full group    #7 attended   Group Type: general health and coping   Group Topic: coping skills, healthy leisure time, and relationship skills/support systems     Group Session Details:  OT provided some education on the importance and physical/mental health benefits of social connection and community/belonging. OT facilitated a brainstorming conversation to think of various was to engage socially in the community, ie libraries, sports, volunteering, book club, pride, festivals, etc. OT facilitated a game, [employer], to promote social engagement and connection with others as well as leisure exploration/engagement and communication skills. Patients are encouraged to play the board game and answer various questions about their life history with peers such as stories about childhood, first experiences, memorable moments, valuable mementos, and social support systems.      Patient Response:  cooperative with task, organized, and actively engaged     Patient Response Details:    Patient engaged quite well in this discussion group. Presented with an improved affect. Took headphones off her ears so she could actually engage, without being prompted. 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 She is willing to share with peers during the social sharing game, offering insightful observations about self and the world. She shares with peers about her strict Mormon Taoism upbringing/traumas and difficulties with being LGBTQ+ in this kind of home environment. She uses humor well and often in her sharing. No paranoia/irritability noted during this hour while she was more engaged.         [id number] OT Group (2 or more in attendance)      Patient Active Problem List   Diagnosis    History  of suicide attempt    Suicidal ideation    Headache, unspecified headache type    Attention deficit hyperactivity disorder (ADHD), combined type    Complex posttraumatic stress disorder    Borderline personality disorder (H)    Major depressive disorder, recurrent, mild    Paranoia (H)    Psychosis (H)    Aggression
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
## 2025-04-08 NOTE — PROGRESS NOTES
"  Rehab Group    Start time: 13:15  End time: 14:15  Patient time total: 45 minutes    attended full group    #5 attended   Group Type: life skills   Group Topic: coping skills and emotional regulation     Group Session Details:  Group covered emotional regulation and strategies to process emotions. Reviewed concepts of the subjectivity of emotions, feeling multiple emotions at once, body/behavioral responses to emotions, the importance of naming/identifying emotions to help move through them. Provided education recent research by Twan Tanner on the amount of emotions vs how many the average person can identify. OT provided education on the concept of \"opposite action\" for managing difficult emotions and behavioral urges. Facilitated an activity that required the patient to describe/communicate various emotions as well as identify what makes them feel certain emotions.      Patient Response:  cooperative with task, organized, and expressed understanding of topic     Patient Response Details:    Patient attended this full group and continues to present with a brighter affect and less irritability as compared to this morning. She appears to do well in active discussion groups and offers verbal participation without requiring prompts. She offers good insight to the importance of emotions and how they offer information/function to our daily lives. She often connects the topic to how she can support others but with prompting will also note how she can support self. She does well describing emotional vocabulary during the game and demonstrates good rapport with SIO staff and peers. She notes, \"I'd love to be a . Turn my trauma into comedy.\" OT observes that she has good comedic timing.         [id number] OT Group (2 or more in attendance)      Patient Active Problem List   Diagnosis    History of suicide attempt    Suicidal ideation    Headache, unspecified headache type    Attention deficit hyperactivity disorder " (ADHD), combined type    Complex posttraumatic stress disorder    Borderline personality disorder (H)    Major depressive disorder, recurrent, mild    Paranoia (H)    Psychosis (H)    Aggression

## 2025-04-08 NOTE — PROGRESS NOTES
"  Rehab Group    Start time: 10:15  End time: 11:15  Patient time total: 45 minutes    attended full group    #7 attended   Group Type: OT Clinic   Group Topic: balanced lifestyle, coping skills, healthy leisure time, and social skills     Group Session Details:  Pt actively participated in occupational therapy clinic to facilitate coping skill exploration, creative expression within personally meaningful activities, and clinical observation of social, cognitive, and kinesthetic performance skills.      Patient Response:  cooperative with task, organized, attentive, and withdrawn, paranoid     Patient Response Details:    Patient was seated in the lounge wearing headphones and coloring when OT rounded for group. OT introduced self and group programming. She was polite with OT but presented with a sarcastic tone of voice. Presented with some paranoia/irritability as well, when a peer sat next to her she whispered, \"why is he sitting next to me,\" and when her 2:1 staff followed her in she whispered, \"why are they following me.\" Once in the group room and engaged with the task options, her mood leveled out and she was independent to gather materials. She chose to refurbish some unfinished projects and responded well to OT encouragement that not everyone will be up for looking in that bin, \"well there's nothing wrong with them, there's some good stuff in there!\" Her work was creative and detailed. She kept her headphones on for the entire group but was willing to take them off if someone asked her a question. Good focus, minimal social engagement.         [id number] OT Group (2 or more in attendance)      Patient Active Problem List   Diagnosis    History of suicide attempt    Suicidal ideation    Headache, unspecified headache type    Attention deficit hyperactivity disorder (ADHD), combined type    Complex posttraumatic stress disorder    Borderline personality disorder (H)    Major depressive disorder, recurrent, mild    " Paranoia (H)    Psychosis (H)    Aggression

## 2025-04-08 NOTE — DISCHARGE INSTRUCTIONS
Behavioral Discharge Planning and Instructions    Summary: You were admitted to [facility] on 4/7/2025 due to concern for symptoms of psychosis including paranoid delusions and suicidal ideation with plan to slit throat or strangle self in context of suspected medication non-adherence and use of synthetic marijuana. You were treated by ANAYELI Croft CNP and provisionally discharged on 4/***/2025 to Home.    You were [facility]ually [facility] on ***.  You were also court ordered to take the medications the doctor ordered for you. You are being discharged on a Provisional Discharge Agreement which shall remain in effect for the duration of the Commitment.  Your Commitment expires on ***.      Continue to follow with your assigned *** : *** (phone: ***) for the duration of you commitment.    Main Diagnosis:   Bipolar disorder, current episode manic, severe, with psychosis versus schizoaffective disorder  Borderline personality disorder  Suicidal ideation  Complex PTSD  ADHD  Cannabis use disorder, severe, dependence  Vitamin D deficiency, level is 16    Health Care Follow-up:   Appointment Date/Time: ***   Psychiatrist/Medication Management: ***     Address: ***   Phone Number: ***    Fax: ***    Appointment Date/Time: ***   Therapist: ***     Address: ***   Phone Number: ***     Fax: ***    Appointment Date/Time: ***   Primary Care Provider: ***     Address: ***   Phone Number: ***     Fax: ***     If no appointments scheduled, explain ***    Patient Navigation Hub:   [facility] work to be your point-of-contact for trustworthy and compassionate care from Inpatient services to [facility] Programmatic Care. We will provide resources and communication to help guide you into programmatic care. Ultimately, our goal is to be the one-stop-shop of communication, coordination, and support for your  journey to programmatic care.  Phone: [phone]    Information will be faxed to your outpatient providers to ensure a healthy continuity of care for you.     Attend all scheduled appointments with your outpatient providers. Call at least 24 hours in advance if you need to reschedule an appointment to ensure continued access to your outpatient providers.     Major Treatments, Procedures and Findings:  You were provided with: a psychiatric assessment, assessed for medical stability, medication evaluation and/or management, group therapy, individual therapy, milieu management, and medical interventions    Symptoms to Report: Feeling more aggressive, increased confusion, losing more sleep, mood getting worse, or thoughts of suicide.    Early warning signs can include: Increased depression or anxiety sleep disturbances increased thoughts or behaviors of suicide or self-harm  increased unusual thinking, such as paranoia or hearing voices.    Safety and Wellness: Take all medicines as directed. Make no changes unless your doctor suggests them. Follow treatment recommendations. Refrain from alcohol and non-prescribed drugs. Ask your support system to help you reduce your access to items that could harm yourself or others. If there is a concern for safety, call 911.    Resources:   General Mental Health Resources:   Throughout [place]: call **CRISIS (**944773)  Crisis Text Line: is available for free, 24/7 by texting [place] to 334650  Suicide Awareness Voices of Education (SAVE) (www.save.org): 083-924-CMJZ (0442)  The National Suicide Prevention Lifeline is now: 988 Suicide and Crisis Lifeline. Call 988 anytime.  National [place] on Mental Illness (www.[place].negrita.org): 398.681.2799 or 100-011-6393.  Iocl0gfti: text the word LIFE to 10945 for immediate support and crisis intervention  Mental Health Consumer/Survivor Network of [place] (www.mhcsn.net): 502.884.2782 or 885-891-6687  Mental Health Association of [place]  (www.mentalhealth.org): 602.657.9422 or 243-760-5371  Peer Support Connection [facility] ([facility]) 1-615.489.5136 Available from 5pm - 9am (7 days a week/365 days a year)  [facility] 8-020-528-1748 Community Outreach for Psych Emergencies    Free/Sliding Scale Mental Health Services and Clinics:   [facility] ([facility]) (phone: [phone]  https://[facility]/)  [facility] (phone: [phone]  https://www.[facility].org/locations/)  [facility] (phone: [phone]  https://www.[email or web address]/-/media/[place]/residents/health-medical/documents/mental-health-center-client-handbook.pdf)  [facility] (phone: [phone]  https://www.mpsi-.org/)  [facility] (Clinic: [phone], Human Services: [phone]  https://www.[facility]/behavioral-health)  [facility] (phone: [phone]  https://[facility]counseling-services/information-for-clients/)     General Medication Instructions:   See your medication sheet(s) for instructions.   Take all medicines as directed.  Make no changes unless your doctor suggests them.   Go to all your doctor visits.  Be sure to have all your required lab tests. This way, your medicines can be refilled on time.  Do not use any drugs not prescribed by your doctor.  Avoid alcohol.    Advance Directives:   Scanned document on file with [facility]?    Is document scanned?    Honoring Choices Your Rights Handout:    Was more information offered?      The Treatment team has appreciated the opportunity to work with you. If you have any questions or concerns about your recent admission, you can contact the unit which can receive your call 24 hours a day, 7 days a week. They will be able to get in touch with a Provider if needed. The unit number is [phone].    sheet(s) for instructions.   Take all medicines as directed.  Make no changes unless your doctor suggests them.   Go to all your doctor visits.  Be sure to have all your required lab tests. This way, your medicines can be refilled on time.  Do not use any drugs not prescribed by your doctor.  Avoid alcohol.    Advance Directives:   Scanned document on file with [facility]?    Is document scanned?    Honoring Choices Your Rights Handout:    Was more information offered?      The Treatment team has appreciated the opportunity to work with you. If you have any questions or concerns about your recent admission, you can contact the unit which can receive your call 24 hours a day, 7 days a week. They will be able to get in touch with a Provider if needed. The unit number is [phone].
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
## 2025-04-08 NOTE — H&P
"[facility], [place]   Psychiatric History and Physical  Admission date: 4/7/2025      Chief Complaint:   \"My friends and family have been trafficking me\".      HPI:   From ED: Pt presents to the ED with her ex-girlfriend for acute psychosis. Upon assessment, pt tells this writer that she looks familiar, asks if her name is Kaity, and requests that she remove her mask. Pt was reassured that this is her first time meeting this writer. Pt goes onto say that she requested to go to the hospital today as she has been having \"revelations\". She tells this writer that \"the world belongs to her\" and that she is \"supposed to wake an ancient river with her brother\". She believes that \"monsters\" are hunting children with autism spectrum disorder and that she needs to get to the children to save them. She lists names of people who may be \"key witnesses\", including a Kal Costa and a Ines Fuentes. She believes that her brother may be murdered or a criminal as a result of trying to protect the pt. She goes onto say that her co-worker has kidnapped four . Pt makes other various disjointed comments, such as that her \"next door neighbors did something bad\" and that she has a \"degree pending in [place]\". She goes onto say, \"Are we the four horseman or the I Am? We are the wintesses like in the pet commercials. It has something to do with cats and the ancient Egyptians.\" Pt is audibly talking to voices while meeting with this writer. At one point, pt turns around, makes direct eye contact with the security camera, and speaks to it as if she is being monitored by an external source. She returns to thinking that she has met this writer previously, saying that this writer's name is \"Galligo\" and has the eyes of her middle school friend. She directs this writer to record certain words during the assessment, such as \"school guidance counselor\". She denies current NSSI, SI, or HI. Per collateral " "report, she has been reporting a suicidal plan to slit her throat. She has not been taking her psychiatric medication for at least a month. She reports using synthetic marijuana daily.  Collateral information: Daisy Callahan, friend, PH: [phone]:  Pt has been to [facility] before for SI. She experienced a depressive episode between 6 months-1.5 years ago. She has started \"going downhill\" over the last few months again. She was having difficulties getting her medication refilled and then stopped taking all of her medications about a month ago due to frustration with her providers. She is \"paranoidish\" to begin with but her paranoia has gotten significantly worse since she stopped taking her medication. She has delusions that \"everything is interconnected\" and that \"everybody is out to get her, Daisy, or everybody else\". She thinks that her foster parents' child is in danger. The \"final straw\" happened last week at work when she thought customers were trafficking one of her 18-year-old co-workers. She also thinks that one of her older male co-workers is a trafficker and that Daisy is being trafficked. She went to her boss who encouraged her to go to a doctor. She has been reporting a vivid suicidal plan to slit her throat and watch the blood drip. She thinks that there is a \"huge pedophilia ring in [place] that she needs to crack down on\". She has been talking in vivid detail about ways to harm members of this pedophilia ring that does not exist. She has not been sleeping and has been \"up wandering the house\". She did have a long period of sleep on either Wednesday or Thursday. She has been eating. She vapes HHC (synthetic marijuana).   Lenore Suggs is a 29 year old female with history of   bipolar disorder, schizophrenia, complex PTSD, borderline personality disorder, ADHD, and cannabis use disorder.  The patient is a poor historian.  She is tangential, disorganized, difficult to follow.  States " "she is here because family and friends have been trafficking her.  When asked to elaborate, the patient talked about having a friend who has been having sex with her for about 10 years.  His mom have been trafficking her.  She knows because \"God told me\".  She does not actually hear God's voice but \"knows\".  Believes that children in [place] have been trafficked as well.  She is here to save them.  She has seen signs.  Does not know how she is going to do it only that she must.  The patient reports stopping her medications a month ago.  She admits she is taking Viibryd and Adderall.  States she told her provider that she does not want Adderall because it is causing manic episodes \"but she kept upping the dose\".  The patient reported that she was thinking about killing herself by cutting her throat.  Today she denies that.  She  talked about being in pain.  She showed me her neck stating that it is curved.  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  She had multiple books in her room and open each 1 and read as small from it, trying to prove something. She made multiple other unrelated statements that are difficult to repeat.  See the previous 2 paragraphs.  The patient denies feeling depressed.  Sleep have been varying but lately she has been sleeping okay.  Reports problems with memory and concentration.  Appetite have been good.  Did not lose weight.  Denies suicidal and homicidal ideation today.  She has been having anxiety \"since childhood\".  No panic attacks.  Attacks the last time she was manic was in November or December of last year.  Currently does not feel manic.  She is denying auditory visual hallucinations.  She is paranoid and delusional.  There is some Anabaptist preoccupation.  The patient reports traumatic childhood growing up in foster care and living with a schizophrenic mother.  She used to have nightmares but not anymore.  However, she has flashbacks on a daily basis.  Denies OCD and eating disorders.  Per " "chart, the patient was diagnosed with borderline personality disorder.  Reports overdosing on pills and alcohol as a teenager.  Says she is allergic to alcohol and you that this is going to kill her.  She \" 4 times\" but reports 1 suicide attempt.  Denies history of SIB.      Past Psychiatric History:   The patient has a history of bipolar disorder, borderline personality disorder personality disorder, complex PTSD, ADHD, possibly schizophrenia.  She was hospitalized in .  Prior medication trials include Zoloft, Seroquel, trazodone, Geodon, Latuda, lithium, Wellbutrin, Adderall, and Viibryd.  She does have a psychiatrist but would like to have a new one.  Currently does not have a therapist.  Reports 1 suicide attempt.  No SIB.      Substance Use and History:   The patient reports that she is allergic to alcohol.  She is vaping  tobacco.  She is using synthetic marijuana \"HHC\" every day to help with sleep, pain, and anxiety.  Does not use any other drugs.      Past Medical History:   PAST MEDICAL HISTORY:   Past Medical History:   Diagnosis Date    Asthma     Bipolar affective disorder (H)     Depressive disorder     Outbursts of anger     Schizophrenia (H)      PAST SURGICAL HISTORY:   Past Surgical History:   Procedure Laterality Date    ENT SURGERY      Unknown object in ear         Family History:   FAMILY HISTORY:   Family History   Problem Relation Age of Onset    Mental Illness Mother     Paranoid behavior Mother     Other Cancer Mother         Bio-mother, surgery on stomach cancer    No Known Problems Father    Mother with schizophrenia and brother with some type of a psychotic disorder.  Does not know the drugs and alcohol history.      Social History:   The patient grew up in [place].  She was given to foster care when she was 9 years old.  The patient is currently living with her ex girlfriend.  She works at works as a pharm tech.  It is not clear the last time she worked.  Denies legal " "history.  She reports having \"2 degrees\" but is not able to elaborate.  Denies  history.       Physical ROS:   The patient endorsed neck pain. The remainder of 10-point review of systems was negative except as noted in HPI.       PTA Medications:     Medications Prior to Admission   Medication Sig Dispense Refill Last Dose/Taking    amphetamine-dextroamphetamine 25 MG CP24 Take 25 mg by mouth daily. 30 capsule 0 More than a month    OLANZapine (ZYPREXA) 5 MG tablet Take 5 mg by mouth 2 times daily. Take one at bedtime and one during the day as needed   Past Week    vilazodone (VIIBRYD) 20 MG TABS tablet Take 1 tablet (20 mg) by mouth daily. Take with 40 mg for total daily dose of 60 mg 90 tablet 0 More than a month    vilazodone (VIIBRYD) 40 MG TABS tablet Take 1 tablet (40 mg) by mouth daily. 90 tablet 0 More than a month    albuterol (PROAIR HFA/PROVENTIL HFA/VENTOLIN HFA) 108 (90 Base) MCG/ACT inhaler Inhale 2 puffs into the lungs every 6 hours as needed for shortness of breath, wheezing or cough 18 g 3           Allergies:     Allergies   Allergen Reactions    Alcohol Hives    Kiwi Hives    Red Dye #40 (Allura Red)     Seasonal Allergies Other (See Comments) and Rash     sneezing          Labs:     Recent Results (from the past 48 hours)   UA with Microscopic reflex to Culture    Collection Time: 04/07/25 12:58 PM    Specimen: Urine, Midstream   Result Value Ref Range    Color Urine Yellow Colorless, Straw, Light Yellow, Yellow    Appearance Urine Slightly Cloudy (A) Clear    Glucose Urine Negative Negative mg/dL    Bilirubin Urine Negative Negative    Ketones Urine Negative Negative mg/dL    Specific Gravity Urine 1.025 1.003 - 1.035    Blood Urine Negative Negative    pH Urine 6.5 5.0 - 7.0    Protein Albumin Urine 20 (A) Negative mg/dL    Urobilinogen Urine Normal Normal mg/dL    Nitrite Urine Negative Negative    Leukocyte Esterase Urine Small (A) Negative    Mucus Urine Present (A) None Seen /LPF    " RBC Urine 2 <=2 /HPF    WBC Urine 19 (H) <=5 /HPF    Squamous Epithelials Urine 4 (H) <=1 /HPF   HCG qualitative urine    Collection Time: 04/07/25 12:58 PM   Result Value Ref Range    hCG Urine Qualitative Negative Negative   Urine Drug Screen Panel    Collection Time: 04/07/25 12:59 PM   Result Value Ref Range    Amphetamines Urine Screen Negative Screen Negative    Barbituates Urine Screen Negative Screen Negative    Benzodiazepine Urine Screen Positive (A) Screen Negative    Cannabinoids Urine Screen Positive (A) Screen Negative    Cocaine Urine Screen Negative Screen Negative    Fentanyl Qual Urine Screen Negative Screen Negative    Opiates Urine Screen Negative Screen Negative    PCP Urine Screen Negative Screen Negative          Physical and Psychiatric Examination:   /90 (Patient Position: Sitting)   Pulse 90   Temp 98.9  F (37.2  C) (Oral)   Resp 18   Wt 85.4 kg (188 lb 4.8 oz)   LMP 02/09/2025 (Exact Date)   SpO2 100%   BMI 30.39 kg/m    Weight is 188 lbs 4.8 oz  Body mass index is 30.39 kg/m .  Physical Exam:  I have reviewed the physical exam as documented by the medical team and agree with findings and assessment and have no additional findings to add at this time.  Mental Status Exam:  Appearance: awake, alert and adequately groomed  Attitude:  cooperative  Eye Contact:  good  Mood:  angry and anxious  Affect:  mood congruent  Speech:  pressured speech  Language: fluent and intact in English  Psychomotor, Gait, Musculoskeletal:  no evidence of tardive dyskinesia, dystonia, or tics  Thought Process:  linear, disorganized, illogical, and tangental  Associations:  no loose associations  Thought Content:  no evidence of suicidal ideation or homicidal ideation, no auditory hallucinations present, and no visual hallucinations present, paranoia and delusions are present.   Insight:  limited  Judgement:  limited  Oriented to:  time, person, and place  Attention Span and Concentration:   limited  Recent and Remote Memory:  limited  Fund of Knowledge:  low-normal         Admission Diagnoses:   Bipolar disorder, current episode manic, severe, with psychosis versus schizoaffective disorder  Borderline personality disorder  Suicidal ideation  Complex PTSD  ADHD  Cannabis use disorder, severe, dependence  Vitamin D deficiency, level is 16       Assessment & Plan:   Medications:  Psychiatric Emergency was declared due to physical aggression and distraction of property.   -- Zyprexa,10 mg, BID. Back up with IM 10 mg, bid.   --As needed medications are available including a combination of Haldol, lorazepam, and Benadryl, hydroxyzine, Zyprexa, and trazodone  --Start vitamin D, 50,000 units, once a week for 6 weeks.  Lab work:  Lab work was reviewed and is mostly normal except for vitamin D at 16.  Urinalysis shows some abnormalities.  Urine cultures pending.  U tox was positive for benzodiazepines and cannabis.  Consults:   Internal medicine to follow up for medical problems.   Care was coordinated with the treatment team.  The patient was consulted on nature of illness and treatment options.   Disposition Plan   Reason for ongoing admission: poses an imminent risk to self, poses an imminent risk to others, and is unable to care for self due to severe psychosis or bijal  Discharge location:  To be determined  Discharge Medications: not ordered  Follow-up Appointments: not scheduled  Legal status: 72 Hour Hold - Date/Time Initiated: 4/6/25 @ 15:12. 72 Hour Hold - Date/Time Ends: 4/9/25 @ 15:12  Petition for commitment was started today  Estimated length of stay: 7 to 10 days  Addendum: Nursing staff called later in the afternoon stating that the patient is hitting the wall screaming and threatening.  She is not redirectable. The [facility] team was called.  She was given combination of Haldol, lorazepam, and Benadryl.  She only took the Haldol and threw the Benadryl and lorazepam on the floor.  She continued to  escalate.  Nursing staff requested another one-time dose of Haldol which was reasonable.  At this point, the patient meet criteria's for psychiatric emergency. Zyprexa was changed to 10 mg twice a day, oral and backup with IM.  Entered by: ANAYELI Bolaños CNP on 4/8/2025 at 7:06 AM     This note was created with the help of Dragon dictation system. All grammatical/typing errors or context distortion are unintentional and inherent to software.

## 2025-04-09 PROCEDURE — 250N000013 HC RX MED GY IP 250 OP 250 PS 637: Performed by: NURSE PRACTITIONER

## 2025-04-09 PROCEDURE — 250N000013 HC RX MED GY IP 250 OP 250 PS 637: Performed by: PSYCHIATRY & NEUROLOGY

## 2025-04-09 PROCEDURE — 124N000002 HC R&B MH UMMC

## 2025-04-09 PROCEDURE — H2032 ACTIVITY THERAPY, PER 15 MIN: HCPCS

## 2025-04-09 PROCEDURE — 99232 SBSQ HOSP IP/OBS MODERATE 35: CPT | Performed by: NURSE PRACTITIONER

## 2025-04-09 PROCEDURE — 90853 GROUP PSYCHOTHERAPY: CPT

## 2025-04-09 PROCEDURE — 97150 GROUP THERAPEUTIC PROCEDURES: CPT | Mod: GO

## 2025-04-09 RX ORDER — HALOPERIDOL 5 MG/ML
10 INJECTION INTRAMUSCULAR 2 TIMES DAILY
Status: DISCONTINUED | OUTPATIENT
Start: 2025-04-09 | End: 2025-04-14

## 2025-04-09 RX ORDER — HALOPERIDOL 5 MG/1
10 TABLET ORAL 2 TIMES DAILY
Status: DISCONTINUED | OUTPATIENT
Start: 2025-04-09 | End: 2025-04-14

## 2025-04-09 RX ORDER — HALOPERIDOL 5 MG/1
10 TABLET ORAL 2 TIMES DAILY
Status: DISCONTINUED | OUTPATIENT
Start: 2025-04-09 | End: 2025-04-09

## 2025-04-09 RX ORDER — IBUPROFEN 400 MG/1
800 TABLET, FILM COATED ORAL 3 TIMES DAILY PRN
Status: DISCONTINUED | OUTPATIENT
Start: 2025-04-09 | End: 2025-04-15 | Stop reason: HOSPADM

## 2025-04-09 RX ADMIN — NICOTINE 1 PATCH: 21 PATCH, EXTENDED RELEASE TRANSDERMAL at 08:56

## 2025-04-09 RX ADMIN — HALOPERIDOL 10 MG: 5 TABLET ORAL at 09:07

## 2025-04-09 RX ADMIN — NICOTINE POLACRILEX 2 MG: 2 GUM, CHEWING BUCCAL at 10:25

## 2025-04-09 RX ADMIN — OLANZAPINE 10 MG: 10 TABLET, FILM COATED ORAL at 08:55

## 2025-04-09 RX ADMIN — HALOPERIDOL 10 MG: 5 TABLET ORAL at 13:08

## 2025-04-09 RX ADMIN — HALOPERIDOL 10 MG: 5 TABLET ORAL at 21:23

## 2025-04-09 ASSESSMENT — ACTIVITIES OF DAILY LIVING (ADL)
ADLS_ACUITY_SCORE: 41
DRESS: INDEPENDENT;WITH SUPERVISION
ADLS_ACUITY_SCORE: 41
ADLS_ACUITY_SCORE: 61
ADLS_ACUITY_SCORE: 61
ADLS_ACUITY_SCORE: 41
ADLS_ACUITY_SCORE: 61
ADLS_ACUITY_SCORE: 41
ADLS_ACUITY_SCORE: 61
ADLS_ACUITY_SCORE: 61
HYGIENE/GROOMING: INDEPENDENT;WITH SUPERVISION
ADLS_ACUITY_SCORE: 41
ADLS_ACUITY_SCORE: 61
ORAL_HYGIENE: INDEPENDENT
ADLS_ACUITY_SCORE: 61
DRESS: INDEPENDENT
ORAL_HYGIENE: INDEPENDENT;WITH SUPERVISION
ADLS_ACUITY_SCORE: 61
ADLS_ACUITY_SCORE: 61
HYGIENE/GROOMING: INDEPENDENT
ADLS_ACUITY_SCORE: 61

## 2025-04-09 NOTE — PROGRESS NOTES
"  Rehab Group    Start time: 10:30  End time: 11:15  Patient time total: 35 minutes    attended partial group, left early    #7 attended   Group Type: OT Clinic   Group Topic: balanced lifestyle, coping skills, healthy leisure time, and social skills     Group Session Details:  Pt actively participated in occupational therapy clinic to facilitate coping skill exploration, creative expression within personally meaningful activities, and clinical observation of social, cognitive, and kinesthetic performance skills.      Patient Response:  cooperative with task, organized, withdrawn, and tired     Patient Response Details:    Patient was in the lounge prior to groups. She asked OT for new headphones. Polite in interaction. She joined group and was independent to find materials and set herself up. She did not require assistance with her art project. She wore her headphones throughout the group and did not engage socially. She appeared quite focused and OT observed that she made productive progress. About 10 minutes from the end of group, she left early stating, \"I'm just super tired. I'm gonna go lay down.\"     OT observed that she slept through the second group of the morning.         [address] OT Group (2 or more in attendance)      Patient Active Problem List   Diagnosis    History of suicide attempt    Suicidal ideation    Headache, unspecified headache type    Attention deficit hyperactivity disorder (ADHD), combined type    Complex posttraumatic stress disorder    Borderline personality disorder (H)    Major depressive disorder, recurrent, mild    Paranoia (H)    Psychosis (H)    Aggression       "

## 2025-04-09 NOTE — PLAN OF CARE
"  Group Attendance:  attended full group    Time session began: 1415  Time session ended: 1445  Patient's total time in group: 30    Total # Attendees   5   Group Type DBT     Group Topic Covered DBT skills: Build Mastery     Group Session Detail DBT Build Mastery skill was taught. A group discussion was facilitated on the skill and experiential learning occurred through an associated project.       Patient's response to the group topic/interactions:  cooperative with task and actively engaged     Patient Details: Lenore \"Felecia\" identified \"time management\" as a skill she wants to build mastery in. She stated she is often late to work because she's tired, and endorsed poor sleep hygiene. She identified \"taking a step back\" as a skill she has learned to tolerate distress, stating that she doesn't want to regret her actions. She completed the project.          28084 - Group psychotherapy - 1 Session  Patient Active Problem List   Diagnosis    History of suicide attempt    Suicidal ideation    Headache, unspecified headache type    Attention deficit hyperactivity disorder (ADHD), combined type    Complex posttraumatic stress disorder    Borderline personality disorder (H)    Major depressive disorder, recurrent, mild    Paranoia (H)    Psychosis (H)    Aggression       "

## 2025-04-09 NOTE — PLAN OF CARE
"  Problem: Psychotic Signs/Symptoms  Goal: Improved Mood Symptoms (Psychotic Signs/Symptoms)  Outcome: Not Progressing   Goal Outcome Evaluation:    Pt had a huge behavioral outburst at the start of the evening during which he was punching the wall. [facility] was called and pt took prn Haldol and became much more calm throughout much of the shift. Pt stated that she was allergic to Ativan, which she said can be very addictive for her, and went saying \" and that's why I don't want it.\" Pt agreed to take another dose of prn Haldol. Pt slept for several hours after eating dinner and watching TV calmly with peers. Pt took her scheduled Zyprexa without any issue. Pt continues on SIO for safety.   Plan: SIO; Build trust with pt. Continue to build on strengths. Encourage compliance and healthy coping.   "

## 2025-04-09 NOTE — PLAN OF CARE
"Preferences: she/her pronouns, goes by \"Felecia\"      needs: No     Team Meeting: Around 9:30am   Attending Provider: ANAYELI Croft CNP  Voicemail Code: See desk phone  Team Note Due: Tuesday  Next Steps:   Gather collateral.   Work to increase outpatient supports/ensure aftercare appointments are in place.      Assessment/Intervention/Current Symtoms and Care Coordination:  -Chart review  -Team meeting - Haldol appears to be best medication for Felecia currently. Ativan has been removed from ordered medications due to Felecia's opposition to it and report of becoming physically aggressive with this medication. [facility] was called again this morning (see RN's note for details). Team has been observing mood lability, SIB, and Felecia running through the jeffery - concern for Felecia's behavior being harmful to more vulnerable peers. Provider and RN are attempting to consider transfer to [facility].   -Writer received call from screener Beth Coy who states the PPS are supporting the petition and plan to get their report to the 's office. Beth Coy states she came to the hospital to interview Felecia yesterday but was told by staff that she was \"too ill\" to meet and was turned away. Court hold is expected to be issued later this afternoon. Unit fax number and writer's email address were provided.   -Evening charge RN took call from [facility] indicating Felecia's court hold started at 4:20pm. Order will be faxed to the unit. Provider was updated with request to change legal status order. Assigned evening RN plans to provide copy of court hold order when received.   Current Symptoms include the following: Psychosis, manic, and paranoia  Precautions: SI, SIB, Assault, Elopement, and Cheeking     Discharge Plan or Goal:  Pending stabilization & development of a safe discharge plan.  Considerations include: Return home with outpatient providers (health insurance is " incompatible with IRTS)     Discharge Checklist:  Transportation: TBD  Medications ordered/sent to: No  Restricted recipient: No  Provisional discharge required: TBD  Ty safety plan completed: No  Appointments scheduled:   Psychiatry - 5/19/2025 at 9am, 6/9/2025 at 1pm  Therapy - TBD  PCP - TBD  AVS complete: No     Barriers to Discharge:  Felecia presents with concern for symptoms of psychosis including paranoid delusions and suicidal ideation with plan to slit throat or strangle self in context of suspected medication non-adherence and use of synthetic marijuana. She requires symptom stabilization, medication management, and supportive discharge plan.      Referral Status:  Referrals TBD     Legal Status:  Felecia is on a court hold in [facility] (as of 4/9/2025)  Case No. [id number]     Inpatient team submitted petition for MI Commitment and Summers in [facility] on 4/8/2025.   (Proposed Summers medications: Haldol, Zyprexa, Abilify, and Invega)      Contacts:  Family/Friends:  Friend - Daisy Rinconsoilaenriquemagen (phone: [phone]) - NATHALIE obtained 4/6/2025     Outpatient Providers:  Psychiatrist/Medication Management Provider - ANAYELI Nguyen CNP of [facility] (phone: [phone])  [facility] Pre-Petition Screener - Beth Murphy (phone: [phone])     Upcoming Meetings/Important Dates:  Court (commitment/guardianship,etc.):  TBD     Interview/assessment/care conference:  N/A     Aftercare/outpatient appointments:  Monday, May 19 at 9am via telehealth - Psychiatry/Medication Management Appointment with ANAYELI Marina CNP  Monday, June 9 at 1pm via telehealth - Psychiatry/Medication Management Appointment with ANAYELI Marina CNP     Rationale for SIO/No Roommate Order:  Felecia is on 2:1 SIO due to severe intrusiveness and SI risk.  Felecia is in single room.   (Single room  was started on [facility] on 5/20/2024).

## 2025-04-09 NOTE — PROGRESS NOTES
"  Rehab Group    Start time: 1315  End time: 1415  Patient time total: 60 minutes    attended full group    #5 attended   Group Type: art   Group Topic Covered: activity therapy       Group Session Detail:  Art Therapy directive was to create group collaborative drawings, with each pt moving around room and contributing to each individual art piece using a variety of art media.  Goals of directive: to assess how the individual functions within a group process, emotional expression, emotional regulation, media exploration, social interest     Patient Response/Contribution:  cooperative with task       Patient Detail:    Pt was a quiet, engaged participant, focused on task for the full duration of group. Pt contributed positively to each group drawing and contributed to group discussion. Pt commented on how she enjoyed seeing her original art \"turned into something beautiful\" by other members of the group.  Pts mood was calm.      Activity Therapy Per 15 min ()      Patient Active Problem List   Diagnosis    History of suicide attempt    Suicidal ideation    Headache, unspecified headache type    Attention deficit hyperactivity disorder (ADHD), combined type    Complex posttraumatic stress disorder    Borderline personality disorder (H)    Major depressive disorder, recurrent, mild    Paranoia (H)    Psychosis (H)    Aggression      "

## 2025-04-09 NOTE — PLAN OF CARE
BEH IP Unit Acuity Rating Score (UARS)  Patient is given one point for every criteria they meet.    CRITERIA SCORING   On a 72 hour hold, court hold, committed, stay of commitment, or revocation. 1    Patient LOS on BEH unit exceeds 20 days. 0  LOS: 2   Patient under guardianship, 55+, otherwise medically complex, or under age 11. 0   Suicide ideation without relief of precipitating factors. 1   Current plan for suicide. 1   Current plan for homicide. 0   Imminent risk or actual attempt to seriously harm another without relief of factors precipitating the attempt. 0   Severe dysfunction in daily living (ex: complete neglect for self care, extreme disruption in vegetative function, extreme deterioration in social interactions). 1   Recent (last 7 days) or current physical aggression in the ED or on unit. 1   Restraints or seclusion episode in past 72 hours. 0   Recent (last 7 days) or current verbal aggression, agitation, yelling, etc., while in the ED or unit. 1   Active psychosis. 1   Need for constant or near constant redirection (from leaving, from others, etc).  1   Intrusive or disruptive behaviors. 1   Patient requires 3 or more hours of individualized nursing care per 8-hour shift (i.e. for ADLs, meds, therapeutic interventions). 1   TOTAL 10

## 2025-04-09 NOTE — PROGRESS NOTES
"[facility], [place]   Psychiatric Progress Note        Interim History:   From H&P: Pt presents to the ED with her ex-girlfriend for acute psychosis. Upon assessment, pt tells this writer that she looks familiar, asks if her name is Kaity, and requests that she remove her mask. Pt was reassured that this is her first time meeting this writer. Pt goes onto say that she requested to go to the hospital today as she has been having \"revelations\". She tells this writer that \"the world belongs to her\" and that she is \"supposed to wake an ancient river with her brother\". She believes that \"monsters\" are hunting children with autism spectrum disorder and that she needs to get to the children to save them. She lists names of people who may be \"key witnesses\", including a Kal Costa and a Ines Fuentes. She believes that her brother may be murdered or a criminal as a result of trying to protect the pt. She goes onto say that her co-worker has kidnapped four . Pt makes other various disjointed comments, such as that her \"next door neighbors did something bad\" and that she has a \"degree pending in [place]\". She goes onto say, \"Are we the four horseman or the I Am? We are the wintesses like in the pet commercials. It has something to do with cats and the ancient Egyptians.\" Pt is audibly talking to voices while meeting with this writer. At one point, pt turns around, makes direct eye contact with the security camera, and speaks to it as if she is being monitored by an external source. She returns to thinking that she has met this writer previously, saying that this writer's name is \"Galligo\" and has the eyes of her middle school friend. She directs this writer to record certain words during the assessment, such as \"school guidance counselor\". She denies current NSSI, SI, or HI. Per collateral report became agitated and stated hearing the wall.  She ripped her pillowcase.  Has been " reporting a suicidal plan to slit her throat. She has not been taking her psychiatric medication for at least a month. She reports using synthetic marijuana daily.     Team meeting report: The patient's care was discussed with the treatment team during the daily team meeting and/or staff's chart notes were reviewed.  Staff report patient has been paranoid, disorganized, irritable, and aggressive.  The patient was making paranoid and delusional statements.  She will that she has been kept in the hospital while children have been raped outside.  The patient ripped her p.o. and was hitting the wall.  She was not redirectable.  The patient was given Haldol.  She refused the Benadryl and the lorazepam.  She continues to escalate and another 5 mg of Haldol was administered.  In the evening, she was much calmer.  Remains paranoid and delusional but no angry outbursts.  She slept 7 hours.  Med compliant.    Met with patient.  She is in her room laying in bed.  She reports not be able to sleep because she has a lot of pain all over her body.  She needs several pillows in order to be more comfortable.  She was agreeable to try the pillow wedge.  We discussed multiple pain relief options including gabapentin, lidocaine patch, Voltaren gel, ibuprofen or Tylenol but she declined all, stating that nothing is working.  The only thing that works is vaping marijuana.  Attempted to discuss events from yesterday.  The patient became agitated almost immediately stating that when people do not listen, she gets angry.  She did not want to go into details.  She denies having any pain in her hands from hitting the wall.  The patient wants to go home.  She asks when the court will come and see her.  Denies all mental health symptoms.  No other questions or concerns.    At 9 AM the patient started running up and down the hallway hitting herself. She was not redirectable. [facility]STIAN team was called. PRN Haldol was administered. Attempted to meet  "with her again. She is angry. She wants to discharge so she \"can help the children\". She became tearful stating she knows when and which child will be raped next. She doesn't trust this provider and requested to have a new one.      The patient was much calmer shortly after taking Haldol. Will change Zyprexa to Haldol.          Medications:     Current Facility-Administered Medications   Medication Dose Route Frequency Provider Last Rate Last Admin    - Psychiatric Emergency -   Other See Admin Instructions Pamella Marks APRN CNP        OLANZapine (zyPREXA) tablet 10 mg  10 mg Oral BID Pamella Marks APRN CNP   10 mg at 04/08/25 2223    Or    OLANZapine (zyPREXA) injection 10 mg  10 mg Intramuscular BID Pamella Marks APRN CNP        vitamin D2 (ERGOCALCIFEROL) 95732 units (1250 mcg) capsule 50,000 Units  50,000 Units Oral Q7 Days Pamella Marks APRN CNP   50,000 Units at 04/08/25 1407            Allergies:     Allergies   Allergen Reactions    Alcohol Hives    Kiwi Hives    Red Dye #40 (Allura Red)     Seasonal Allergies Other (See Comments) and Rash     sneezing            Labs:     Recent Results (from the past 4 weeks)   UA with Microscopic reflex to Culture    Collection Time: 04/07/25 12:58 PM    Specimen: Urine, Midstream   Result Value Ref Range    Color Urine Yellow Colorless, Straw, Light Yellow, Yellow    Appearance Urine Slightly Cloudy (A) Clear    Glucose Urine Negative Negative mg/dL    Bilirubin Urine Negative Negative    Ketones Urine Negative Negative mg/dL    Specific Gravity Urine 1.025 1.003 - 1.035    Blood Urine Negative Negative    pH Urine 6.5 5.0 - 7.0    Protein Albumin Urine 20 (A) Negative mg/dL    Urobilinogen Urine Normal Normal mg/dL    Nitrite Urine Negative Negative    Leukocyte Esterase Urine Small (A) Negative    Mucus Urine Present (A) None Seen /LPF    RBC Urine 2 <=2 /HPF    WBC Urine 19 (H) <=5 /HPF    Squamous " Epithelials Urine 4 (H) <=1 /HPF   HCG qualitative urine    Collection Time: 04/07/25 12:58 PM   Result Value Ref Range    hCG Urine Qualitative Negative Negative   Urine Culture    Collection Time: 04/07/25 12:58 PM    Specimen: Urine, Midstream   Result Value Ref Range    Culture <10,000 CFU/mL Mixture of Urogenital Kristina    Urine Drug Screen Panel    Collection Time: 04/07/25 12:59 PM   Result Value Ref Range    Amphetamines Urine Screen Negative Screen Negative    Barbituates Urine Screen Negative Screen Negative    Benzodiazepine Urine Screen Positive (A) Screen Negative    Cannabinoids Urine Screen Positive (A) Screen Negative    Cocaine Urine Screen Negative Screen Negative    Fentanyl Qual Urine Screen Negative Screen Negative    Opiates Urine Screen Negative Screen Negative    PCP Urine Screen Negative Screen Negative   Comprehensive metabolic panel    Collection Time: 04/08/25  7:41 AM   Result Value Ref Range    Sodium 139 135 - 145 mmol/L    Potassium 3.4 3.4 - 5.3 mmol/L    Carbon Dioxide (CO2) 23 22 - 29 mmol/L    Anion Gap 11 7 - 15 mmol/L    Urea Nitrogen 10.0 6.0 - 20.0 mg/dL    Creatinine 0.73 0.51 - 0.95 mg/dL    GFR Estimate >90 >60 mL/min/1.73m2    Calcium 9.0 8.8 - 10.4 mg/dL    Chloride 105 98 - 107 mmol/L    Glucose 86 70 - 99 mg/dL    Alkaline Phosphatase 50 40 - 150 U/L    AST 14 0 - 45 U/L    ALT 9 0 - 50 U/L    Protein Total 6.9 6.4 - 8.3 g/dL    Albumin 4.2 3.5 - 5.2 g/dL    Bilirubin Total 0.4 <=1.2 mg/dL   Hemoglobin A1c    Collection Time: 04/08/25  7:41 AM   Result Value Ref Range    Estimated Average Glucose 94 <117 mg/dL    Hemoglobin A1C 4.9 <5.7 %   Lipid panel    Collection Time: 04/08/25  7:41 AM   Result Value Ref Range    Cholesterol 158 <200 mg/dL    Triglycerides 64 <150 mg/dL    Direct Measure HDL 57 >=50 mg/dL    LDL Cholesterol Calculated 88 <100 mg/dL    Non HDL Cholesterol 101 <130 mg/dL   TSH with free T4 reflex and/or T3 as indicated    Collection Time: 04/08/25   7:41 AM   Result Value Ref Range    TSH 2.16 0.30 - 4.20 uIU/mL   HCG quantitative pregnancy    Collection Time: 04/08/25  7:41 AM   Result Value Ref Range    hCG Quantitative <1 <5 mIU/mL   Vitamin B12    Collection Time: 04/08/25  7:41 AM   Result Value Ref Range    Vitamin B12 362 232 - 1,245 pg/mL   Folate    Collection Time: 04/08/25  7:41 AM   Result Value Ref Range    Folic Acid 8.3 4.6 - 34.8 ng/mL   Vitamin D Deficiency    Collection Time: 04/08/25  7:41 AM   Result Value Ref Range    Vitamin D, Total (25-Hydroxy) 16 (L) 20 - 50 ng/mL   CBC with platelets and differential    Collection Time: 04/08/25  7:41 AM   Result Value Ref Range    WBC Count 4.9 4.0 - 11.0 10e3/uL    RBC Count 4.17 3.80 - 5.20 10e6/uL    Hemoglobin 12.3 11.7 - 15.7 g/dL    Hematocrit 36.6 35.0 - 47.0 %    MCV 88 78 - 100 fL    MCH 29.5 26.5 - 33.0 pg    MCHC 33.6 31.5 - 36.5 g/dL    RDW 13.0 10.0 - 15.0 %    Platelet Count 212 150 - 450 10e3/uL    % Neutrophils 59 %    % Lymphocytes 30 %    % Monocytes 8 %    % Eosinophils 2 %    % Basophils 1 %    % Immature Granulocytes 0 %    NRBCs per 100 WBC 0 <1 /100    Absolute Neutrophils 2.9 1.6 - 8.3 10e3/uL    Absolute Lymphocytes 1.5 0.8 - 5.3 10e3/uL    Absolute Monocytes 0.4 0.0 - 1.3 10e3/uL    Absolute Eosinophils 0.1 0.0 - 0.7 10e3/uL    Absolute Basophils 0.0 0.0 - 0.2 10e3/uL    Absolute Immature Granulocytes 0.0 <=0.4 10e3/uL    Absolute NRBCs 0.0 10e3/uL            Precautions:     Behavioral Orders   Procedures    Assault precautions    Cheeking Precautions (behavioral units)    Cheeking Precautions (behavioral units)     Patient Observed swallowing PO medications; Patient asked to drink water after swallowing medication; Patient in Staff line of sight for 15 minutes after medication given; Mouth checks after PO administration (patient asked to open mouth and stick out their tongue).    Code 1 - Restrict to Unit    Elopement precautions    Routine Programming     As clinically  indicated    Self Injury Precaution    Status 15     Every 15 minutes.    Status Individual Observation     Patient SIO status reviewed with team/RN.  Please also refer to RN/team documentation for add'l detail.    -SIO staff to monitor following which have contributed to patient being on SIO:  As above  -Possible interventions SIO staff could use to support patient's treatment progress:    -When following observed, team will review discontinuation of SIO:     Order Specific Question:   CONTINUOUS 24 hours / day     Answer:   Other     Order Specific Question:   Specify distance     Answer:   10 feet,2:1     Order Specific Question:   Indications for SIO     Answer:   Assault risk     Order Specific Question:   Indications for SIO     Answer:   Severe intrusiveness     Order Specific Question:   Indications for SIO     Answer:   Suicide risk    Suicide precautions: Suicide Risk: MODERATE; Clinical rationale to override score: modification to the care environment, response to medication, lack of access to a plan for self-harm, Exhibiting Suicidal/self-harm behaviors or thoughts, Collatera...     Patients on Suicide Precautions should have a Combination Diet ordered that includes a Diet selection(s) AND a Behavioral Tray selection for Safe Tray - with utensils, or Safe Tray - NO utensils       Order Specific Question:   Suicide Risk     Answer:   MODERATE     Order Specific Question:   Clinical rationale to override score:     Answer:   modification to the care environment     Order Specific Question:   Clinical rationale to override score:     Answer:   response to medication     Order Specific Question:   Clinical rationale to override score:     Answer:   lack of access to a plan for self-harm     Order Specific Question:   Clinical rationale to override score:     Answer:   Exhibiting Suicidal/self-harm behaviors or thoughts     Order Specific Question:   Clinical rationale to override score:     Answer:    Collateral information supporting Suicidal/self-harm behaviors            Psychiatric Examination:   Temp: 97.9  F (36.6  C) Temp src: Temporal BP: (!) 141/82 Pulse: 78     SpO2: 93 % O2 Device: None (Room air)    Weight is 189 lbs 4.8 oz  Body mass index is 30.55 kg/m .    Appearance: awake, alert and adequately groomed  Attitude:  cooperative and guarded  Eye Contact:  intense  Mood:  good  Affect:  intensity is heightened  Speech:  pressured speech  Psychomotor Behavior:  no evidence of tardive dyskinesia, dystonia, or tics  Throught Process:  linear, illogical, and circumstantial  Associations:  no loose associations  Thought Content:  no evidence of suicidal ideation or homicidal ideation, no auditory hallucinations present, no visual hallucinations present, and paranoia and delusions are present.   Insight:  limited  Judgement:  limited  Oriented to:  time, person, and place  Attention Span and Concentration:  fair  Recent and Remote Memory:  fair         Precautions:     Behavioral Orders   Procedures    Assault precautions    Cheeking Precautions (behavioral units)    Cheeking Precautions (behavioral units)     Patient Observed swallowing PO medications; Patient asked to drink water after swallowing medication; Patient in Staff line of sight for 15 minutes after medication given; Mouth checks after PO administration (patient asked to open mouth and stick out their tongue).    Code 1 - Restrict to Unit    Elopement precautions    Routine Programming     As clinically indicated    Self Injury Precaution    Status 15     Every 15 minutes.    Status Individual Observation     Patient SIO status reviewed with team/RN.  Please also refer to RN/team documentation for add'l detail.    -SIO staff to monitor following which have contributed to patient being on SIO:  As above  -Possible interventions SIO staff could use to support patient's treatment progress:    -When following observed, team will review  discontinuation of SIO:     Order Specific Question:   CONTINUOUS 24 hours / day     Answer:   Other     Order Specific Question:   Specify distance     Answer:   10 feet,2:1     Order Specific Question:   Indications for SIO     Answer:   Assault risk     Order Specific Question:   Indications for SIO     Answer:   Severe intrusiveness     Order Specific Question:   Indications for SIO     Answer:   Suicide risk    Suicide precautions: Suicide Risk: MODERATE; Clinical rationale to override score: modification to the care environment, response to medication, lack of access to a plan for self-harm, Exhibiting Suicidal/self-harm behaviors or thoughts, Collatera...     Patients on Suicide Precautions should have a Combination Diet ordered that includes a Diet selection(s) AND a Behavioral Tray selection for Safe Tray - with utensils, or Safe Tray - NO utensils       Order Specific Question:   Suicide Risk     Answer:   MODERATE     Order Specific Question:   Clinical rationale to override score:     Answer:   modification to the care environment     Order Specific Question:   Clinical rationale to override score:     Answer:   response to medication     Order Specific Question:   Clinical rationale to override score:     Answer:   lack of access to a plan for self-harm     Order Specific Question:   Clinical rationale to override score:     Answer:   Exhibiting Suicidal/self-harm behaviors or thoughts     Order Specific Question:   Clinical rationale to override score:     Answer:   Collateral information supporting Suicidal/self-harm behaviors          DIagnoses:   Bipolar disorder, current episode manic, severe, with psychosis versus schizoaffective disorder  Borderline personality disorder  Suicidal ideation  Complex PTSD  ADHD  Cannabis use disorder, severe, dependence  Vitamin D deficiency, level is 16         Plan:   Psychiatric Emergency was declared due to physical aggression and distraction of property.      --Discontinue Zyprexa, not helpful  --Start Haldol, 10 mg, bid for psychosis. Back up with IM Haldol.   --As needed medications are available including: Haldol, hydroxyzine, Zyprexa, and trazodone  --Start vitamin D, 50,000 units, once a week for 6 weeks.    Lab work:  Lab work was reviewed and is mostly normal except for vitamin D at 16.  Urinalysis shows some abnormalities.  Urine cultures pending.  U tox was positive for benzodiazepines and cannabis.    Consults:   Internal medicine to follow up for medical problems.     Other:     --SIO 2:1 due to aggression, plus acuity staff.   --Cheeking precaution    --Care was coordinated with the treatment team.  --The patient was consulted on nature of illness and treatment options.      Disposition Plan   Reason for ongoing admission: poses an imminent risk to self, poses an imminent risk to others, and is unable to care for self due to severe psychosis or bijal  Discharge location:  TBD   Discharge Medications: not ordered  Follow-up Appointments: not scheduled    Legal status: 72 Hour Hold - Date/Time Initiated: 4/6/25 @ 15:12. 72 Hour Hold - Date/Time Ends: 4/9/25 @ 15:12  Petition for commitment was started today  Estimated length of stay: 7 to 10 days    Discharge will be granted once symptoms improved.    Pamella GUADALUPE, CNP    This note was created with the help of Dragon dictation system. All grammatical/typing errors or context distortion are unintentional and inherent to software.

## 2025-04-09 NOTE — PLAN OF CARE
Goal Outcome Evaluation:       Pt in bed at the beginning of the shift.Pt appeared asleep for 7 hours. No behavior or concerns this shift.Pt remains on 2:1 for assault risk and severe intrusiveness.Will continue to monitor

## 2025-04-09 NOTE — PLAN OF CARE
"She woke up and refused to eat her breakfast.  She was irritable with writer and is displaying confused, disorganized thought process by difficulty expressing herself and is having difficulty following conversations.  She started to slap herself in the face, yet was able to stop after being redirected by staff.  States, \" you guys are not listening to me,\" and was becoming agitated and was unwilling to talk with writer in early interactions, so the conversation was ended in order to reduce her agitation.  She complained of neck pain, stating she has chronic pain and was going to see a doctor, yet said, \" but I'm here.\"  She also said, \" I'm going through withdrawal form vaping the red pill.\"  She refused pain management interventions when offered to her by writer.  Later, she was slapping herself in the head and running in the hallway and [facility] team was called and after talking with NP was given oral PRN haldol.  She is labile in mood and goes from yelling at staff to crying stating that she needs to leave, \" because of the children being raped.\"  Writer updated assistant nurse manager of findings.  After yelling in the staff outside her room, she stated she wanted a new MD.  She remains on an SIO 2:1 due to history of aggression, disorganized thought process, self-injurious behaviors, and making suicidal statements while hospitalized.  She was told that she is on a court hold by staff.  Writer updated NP, ANS nurse manager, and assistant nurse manager regarding patient condition in the shift.  No signs of injury noticed on patient by staff from self injurious behaviors.       "

## 2025-04-10 VITALS
HEART RATE: 94 BPM | OXYGEN SATURATION: 99 % | DIASTOLIC BLOOD PRESSURE: 80 MMHG | SYSTOLIC BLOOD PRESSURE: 126 MMHG | RESPIRATION RATE: 18 BRPM | TEMPERATURE: 97.6 F | BODY MASS INDEX: 30.55 KG/M2 | WEIGHT: 189.3 LBS

## 2025-04-10 PROCEDURE — 90853 GROUP PSYCHOTHERAPY: CPT

## 2025-04-10 PROCEDURE — 99232 SBSQ HOSP IP/OBS MODERATE 35: CPT | Performed by: NURSE PRACTITIONER

## 2025-04-10 PROCEDURE — 97150 GROUP THERAPEUTIC PROCEDURES: CPT | Mod: GO

## 2025-04-10 PROCEDURE — 250N000013 HC RX MED GY IP 250 OP 250 PS 637: Performed by: NURSE PRACTITIONER

## 2025-04-10 PROCEDURE — 250N000013 HC RX MED GY IP 250 OP 250 PS 637: Performed by: PSYCHIATRY & NEUROLOGY

## 2025-04-10 PROCEDURE — 124N000002 HC R&B MH UMMC

## 2025-04-10 RX ADMIN — NICOTINE 1 PATCH: 21 PATCH, EXTENDED RELEASE TRANSDERMAL at 10:16

## 2025-04-10 RX ADMIN — HALOPERIDOL 10 MG: 5 TABLET ORAL at 21:27

## 2025-04-10 RX ADMIN — NICOTINE POLACRILEX 2 MG: 2 GUM, CHEWING BUCCAL at 10:16

## 2025-04-10 RX ADMIN — HALOPERIDOL 10 MG: 5 TABLET ORAL at 08:26

## 2025-04-10 ASSESSMENT — ACTIVITIES OF DAILY LIVING (ADL)
ADLS_ACUITY_SCORE: 41
ORAL_HYGIENE: INDEPENDENT
ADLS_ACUITY_SCORE: 41
HYGIENE/GROOMING: INDEPENDENT
ADLS_ACUITY_SCORE: 41
ADLS_ACUITY_SCORE: 41
ORAL_HYGIENE: INDEPENDENT
ADLS_ACUITY_SCORE: 41
DRESS: INDEPENDENT
ADLS_ACUITY_SCORE: 41
HYGIENE/GROOMING: INDEPENDENT
DRESS: INDEPENDENT
ADLS_ACUITY_SCORE: 41

## 2025-04-10 NOTE — PLAN OF CARE
Problem: Anxiety Signs/Symptoms  Goal: Optimized Energy Level (Anxiety Signs/Symptoms)  Outcome: Progressing   Goal Outcome Evaluation:    Pt continues on SIO with 2:1 staffing for safety. Pt had a decent evening. Pt slept through much of the shift. Pt got up and ate dinner and then took her scheduled haldol without any issue. Pt was calm and did not engage in SIB. Pt did not report any medication side effects or physical discomfort. Pt denied hallucinations.   Plan: SIO; Build trust with pt. Continue to build on strengths. Encourage compliance and healthy coping.

## 2025-04-10 NOTE — PROGRESS NOTES
Rehab Group    Start time: 14:15  End time: 15:15  Patient time total: 45 minutes    attended full group    #7 attended   Group Type: general health and coping   Group Topic: cognitive activities, healthy leisure time, and social skills     Group Session Details:  OT provided some education on the importance and physical/mental health benefits of social connection and community/belonging. OT facilitated a game, The Sound Game, to promote social engagement/connection, leisure exploration, communication skills, creativity, and flexible thinking. During this game, patients are encouraged to use paraverbal communication (sounds, onomatopoeias) to make peers guess what object or person is on their card.      Patient Response:  positive affect, cooperative with task, organized, and actively engaged     Patient Response Details:    Patient joined this group independently. She learned the novel game very quickly and demonstrated quick thinking and problem solving skills. She was able to play the game correctly/appropriately, only using sounds and not charades. She came up with very good sounds and was leading the scoreboard most of the time. She had a positive affect throughout and good rapport with peers.         [id number] OT Group (2 or more in attendance)      Patient Active Problem List   Diagnosis    History of suicide attempt    Suicidal ideation    Headache, unspecified headache type    Attention deficit hyperactivity disorder (ADHD), combined type    Complex posttraumatic stress disorder    Borderline personality disorder (H)    Major depressive disorder, recurrent, mild    Paranoia (H)    Psychosis (H)    Aggression

## 2025-04-10 NOTE — PLAN OF CARE
BEH IP Unit Acuity Rating Score (UARS)  Patient is given one point for every criteria they meet.    CRITERIA SCORING   On a 72 hour hold, court hold, committed, stay of commitment, or revocation. 1    Patient LOS on BEH unit exceeds 20 days. 0  LOS: 3   Patient under guardianship, 55+, otherwise medically complex, or under age 11. 0   Suicide ideation without relief of precipitating factors. 1   Current plan for suicide. 1   Current plan for homicide. 0   Imminent risk or actual attempt to seriously harm another without relief of factors precipitating the attempt. 0   Severe dysfunction in daily living (ex: complete neglect for self care, extreme disruption in vegetative function, extreme deterioration in social interactions). 1   Recent (last 7 days) or current physical aggression in the ED or on unit. 1   Restraints or seclusion episode in past 72 hours. 0   Recent (last 7 days) or current verbal aggression, agitation, yelling, etc., while in the ED or unit. 1   Active psychosis. 1   Need for constant or near constant redirection (from leaving, from others, etc).  1   Intrusive or disruptive behaviors. 1   Patient requires 3 or more hours of individualized nursing care per 8-hour shift (i.e. for ADLs, meds, therapeutic interventions). 1   TOTAL 10
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
## 2025-04-10 NOTE — PLAN OF CARE
"  Group Attendance:  came in and out of group session    Time session began: 1115  Time session ended: 1150  Patient's total time in group: 30    Total # Attendees   6   Group Type psychotherapeutic     Group Topic Covered symptom management and relaxation and grounding techniques/coping skills     Group Session Detail Participants engaged in an activity to explore support systems and barriers to mental health and wellbeing using plant propagation illustrations. Writer provided education on adventitious root systems and asked participants to consider how stressful periods such as hospitalization promote the growth of new supports in their lives. Participants were asked to consider ways they want to adapt to the environments upon discharge.       Patient's response to the group topic/interactions:  cooperative with task and actively engaged     Patient Details: Lenore \"Felecia\" appeared tearful when discussing depressive symptoms. She identified feeling like she's always taking care of other people. She left group briefly and was able to return to complete the task.          [id number] - Group psychotherapy - 1 Session  Patient Active Problem List   Diagnosis    History of suicide attempt    Suicidal ideation    Headache, unspecified headache type    Attention deficit hyperactivity disorder (ADHD), combined type    Complex posttraumatic stress disorder    Borderline personality disorder (H)    Major depressive disorder, recurrent, mild    Paranoia (H)    Psychosis (H)    Aggression       "

## 2025-04-10 NOTE — PROGRESS NOTES
"  Rehab Group    Start time: 10:15  End time: 11:15  Patient time total: 45 minutes    attended full group    #8 attended   Group Type: OT Clinic   Group Topic: balanced lifestyle, coping skills, healthy leisure time, and social skills     Group Session Details:  Pt actively participated in occupational therapy clinic to facilitate coping skill exploration, creative expression within personally meaningful activities, and clinical observation of social, cognitive, and kinesthetic performance skills.      Patient Response:  cooperative with task, attentive, polite/pleasant, and withdrawn     Patient Response Details:    Patient joined for this full group. She wore headphones throughout and sat alone at the table by the window, appears a bit withdrawn. Good focus on her coloring pages. However, when prompted or on approach she is polite and pleasant. OT offered a handout on time management strategies as she noted it was a goal of hers in a different group. She looked it over and appeared very familiar with the strategies. She noted, \"I struggle to factor in transportation time,\" indicate she's often late. OT offered some suggestions. She at first declined the handouts, saying is repetitive to what she already knows and due to the structure here she doesn't need it right now. However, with minimal encouragement, she accepted it saying that she could probably use it after discharge. Very polite throughout the interaction.         [id number] OT Group (2 or more in attendance)      Patient Active Problem List   Diagnosis    History of suicide attempt    Suicidal ideation    Headache, unspecified headache type    Attention deficit hyperactivity disorder (ADHD), combined type    Complex posttraumatic stress disorder    Borderline personality disorder (H)    Major depressive disorder, recurrent, mild    Paranoia (H)    Psychosis (H)    Aggression       "

## 2025-04-10 NOTE — CARE PLAN
Rehab Group    Start time: 1600  End time: 1645  Patient time total: 39 minutes    attended full group    #4 attended   Group Type: occupational therapy   Group Topic Covered: balanced lifestyle, cognitive activities, coping skills, healthy leisure time, self-esteem, and social skills       Group Session Detail:   Scattergories activity for concentration, cognitive processing, follow through, frustration tolerance, coping, mood stabilization, reality-based activity, healthy leisure exploration, creativity, flexible thinking, building self-esteem, and socialization.       Patient Response/Contribution:  socially appropriate, attentive, and actively engaged       Patient Detail:  Pt joined group just a few minutes late.  She was able to participate in a meaningful manner after being given a brief explanation.  Pt demonstrated a decent fund of knowledge and was gracious towards peers.          [id number] OT Group (2 or more in attendance)      Patient Active Problem List   Diagnosis    History of suicide attempt    Suicidal ideation    Headache, unspecified headache type    Attention deficit hyperactivity disorder (ADHD), combined type    Complex posttraumatic stress disorder    Borderline personality disorder (H)    Major depressive disorder, recurrent, mild    Paranoia (H)    Psychosis (H)    Aggression

## 2025-04-10 NOTE — PLAN OF CARE
Problem: Sleep Disturbance  Goal: Adequate Sleep/Rest  Outcome: Progressing   Goal Outcome Evaluation:       Pt appeared asleep for 7 hours. No behavioral and medical concerns noted and reported. Remains SIO 2:1. Will continue POC.

## 2025-04-10 NOTE — PROGRESS NOTES
Rehab Group    Start time: 13:15  End time: 14:15  Patient time total: 45 minutes    attended full group    #4 attended   Group Type: life skills   Group Topic:  sleep hygiene     Group Session Details:  OT facilitated a group on the topic of sleep hygiene. OT provided education on the definition of sleep hygiene, the biological clock, and melatonin hormone. OT facilitated discussion on the benefits of healthy sleep vs potential harm from poor sleep. OT provided education on the biological processes that occur during sleep (ie brain storing new information, repairing cells, restoring energy, releasing hormones, getting rid of toxic waste). OT facilitated a discussion on the following aspects of sleep health: barriers to sleep, indicators of poor sleep, proper environment for sleep, lifestyle changes to promote sleep, strategies for getting to sleep, and proper bedtime routines. OT offered a handout on tracking a sleep diary.      Patient Response:  cooperative with task, organized, socially appropriate, and expressed understanding of topic     Patient Response Details:    Patient joins group independently. She demonstrates a strong basis of knowledge on this topic. She's able to define various terms ie melatonin, biological clock, sleep hygiene. She is able to brainstorm several barriers to good sleep and the consequences of poor sleep. For herself, she notes that she has chronic insomnia although last night she got better sleep. She notes that for awhile, doctors thought she had sleep apnea, but then they diagnosed her with insomnia. She reports she has been told it stems from her complex PTSD. She denies having problematic nightmares. She demonstrates a good understanding of healthy sleep environments and nighttime routines. Good insight. Accepts the handout at the end.         [id number] OT Group (2 or more in attendance)      Patient Active Problem List   Diagnosis    History of suicide attempt    Suicidal ideation     Headache, unspecified headache type    Attention deficit hyperactivity disorder (ADHD), combined type    Complex posttraumatic stress disorder    Borderline personality disorder (H)    Major depressive disorder, recurrent, mild    Paranoia (H)    Psychosis (H)    Aggression
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
## 2025-04-10 NOTE — PROGRESS NOTES
"[facility], [place]   Psychiatric Progress Note        Interim History:   From H&P: Pt presents to the ED with her ex-girlfriend for acute psychosis. Upon assessment, pt tells this writer that she looks familiar, asks if her name is Kaity, and requests that she remove her mask. Pt was reassured that this is her first time meeting this writer. Pt goes onto say that she requested to go to the hospital today as she has been having \"revelations\". She tells this writer that \"the world belongs to her\" and that she is \"supposed to wake an ancient river with her brother\". She believes that \"monsters\" are hunting children with autism spectrum disorder and that she needs to get to the children to save them. She lists names of people who may be \"key witnesses\", including a Kal Costa and a Ines Fuentes. She believes that her brother may be murdered or a criminal as a result of trying to protect the pt. She goes onto say that her co-worker has kidnapped four . Pt makes other various disjointed comments, such as that her \"next door neighbors did something bad\" and that she has a \"degree pending in [place]\". She goes onto say, \"Are we the four horseman or the I Am? We are the wintesses like in the pet commercials. It has something to do with cats and the ancient Egyptians.\" Pt is audibly talking to voices while meeting with this writer. At one point, pt turns around, makes direct eye contact with the security camera, and speaks to it as if she is being monitored by an external source. She returns to thinking that she has met this writer previously, saying that this writer's name is \"Galligo\" and has the eyes of her middle school friend. She directs this writer to record certain words during the assessment, such as \"school guidance counselor\". She denies current NSSI, SI, or HI. Per collateral report became agitated and stated hearing the wall.  She ripped her pillowcase.  Has been " reporting a suicidal plan to slit her throat. She has not been taking her psychiatric medication for at least a month. She reports using synthetic marijuana daily.     Team meeting report: The patient's care was discussed with the treatment team during the daily team meeting and/or staff's chart notes were reviewed.  Staff reports the patient was paranoid, delusional, disorganized, irritable, difficult to redirect.  She was loud.  She was threatening.  She requested to have a new provider.  She made multiple delusional statements about her children being raped.  She had another episode of increased agitation and was given Haldol.  She was much calmer afterwards.  She slept part of the evening and all night night.    Met with patient.  She is calm, pleasant, cooperative.  She is smiling.  States she is feeling much better.  She got enough sleep.  States she gets out of control when she is not able to sleep.  She is feeling good today.  She worries about her 3 cats.  Her ex-girlfriend with whom she lives, does not feed them th proper way.  Other than this, she denies having any worries.  Denied mental health issues.  Discussed the court process.  The patient is hoping that they discharged her soon.  No other questions or concerns.    Decrease SIO from 2:1 to 1:1.          Medications:     Current Facility-Administered Medications   Medication Dose Route Frequency Provider Last Rate Last Admin    - Psychiatric Emergency -   Other See Admin Instructions Pamella Marks APRN CNP        haloperidol (HALDOL) tablet 10 mg  10 mg Oral BID Pamella Marks APRN CNP   10 mg at 04/10/25 0826    Or    haloperidol lactate (HALDOL) injection 10 mg  10 mg Intramuscular BID Pamella Marks APRN CNP        vitamin D2 (ERGOCALCIFEROL) 01705 units (1250 mcg) capsule 50,000 Units  50,000 Units Oral Q7 Days Pamella Marks APRN CNP   50,000 Units at 04/08/25 1407             Allergies:     Allergies   Allergen Reactions    Alcohol Hives    Kiwi Hives    Red Dye #40 (Allura Red)     Seasonal Allergies Other (See Comments) and Rash     sneezing            Labs:     Recent Results (from the past 4 weeks)   UA with Microscopic reflex to Culture    Collection Time: 04/07/25 12:58 PM    Specimen: Urine, Midstream   Result Value Ref Range    Color Urine Yellow Colorless, Straw, Light Yellow, Yellow    Appearance Urine Slightly Cloudy (A) Clear    Glucose Urine Negative Negative mg/dL    Bilirubin Urine Negative Negative    Ketones Urine Negative Negative mg/dL    Specific Gravity Urine 1.025 1.003 - 1.035    Blood Urine Negative Negative    pH Urine 6.5 5.0 - 7.0    Protein Albumin Urine 20 (A) Negative mg/dL    Urobilinogen Urine Normal Normal mg/dL    Nitrite Urine Negative Negative    Leukocyte Esterase Urine Small (A) Negative    Mucus Urine Present (A) None Seen /LPF    RBC Urine 2 <=2 /HPF    WBC Urine 19 (H) <=5 /HPF    Squamous Epithelials Urine 4 (H) <=1 /HPF   HCG qualitative urine    Collection Time: 04/07/25 12:58 PM   Result Value Ref Range    hCG Urine Qualitative Negative Negative   Urine Culture    Collection Time: 04/07/25 12:58 PM    Specimen: Urine, Midstream   Result Value Ref Range    Culture <10,000 CFU/mL Mixture of Urogenital Kristina    Urine Drug Screen Panel    Collection Time: 04/07/25 12:59 PM   Result Value Ref Range    Amphetamines Urine Screen Negative Screen Negative    Barbituates Urine Screen Negative Screen Negative    Benzodiazepine Urine Screen Positive (A) Screen Negative    Cannabinoids Urine Screen Positive (A) Screen Negative    Cocaine Urine Screen Negative Screen Negative    Fentanyl Qual Urine Screen Negative Screen Negative    Opiates Urine Screen Negative Screen Negative    PCP Urine Screen Negative Screen Negative   Comprehensive metabolic panel    Collection Time: 04/08/25  7:41 AM   Result Value Ref Range    Sodium 139 135 - 145 mmol/L     Potassium 3.4 3.4 - 5.3 mmol/L    Carbon Dioxide (CO2) 23 22 - 29 mmol/L    Anion Gap 11 7 - 15 mmol/L    Urea Nitrogen 10.0 6.0 - 20.0 mg/dL    Creatinine 0.73 0.51 - 0.95 mg/dL    GFR Estimate >90 >60 mL/min/1.73m2    Calcium 9.0 8.8 - 10.4 mg/dL    Chloride 105 98 - 107 mmol/L    Glucose 86 70 - 99 mg/dL    Alkaline Phosphatase 50 40 - 150 U/L    AST 14 0 - 45 U/L    ALT 9 0 - 50 U/L    Protein Total 6.9 6.4 - 8.3 g/dL    Albumin 4.2 3.5 - 5.2 g/dL    Bilirubin Total 0.4 <=1.2 mg/dL   Hemoglobin A1c    Collection Time: 04/08/25  7:41 AM   Result Value Ref Range    Estimated Average Glucose 94 <117 mg/dL    Hemoglobin A1C 4.9 <5.7 %   Lipid panel    Collection Time: 04/08/25  7:41 AM   Result Value Ref Range    Cholesterol 158 <200 mg/dL    Triglycerides 64 <150 mg/dL    Direct Measure HDL 57 >=50 mg/dL    LDL Cholesterol Calculated 88 <100 mg/dL    Non HDL Cholesterol 101 <130 mg/dL   TSH with free T4 reflex and/or T3 as indicated    Collection Time: 04/08/25  7:41 AM   Result Value Ref Range    TSH 2.16 0.30 - 4.20 uIU/mL   HCG quantitative pregnancy    Collection Time: 04/08/25  7:41 AM   Result Value Ref Range    hCG Quantitative <1 <5 mIU/mL   Vitamin B12    Collection Time: 04/08/25  7:41 AM   Result Value Ref Range    Vitamin B12 362 232 - 1,245 pg/mL   Folate    Collection Time: 04/08/25  7:41 AM   Result Value Ref Range    Folic Acid 8.3 4.6 - 34.8 ng/mL   Vitamin D Deficiency    Collection Time: 04/08/25  7:41 AM   Result Value Ref Range    Vitamin D, Total (25-Hydroxy) 16 (L) 20 - 50 ng/mL   CBC with platelets and differential    Collection Time: 04/08/25  7:41 AM   Result Value Ref Range    WBC Count 4.9 4.0 - 11.0 10e3/uL    RBC Count 4.17 3.80 - 5.20 10e6/uL    Hemoglobin 12.3 11.7 - 15.7 g/dL    Hematocrit 36.6 35.0 - 47.0 %    MCV 88 78 - 100 fL    MCH 29.5 26.5 - 33.0 pg    MCHC 33.6 31.5 - 36.5 g/dL    RDW 13.0 10.0 - 15.0 %    Platelet Count 212 150 - 450 10e3/uL    % Neutrophils 59 %    %  Lymphocytes 30 %    % Monocytes 8 %    % Eosinophils 2 %    % Basophils 1 %    % Immature Granulocytes 0 %    NRBCs per 100 WBC 0 <1 /100    Absolute Neutrophils 2.9 1.6 - 8.3 10e3/uL    Absolute Lymphocytes 1.5 0.8 - 5.3 10e3/uL    Absolute Monocytes 0.4 0.0 - 1.3 10e3/uL    Absolute Eosinophils 0.1 0.0 - 0.7 10e3/uL    Absolute Basophils 0.0 0.0 - 0.2 10e3/uL    Absolute Immature Granulocytes 0.0 <=0.4 10e3/uL    Absolute NRBCs 0.0 10e3/uL            Precautions:     Behavioral Orders   Procedures    Assault precautions    Cheeking Precautions (behavioral units)    Cheeking Precautions (behavioral units)     Patient Observed swallowing PO medications; Patient asked to drink water after swallowing medication; Patient in Staff line of sight for 15 minutes after medication given; Mouth checks after PO administration (patient asked to open mouth and stick out their tongue).    Code 1 - Restrict to Unit    Elopement precautions    Routine Programming     As clinically indicated    Self Injury Precaution    Status 15     Every 15 minutes.    Status Individual Observation     Patient SIO status reviewed with team/RN.  Please also refer to RN/team documentation for add'l detail.    -SIO staff to monitor following which have contributed to patient being on SIO:  As above  -Possible interventions SIO staff could use to support patient's treatment progress:    -When following observed, team will review discontinuation of SIO:     Order Specific Question:   CONTINUOUS 24 hours / day     Answer:   Other     Order Specific Question:   Specify distance     Answer:   10 feet, 1:1     Order Specific Question:   Indications for SIO     Answer:   Assault risk     Order Specific Question:   Indications for SIO     Answer:   Severe intrusiveness     Order Specific Question:   Indications for SIO     Answer:   Suicide risk    Suicide precautions: Suicide Risk: MODERATE; Clinical rationale to override score: modification to the care  environment, response to medication, lack of access to a plan for self-harm, Exhibiting Suicidal/self-harm behaviors or thoughts, Collatera...     Patients on Suicide Precautions should have a Combination Diet ordered that includes a Diet selection(s) AND a Behavioral Tray selection for Safe Tray - with utensils, or Safe Tray - NO utensils       Order Specific Question:   Suicide Risk     Answer:   MODERATE     Order Specific Question:   Clinical rationale to override score:     Answer:   modification to the care environment     Order Specific Question:   Clinical rationale to override score:     Answer:   response to medication     Order Specific Question:   Clinical rationale to override score:     Answer:   lack of access to a plan for self-harm     Order Specific Question:   Clinical rationale to override score:     Answer:   Exhibiting Suicidal/self-harm behaviors or thoughts     Order Specific Question:   Clinical rationale to override score:     Answer:   Collateral information supporting Suicidal/self-harm behaviors            Psychiatric Examination:                      Weight is 189 lbs 4.8 oz  Body mass index is 30.55 kg/m .    Appearance: awake, alert and adequately groomed  Attitude:  cooperative and guarded  Eye Contact:  intense  Mood:  good  Affect:  intensity is heightened  Speech:  pressured speech  Psychomotor Behavior:  no evidence of tardive dyskinesia, dystonia, or tics  Throught Process:  linear, illogical, and circumstantial  Associations:  no loose associations  Thought Content:  no evidence of suicidal ideation or homicidal ideation, no auditory hallucinations present, no visual hallucinations present, and paranoia and delusions are present.   Insight:  limited  Judgement:  limited  Oriented to:  time, person, and place  Attention Span and Concentration:  fair  Recent and Remote Memory:  fair         Precautions:     Behavioral Orders   Procedures    Assault precautions    Cheeking  Precautions (behavioral units)    Cheeking Precautions (behavioral units)     Patient Observed swallowing PO medications; Patient asked to drink water after swallowing medication; Patient in Staff line of sight for 15 minutes after medication given; Mouth checks after PO administration (patient asked to open mouth and stick out their tongue).    Code 1 - Restrict to Unit    Elopement precautions    Routine Programming     As clinically indicated    Self Injury Precaution    Status 15     Every 15 minutes.    Status Individual Observation     Patient SIO status reviewed with team/RN.  Please also refer to RN/team documentation for add'l detail.    -SIO staff to monitor following which have contributed to patient being on SIO:  As above  -Possible interventions SIO staff could use to support patient's treatment progress:    -When following observed, team will review discontinuation of SIO:     Order Specific Question:   CONTINUOUS 24 hours / day     Answer:   Other     Order Specific Question:   Specify distance     Answer:   10 feet, 1:1     Order Specific Question:   Indications for SIO     Answer:   Assault risk     Order Specific Question:   Indications for SIO     Answer:   Severe intrusiveness     Order Specific Question:   Indications for SIO     Answer:   Suicide risk    Suicide precautions: Suicide Risk: MODERATE; Clinical rationale to override score: modification to the care environment, response to medication, lack of access to a plan for self-harm, Exhibiting Suicidal/self-harm behaviors or thoughts, Collatera...     Patients on Suicide Precautions should have a Combination Diet ordered that includes a Diet selection(s) AND a Behavioral Tray selection for Safe Tray - with utensils, or Safe Tray - NO utensils       Order Specific Question:   Suicide Risk     Answer:   MODERATE     Order Specific Question:   Clinical rationale to override score:     Answer:   modification to the care environment     Order  Specific Question:   Clinical rationale to override score:     Answer:   response to medication     Order Specific Question:   Clinical rationale to override score:     Answer:   lack of access to a plan for self-harm     Order Specific Question:   Clinical rationale to override score:     Answer:   Exhibiting Suicidal/self-harm behaviors or thoughts     Order Specific Question:   Clinical rationale to override score:     Answer:   Collateral information supporting Suicidal/self-harm behaviors          DIagnoses:   Bipolar disorder, current episode manic, severe, with psychosis versus schizoaffective disorder  Borderline personality disorder  Suicidal ideation  Complex PTSD  ADHD  Cannabis use disorder, severe, dependence  Vitamin D deficiency, level is 16         Plan:   Psychiatric Emergency was declared due to physical aggression and distraction of property.     --Discontinue Zyprexa, not helpful  --Start Haldol, 10 mg, bid for psychosis. Back up with IM Haldol.   --As needed medications are available including: Haldol, hydroxyzine, Zyprexa, and trazodone  --Start vitamin D, 50,000 units, once a week for 6 weeks.    Lab work:  Lab work was reviewed and is mostly normal except for vitamin D at 16.  Urinalysis shows some abnormalities.  Urine cultures pending.  U tox was positive for benzodiazepines and cannabis.    Consults:   Internal medicine to follow up for medical problems.     Other:     --Change SIO 1:1 due to aggression   --Cheeking precaution    --Care was coordinated with the treatment team.  --The patient was consulted on nature of illness and treatment options.      Disposition Plan   Reason for ongoing admission: poses an imminent risk to self, poses an imminent risk to others, and is unable to care for self due to severe psychosis or bijal  Discharge location:  TBD   Discharge Medications: not ordered  Follow-up Appointments: not scheduled    Legal Status:  Felecia is on a court hold in [facility]  (as of 4/9/2025)  Case No. [id number]     Inpatient team submitted petition for MI Commitment and Summers in [facility] on 4/8/2025.   (Proposed Summers medications: Haldol, Zyprexa, Abilify, and Invega)      Contacts:  Family/Friends:  Friend - Daisy Callahan (phone: [phone]) - NATHALIE obtained 4/6/2025     Outpatient Providers:  Psychiatrist/Medication Management Provider - ANAYELI Nguyen CNP of [facility] (phone: [phone])  [facility] Pre-Petition Screener - Beth Murphy (phone: [phone])    Discharge will be granted once symptoms improved.    Pamella GUADALUPE, CNP    This note was created with the help of Dragon dictation system. All grammatical/typing errors or context distortion are unintentional and inherent to software.

## 2025-04-11 ENCOUNTER — TRANSFERRED RECORDS (OUTPATIENT)
Dept: HEALTH INFORMATION MANAGEMENT | Facility: CLINIC | Age: 30
End: 2025-04-11
Payer: COMMERCIAL

## 2025-04-11 PROCEDURE — 97150 GROUP THERAPEUTIC PROCEDURES: CPT | Mod: GO

## 2025-04-11 PROCEDURE — 250N000013 HC RX MED GY IP 250 OP 250 PS 637: Performed by: PSYCHIATRY & NEUROLOGY

## 2025-04-11 PROCEDURE — 250N000013 HC RX MED GY IP 250 OP 250 PS 637: Performed by: NURSE PRACTITIONER

## 2025-04-11 PROCEDURE — 124N000002 HC R&B MH UMMC

## 2025-04-11 PROCEDURE — 99232 SBSQ HOSP IP/OBS MODERATE 35: CPT | Performed by: NURSE PRACTITIONER

## 2025-04-11 RX ORDER — BENZTROPINE MESYLATE 0.5 MG/1
1 TABLET ORAL 2 TIMES DAILY PRN
Status: DISCONTINUED | OUTPATIENT
Start: 2025-04-11 | End: 2025-04-15 | Stop reason: HOSPADM

## 2025-04-11 RX ADMIN — NICOTINE 1 PATCH: 21 PATCH, EXTENDED RELEASE TRANSDERMAL at 10:29

## 2025-04-11 RX ADMIN — POLYETHYLENE GLYCOL 3350 17 G: 17 POWDER, FOR SOLUTION ORAL at 10:28

## 2025-04-11 RX ADMIN — NICOTINE POLACRILEX 2 MG: 2 GUM, CHEWING BUCCAL at 09:48

## 2025-04-11 RX ADMIN — HALOPERIDOL 10 MG: 5 TABLET ORAL at 20:23

## 2025-04-11 RX ADMIN — HALOPERIDOL 10 MG: 5 TABLET ORAL at 08:27

## 2025-04-11 RX ADMIN — ACETAMINOPHEN 650 MG: 325 TABLET, FILM COATED ORAL at 18:52

## 2025-04-11 ASSESSMENT — ACTIVITIES OF DAILY LIVING (ADL)
ORAL_HYGIENE: INDEPENDENT
ADLS_ACUITY_SCORE: 15
ADLS_ACUITY_SCORE: 15
ADLS_ACUITY_SCORE: 41
ADLS_ACUITY_SCORE: 15
DRESS: INDEPENDENT
HYGIENE/GROOMING: INDEPENDENT
ADLS_ACUITY_SCORE: 15
ADLS_ACUITY_SCORE: 41
ADLS_ACUITY_SCORE: 41
ADLS_ACUITY_SCORE: 15
ADLS_ACUITY_SCORE: 41
ORAL_HYGIENE: INDEPENDENT
HYGIENE/GROOMING: INDEPENDENT
ADLS_ACUITY_SCORE: 15
DRESS: INDEPENDENT
ADLS_ACUITY_SCORE: 15
ADLS_ACUITY_SCORE: 41
ADLS_ACUITY_SCORE: 15
ADLS_ACUITY_SCORE: 15
ADLS_ACUITY_SCORE: 41
ADLS_ACUITY_SCORE: 41
ADLS_ACUITY_SCORE: 15
ADLS_ACUITY_SCORE: 15

## 2025-04-11 NOTE — PLAN OF CARE
"Initial meeting note:    Therapist introduced self to patient and discussed psychotherapy service available to patient. Lenore stated she doesn't \"have much to talk about, my only concern is for the children\" and declined individual psychotherapy services. She later returned to writer and stated, \"I do have something to talk about. Before I came in here I wasn't on meds for nine years. They put me on meds and now my hands are shaking uncontrollably.\" She showed writer her hands, which were shaking. She stated, \"That's why I don't want to do arts and crafts.\" She was encouraged to discuss this concern with her provider.     Pt response: Pt not interested currently in meeting 1:1; therapist will continue remaining available for pt     Plan: Pt was encouraged to attend groups and therapist will remain available for 1:1 sessions    "
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
## 2025-04-11 NOTE — PROGRESS NOTES
"  Rehab Group    Start time: 10:15  End time: 11:15  Patient time total: 35 minutes    attended partial group    #8 attended   Group Type: OT Clinic   Group Topic: balanced lifestyle, coping skills, healthy leisure time, and social skills     Group Session Details:  Pt actively participated in occupational therapy clinic to facilitate coping skill exploration, creative expression within personally meaningful activities, and clinical observation of social, cognitive, and kinesthetic performance skills.      Patient Response:  socially appropriate     Patient Response Details:    Patient attended most of group but declined to engage in any targeted project or task. She chose to sit, observe, and socialize. At first she declined to choose song requests, \"you guys won't like my music, it's weird.\" But with encouragement she shares some requests of Cymraes throat singing, which peers do like and becomes a nice discussion. She also shares about her knowledge of languages, that in high school she learned Welsh, [place]an, and [place] citizen all at the same time and then later started learning Citizen of [place]. Reports there is one evening nurse who she can practice [place] citizen with. Overall demonstrates a positive affect. In and out of group a little but engages well socially. Supportive of peers projects.         [id number] OT Group (2 or more in attendance)      Patient Active Problem List   Diagnosis    History of suicide attempt    Suicidal ideation    Headache, unspecified headache type    Attention deficit hyperactivity disorder (ADHD), combined type    Complex posttraumatic stress disorder    Borderline personality disorder (H)    Major depressive disorder, recurrent, mild    Paranoia (H)    Psychosis (H)    Aggression       "
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
## 2025-04-11 NOTE — PLAN OF CARE
Problem: Psychotic Signs/Symptoms  Goal: Improved Mood Symptoms (Psychotic Signs/Symptoms)  Outcome: Progressing   Goal Outcome Evaluation:    Pt had a very good evening. Had no behavioral outburst. Was medication compliant. Denied anxiety or depression. Denied auditory and visual hallucinations. Did not appear to be responding. Denied SI/SIB/HI. Denied having problem with eating or sleeping. Ate dinner in the lounge and watched TV in the lounge with his peers. Continued on SIO for safety. Pt had a very decent evening shift.   Plan: SIO; Build trust with pt. Continue to build on strengths. Encourage compliance and healthy coping.

## 2025-04-11 NOTE — PLAN OF CARE
Pt has been active in the milieu. Pt's sio was discontinued without issue. Pt was served court papers without issue. Pt attended groups. Pt has court on Monday and is aware of it. Pt states she has not had a BM for a week. Pt was given prn miralax with results and states she feels much better. 6.5 hours of sleep recorded last evening. Pt states the haldol is making her tired. Pt states it is difficult to sleep and was given an egg crate mattress. Pt states she has anxiety. Offered prn and she requested ativan. Informed pt that visteral was available and she declined and took a lavender patch.  Pt states she always has depression. PT states she lives with an ex girlfriend and is looking forward to discharge to see her cats. Pt has 3 cats. PT denies SI and SIB. PT denies hallucinations and racing thoughts. Unable to finish admission profile.     Goal Outcome Evaluation: ongoing

## 2025-04-11 NOTE — PLAN OF CARE
RN: Pt A/O x4, VSS, except temp was 99.7 after dinner, rechecked 99.5. Pt reported having generalized body ache, but denied HA, sore throat, denied having S/S of UTI. PRN Tylenol was given. Pt slept from the beginning of the shift until dinner time. Pt was observed watching TV in the lounge intermittently after dinner. Pt was withdrawn to self, no interactions with peers. Pt presented as flat affect, but brightens upon approach, mood was calm. Pt denied all MH symptoms this shift, pt was medication compliant, no reported or observed side effects. Pt is eating and drinking adequately, reports no issues with bowel or bladder. Pt denied pain or physical discomfort. Continue with POC.        Goal Outcome Evaluation:

## 2025-04-11 NOTE — PROGRESS NOTES
Rehab Group    Start time: 13:15  End time: 14:15  Patient time total: 35 minutes    attended partial group    #5 attended   Group Type: general health and coping   Group Topic: coping skills and sensory intervention     Group Session Details:  OT facilitated an discussion/activity based group on coping skills related to the senses. OT provided education on the definition of Sensory Processing, sensory patterns/preferences, and the difference between being oversensitive or undersensitive to sensory stimuli. OT provided education on the 8 senses (visual, auditory, smell, taste, tactile, vestibular/balance, interoceptive/internal sensations, and proprioception/deep pressure). OT facilitated brainstorming through each of the senses: coping strategies for calming or alerting that sense. OT facilitated brainstorming the perfect  safe  or  cozy  calming environment. OT then facilitated a DIY stress ball making activity.      Patient Response:  cooperative with task, socially appropriate, and actively engaged     Patient Response Details:    Patient joined group late, partially through the discussion. She jumps in easily and verbalizes a familiarity with all senses (including extra senses). She is attentive to the education and brainstorms various calming strategies using senses: weighted blankets, tea, nature. She engages in making the homemade stress ball and takes some fidget toys to go. Thanks OT, very polite/pleasant.         [id number] OT Group (2 or more in attendance)      Patient Active Problem List   Diagnosis    History of suicide attempt    Suicidal ideation    Headache, unspecified headache type    Attention deficit hyperactivity disorder (ADHD), combined type    Complex posttraumatic stress disorder    Borderline personality disorder (H)    Major depressive disorder, recurrent, mild    Paranoia (H)    Psychosis (H)    Aggression

## 2025-04-11 NOTE — PLAN OF CARE
BEH IP Unit Acuity Rating Score (UARS)  Patient is given one point for every criteria they meet.    CRITERIA SCORING   On a 72 hour hold, court hold, committed, stay of commitment, or revocation. 1    Patient LOS on BEH unit exceeds 20 days. 0  LOS: 4   Patient under guardianship, 55+, otherwise medically complex, or under age 11. 0   Suicide ideation without relief of precipitating factors. 1   Current plan for suicide. 0   Current plan for homicide. 0   Imminent risk or actual attempt to seriously harm another without relief of factors precipitating the attempt. 0   Severe dysfunction in daily living (ex: complete neglect for self care, extreme disruption in vegetative function, extreme deterioration in social interactions). 1   Recent (last 7 days) or current physical aggression in the ED or on unit. 1   Restraints or seclusion episode in past 72 hours. 0   Recent (last 7 days) or current verbal aggression, agitation, yelling, etc., while in the ED or unit. 1   Active psychosis. 1   Need for constant or near constant redirection (from leaving, from others, etc).  0   Intrusive or disruptive behaviors. 0   Patient requires 3 or more hours of individualized nursing care per 8-hour shift (i.e. for ADLs, meds, therapeutic interventions). 0   TOTAL 6

## 2025-04-11 NOTE — PLAN OF CARE
Problem: Sleep Disturbance  Goal: Adequate Sleep/Rest  Outcome: Progressing   Goal Outcome Evaluation:       Pt appeared asleep for 6.5 hours. No behavioral and medical concerns noted and reported. Remains SIO 1:1. Will continue POC.
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
## 2025-04-12 PROCEDURE — 250N000013 HC RX MED GY IP 250 OP 250 PS 637: Performed by: NURSE PRACTITIONER

## 2025-04-12 PROCEDURE — H2032 ACTIVITY THERAPY, PER 15 MIN: HCPCS

## 2025-04-12 PROCEDURE — 124N000002 HC R&B MH UMMC

## 2025-04-12 PROCEDURE — 250N000013 HC RX MED GY IP 250 OP 250 PS 637: Performed by: PSYCHIATRY & NEUROLOGY

## 2025-04-12 RX ADMIN — HALOPERIDOL 10 MG: 5 TABLET ORAL at 08:43

## 2025-04-12 RX ADMIN — NICOTINE POLACRILEX 4 MG: 2 GUM, CHEWING BUCCAL at 17:27

## 2025-04-12 RX ADMIN — HALOPERIDOL 10 MG: 5 TABLET ORAL at 20:00

## 2025-04-12 RX ADMIN — NICOTINE 1 PATCH: 21 PATCH, EXTENDED RELEASE TRANSDERMAL at 12:13

## 2025-04-12 ASSESSMENT — ACTIVITIES OF DAILY LIVING (ADL)
ADLS_ACUITY_SCORE: 15
ADLS_ACUITY_SCORE: 15
DRESS: INDEPENDENT
ADLS_ACUITY_SCORE: 15
DRESS: INDEPENDENT
HYGIENE/GROOMING: INDEPENDENT
ADLS_ACUITY_SCORE: 15
ORAL_HYGIENE: INDEPENDENT
HYGIENE/GROOMING: INDEPENDENT
ADLS_ACUITY_SCORE: 15
ORAL_HYGIENE: INDEPENDENT
ADLS_ACUITY_SCORE: 15

## 2025-04-12 NOTE — PLAN OF CARE
Goal Outcome Evaluation:       Pt in bed at the beginning of the shift.Pt appeared asleep for 7 hours.No behavior or concerns noted this shift.Pt remains on 15 minutes safety checks.Will continue to monitor.

## 2025-04-12 NOTE — PROGRESS NOTES
Rehab Group    Start time: 1600  End time: 1700  Patient time total: 45 minutes    attended full group    #2 attended   Group Type: art   Group Topic Covered: activity therapy       Group Session Detail:  Art Therapy directive was to create mixed media positive thoughts and/or affirmations cards.  Goals of directive: emotional regulation, distress tolerance, media exploration, emotional expression.     Patient Response/Contribution:  cooperative with task       Patient Detail:    Pt was a quiet, engaged participant, focused on task for the full duration of group. Pt finished two positive affirmation cards with artwork and writing and briefly shared with author and group.  Pts mood was calm, flat/blunted affect.      Activity Therapy Per 15 min ()      Patient Active Problem List   Diagnosis    History of suicide attempt    Suicidal ideation    Headache, unspecified headache type    Attention deficit hyperactivity disorder (ADHD), combined type    Complex posttraumatic stress disorder    Borderline personality disorder (H)    Major depressive disorder, recurrent, mild    Paranoia (H)    Psychosis (H)    Aggression

## 2025-04-12 NOTE — PLAN OF CARE
"Pt has been withdrawn and isolative. Pt has been sleeping bed most of the day. Pt politely states she is bored and plans to sleep to make the weekend go faster. Only one group was offered this shift. Offered numerous things to do and pt declined. Pt also states our chairs are too hard to sit in for long periods of time. Unable to finish admission profile. 7 hours of sleep recorded. Pt states her eyelashes are long and are bothersome at times. PT states her appetite is good. Pt ate breakfast and lunch. Pt states she sleeps ok. Chronic shoulder pain. Pt denies need for intervention including hot/ice pack or prn medication. PT said the egg crate mattress has been very helpful and thanked writer. Pt states she has \"a little\" anxiety and denies depression. Pt denies hallucinations. Pt denies SI and SIB.     Goal Outcome Evaluation:  ongoing                         "

## 2025-04-13 PROCEDURE — 250N000013 HC RX MED GY IP 250 OP 250 PS 637: Performed by: NURSE PRACTITIONER

## 2025-04-13 PROCEDURE — 124N000002 HC R&B MH UMMC

## 2025-04-13 PROCEDURE — 90853 GROUP PSYCHOTHERAPY: CPT

## 2025-04-13 RX ADMIN — HALOPERIDOL 10 MG: 5 TABLET ORAL at 20:04

## 2025-04-13 RX ADMIN — HALOPERIDOL 10 MG: 5 TABLET ORAL at 08:39

## 2025-04-13 RX ADMIN — HYDROXYZINE HYDROCHLORIDE 25 MG: 25 TABLET, FILM COATED ORAL at 16:53

## 2025-04-13 ASSESSMENT — ACTIVITIES OF DAILY LIVING (ADL)
ADLS_ACUITY_SCORE: 15
HYGIENE/GROOMING: INDEPENDENT
ORAL_HYGIENE: INDEPENDENT
ADLS_ACUITY_SCORE: 15
HYGIENE/GROOMING: INDEPENDENT
ADLS_ACUITY_SCORE: 15
DRESS: INDEPENDENT
ADLS_ACUITY_SCORE: 15
DRESS: INDEPENDENT
ORAL_HYGIENE: INDEPENDENT
ADLS_ACUITY_SCORE: 15

## 2025-04-13 NOTE — PLAN OF CARE
Goal Outcome Evaluation:       Pt in bed at at the beginning of the shift.Pt appeared asleep for 7 hours.No behavior or concerns noted this  shift.Pt remains on 15 minutes safety checks.Will continue to monitor

## 2025-04-13 NOTE — PLAN OF CARE
"  Group Attendance:  attended full group    Time session began: 1600  Time session ended: 1645  Patient's total time in group: 45    Total # Attendees   5   Group Type psychotherapeutic     Group Topic Covered goals and motivation and relaxation and grounding techniques/coping skills     Group Session Detail Participants completed a creative project to explore values and a group discussion on the topic was facilitated.       Patient's response to the group topic/interactions:  positive affect, cooperative with task, supportive of peers, actively engaged, and engaged socially when prompted     Patient Details: Lenore \"Felecia\" identified \"earth\" as something she values. She shared that she has an interest in anthropology and discussed some cultures that she is particularly interested in. She shared that she is excited for discharge.          32821 - Group psychotherapy - 1 Session  Patient Active Problem List   Diagnosis    History of suicide attempt    Suicidal ideation    Headache, unspecified headache type    Attention deficit hyperactivity disorder (ADHD), combined type    Complex posttraumatic stress disorder    Borderline personality disorder (H)    Major depressive disorder, recurrent, mild    Paranoia (H)    Psychosis (H)    Aggression       "

## 2025-04-13 NOTE — PLAN OF CARE
RN: Pt had low fever on evening shift for 3 days, reports feeling chills at times. Pt denied cold symptoms. IM notified, given order continue to monitor pt's status.   Pt was visible in the lounge watching TV intermittently, but was socially withdrawn to self, no interaction with peers. Pt attended group this shift. Pt presented as flat affect, but brighten's upon approach, mood was calm. Pt denied SI/SIB/HI, hallucinations, depression. Pt endorsed mild anxiety due to court meeting tomorrow, PRN Atarax was given with good effect. Pt is medication compliant, no side effects noted. Pt is eating and drinking adequately, reports no issues with bowel or bladder. Continue with POC.

## 2025-04-13 NOTE — PLAN OF CARE
RN: A/O x4, VSS, except temp was 99.4, pt was asymptomatic, declined intervention. Pt attended art therapeutic group. Pt was visible in the lounge watching TV intermittently, and was withdrawn to self, no interactions with peers. Pt presented as flat affect, calm and cooperative. Pt denied all MH symptoms this shift, pt was medication compliant, no reported or observed side effects. Pt is eating and drinking adequately, reports no issues with bowel or bladder. Pt reports mild HA at HS, but declined intervention. Continue with POC.     Problem: Psychotic Signs/Symptoms  Goal: Increased Participation and Engagement (Psychotic Signs/Symptoms)  Intervention: Facilitate Participation and Engagement  Recent Flowsheet Documentation  Taken 4/12/2025 2000 by Beatrice Hubbard, RN  Supportive Measures: active listening utilized   Goal Outcome Evaluation:    Plan of Care Reviewed With: patient

## 2025-04-13 NOTE — PLAN OF CARE
Pt has been in her room sleeping most of the shift. Pt came out for meals. Pt is pleasant and cooperative. Pt is aware she has court tomorrow and hopes to discharge after court. Pt states she has someone who could pick her up. Pt would like her medications sent to [facility] [place] in [facility]. Pt denies hallucinations. Pt denies SI and SIB. Pts states she thinks her anxiety will come tonight because of court tomorrow. Pt has chronic shoulder pain and denies need for intervention. Medication compliant. Pt took a shower. Unable to finish admission profile.     Goal Outcome Evaluation: ongoing

## 2025-04-14 PROCEDURE — 250N000013 HC RX MED GY IP 250 OP 250 PS 637: Performed by: PSYCHIATRY & NEUROLOGY

## 2025-04-14 PROCEDURE — 250N000013 HC RX MED GY IP 250 OP 250 PS 637: Performed by: NURSE PRACTITIONER

## 2025-04-14 PROCEDURE — 124N000002 HC R&B MH UMMC

## 2025-04-14 PROCEDURE — 99232 SBSQ HOSP IP/OBS MODERATE 35: CPT | Performed by: NURSE PRACTITIONER

## 2025-04-14 PROCEDURE — 97150 GROUP THERAPEUTIC PROCEDURES: CPT | Mod: GO

## 2025-04-14 RX ORDER — HALOPERIDOL 10 MG/1
10 TABLET ORAL AT BEDTIME
Qty: 30 TABLET | Refills: 0 | Status: SHIPPED | OUTPATIENT
Start: 2025-04-14 | End: 2025-04-14

## 2025-04-14 RX ORDER — ERGOCALCIFEROL 1.25 MG/1
50000 CAPSULE, LIQUID FILLED ORAL
Qty: 5 CAPSULE | Refills: 0 | Status: SHIPPED | OUTPATIENT
Start: 2025-04-15 | End: 2025-04-14

## 2025-04-14 RX ORDER — HALOPERIDOL 5 MG/ML
5 INJECTION INTRAMUSCULAR EVERY MORNING
Status: DISCONTINUED | OUTPATIENT
Start: 2025-04-14 | End: 2025-04-14

## 2025-04-14 RX ORDER — HALOPERIDOL 5 MG/1
5 TABLET ORAL EVERY MORNING
Status: DISCONTINUED | OUTPATIENT
Start: 2025-04-14 | End: 2025-04-14

## 2025-04-14 RX ORDER — ERGOCALCIFEROL 1.25 MG/1
50000 CAPSULE, LIQUID FILLED ORAL
Qty: 5 CAPSULE | Refills: 0 | Status: ON HOLD | OUTPATIENT
Start: 2025-04-15

## 2025-04-14 RX ORDER — HALOPERIDOL 10 MG/1
10 TABLET ORAL AT BEDTIME
Qty: 30 TABLET | Refills: 0 | Status: ON HOLD | OUTPATIENT
Start: 2025-04-14

## 2025-04-14 RX ORDER — HALOPERIDOL 5 MG/1
10 TABLET ORAL AT BEDTIME
Status: DISCONTINUED | OUTPATIENT
Start: 2025-04-14 | End: 2025-04-15 | Stop reason: HOSPADM

## 2025-04-14 RX ORDER — HALOPERIDOL 5 MG/ML
10 INJECTION INTRAMUSCULAR AT BEDTIME
Status: DISCONTINUED | OUTPATIENT
Start: 2025-04-14 | End: 2025-04-15 | Stop reason: HOSPADM

## 2025-04-14 RX ADMIN — HALOPERIDOL 10 MG: 5 TABLET ORAL at 20:26

## 2025-04-14 RX ADMIN — TRAZODONE HYDROCHLORIDE 50 MG: 50 TABLET ORAL at 19:33

## 2025-04-14 RX ADMIN — HALOPERIDOL 5 MG: 5 TABLET ORAL at 09:01

## 2025-04-14 RX ADMIN — HYDROXYZINE HYDROCHLORIDE 25 MG: 25 TABLET, FILM COATED ORAL at 19:33

## 2025-04-14 RX ADMIN — NICOTINE POLACRILEX 2 MG: 2 GUM, CHEWING BUCCAL at 11:02

## 2025-04-14 ASSESSMENT — ACTIVITIES OF DAILY LIVING (ADL)
ADLS_ACUITY_SCORE: 15
DRESS: SCRUBS (BEHAVIORAL HEALTH);INDEPENDENT
ADLS_ACUITY_SCORE: 15
ADLS_ACUITY_SCORE: 15
ORAL_HYGIENE: INDEPENDENT
ADLS_ACUITY_SCORE: 15
ADLS_ACUITY_SCORE: 15
LAUNDRY: WITH SUPERVISION
ADLS_ACUITY_SCORE: 15
ADLS_ACUITY_SCORE: 15
HYGIENE/GROOMING: INDEPENDENT
ADLS_ACUITY_SCORE: 15

## 2025-04-14 NOTE — PLAN OF CARE
"  Rehab Group    Start time: 10:20  End time: 11:05  Patient time total: 40 minutes    attended partial group    #7 attended   Group Type: occupational therapy   Group Topic Covered: coping skills, emotional regulation, mindfulness, and relaxation      Group Session Detail:  Patient engaged in a watercolor task activity in order to promote self expression, maximize autonomy within meaningful tasks, encourage productive use of time, and to maximize attention and focus on a therapeutic task.       Patient Response/Contribution:  cooperative with task, socially appropriate, attentive, and actively engaged     Patient Detail:  During check-in, patient reported that \"hiking\" is one healthy and safe coping activity she engages in outside of the hospital setting. Patient remained a pleasant, engaged, and cooperative group attendee for full duration of group. Good attention and focus on the task at hand. Patient worked in a goal-directed manner; appearing organized in her planning and thought. No safety or behavioral concerns observed in today's group session.       [id number] OT Group (2 or more in attendance)      Patient Active Problem List   Diagnosis    History of suicide attempt    Suicidal ideation    Headache, unspecified headache type    Attention deficit hyperactivity disorder (ADHD), combined type    Complex posttraumatic stress disorder    Borderline personality disorder (H)    Major depressive disorder, recurrent, mild    Paranoia (H)    Psychosis (H)    Aggression        "

## 2025-04-14 NOTE — PLAN OF CARE
BEH IP Unit Acuity Rating Score (UARS)  Patient is given one point for every criteria they meet.    CRITERIA SCORING   On a 72 hour hold, court hold, committed, stay of commitment, or revocation. 1    Patient LOS on BEH unit exceeds 20 days. 0  LOS: 7   Patient under guardianship, 55+, otherwise medically complex, or under age 11. 0   Suicide ideation without relief of precipitating factors. 0   Current plan for suicide. 0   Current plan for homicide. 0   Imminent risk or actual attempt to seriously harm another without relief of factors precipitating the attempt. 0   Severe dysfunction in daily living (ex: complete neglect for self care, extreme disruption in vegetative function, extreme deterioration in social interactions). 1   Recent (last 7 days) or current physical aggression in the ED or on unit. 1   Restraints or seclusion episode in past 72 hours. 0   Recent (last 7 days) or current verbal aggression, agitation, yelling, etc., while in the ED or unit. 1   Active psychosis. 1   Need for constant or near constant redirection (from leaving, from others, etc).  0   Intrusive or disruptive behaviors. 0   Patient requires 3 or more hours of individualized nursing care per 8-hour shift (i.e. for ADLs, meds, therapeutic interventions). 0   TOTAL 5
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
## 2025-04-14 NOTE — PLAN OF CARE
Goal Outcome Evaluation:       Pt had a quiet night.Pt appeared asleep for 7 hours.No behavior or concerns noted this shift.Pt remains on 15 minutes safety checks.Will continue to monitor.

## 2025-04-14 NOTE — PLAN OF CARE
"  Rehab Group    Start time: 11:15  End time: 12:00  Patient time total: 25 minutes    attended partial group    #7 attended   Group Type: occupational therapy   Group Topic Covered: cognitive activities, coping skills, healthy leisure time, and social skills     Group Session Detail:  Patient engaged in group leisure activity (Family Feud) with the focus being: building interpersonal skills, encouraging team collaboration, and promoting overall prosocial engagement and interaction.      Patient Response/Contribution:  cooperative with task, organized, supportive of peers, socially appropriate, listened actively, attentive, and actively engaged     Patient Detail:  During check-in, patient reported a current leisure interest of \"Cribbage.\" Patient remained an engaged, attentive, and cooperative group attendee for the duration of time spent in group. Patient was pulled from group early; however, she did return of her own volition. Patient was observed working collaboratively with peers to reach a common goal. Patient was social with select peers and appeared to brighten slightly with social interaction. Patient utilized and responded appropriately to humor throughout group. Patient was able to complete mathematical calculations independently and successfully.       [id number] OT Group (2 or more in attendance)      Patient Active Problem List   Diagnosis    History of suicide attempt    Suicidal ideation    Headache, unspecified headache type    Attention deficit hyperactivity disorder (ADHD), combined type    Complex posttraumatic stress disorder    Borderline personality disorder (H)    Major depressive disorder, recurrent, mild    Paranoia (H)    Psychosis (H)    Aggression        "

## 2025-04-14 NOTE — PLAN OF CARE
"  Rehab Group    Start time: 13:15  End time: 14:00  Patient time total: 45 minutes    attended full group    #4 attended   Group Type: occupational therapy   Group Topic Covered: coping skills and relaxation      Group Session Detail:  Patient actively participated in a progressive muscle relaxation group in order to promote: positive relaxation and coping skills, encourage insight and self-reflection, promote a positive change, manage behaviors, and improve focus. In addition, patient was guided through proper deep breathing techniques and gentle full body movements/stretches to further promote relaxation.      Patient Response/Contribution:  listened actively and worked intermittently     Patient Detail:  Patient was unable to identify any activity she currently engages in which promotes relaxation/calm for her during check-in. Patient reported having a \"grainy film over both eyes\" which she reports is something new. Writer encouraged patient to inform nursing staff of symptom for continued monitoring; patient receptive. Patient appeared somewhat restless throughout group; observed pacing/standing intermittently throughout group. Patient's active participation was sporadic; appeared inattentive at times. Patient reported feeling anxious at the start of group and \"sleepy\" at the conclusion of group-which she reports is an \"improvement.\"      [id number] OT Group (2 or more in attendance)      Patient Active Problem List   Diagnosis    History of suicide attempt    Suicidal ideation    Headache, unspecified headache type    Attention deficit hyperactivity disorder (ADHD), combined type    Complex posttraumatic stress disorder    Borderline personality disorder (H)    Major depressive disorder, recurrent, mild    Paranoia (H)    Psychosis (H)    Aggression        "

## 2025-04-14 NOTE — PLAN OF CARE
Shift Summary ([id number])  Mental Health Status   Pt is alert and oriented x 4. Able to communicate needs. No acute events or safety concern this shift.  Mood is calm with flat affect. Cooperative and polite. No delusion, hallucination, manic or psychotic symptoms noted. Appears to be in touch with reality.   Pt denies SI, SIB, AH, VH, anxiety, depression, racing or intrusive thoughts.   Pt was visible in lounge isolative and socially withdrawn to self. Participated in therapeutic group activity.    Pt took scheduled medication ok with no problem. Tolerated medications well. No side effect reported or observed.  Possible discharge tomorrow.   Prn given: None  Physical Health Status   Moves around independently with no difficulties.   Hygiene is appropriate.   Appetite and fluid intake are adequate. Ate both breakfast and lunch.   Reported no problem with bowel and bladder.   Slept 6.75 hours last night per staff's report.   No c/o pain or discomfort. However c/o blurry vision. Provider updated. No new order at this time.  Today's Lab orders: None  Sitting /84 & pulse 86  Standing /87 & pulse 94  Prn given: None   Continue to monitor pt's status Q 15 minutes and stabilize the patient's symptoms with the use of medications and therapeutic programming.
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
## 2025-04-15 VITALS
SYSTOLIC BLOOD PRESSURE: 117 MMHG | TEMPERATURE: 97.6 F | DIASTOLIC BLOOD PRESSURE: 86 MMHG | HEART RATE: 89 BPM | RESPIRATION RATE: 18 BRPM | BODY MASS INDEX: 30.67 KG/M2 | OXYGEN SATURATION: 99 % | WEIGHT: 190 LBS

## 2025-04-15 PROCEDURE — 90832 PSYTX W PT 30 MINUTES: CPT

## 2025-04-15 PROCEDURE — 250N000013 HC RX MED GY IP 250 OP 250 PS 637: Performed by: NURSE PRACTITIONER

## 2025-04-15 PROCEDURE — 99238 HOSP IP/OBS DSCHRG MGMT 30/<: CPT | Performed by: NURSE PRACTITIONER

## 2025-04-15 RX ADMIN — TRAZODONE HYDROCHLORIDE 50 MG: 50 TABLET ORAL at 03:24

## 2025-04-15 RX ADMIN — ERGOCALCIFEROL 50000 UNITS: 1.25 CAPSULE, LIQUID FILLED ORAL at 13:42

## 2025-04-15 ASSESSMENT — ACTIVITIES OF DAILY LIVING (ADL)
ADLS_ACUITY_SCORE: 15
DRESS: INDEPENDENT
ADLS_ACUITY_SCORE: 15
ORAL_HYGIENE: INDEPENDENT
ADLS_ACUITY_SCORE: 15
HYGIENE/GROOMING: INDEPENDENT
ADLS_ACUITY_SCORE: 15
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
## 2025-04-15 NOTE — PLAN OF CARE
Goal Outcome Evaluation:      Problem: Sleep Disturbance  Goal: Adequate Sleep/Rest  4/15/2025 0627 by Donna Sewell RN  Outcome: Progressing    NOC Shift Report   (4/14/25 into 04/15/25)    Pt in bed at beginning of shift, breathing quiet and unlabored. Pt slept through shift. Pt slept 5.75 hours.     No pt complaints or concerns at this time.     No PRNs requested or given.     Will continue to monitor via Q15 minute safety checks.     Will continue to monitor and assess.

## 2025-04-15 NOTE — PLAN OF CARE
Shift Summary ([id number])  Mental Health Status   Waiting for paper documentation from court then possible discharge. No acute events or safety concern this shift. Mood is calm with flat affect.   Pt denies SI, SIB, AH, VH, anxiety, depression, racing or intrusive thoughts.   Pt was visible in lounge isolative and socially withdrawn to self. Participated in therapeutic group activity.    Pt took scheduled medication ok with no problem. Tolerated medications well. No side effect reported or observed.  Prn given: None   Physical Health Status   Moves around independently with no difficulties.   Hygiene is appropriate.   Appetite and fluid intake are adequate. Ate both breakfast and lunch.   Reported no problem with bowel and bladder.   Slept 5.75 hours last night per staff's report.   No c/o pain or discomfort.   Today's Lab orders: None   Sitting /84 & pulse 98  Standing /85 & pulse 105  Prn given: None   Continue to monitor pt's status Q 15 minutes and stabilize the patient's symptoms with the use of medications and therapeutic programming.

## 2025-04-15 NOTE — PLAN OF CARE
Problem: Anxiety Signs/Symptoms  Goal: Improved Mood Symptoms (Anxiety Signs/Symptoms)  Outcome: Met   Goal Outcome Evaluation:    Discharge Note    Pt left with all of her belongings and court papers. Pt denies anxiety, depression, suicidal and homocidal ideations.

## 2025-04-15 NOTE — PLAN OF CARE
"Individual Therapy Note      Date of Service: April 15, 2025    Patient: Felecia goes by \"Felecia,\" uses she/her pronouns    Individuals Present: Felecia & VENESSA Coates    Session start: 1502  Session end: 1523  Session duration in minutes: 21      Modality Used: Person Centered, Brief Therapy, and Solution Focused    Goals: Safety planning    Patient Description of current symptoms: \"I can always come back if I need to.\"      Mental Status Exam:   Attitude: cooperative  Eye Contact: good  Mood: depressed  Affect: mood congruent  Speech: clear, coherent  Psychomotor Behavior: no evidence of tardive dyskinesia, dystonia, or tics  Thought Process:  illogical  Associations: no loose associations  Thought Content:  paranoia, delusions   Insight: limited  Judgment: fair  Attention Span and Concentration: intact    Pt progress: Discharging today     Treatment Objective(s) Addressed:   The focus of this session was on safety planning     Progress Towards Goals and Assessment of Patient:   Lenore participated in the safety planning process. She presented with paranoid and delusional thought content. She identified hypervigilance and sleep disturbance as indicators of mental health crisis. She identified a couple of coping strategies for stress. She has social support in her ex-girlfriend whom she lives with. She reported no safety concerns upon discharge and is familiar with the [facility].     Therapeutic Intervention(s):   Provided active listening, unconditional positive regard, and validation. Engaged in safety planning identifying coping skills, warning signs, health support and resources.    Safety Plan: completed with patient    Plan/next step: Discharging today    49543 - Psychotherapy (with patient) - 30 (16-37*) min    Patient Active Problem List   Diagnosis    History of suicide attempt    Suicidal ideation    Headache, unspecified headache type    Attention deficit hyperactivity disorder " (ADHD), combined type    Complex posttraumatic stress disorder    Borderline personality disorder (H)    Major depressive disorder, recurrent, mild    Paranoia (H)    Psychosis (H)    Aggression

## 2025-04-15 NOTE — PLAN OF CARE
BEH IP Unit Acuity Rating Score (UARS)  Patient is given one point for every criteria they meet.    CRITERIA SCORING   On a 72 hour hold, court hold, committed, stay of commitment, or revocation. 1    Patient LOS on BEH unit exceeds 20 days. 0  LOS: 8   Patient under guardianship, 55+, otherwise medically complex, or under age 11. 0   Suicide ideation without relief of precipitating factors. 0   Current plan for suicide. 0   Current plan for homicide. 0   Imminent risk or actual attempt to seriously harm another without relief of factors precipitating the attempt. 0   Severe dysfunction in daily living (ex: complete neglect for self care, extreme disruption in vegetative function, extreme deterioration in social interactions). 1   Recent (last 7 days) or current physical aggression in the ED or on unit. 0   Restraints or seclusion episode in past 72 hours. 0   Recent (last 7 days) or current verbal aggression, agitation, yelling, etc., while in the ED or unit. 1   Active psychosis. 0   Need for constant or near constant redirection (from leaving, from others, etc).  0   Intrusive or disruptive behaviors. 0   Patient requires 3 or more hours of individualized nursing care per 8-hour shift (i.e. for ADLs, meds, therapeutic interventions). 0   TOTAL 3
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
## 2025-04-15 NOTE — PLAN OF CARE
"Goal Outcome Evaluation:    Plan of Care Reviewed With: patient          Pt and RN met in the pt's room. Pt presents as flat but brightens upon approach.  Pt denies any SI, HI, or SIB. Pt contracts for safety. Pt denies depression, hallucinations or delusions.  Pt endorse s\"high\" anxiety due to discharge tomorrow and requested PRN medication. Hydroxyzine was administered. Pt also received trazodone to help with sleep. Pt was withdrawn and isolative to her room for most of the shift. Pt is medication compliant. Appetite and fluid intake adequate. VSS. Pt denies acute physical pain. No medication side effects reported or observed this shift. Hygiene is adequate. No issues with bowel or bladder noted      PRNs Given during shift:   hydrOXYzine HCl (ATARAX) tablet 25 mg, 25 mg at 04/14/25 1933    traZODone (DESYREL) tablet 50 mg, 50 mg at 04/14/25 1933       Group Attendance: Did not have group this evening  Pain: 0/10     /81 (BP Location: Left arm)   Pulse 95   Temp 99.1  F (37.3  C) (Oral)   Resp 18   Wt 85.9 kg (189 lb 4.8 oz)   LMP 02/09/2025 (Exact Date)   SpO2 98%   BMI 30.55 kg/m         "

## 2025-04-16 ENCOUNTER — TELEPHONE (OUTPATIENT)
Dept: BEHAVIORAL HEALTH | Facility: CLINIC | Age: 30
End: 2025-04-16
Payer: COMMERCIAL

## 2025-04-16 ENCOUNTER — MYC MEDICAL ADVICE (OUTPATIENT)
Dept: PSYCHIATRY | Facility: CLINIC | Age: 30
End: 2025-04-16
Payer: COMMERCIAL

## 2025-04-16 NOTE — TELEPHONE ENCOUNTER
Writer spoke with pt and explained TC services. Pt stated they were going back to ED as unable to sleep. Writer postponed referral for check in tomorrow to schedule TC psychiatry if needed.    Jackelyn Herring  04/16/2025  202

## 2025-04-16 NOTE — TELEPHONE ENCOUNTER
----- Message from Kalani Morse sent at 4/14/2025 12:24 PM CDT -----  Transition Clinic Referral   [place]       Please Check Type of Referral Requested:     ___x_MEDICATION:   Referrals for Medication are ONLY accepted from the following areas (select): Inpatient Mental Health Unit - HIGH PRIORITY                                      Suboxone and Opioid Management Referrals are automatically denied.   TC Psychiatry cannot see patient without active medical insurance.   Next level of psychiatry care must be within 12 months.  TC Psychiatry cannot accept patient with next level of care scheduled with PCP.  The transition clinic cannot follow patients who are on a restricted recipient program.    Reason for Transition Clinic Referral: Discharging from inpatient psychiatry, placed on a stayed order for commitment, needs follow-up before next outpatient appointment (only given a 30 day supply of medications at time of discharge and appointment is further than 30 days out)    Next Level of Care Patient Will Be Transitioned To: Home with outpatient providers  Provider(s) ANAYELI Marina [facility] [place]  Date/Time Monday, May 19 at 9am via telehealth    What Would Be Helpful from the Transition Clinic: Adjust medications as needed and provide refill     Requesting to schedule in collaboration with patient (via Teams): No - please connect directly with Felecia after discharge.      Needs:NO    Does Patient Have Access to Technology: Yes    Patient E-mail Address: [email or web address]    Current Patient Phone Number: [phone]    Clinician Gender Preference (if applicable): NO    Patient location preference:Virtual    CALVIN Figueroa    (Master Form: Updated 11/28/2023)

## 2025-04-17 NOTE — TELEPHONE ENCOUNTER
Unfortunately, I am no longer seeing Felecia and have not since 9/13/2024 when referred to long term psychiatry.  She has an upcoming appointment with Cyndi Johnson in May.  Can we have her seen at the Transition Clinic in the interim?  She was recently in the psychiatric unit at [facility].      Thank you!     Clementine Myles, DNP, APRN, FMHNP-BC,

## 2025-04-17 NOTE — TELEPHONE ENCOUNTER
Second attempt at reaching patient. Left message asking for a return call to schedule with the TC. MyChart message sent to patient. Referral will be closed.    Brandie Jackson  Transition Clinic Coordinator  04/17/25 8:03 AM

## 2025-04-20 ENCOUNTER — HOSPITAL ENCOUNTER (EMERGENCY)
Facility: CLINIC | Age: 30
Discharge: HOME | End: 2025-04-21
Attending: EMERGENCY MEDICINE | Admitting: EMERGENCY MEDICINE
Payer: COMMERCIAL

## 2025-04-20 DIAGNOSIS — F43.10 COMPLEX POSTTRAUMATIC STRESS DISORDER: ICD-10-CM

## 2025-04-20 DIAGNOSIS — R45.851 SUICIDAL IDEATION: ICD-10-CM

## 2025-04-20 DIAGNOSIS — F33.1 MAJOR DEPRESSIVE DISORDER, RECURRENT EPISODE, MODERATE (H): Primary | ICD-10-CM

## 2025-04-20 DIAGNOSIS — G47.00 INSOMNIA, UNSPECIFIED TYPE: ICD-10-CM

## 2025-04-20 DIAGNOSIS — F60.3 BORDERLINE PERSONALITY DISORDER (H): ICD-10-CM

## 2025-04-20 PROCEDURE — 99283 EMERGENCY DEPT VISIT LOW MDM: CPT

## 2025-04-20 ASSESSMENT — COLUMBIA-SUICIDE SEVERITY RATING SCALE - C-SSRS
5. HAVE YOU STARTED TO WORK OUT OR WORKED OUT THE DETAILS OF HOW TO KILL YOURSELF? DO YOU INTEND TO CARRY OUT THIS PLAN?: NO
4. HAVE YOU HAD THESE THOUGHTS AND HAD SOME INTENTION OF ACTING ON THEM?: YES
6. HAVE YOU EVER DONE ANYTHING, STARTED TO DO ANYTHING, OR PREPARED TO DO ANYTHING TO END YOUR LIFE?: YES
1. IN THE PAST MONTH, HAVE YOU WISHED YOU WERE DEAD OR WISHED YOU COULD GO TO SLEEP AND NOT WAKE UP?: YES
3. HAVE YOU BEEN THINKING ABOUT HOW YOU MIGHT KILL YOURSELF?: YES
2. HAVE YOU ACTUALLY HAD ANY THOUGHTS OF KILLING YOURSELF IN THE PAST MONTH?: YES

## 2025-04-20 ASSESSMENT — ACTIVITIES OF DAILY LIVING (ADL)
ADLS_ACUITY_SCORE: 41
ADLS_ACUITY_SCORE: 41

## 2025-04-21 ENCOUNTER — MYC MEDICAL ADVICE (OUTPATIENT)
Dept: BEHAVIORAL HEALTH | Facility: CLINIC | Age: 30
End: 2025-04-21

## 2025-04-21 ENCOUNTER — TELEPHONE (OUTPATIENT)
Dept: BEHAVIORAL HEALTH | Facility: CLINIC | Age: 30
End: 2025-04-21

## 2025-04-21 VITALS
OXYGEN SATURATION: 98 % | DIASTOLIC BLOOD PRESSURE: 97 MMHG | RESPIRATION RATE: 16 BRPM | TEMPERATURE: 97.8 F | SYSTOLIC BLOOD PRESSURE: 146 MMHG | HEART RATE: 93 BPM

## 2025-04-21 PROBLEM — F33.1 MAJOR DEPRESSIVE DISORDER, RECURRENT EPISODE, MODERATE (H): Status: ACTIVE | Noted: 2025-04-21

## 2025-04-21 LAB
AMPHETAMINES UR QL SCN: ABNORMAL
ANION GAP SERPL CALCULATED.3IONS-SCNC: 12 MMOL/L (ref 7–15)
BARBITURATES UR QL SCN: ABNORMAL
BASOPHILS # BLD AUTO: 0 10E3/UL (ref 0–0.2)
BASOPHILS NFR BLD AUTO: 0 %
BENZODIAZ UR QL SCN: ABNORMAL
BUN SERPL-MCNC: 7.5 MG/DL (ref 6–20)
BZE UR QL SCN: ABNORMAL
CALCIUM SERPL-MCNC: 9.4 MG/DL (ref 8.8–10.4)
CANNABINOIDS UR QL SCN: ABNORMAL
CHLORIDE SERPL-SCNC: 101 MMOL/L (ref 98–107)
CREAT SERPL-MCNC: 0.68 MG/DL (ref 0.51–0.95)
EGFRCR SERPLBLD CKD-EPI 2021: >90 ML/MIN/1.73M2
EOSINOPHIL # BLD AUTO: 0 10E3/UL (ref 0–0.7)
EOSINOPHIL NFR BLD AUTO: 0 %
ERYTHROCYTE [DISTWIDTH] IN BLOOD BY AUTOMATED COUNT: 12.7 % (ref 10–15)
FENTANYL UR QL: ABNORMAL
GLUCOSE SERPL-MCNC: 111 MG/DL (ref 70–99)
HCG SERPL QL: NEGATIVE
HCO3 SERPL-SCNC: 27 MMOL/L (ref 22–29)
HCT VFR BLD AUTO: 32.8 % (ref 35–47)
HGB BLD-MCNC: 11.1 G/DL (ref 11.7–15.7)
IMM GRANULOCYTES # BLD: 0 10E3/UL
IMM GRANULOCYTES NFR BLD: 0 %
LYMPHOCYTES # BLD AUTO: 1.2 10E3/UL (ref 0.8–5.3)
LYMPHOCYTES NFR BLD AUTO: 18 %
MAGNESIUM SERPL-MCNC: 1.6 MG/DL (ref 1.7–2.3)
MCH RBC QN AUTO: 29.4 PG (ref 26.5–33)
MCHC RBC AUTO-ENTMCNC: 33.8 G/DL (ref 31.5–36.5)
MCV RBC AUTO: 87 FL (ref 78–100)
MONOCYTES # BLD AUTO: 0.7 10E3/UL (ref 0–1.3)
MONOCYTES NFR BLD AUTO: 10 %
NEUTROPHILS # BLD AUTO: 4.9 10E3/UL (ref 1.6–8.3)
NEUTROPHILS NFR BLD AUTO: 72 %
NRBC # BLD AUTO: 0 10E3/UL
NRBC BLD AUTO-RTO: 0 /100
OPIATES UR QL SCN: ABNORMAL
PCP QUAL URINE (ROCHE): ABNORMAL
PLATELET # BLD AUTO: 188 10E3/UL (ref 150–450)
POTASSIUM SERPL-SCNC: 3.2 MMOL/L (ref 3.4–5.3)
RBC # BLD AUTO: 3.78 10E6/UL (ref 3.8–5.2)
SODIUM SERPL-SCNC: 140 MMOL/L (ref 135–145)
WBC # BLD AUTO: 6.9 10E3/UL (ref 4–11)

## 2025-04-21 PROCEDURE — 80048 BASIC METABOLIC PNL TOTAL CA: CPT | Performed by: EMERGENCY MEDICINE

## 2025-04-21 PROCEDURE — 36415 COLL VENOUS BLD VENIPUNCTURE: CPT | Performed by: EMERGENCY MEDICINE

## 2025-04-21 PROCEDURE — 85004 AUTOMATED DIFF WBC COUNT: CPT | Performed by: EMERGENCY MEDICINE

## 2025-04-21 PROCEDURE — 250N000013 HC RX MED GY IP 250 OP 250 PS 637: Performed by: EMERGENCY MEDICINE

## 2025-04-21 PROCEDURE — 83735 ASSAY OF MAGNESIUM: CPT | Performed by: EMERGENCY MEDICINE

## 2025-04-21 PROCEDURE — 80307 DRUG TEST PRSMV CHEM ANLYZR: CPT | Performed by: EMERGENCY MEDICINE

## 2025-04-21 PROCEDURE — 84703 CHORIONIC GONADOTROPIN ASSAY: CPT | Performed by: EMERGENCY MEDICINE

## 2025-04-21 RX ORDER — HALOPERIDOL 5 MG/1
10 TABLET ORAL AT BEDTIME
Status: DISCONTINUED | OUTPATIENT
Start: 2025-04-21 | End: 2025-04-21 | Stop reason: HOSPADM

## 2025-04-21 RX ORDER — OLANZAPINE 10 MG/1
10 TABLET, ORALLY DISINTEGRATING ORAL 2 TIMES DAILY PRN
Status: DISCONTINUED | OUTPATIENT
Start: 2025-04-21 | End: 2025-04-21 | Stop reason: HOSPADM

## 2025-04-21 RX ORDER — IBUPROFEN 600 MG/1
600 TABLET, FILM COATED ORAL ONCE
Status: COMPLETED | OUTPATIENT
Start: 2025-04-21 | End: 2025-04-21

## 2025-04-21 RX ADMIN — HALOPERIDOL 10 MG: 5 TABLET ORAL at 01:41

## 2025-04-21 RX ADMIN — IBUPROFEN 600 MG: 600 TABLET ORAL at 08:59

## 2025-04-21 RX ADMIN — OLANZAPINE 10 MG: 10 TABLET, ORALLY DISINTEGRATING ORAL at 08:59

## 2025-04-21 ASSESSMENT — ACTIVITIES OF DAILY LIVING (ADL)
ADLS_ACUITY_SCORE: 41

## 2025-04-21 ASSESSMENT — COLUMBIA-SUICIDE SEVERITY RATING SCALE - C-SSRS
TOTAL  NUMBER OF INTERRUPTED ATTEMPTS SINCE LAST CONTACT: NO
6. HAVE YOU EVER DONE ANYTHING, STARTED TO DO ANYTHING, OR PREPARED TO DO ANYTHING TO END YOUR LIFE?: NO
TOTAL  NUMBER OF ABORTED OR SELF INTERRUPTED ATTEMPTS SINCE LAST CONTACT: NO
SUICIDE, SINCE LAST CONTACT: NO
2. HAVE YOU ACTUALLY HAD ANY THOUGHTS OF KILLING YOURSELF?: NO
REASONS FOR IDEATION SINCE LAST CONTACT: COMPLETELY TO END OR STOP THE PAIN (YOU COULDN'T GO ON LIVING WITH THE PAIN OR HOW YOU WERE FEELING)
ATTEMPT SINCE LAST CONTACT: NO
1. SINCE LAST CONTACT, HAVE YOU WISHED YOU WERE DEAD OR WISHED YOU COULD GO TO SLEEP AND NOT WAKE UP?: YES

## 2025-04-21 NOTE — ED PROVIDER NOTES
Patient signed out to me at 6 AM this morning.  Was notified by the DEC assessment team that patient has been seen by mental health provider this morning and the DEC team feels she can be safely discharged home with outpatient resources.  I am in agreement with that plan.     Samuel Garrett MD  04/21/25 110

## 2025-04-21 NOTE — CONSULTS
Initial Psychiatric Consult   Consult date: April 21, 2025         Reason for Consult, requesting source:      Requesting source: Samuel Garrett    Labs and imaging reviewed. Patient seen and evaluated by Paulo Kennedy PA-C          HPI:   Patient presented to ED reporting SI and insomnia.    Last discharged from inpatient psychiatry 4/15/25 after stabilizing on haldol.     Presented to ED asking for help with sleep. Minimally engaged with DEC assessment. Asserts that she has been taking her medication as prescribed.   Was documented sleeping well with medication prior to discharge. Received haldol here last night, and slept ok.   Notes chronic SI without specific plan to harm self.     Appears markedly more organized today than on last interaction with writer.         Past Psychiatric History:           Substance Use and History:           Past Medical History:   PAST MEDICAL HISTORY:   Past Medical History:   Diagnosis Date    Asthma     Bipolar affective disorder (H)     Depressive disorder     Outbursts of anger     Schizophrenia (H)        PAST SURGICAL HISTORY:   Past Surgical History:   Procedure Laterality Date    ENT SURGERY  2005    Unknown object in ear             Family History:   FAMILY HISTORY:   Family History   Problem Relation Age of Onset    Mental Illness Mother     Paranoid behavior Mother     Other Cancer Mother         Bio-mother, surgery on stomach cancer    No Known Problems Father        Family Psychiatric History:         Social History:   SOCIAL HISTORY:   Social History     Tobacco Use    Smoking status: Passive Smoke Exposure - Never Smoker    Smokeless tobacco: Never   Substance Use Topics    Alcohol use: Not Currently     Comment: Alcohol allergy                Physical ROS:   The 10 point Review of Systems is negative other than noted in the HPI or here.           Medications:     Current Facility-Administered Medications   Medication Dose Route Frequency Provider Last Rate  Last Admin    haloperidol (HALDOL) tablet 10 mg  10 mg Oral At Bedtime Christo Herndon MD   10 mg at 04/21/25 0141              Allergies:     Allergies   Allergen Reactions    Alcohol Hives    Kiwi Hives    Red Dye #40 (Allura Red)     Seasonal Allergies Other (See Comments) and Rash     sneezing          Labs:     Recent Results (from the past 48 hours)   Basic metabolic panel    Collection Time: 04/21/25  1:33 AM   Result Value Ref Range    Sodium 140 135 - 145 mmol/L    Potassium 3.2 (L) 3.4 - 5.3 mmol/L    Chloride 101 98 - 107 mmol/L    Carbon Dioxide (CO2) 27 22 - 29 mmol/L    Anion Gap 12 7 - 15 mmol/L    Urea Nitrogen 7.5 6.0 - 20.0 mg/dL    Creatinine 0.68 0.51 - 0.95 mg/dL    GFR Estimate >90 >60 mL/min/1.73m2    Calcium 9.4 8.8 - 10.4 mg/dL    Glucose 111 (H) 70 - 99 mg/dL   Magnesium    Collection Time: 04/21/25  1:33 AM   Result Value Ref Range    Magnesium 1.6 (L) 1.7 - 2.3 mg/dL   HCG qualitative Blood    Collection Time: 04/21/25  1:33 AM   Result Value Ref Range    hCG Serum Qualitative Negative Negative   CBC with platelets and differential    Collection Time: 04/21/25  1:33 AM   Result Value Ref Range    WBC Count 6.9 4.0 - 11.0 10e3/uL    RBC Count 3.78 (L) 3.80 - 5.20 10e6/uL    Hemoglobin 11.1 (L) 11.7 - 15.7 g/dL    Hematocrit 32.8 (L) 35.0 - 47.0 %    MCV 87 78 - 100 fL    MCH 29.4 26.5 - 33.0 pg    MCHC 33.8 31.5 - 36.5 g/dL    RDW 12.7 10.0 - 15.0 %    Platelet Count 188 150 - 450 10e3/uL    % Neutrophils 72 %    % Lymphocytes 18 %    % Monocytes 10 %    % Eosinophils 0 %    % Basophils 0 %    % Immature Granulocytes 0 %    NRBCs per 100 WBC 0 <1 /100    Absolute Neutrophils 4.9 1.6 - 8.3 10e3/uL    Absolute Lymphocytes 1.2 0.8 - 5.3 10e3/uL    Absolute Monocytes 0.7 0.0 - 1.3 10e3/uL    Absolute Eosinophils 0.0 0.0 - 0.7 10e3/uL    Absolute Basophils 0.0 0.0 - 0.2 10e3/uL    Absolute Immature Granulocytes 0.0 <=0.4 10e3/uL    Absolute NRBCs 0.0 10e3/uL   Urine Drug Screen Panel     Collection Time: 04/21/25  1:55 AM   Result Value Ref Range    Amphetamines Urine Screen Positive (A) Screen Negative    Barbituates Urine Screen Negative Screen Negative    Benzodiazepine Urine Screen Negative Screen Negative    Cannabinoids Urine Screen Positive (A) Screen Negative    Cocaine Urine Screen Negative Screen Negative    Fentanyl Qual Urine Screen Negative Screen Negative    Opiates Urine Screen Negative Screen Negative    PCP Urine Screen Negative Screen Negative          Physical and Psychiatric Examination:     BP (!) 155/97   Pulse 93   Temp 97.8  F (36.6  C) (Temporal)   Resp 16   LMP 02/09/2025 (Exact Date)   SpO2 99%   Weight is 0 lbs 0 oz  There is no height or weight on file to calculate BMI.    Physical Exam:  I have reviewed the physical exam as documented by by the medical team and agree with findings and assessment and have no additional findings to add at this time.    Mental Status Exam:    Appearance: awake, alert  Attitude:  cooperative  Eye Contact:  fair  Mood:   Neutral  Affect:  mood congruent  Speech:  clear, coherent  Language: Fluent in english   Psychomotor Behavior:  no evidence of tardive dyskinesia, dystonia, or tics  Thought Process:  goal oriented  Associations:  no loose associations  Thought Content:  passive suicidal ideation present  Insight:  fair  Judgement:  fair  Oriented to:  time, person, and place  Attention Span and Concentration:  fair  Recent and Remote Memory:  fair  Fund of Knowledge: Appropriate   Gait and Station:                DSM-5 Diagnosis:   schizophrenia          Assessment:   Patient denies current SI HI. Her sleep has improved with haldol. Question medicaiton adherence at home since it seemed effective here last night.   Patient is slow to respond to questions, but is not grossly psychotic or disorganized.     Repeat admission to inpatient psychiatry is unlikely to provide substanial benefit. Patient is accepting of encouragement to continue  taking her prescribed antipsychotics.     Agreeable to PHP/IOP when encouraged by DEC           Summary of Recommendations:   Take medication as prescribed  PHP/IOP  Discharge from ED.       Paulo Kennedy PA-C

## 2025-04-21 NOTE — TELEPHONE ENCOUNTER
----- Message from Giles HITCHCOCK sent at 4/21/2025 10:52 AM CDT -----  Regarding: TC Psychiatry  Transition Clinic Referral   [place]       Please Check Type of Referral Requested:       ___X_MEDICATION:   Referrals for Medication are ONLY accepted from the following areas (select): [facility] - HIGH PRIORITY    Referring Provider Contact Name: Juan A ESPARZA; Phone Number: [phone]    Reason for Transition Clinic Referral: Bridge gap in ED discharge to next [place] psychiatry appointment    Next Level of Care Patient Will Be Transitioned To: outpatient  Provider(s)[place]  Date/Time 5/19/2025    What Would Be Helpful from the Transition Clinic: Bridge gap in ED discharge to next [place] psychiatry appointment    Requesting to schedule in collaboration with patient (via Teams): No      Needs:NO    Does Patient Have Access to Technology: Yes    Patient E-mail Address: [email or web address]    Current Patient Phone Number: [phone];     Clinician Gender Preference (if applicable): YES: Female    Patient location preference:[place]    (Master Form: Updated 11/28/2023)

## 2025-04-21 NOTE — ED NOTES
Pt reporting left leg pain. CMS intact. Pedal pulses palpable. RN notified Dr. Garrett. Per MARI MAY to order and administer oral ibuprofen. See MAR.

## 2025-04-21 NOTE — ED NOTES
Pt provided with bear hugger and extra blanket. Pt thanked nurse. Pt resting in recliner with eyes closed. Pt's respirations are even and unlabored.     DISCHARGE
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
## 2025-04-21 NOTE — PHARMACY-ADMISSION MEDICATION HISTORY
Pharmacist Admission Medication History    Admission medication history is complete. The information provided in this note is only as accurate as the sources available at the time of the update.    Information Source(s): Patient and CareEverywhere/SureScripts via in-person    Medication History Completed By: Shayy Hopper RPH 4/21/2025 8:16 AM    PTA Med List   Medication Sig Last Dose/Taking    albuterol (PROAIR HFA/PROVENTIL HFA/VENTOLIN HFA) 108 (90 Base) MCG/ACT inhaler Inhale 2 puffs into the lungs every 6 hours as needed for shortness of breath, wheezing or cough Taking As Needed    haloperidol (HALDOL) 10 MG tablet Take 1 tablet (10 mg) by mouth at bedtime. 4/20/2025 at  9:00 PM    vitamin D2 (ERGOCALCIFEROL) 03624 units (1250 mcg) capsule Take 1 capsule (50,000 Units) by mouth every 7 days. 4/20/2025   Ashleigh PinedaD

## 2025-04-21 NOTE — ED PROVIDER NOTES
Emergency Department Note      History of Present Illness     Chief Complaint   Insomnia      HPI   Lenore Suggs is a 29 year old female with a history of insomnia, schizophrenia who presents for insomnia. The patient has not slept in the past 5 days, she has been taking the Haldol at bedtime, around 2100 tonight, with no relief. She last saw her psychiatrist when she was in the hospital. She denies any alcohol or drug use. She lives at home with her friend. She denies any cough, fever, vomiting, hallucinations.     Independent Historian   None    Review of External Notes   I reviewed the note from 4/15/2025 where the patient was seen of psychosis.     Past Medical History     Medical History and Problem List   Asthma   Bipolar affective disorder   Depression   Schizophrenia   Insomnia   Suicidal ideation   ADHD  Psychosis   Paranoia   PTSD    Medications   Albuterol   Haldol     Surgical History   Tonsillectomy   Tympanostomy     Physical Exam     Patient Vitals for the past 24 hrs:   BP Temp Temp src Pulse Resp SpO2   04/20/25 2151 (!) 149/102 97.8  F (36.6  C) Temporal 76 18 98 %     Physical Exam  General: Does not appear in acute distress  Head: No signs of trauma.   CV: Normal rate and regular rhythm.    Resp: Effort normal and breath sounds normal. No respiratory distress.   MSK: Normal range of motion. no edema. No Calf tenderness.  Neuro: The patient is alert and oriented. Speech normal.  Skin: Skin is warm and dry. No rash noted.   Psych: Calm, but either pacing in the room or squatting on the floor      Diagnostics     Lab Results   Labs Ordered and Resulted from Time of ED Arrival to Time of ED Departure   BASIC METABOLIC PANEL - Abnormal       Result Value    Sodium 140      Potassium 3.2 (*)     Chloride 101      Carbon Dioxide (CO2) 27      Anion Gap 12      Urea Nitrogen 7.5      Creatinine 0.68      GFR Estimate >90      Calcium 9.4      Glucose 111 (*)    MAGNESIUM - Abnormal    Magnesium  1.6 (*)    CBC WITH PLATELETS AND DIFFERENTIAL - Abnormal    WBC Count 6.9      RBC Count 3.78 (*)     Hemoglobin 11.1 (*)     Hematocrit 32.8 (*)     MCV 87      MCH 29.4      MCHC 33.8      RDW 12.7      Platelet Count 188      % Neutrophils 72      % Lymphocytes 18      % Monocytes 10      % Eosinophils 0      % Basophils 0      % Immature Granulocytes 0      NRBCs per 100 WBC 0      Absolute Neutrophils 4.9      Absolute Lymphocytes 1.2      Absolute Monocytes 0.7      Absolute Eosinophils 0.0      Absolute Basophils 0.0      Absolute Immature Granulocytes 0.0      Absolute NRBCs 0.0     URINE DRUG SCREEN PANEL - Abnormal    Amphetamines Urine Screen Positive (*)     Barbituates Urine Screen Negative      Benzodiazepine Urine Screen Negative      Cannabinoids Urine Screen Positive (*)     Cocaine Urine Screen Negative      Fentanyl Qual Urine Screen Negative      Opiates Urine Screen Negative      PCP Urine Screen Negative     HCG QUALITATIVE PREGNANCY - Normal    hCG Serum Qualitative Negative         Imaging   No orders to display       ED Course      Medications Administered   Medications   haloperidol (HALDOL) tablet 10 mg (10 mg Oral $Given 4/21/25 0141)   OLANZapine zydis (zyPREXA) ODT tab 10 mg (has no administration in time range)       Procedures   Procedures     Discussion of Management   DEC regarding mental health concerns    ED Course   ED Course as of 04/21/25 0253   Sun Apr 20, 2025   1810 I initially assessed the patient and obtained the above history and physical exam.      Additional Documentation  Social Determinants of Health: Stress/Adjustment Disordersincluding chronic SI and insomnia, exacerbating mental health concerns.      Medical Decision Making / Diagnosis     CMS Diagnoses: None    MIPS       None    MDM   Lenore Suggs is a 29 year old female presents due to not being able to sleep.  She reports that since being discharged from psychiatric hospital she has been taking her  Haldol overnight, but she has not been able to sleep over the last 5 days.  My evaluation the patient had a very flat affect and was pacing about the room or squatting on the floor.  I did have DEC speak with the patient, and we both had concerns regarding the patient's overall affect and interactions.  She did discuss suicidal ideation.  At this time, it is felt the patient would benefit from inpatient psychiatric care for further assessment and stabilization.  Apparently the patient has a history of disruptive behavior [facility], and is not felt that she would be a good fit there at this time.  Patient is voluntary at this time.    Disposition   The patient will board in the emergency department pending bed placement. Care was signed out to Dr. Garrett pending psych placement.     Diagnosis     ICD-10-CM    1. Insomnia, unspecified type  G47.00       2. Suicidal ideation  R45.851            Discharge Medications   New Prescriptions    No medications on file     Scribe Disclosure:  I, John Pinedo, am serving as a scribe at 10:42 PM on 4/20/2025 to document services personally performed by Christo Herndon MD based on my observations and the provider's statements to me.        Christo Herndon MD  04/21/25 6294

## 2025-04-21 NOTE — TELEPHONE ENCOUNTER
Writer called ED to check if patient in unit. Pt had discharged. Writer called pts and left VM. My chart message sent. Postponed for another attempt.    Jackelyn Herring  04/21/2025  1131  --------------------------------------------------------  [facility]  Transition Clinic Referral  [place]      Please Check Type of Referral Requested:      ___X_MEDICATION:   Referrals for Medication are ONLY accepted from the following areas (select): [facility] PRIOR[facility]    Referring Provider Contact Name: Juan A ESPARZA; Phone Number: [phone]    Reason for Transition Clinic Referral: Bridge gap in ED discharge to next [facility] psychiatry appointment    Next Level of Care Patient Will Be Transitioned To: outpatient  Provider(s)[facility]  Date/Time 5/19/2025    What Would Be Helpful from the Transition Clinic: Bridge gap in ED discharge to next [facility] psychiatry appointment    Requesting to schedule in collaboration with patient (via Teams): No     Needs:NO    Does Patient Have Access to Technology: Yes    Patient E-mail Address: [email or web address]    Current Patient Phone Number: [phone];    Clinician Gender Preference (if applicable): YES: Female    Patient location preference:[facility]    (Master Form: Updated 11/28/2023)
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
## 2025-04-21 NOTE — DISCHARGE INSTRUCTIONS
Aftercare Plan  If I am feeling unsafe or I am in a crisis, I will:   Contact my established care providers   Call the National Suicide Prevention Lifeline: 988  Go to the nearest emergency room   Call 911       Patient Navigation Hub - Scheduled Appointment  You have been scheduled a telephone appointment with the Mental Health and Addiction Services Patient Navigation Hub. As a reminder, this is not an in-person appointment. A Navigator will contact you at your personal telephone number on 4/22/2025 at 10AM. You can expect a 15-30 minute appointment. You will discuss programming options and be assisted in next steps. If you have any further questions or concerns, please contact the Patient Navigation Hub at [phone]. Note: You will not be charged for this telephone appointment.    Our Navigators work to be your point-of-contact for trustworthy and compassionate care from Emergency Services to [facility] Programmatic Care. We will provide resources and communication to help guide you into programmatic care, other internal resources (I.e., Transition Clinic), and/or community programs. Ultimately, our goal is to be the one-stop-shop of communication, coordination, and support for you.    Phone: [phone]  Email: [email or web address]  Fax: [phone]    [facility] Programmatic Care  The following is a list of our programming options that may fit your next steps:    Programs  Mental Health  Intensive Outpatient Program (IOP)  Partial Hospitalization Program (PHP)  Day Treatment  Substance Use Disorder  Intensive Outpatient Program  Outpatient Group  Mental Health & Substance Use Disorder  Co-Occurring Intensive Outpatient Program  Residential  Available Locations  [facility], [facility], [place], [place], [place]  Note: Specific program options vary by location.    Transition Clinic  After leaving Emergency Services, it may take up to 30  days to enter a program. Knowing support is important during this period of time, we offer an urgent model of mental health care via the Transition Clinic. We encourage you to discuss this with your Navigator during your telephone appointment.    FAQ  What can I expect after leaving Emergency Services?  If not already, you will soon be contacted by a Navigator who will work with you to find a program that fits your needs. If you desire to enter one of our [facility] programs, you will enter our programmatic care intake where an assessment will likely be needed over telephone, virtually, or in-person. After this assessment, a Navigator will connect with you again to provide a handoff to the specific program for next steps.   Please note that it may take up to 30 days for you to begin a program. If an extended wait occurs, please consider services at the Transition Clinic.  What is programmatic care?  It is a highly structured and comprehensive approach designed to address mental health and substance use disorders.   Programmatic care is typically used when individuals have not responded well to less intensive treatments or when they require a higher level of intervention due to the severity of their condition. It can be found in various settings, such as an outpatient location, residential treatment facilities, or inpatient hospitals.  Each program varies in duration and weekly commitments. Ask a Navigator for more program details.    ----------------------------------------------------------------------------------------------------------------------------------------------------------------    Transition Clinic Psychiatry   A referral was placed for a transition clinic psychiatry appointment on your behalf to follow up with before your next appointment with Cyndi Johnson at [place]. You can follow up on the status of this referral by calling  [phone]    ----------------------------------------------------------------------------------------------------------------------------------------------------------------    Type: Teletherapy  Date: Wednesday, 4/23/2025  Time: 7:00 am - 8:00 am  Provider: Claire Rick  [facility]  Location: [place] [address]  Phone: [phone]  Patient instructions: Greetings! Once we receive the referral from [facility], our scheduling department will call you to confirm the appointment and send out intake paperwork of which will need to be completed prior to the appointment. We look forward to working with you.  ________________    [facility] Crisis Line Number: [phone]  [facility] Crisis Line Number: [phone]  ________________    Grounding Techniques  Grounding is a practice that can help you pull away from flashbacks, unwanted memories, and negative or challenging emotions.   These techniques may help distract you from what you are experiencing and refocus on what s happening in the present moment. You can use grounding techniques to help create space from distressing feelings in nearly any situation but they are especially helpful if you are dealing with: anxiety, PTSD, dissocation, self-harm urges, traumatic memories, and substance use disorders.        or touch items near you   Are the things you touch soft or hard? Heavy or light? Warm or cool? Focus on the texture and color of each item.  Challenge yourself to think of specific colors rather than general colors.      5-4-3-2-1 senses   Working backward from 5, use your sense to list things you notice around you.  For example, you might start by listing five things you hear, four things you see, three things you can touch, two things you can smell, and one thing you taste. Make an effort to notice the little things you might not always pay attention to, such as the color of the flecks in the  carpet or the hum of a computer.      Savor a food or drink   Take small bites or sips of a food or beverage you enjoy, letting yourself fully taste each bite.  Think about how it tastes and smells and the flavors that linger on your tongue.     Hold a piece of ice   What does it feel like at first? How long does it take to start melting? How does the sensation change when the ice begins to melt?      Move your body   Do a few exercises or stretches.  You could try jumping jacks, jogging in place, or stretching. Pat attention to how your body feels with each movement and when your hands or feet touch the floor or move through the air.  How does the floor feel against your feet and hands? If you jump, listen to the sound of your feet hitting the floor or arms moving through the air.      Feel your body   You can do this sitting or standing.  Focus on how your body feels from head to toe, noticing each part.  Can you feel your hair on your shoulders or forehead? Glasses on your nose or ears?   The weight of your shirt on your shoulders?  Do your arms feel loose or stiff at your sides? Can you feel your heartbeat- rapid or steady? Does your stomach feel full, are you hungry?     Think in categories   Choose one or two broad categories, such as  musical instruments ,  ice cream flavors ,  mammals , or  baseball teams.  Take a minute or two to mentally list as many things as you can from the chosen category.       Make yourself laugh   Make up a silly joke or find some on the internet. You might also make yourself laugh by watching your favorite funny animal video, a clip from a  or TV show, or anything you know that will make you laugh.      Anchoring phrases   This might be something like,  I am (full name), I am (age), I live in (city), (state).  Today is (date). The time is (time). I am sitting in/at (object). You can expand on the phrase by adding details until you feel calm.  Weather outside, clothes you  are wearing, people are you going to see, what you are eating/drinking.      Practice self-kindness   Repeat kind, compassionate phrases to yourself:  you are having a tough time, but you will make it through; you are strong and you can move through this depression; you are trying hard and you are doing your best.        Additional Information  Today you were seen by a licensed mental health professional through Triage and Transition services, [facility] ([facility])  for a crisis assessment in the Emergency Department at [facility].  It is recommended that you follow up with your established providers (psychiatrist, mental health therapist, and/or primary care doctor - as relevant) as soon as possible. Coordinators from [facility] will be calling you in the next 24-48 hours to ensure that you have the resources you need.  You can also contact [facility] coordinators directly at [phone]. You may have been scheduled for or offered an appointment with a mental health provider. [facility] maintains an extensive network of licensed behavioral health providers to connect patients with the services they need.  We do not charge providers a fee to participate in our referral network.  We match patients with providers based on a patient's specific needs, insurance coverage, and location.  Our first effort will be to refer you to a provider within your care system, and will utilize providers outside your care system as needed.

## 2025-04-21 NOTE — ED TRIAGE NOTES
Pt presents to triage with C/O insomnia; pt states its been 5 nights since she was able to sleep. Pt also endorses some passive SI, no plan. Pt made verbal contract for safety with writer.      Triage Assessment (Adult)       Row Name 04/20/25 6401          Triage Assessment    Airway WDL WDL        Respiratory WDL    Respiratory WDL WDL        Skin Circulation/Temperature WDL    Skin Circulation/Temperature WDL WDL        Cardiac WDL    Cardiac WDL WDL        Peripheral/Neurovascular WDL    Peripheral Neurovascular WDL WDL        Cognitive/Neuro/Behavioral WDL    Cognitive/Neuro/Behavioral WDL WDL

## 2025-04-21 NOTE — ED NOTES
"Pt on call light, states, \"I am not having a good time in here, I just need you to knock me out with something.\"  Waiting for MD to see pt  "

## 2025-04-21 NOTE — TELEPHONE ENCOUNTER
First attempt to contact pt. Kimberlyr left a VM with TC contact info and encouraged a phone call back to schedule TC appointment. Kimberlyr will postpone for tomorrow.    Jackelyn Herring  04/21/2025  1126

## 2025-04-21 NOTE — TELEPHONE ENCOUNTER
R: [facility] ONLY    9:22a Called [facility] to inquire about  bed availability for YA. PC informed no beds available for YA.     Juan A Calderon 10:49 AM    MRN: [id number], YR at [facility], can be removed from worklist for admission as she will be discharging home with outpatient appts    Intake notes pt removed from active placement [facility]. Intake will no longer follow for IP MH admission.     [place] [facility]patient Bed Call Log 4/21/25 at 9:23 AM: Intake has called facilities that have not updated the bed status within the last 12 hours.    Adults:    [facility]:     [facility] [place] - is at capacity (0 beds).     [facility] - Phone: [phone] is posting 2 beds. per call @ 8:21 AM, Per Kristi, reviewing at capacity, call back after 9:30 AM     [facility] - Phone: [phone] has posted 0 beds. Criteria: Negative covid required.     [facility] - Phone: [phone] is posting 0 beds. Criteria: Neg covid. No high school/[place]-psych. per call @ 8:24 AM, Per Melany, Call back after 9:30 AM     [facility] - Phone: [phone] is posting 0 beds.     [facility] - Phone: [phone] is posting 0 beds.     [facility] - Phone: [phone] has posted 6 beds. Criteria: Negative covid. per call @ 8:41 AM, Per Amalia, Adults: At Capacity. Adol: A couple double beds. No Child or [facility].     [facility] - Phone: [phone] is posting 0 beds.      Pt remains on the work list pending appropriate bed availability.

## 2025-04-21 NOTE — ED NOTES
IP MH Referral Acuity Rating Score (RARS)    LMHP complete at referral to IP MH, with DEC; and, daily while awaiting IP MH placement. Call score to PPS.  CRITERIA SCORING   New 73 HH and Involuntary for IP MH (not adolescent) 0/3   Boarding over 24 hours 0/1   Vulnerable adult at least 55+ with multiple co morbidities; or, Patient age 11 or under 0/1   Suicide ideation without relief of precipitating factors 1/1   Current plan for suicide 0/1   Current plan for homicide 0/1   Imminent risk or actual attempt to seriously harm another without relief of factors precipitating the attempt 0/1   Severe dysfunction in daily living (ex: complete neglect for self care, extreme disruption in vegetative function, extreme deterioration in social interactions) 1/1   Recent (last 2 weeks) or current physical aggression in the ED 0/1   Restraints or seclusion episode in ED 0/1   Verbal aggression, agitation, yelling, etc., while in the ED 0/1   Active psychosis with psychomotor agitation or catatonia 1/1   Need for constant or near constant redirection (from leaving, from others, etc).  0/1   Intrusive or disruptive behaviors 0/1   TOTAL 3

## 2025-04-21 NOTE — PLAN OF CARE
Lenore LUZMA Bergeronz  April 21, 2025  Plan of Care Hand-off Note     Patient Recommended Care Path: inpatient mental health    Clinical Substantiation:  Patient returns to the hospital after recent admission for psychosis during which she had medication changes. She presents with flat affect, frequent yawning, restlessness, pacing, low energy, recent weight loss (per housemate, patient has lost a significant amount of weight in 4-6 months), suicidal ideation, and anhedonia. Patient has a history of medication non-adherence as well as recent medication changes. Patient would benefit from inpatient level of care for medication management, safety, and stabilization. She is on the work list with Patient Placement. Patient is currently under a Stay of Commitment by [facility].    Goals for crisis stabilization:  Stabilize thoughts and mood. Update safety plan.    Next steps for Care Team:  Facilitate psychiatric evaluation. Contact [facility] Court office to obtain contact information for patient's newly assigned mental health . Call  to notify of re-hospitalization and discuss status of current Stay of Commitment.    Treatment Objectives Addressed:  rapport building, assessing safety, identifying treatment goals    Therapeutic Interventions:  Reviewed healthy living that supports positive mental health, including looking at sleep hygiene, regular movement, nutrition, and regular socialization., Provided positive reinforcement for progress towards goals, gains in knowledge, and application of skills previously taught.    Has a specific means been identified for suicidal.homicide actions: No  If yes, describe:    Explain action steps toward mitigation:    Document completion of mitigation action:    The follow up action still needed prior to discharge:      Patient coping skills attempted to reduce the crisis:  Patient's housemate suggested patient go to the ED and patient was  cooperative.       Imminent risk of harm:  (Suicidal Ideation)  Severe psychiatric, behavioral or other comorbid conditions are appropriate for management at inpatient mental health as indicated by at least one of the following: Psychiatric Symptoms, Impaired impulse control, judgement, or insight, Symptoms of impact to function  Severe dysfunction in daily living is present as indicated by at least one of the following: Extreme disruption in vegetative function  Situation and expectations are appropriate for inpatient care: Voluntary treatment at lower level of care is not feasible  Inpatient mental health services are necessary to meet patient needs and at least one of the following: Specific condition related to admission diagnosis is present and judged likely to deteriorate in absence of treatment at proposed level of care      Collateral contact information:  Friend, Daisy Callahan, [phone]    Legal Status: Voluntary/Patient has signed consent for treatment                            Reviewed court records: yes (Patient is her own decision maker.)     Psychiatry Consult: Patient has Psychiatry Consult Order    [facility]

## 2025-04-21 NOTE — PROGRESS NOTES
"Triage and Transition Services Extended Care Reassessment     Patient: Felecia goes by \"Felecia,\" uses she/her pronouns  Date of Service: April 21, 2025  Site of Service: [facility]                             ED20    Patient was seen yes  Mode of Assessment: In person     Reason for Reassessment:  (discharge)    History of Patient's Original Emergency Room Encounter: Dx hx includes ADHD, MDD, Bipolar II, Schizoaffective Disorder, NINFA, BPD, and C-PTSD.  Recently [facility] 4/6-15/2025. Currently on Stay of Commitment.    Current Patient Presentation: flat, slow verbal responses yet linear thoughts    Presentation Summary: Patient is asking for discharge which was supported by Consult Psychiatry.  She reports being in physical pain here, wanting to get improved sleep, and having chronic passive SI which is baseline for her.  Patient's thoughts are linear however verbal speech is slowed.  Patient states if she stays here \"just shoot me up so I can sleep.\"  Patient is future oriented by wanting to engage in follow up for therapy and psychiatry.  She is also open to completing Navigator phone call with Programmatic Care.  Patient has social support from ex-girlfriend, [facility].  Patient plans to cab home to her apartment today to get more sleep.  There is no evidence of paranoia or delusional thought content today.  Patient is able to make needs known as well.    Changes Observed Since Initial Assessment: patient/family request    Therapeutic Interventions Provided: Engaged in guided discovery, explored patient's perspectives and helped expand them through socratic dialogue., Coached on coping techniques/relaxation skills to help improve distress tolerance and managing intense emotions., Taught the link between thoughts, feelings, and behaviors., Reviewed healthy living that supports positive mental health, including looking at sleep hygiene, regular movement, nutrition, and regular " socialization., Provided positive reinforcement for progress towards goals, gains in knowledge, and application of skills previously taught., Identified and practiced coping skills.    Current Symptoms: anxious helplessness, sadness anxious  (none observed)  (no concerns noted)    Mental Status Exam   Affect: Flat  Appearance: Appropriate  Attention Span/Concentration: Attentive  Eye Contact: Variable    Fund of Knowledge: Appropriate   Language /Speech Content: Fluent  Language /Speech Volume: Soft  Language /Speech Rate/Productions: Slow  Recent Memory: Intact  Remote Memory: Intact  Mood: Anxious  Orientation to Person: Yes   Orientation to Place: Yes  Orientation to Time of Day: Yes  Orientation to Date: Yes     Situation (Do they understand why they are here?): Yes  Psychomotor Behavior: Normal  Thought Content: Clear  Thought Form: Intact, Goal Directed    Treatment Objective(s) Addressed: rapport building, orienting the patient to therapy, processing feelings, identifying an appropriate aftercare plan, assessing safety, identifying additional supports, exploring obstacles to safety in the community    Patient Response to Interventions: eager to participate, acceptance expressed, verbalizes understanding    Progress Towards Goals:  Patient Reports Symptoms Are: stable  Patient Progress Toward Goals: is making progress  Comment: looking for improved sleep; willing to engage in outpatient supports of individual therapy, medication management, and Programmatic Care  Next Step to Work Toward Discharge: follow up on referrals  Symptom Stabilization Comment: attend appointments for therapy, medication management, and Programmatic Care    Case Management: Case Management Included: collaborating with patient's support system  Details on Collaborating with Patient's Support System: Daisy Prather, [phone]  Summary of Interaction: Ex-mp was receptive to care coordination this morning  regarding patient's request to discharge.  Ex-girlfriend expresses frustration with patient's high needs and dependence on her which ex-gf is wanting to start the formal separation.  Ex-jacinda reports that she would prefer to be only contacted in the future for historical information and not be involved in future care planning.    C-SSRS Since Last Contact:   1. Wish to be Dead (Since Last Contact): Yes  Wish to be Dead Description (Since Last Contact): chronic, passive - baseline  2. Non-Specific Active Suicidal Thoughts (Since Last Contact): No  Most Severe Ideation Rating (Since Last Contact): 1  Frequency (Since Last Contact): 2-5 times in week  Duration (Since Last Contact): Fleeting, few seconds or minutes  Controllability (Since Last Contact): Easily able to control thoughts  Deterrents (Since Last Contact): Deterrents definitely stopped you from attempting suicide  Reasons for Ideation (Since Last Contact): Completely to end or stop the pain (You couldn't go on living with the pain or how you were feeling)  Actual Attempt (Since Last Contact): No  Has subject engaged in non-suicidal self-injurious behavior? (Since Last Contact): No  Interrupted Attempts (Since Last Contact): No  Aborted or Self-Interrupted Attempt (Since Last Contact): No  Preparatory Acts or Behavior (Since Last Contact): No  Suicide (Since Last Contact): No     Calculated C-SSRS Risk Score (Since Last Contact): Low Risk    Plan: Final Disposition / Recommended Care Path: discharge  Plan for Care reviewed with assigned Medical Provider: yes  Plan for Care Team Review: provider, RN  Comments: Abram Kennedy  Patient and/or validated legal guardian concurs: yes  Clinical Substantiation: It is the recommendation of consult psychiatry to support patient's request to discharge home.  Writer met with patient to safety plan and create aftercare plan.  Patient denies any active SI currently and notes at baseline she has chronic passive  ideations.  Despite patient's current presentation of slowed verbal responses, shuffling gait, and flat affect her thoughts are linear and future oriented.  Patient complains of lack of sleep and being in physical pain.    Legal Status: Legal Status: Voluntary/Patient has signed consent for treatment    Session Status: Session Duration (minutes): 20 minutes  Session Number: 1  Anticipated number of sessions or this episode of care: 1    Session Start Time:  0858 / 1040  Session Stop Time:  0908 / 1050  CPT codes: 55931 - Psychotherapy (with patient) - 30 (16-37*) min  Time Spent: 20 minutes      CPT code(s) utilized: 02476 - Psychotherapy (with patient) - 30 (16-37*) min    Diagnosis:   Patient Active Problem List   Diagnosis Code    History of suicide attempt Z91.51    Suicidal ideation R45.851    Headache, unspecified headache type R51.9    Attention deficit hyperactivity disorder (ADHD), combined type F90.2    Complex posttraumatic stress disorder F43.10    Borderline personality disorder (H) F60.3    Major depressive disorder, recurrent, mild F33.0    Paranoia (H) F22    Psychosis (H) F29    Aggression R46.89    Major depressive disorder, recurrent episode, moderate (H) F33.1       Primary Problem This Admission: Active Hospital Problems    *Major depressive disorder, recurrent episode, moderate (H)      Borderline personality disorder (H)      Complex posttraumatic stress disorder      Attention deficit hyperactivity disorder (ADHD), combined type      SEBASTIAN Le, [facility] Center[facility]  Licensed Mental Health Professional (LMHP)  MameATH, [phone]

## 2025-04-21 NOTE — ED NOTES
"Found patient lying on floor, per patient \" I don't like the bed its uncomfortable\" . Patient denies fall. Patient states \" I am comfortable on floor than the bed\". Writer offered recliner. Patient accepted the offer. Recliner provided.    "

## 2025-04-22 NOTE — TELEPHONE ENCOUNTER
Second attempt at reaching patient. Left message asking for a return call to schedule with the TC. Referral will be closed.    Brandie Jackson  Transition Clinic Coordinator  04/22/25 9:18 AM

## 2025-04-23 ENCOUNTER — TELEPHONE (OUTPATIENT)
Dept: BEHAVIORAL HEALTH | Facility: CLINIC | Age: 30
End: 2025-04-23
Payer: COMMERCIAL

## 2025-04-23 ENCOUNTER — HOSPITAL ENCOUNTER (INPATIENT)
Facility: CLINIC | Age: 30
End: 2025-04-23
Attending: STUDENT IN AN ORGANIZED HEALTH CARE EDUCATION/TRAINING PROGRAM | Admitting: PSYCHIATRY & NEUROLOGY
Payer: COMMERCIAL

## 2025-04-23 DIAGNOSIS — F90.2 ATTENTION DEFICIT HYPERACTIVITY DISORDER (ADHD), COMBINED TYPE: ICD-10-CM

## 2025-04-23 DIAGNOSIS — G25.71 AKATHISIA: ICD-10-CM

## 2025-04-23 DIAGNOSIS — F20.9 SCHIZOPHRENIA, UNSPECIFIED TYPE (H): ICD-10-CM

## 2025-04-23 DIAGNOSIS — E55.9 VITAMIN D DEFICIENCY: ICD-10-CM

## 2025-04-23 DIAGNOSIS — F41.9 ANXIETY: ICD-10-CM

## 2025-04-23 DIAGNOSIS — F31.9 BIPOLAR AFFECTIVE DISORDER, REMISSION STATUS UNSPECIFIED (H): ICD-10-CM

## 2025-04-23 DIAGNOSIS — G24.1 DYSTONIA, TORSION, FRAGMENTS OF: Primary | ICD-10-CM

## 2025-04-23 LAB
ALBUMIN SERPL BCG-MCNC: 4.3 G/DL (ref 3.5–5.2)
ALP SERPL-CCNC: 56 U/L (ref 40–150)
ALT SERPL W P-5'-P-CCNC: 14 U/L (ref 0–50)
AMPHETAMINES UR QL SCN: ABNORMAL
ANION GAP SERPL CALCULATED.3IONS-SCNC: 10 MMOL/L (ref 7–15)
AST SERPL W P-5'-P-CCNC: 22 U/L (ref 0–45)
BARBITURATES UR QL SCN: ABNORMAL
BENZODIAZ UR QL SCN: ABNORMAL
BILIRUB SERPL-MCNC: 0.4 MG/DL
BUN SERPL-MCNC: 8.5 MG/DL (ref 6–20)
BZE UR QL SCN: ABNORMAL
CALCIUM SERPL-MCNC: 9.2 MG/DL (ref 8.8–10.4)
CANNABINOIDS UR QL SCN: ABNORMAL
CHLORIDE SERPL-SCNC: 99 MMOL/L (ref 98–107)
CREAT SERPL-MCNC: 0.78 MG/DL (ref 0.51–0.95)
EGFRCR SERPLBLD CKD-EPI 2021: >90 ML/MIN/1.73M2
FENTANYL UR QL: ABNORMAL
GLUCOSE SERPL-MCNC: 130 MG/DL (ref 70–99)
HCO3 SERPL-SCNC: 27 MMOL/L (ref 22–29)
OPIATES UR QL SCN: ABNORMAL
PCP QUAL URINE (ROCHE): ABNORMAL
POTASSIUM SERPL-SCNC: 3.2 MMOL/L (ref 3.4–5.3)
PROT SERPL-MCNC: 6.9 G/DL (ref 6.4–8.3)
SODIUM SERPL-SCNC: 136 MMOL/L (ref 135–145)

## 2025-04-23 PROCEDURE — 250N000013 HC RX MED GY IP 250 OP 250 PS 637: Performed by: PHYSICIAN ASSISTANT

## 2025-04-23 PROCEDURE — 99285 EMERGENCY DEPT VISIT HI MDM: CPT | Mod: FS | Performed by: STUDENT IN AN ORGANIZED HEALTH CARE EDUCATION/TRAINING PROGRAM

## 2025-04-23 PROCEDURE — 99285 EMERGENCY DEPT VISIT HI MDM: CPT | Performed by: STUDENT IN AN ORGANIZED HEALTH CARE EDUCATION/TRAINING PROGRAM

## 2025-04-23 PROCEDURE — 36415 COLL VENOUS BLD VENIPUNCTURE: CPT | Performed by: PHYSICIAN ASSISTANT

## 2025-04-23 PROCEDURE — 80053 COMPREHEN METABOLIC PANEL: CPT | Performed by: PHYSICIAN ASSISTANT

## 2025-04-23 PROCEDURE — 99418 PROLNG IP/OBS E/M EA 15 MIN: CPT | Performed by: NURSE PRACTITIONER

## 2025-04-23 PROCEDURE — 80307 DRUG TEST PRSMV CHEM ANLYZR: CPT | Performed by: PHYSICIAN ASSISTANT

## 2025-04-23 PROCEDURE — 99255 IP/OBS CONSLTJ NEW/EST HI 80: CPT | Performed by: NURSE PRACTITIONER

## 2025-04-23 PROCEDURE — 80325 AMPHETAMINES 3OR 4: CPT | Performed by: NURSE PRACTITIONER

## 2025-04-23 PROCEDURE — 80307 DRUG TEST PRSMV CHEM ANLYZR: CPT | Performed by: NURSE PRACTITIONER

## 2025-04-23 RX ORDER — QUETIAPINE FUMARATE 100 MG/1
100 TABLET, FILM COATED ORAL 2 TIMES DAILY
Status: DISCONTINUED | OUTPATIENT
Start: 2025-04-23 | End: 2025-04-23

## 2025-04-23 RX ORDER — QUETIAPINE FUMARATE 100 MG/1
100 TABLET, FILM COATED ORAL AT BEDTIME
Status: DISCONTINUED | OUTPATIENT
Start: 2025-04-23 | End: 2025-04-28

## 2025-04-23 RX ORDER — QUETIAPINE FUMARATE 100 MG/1
100 TABLET, FILM COATED ORAL AT BEDTIME
Status: DISCONTINUED | OUTPATIENT
Start: 2025-04-24 | End: 2025-04-23

## 2025-04-23 RX ORDER — POTASSIUM CHLORIDE 1.5 G/1.58G
40 POWDER, FOR SOLUTION ORAL ONCE
Status: COMPLETED | OUTPATIENT
Start: 2025-04-23 | End: 2025-04-23

## 2025-04-23 RX ORDER — QUETIAPINE FUMARATE 25 MG/1
25 TABLET, FILM COATED ORAL
Status: DISCONTINUED | OUTPATIENT
Start: 2025-04-23 | End: 2025-04-24

## 2025-04-23 RX ADMIN — POTASSIUM CHLORIDE 40 MEQ: 1.5 POWDER, FOR SOLUTION ORAL at 22:08

## 2025-04-23 ASSESSMENT — ACTIVITIES OF DAILY LIVING (ADL)
ADLS_ACUITY_SCORE: 43
ADLS_ACUITY_SCORE: 41
ADLS_ACUITY_SCORE: 43
ADLS_ACUITY_SCORE: 43
ADLS_ACUITY_SCORE: 41
ADLS_ACUITY_SCORE: 41
ADLS_ACUITY_SCORE: 43

## 2025-04-23 ASSESSMENT — COLUMBIA-SUICIDE SEVERITY RATING SCALE - C-SSRS
1. IN THE PAST MONTH, HAVE YOU WISHED YOU WERE DEAD OR WISHED YOU COULD GO TO SLEEP AND NOT WAKE UP?: YES
2. HAVE YOU ACTUALLY HAD ANY THOUGHTS OF KILLING YOURSELF IN THE PAST MONTH?: YES
5. HAVE YOU STARTED TO WORK OUT OR WORKED OUT THE DETAILS OF HOW TO KILL YOURSELF? DO YOU INTEND TO CARRY OUT THIS PLAN?: NO
6. HAVE YOU EVER DONE ANYTHING, STARTED TO DO ANYTHING, OR PREPARED TO DO ANYTHING TO END YOUR LIFE?: YES
4. HAVE YOU HAD THESE THOUGHTS AND HAD SOME INTENTION OF ACTING ON THEM?: YES
3. HAVE YOU BEEN THINKING ABOUT HOW YOU MIGHT KILL YOURSELF?: NO

## 2025-04-23 NOTE — PHARMACY-ADMISSION MEDICATION HISTORY
Please see Admission Medication History completed on 4/20 under previous encounter at [facility] Emergency Dept  for information regarding prior to admission medications.     Nikole Parrish, PharmD/MSLD

## 2025-04-23 NOTE — ED NOTES
Pt's  called. Pt on a civil commitment. Pt's  is Angelina Mcdermott, [phone]. Writer asked Angelina to fax over civil commitment form.   Edit: Civil commitment forms were faxed over.

## 2025-04-23 NOTE — ED NOTES
Bed: ED08  Expected date:   Expected time:   Means of arrival:   Comments:  [facility]  28 y.o.  F  Weakness/Not eating  /82  HR 72

## 2025-04-23 NOTE — PLAN OF CARE
Lenore LUZMA Bergeronz  April 23, 2025  Plan of Care Hand-off Note     Patient Recommended Care Path: inpatient mental health    Clinical Substantiation:  Admission is recommended for safety and stabilization.   She has suicidal thoughts to overdose and believes that she will act on these thoughts if outside of a secure setting.    Goals for crisis stabilization:  Stabilize thoughts and mood. Update safety plan.    Next steps for Care Team:       Treatment Objectives Addressed:  rapport building, assessing safety, identifying treatment goals    Therapeutic Interventions:  Reviewed healthy living that supports positive mental health, including looking at sleep hygiene, regular movement, nutrition, and regular socialization., Provided positive reinforcement for progress towards goals, gains in knowledge, and application of skills previously taught.    Has a specific means been identified for suicidal.homicide actions: Yes  If yes, describe: Pt has ideation to overdose on pills accessible in her home.  Explain action steps toward mitigation: Pt is being recommended for inpatient hospitalization.  Document completion of mitigation action: Pt is being recommended for inpatient hospitalization.  The follow up action still needed prior to discharge: Pt will need to engage in safety planning around her suicidal ideation prior to discharge.    Patient coping skills attempted to reduce the crisis:  Pt met with        Imminent risk of harm: Suicidal Behavior  Severe psychiatric, behavioral or other comorbid conditions are appropriate for management at inpatient mental health as indicated by at least one of the following: Psychiatric Symptoms, Symptoms of impact to function  Severe dysfunction in daily living is present as indicated by at least one of the following: Other evidence of severe dysfunction  Situation and expectations are appropriate for inpatient care: Voluntary treatment at lower level of care is not  feasible  Inpatient mental health services are necessary to meet patient needs and at least one of the following: Specific condition related to admission diagnosis is present and judged likely to further improve at proposed level of care      Collateral contact information:   Angelina Mcdermott [phone].  This was the first time  met pt today.    Legal Status: Voluntary/Patient has signed consent for treatment                        Reviewed court records: yes.  Currently on Stay of Commitment      Psychiatry Consult: ordered    LESIA RojoSW

## 2025-04-23 NOTE — TELEPHONE ENCOUNTER
S: [facility] [place] , DEC  Gaye  calling at 5:35 PM about 29 year old/female presenting with SI with a plan.     B: Pt arrived via EMS. Presenting problem, stressors: met with  for 1st time and CM was concerned about comments of SI with a plan and overall mental health. [facility] was called and referred pt to ED. Pt reports decreased sleeping, poor appetite, poor motivation. Has her first mental health admission on [facility] earlier this month. Pt is currently on a stay of commitment.     Pt affect in ED: Blunted and Flat  Pt Dx: Major Depressive Disorder, PTSD, and Borderline Personality Disorder  Previous IP hx? Yes: [facility]  Pt endorses SI with a plan to overdose on meds she has at home.     Hx of suicide attempt? Yes: at age of 15 via overdose.   Pt denies SIB  Pt denies HI   Pt denies hallucinations .   Pt RARS Score: 2    Hx of aggression/violence, sexual offenses, legal concerns, Epic care plan? describe: No  Current concerns for aggression this visit? No  Does pt have a history of Civil Commitment?  Pt is on a stay of commitment  Is Pt their own guardian? Yes    Pt is prescribed medication. Is patient medication compliant? No  Pt endorses OP services:   CD concerns: None  Acute or chronic medical concerns: No  Does Pt present with specific needs, assistive devices, or exclusionary criteria? None      Pt is ambulatory  Pt is able to perform ADLs independently      A: Pt to be reviewed for [facility] admission. Pt is Voluntary  Preferred placement: Statewide    COVID Symptoms: No  If yes, COVID test required   Utox: Not ordered, intake to request lab    CMP: N/A  CBC: N/A  HCG: Not ordered, intake to request lab     R: Patient cleared and ready for behavioral bed placement: Yes  Pt placed on [facility] worklist? Yes    Does Patient need a Transfer Center request created? No, Pt is located within [facility] ED, [facility] ED, or [facility]      5:56 PM Called ED and requested UDS (results from  previous ED admission on 4/21)

## 2025-04-23 NOTE — PROGRESS NOTES
IP MH Referral Acuity Rating Score (RARS)    LMHP complete at referral to IP MH, with DEC; and, daily while awaiting IP MH placement. Call score to PPS.  CRITERIA SCORING   New 72 HH and Involuntary for IP MH (not adolescent) 0/3   Boarding over 24 hours 0/1   Vulnerable adult at least 55+ with multiple co morbidities; or, Patient age 11 or under 0/1   Suicide ideation without relief of precipitating factors 1/1   Current plan for suicide 1/1   Current plan for homicide 0/1   Imminent risk or actual attempt to seriously harm another without relief of factors precipitating the attempt 0/1   Severe dysfunction in daily living (ex: complete neglect for self care, extreme disruption in vegetative function, extreme deterioration in social interactions) 1/1   Recent (last 2 weeks) or current physical aggression in the ED 0/1   Restraints or seclusion episode in ED 0/1   Verbal aggression, agitation, yelling, etc., while in the ED 0/1   Active psychosis with psychomotor agitation or catatonia 0/1   Need for constant or near constant redirection (from leaving, from others, etc).  0/1   Intrusive or disruptive behaviors 0/1   TOTAL 2

## 2025-04-23 NOTE — TELEPHONE ENCOUNTER
R: [place]  Access Inpatient Bed Call Log 4/23/25 @ 3:21 PM:   Intake has called facilities that have not updated the bed status within the last 12 hours.  [facility] [place] - is at capacity (0 beds).  [facility] - Phone: [phone] is posting 6 beds. Per call @ 3:29 PM, per Parisa, at capacity, try back after 5 PM.  [facility] - Phone: [phone] has posted 0 beds. Criteria: Negative covid required.  [facility] - Phone: [phone] is posting 0 beds. Criteria: Neg covid. No high school/[place]-psych. Per call @ 8:56 AM, per Melany at capacity, call back later, staff currently in meeting about discharges.  [facility] - Phone: [phone] is posting 0 beds.  [facility] - Phone: [phone] is posting 0 beds.   [facility] - Phone: [phone] has posted 6 beds. Criteria: Negative covid. Per call @ 3:30 PM, per Elsi, 2 adults, no child, 1 M and 1 F for adol.  [facility] - Phone: [phone] is posting 0 beds.    [facility] - Phone: [phone] is posting 3 beds. Criteria: LOW acuity. Ages 12+; Neg covid. No Answer  [facility] - Phone: [phone] is posting 1 beds. Criteria: No aggression. Negative Covid. Low acuity.  [facility] - Phone: [phone] is posting 1 beds. Criteria: Low acuity only. Neg covid. Per call @ 9:05 AM, per Margot, still assessing bed availability for [place] and [place] [place], call back after 11 AM.  [facility] - Phone: [phone] is posting 2 beds. Criteria: Low acuity. No current aggression.  [facility] - Phone: [phone] is posting 0 beds. Criteria: Negative covid.   [facility] - Phone: [phone] has posted 2 beds. Criteria: Negative covid.   [facility] - Phone: [phone] is posting 1 bed. Criteria: Low acuity; 72 HH hold preferred; [place] unit;  Negative covid required.   [facility] - Phone: [phone] has posted 3 beds. Criteria: Low acuity only. Neg covid.   [facility] [place] - Phone: [phone] is posting 5 beds. Criteria: Negative covid required; Vol only; No history of aggression, violence, or assault; No sexual offenders; No 72 HH holds. Per call @ 9:07 AM, per Celia, open for referrals.    [facility] - Phone: [phone] has posted 2 beds. Criteria: Negative covid required; Must have the cognitive ability to do programming; No aggressive or violent behavior or recent HX (last 2 yrs); MH must be primary; Always low acuity.   [facility] - Phone: [phone] is posting 3 beds. Criteria: Negative covid required; Low acuity only; Violence and aggression capped.   [facility] - Phone: [phone] is posting 1 bed. Criteria: Low acuity; Negative covid required. Wait list only  [facility] - Phone: [phone] is posting 6 beds. Criteria: Low acuity; Negative covid required.  [facility] - Phone: [phone] is posting 4 beds. Criteria: Negative covid; LOW acuity; (No lines, drains, or tubes, oxygen, CPAP, IV, etc.); Must Have a Ride Home. Per call @ 9:11 AM, website is up to date?  [facility] - Phone: [phone] is posting 4 beds. Criteria: Negative covid; (No lines, drains, or tubes, oxygen, CPAP, IV, etc.).     Pt remains on the work list pending appropriate bed availability.       6:21 PM Paged Resident Rush to review pt for admission to [facility].   6:40 PM Per call back from Rush, she is requesting a metabolic panel to make sure her levels are ok since she hasn't been eating and unit doesn't have the ability to give IV fluids. COVID swab if possible.  6:43 PM Per call to ED REYNALDO Santiago pt has  been eating a lot of food and drinking water (intake is on flowsheet) she will request the metabolic panel.

## 2025-04-23 NOTE — ED PROVIDER NOTES
"ED Provider Note  [facility]      History     Chief Complaint   Patient presents with    Extremity Weakness    Generalized Weakness    Insomnia     Pt stated that she is feeling weak. Pt stated that weakness is mostly in legs. Pt stated she is having insomnia. Last slept yesterday 4 hours. Last ate 2 days ago. Pt has been drinking water. Pt drank 1 glass aprox 0900.       Generalized Weakness    28yo F pmhx depression, schizophrenia, bipolar affective disorder presents for mental health evaluation.  Patient recently had a stay of her mental health commitment filed April 15, 2025 (see media tab).  Met with her  today (Angelina Mcdermott, [phone]), expressing concerns for  regarding insomnia for the last 2 weeks and \"shaking\" in her bilateral lower extremities.  She denies substance use, SI, HI, auditory or visual hallucinations.  Patient reports compliance with her Haldol (though  is on sure of the veracity of this).  Notably, patient was seen at [facility] [place] [facility] yesterday for similar symptoms.  Seroquel was added to her regimen, but patient has not yet taken. Given ongoing symptoms,  and patient agreement that patient would be best served by a mental health assessment today.  Patient reported he lives with her ex in independent housing.    Past Medical History  Past Medical History:   Diagnosis Date    Asthma     Bipolar affective disorder (H)     Depressive disorder     Outbursts of anger     Schizophrenia (H)      Past Surgical History:   Procedure Laterality Date    ENT SURGERY  2005    Unknown object in ear     haloperidol (HALDOL) 10 MG tablet  vitamin D2 (ERGOCALCIFEROL) 80490 units (1250 mcg) capsule  albuterol (PROAIR HFA/PROVENTIL HFA/VENTOLIN HFA) 108 (90 Base) MCG/ACT inhaler      Allergies   Allergen Reactions    Alcohol Hives    Kiwi Hives    Red Dye #40 (Allura Red)     Seasonal Allergies Other (See Comments) and Rash     sneezing " "    Family History  Family History   Problem Relation Age of Onset    Mental Illness Mother     Paranoid behavior Mother     Other Cancer Mother         Bio-mother, surgery on stomach cancer    No Known Problems Father      Social History   Social History     Tobacco Use    Smoking status: Passive Smoke Exposure - Never Smoker    Smokeless tobacco: Never   Vaping Use    Vaping status: Every Day    Substances: HAC   Substance Use Topics    Alcohol use: Not Currently     Comment: Alcohol allergy    Drug use: Never      A medically appropriate review of systems was performed with pertinent positives and negatives noted in the HPI, and all other systems negative.    Physical Exam   BP: 135/87  Pulse: 92  Temp: 98.9  F (37.2  C)  Resp: 16  Height: 167.6 cm (5' 6\")  Weight: 86.2 kg (190 lb)  SpO2: 97 %  Physical Exam  Constitutional:       General: She is not in acute distress.     Appearance: Normal appearance. She is not diaphoretic.   HENT:      Head: Atraumatic.      Mouth/Throat:      Mouth: Mucous membranes are moist.   Eyes:      Conjunctiva/sclera: Conjunctivae normal.   Cardiovascular:      Rate and Rhythm: Normal rate.   Pulmonary:      Effort: No respiratory distress.   Abdominal:      General: Abdomen is flat.   Musculoskeletal:      Cervical back: Neck supple.      Comments: Normal movement bilateral lower extremities   Skin:     General: Skin is warm.   Neurological:      Mental Status: She is alert.   Psychiatric:         Attention and Perception: She does not perceive auditory or visual hallucinations.         Mood and Affect: Affect is flat.         Speech: Speech is delayed.         Behavior: Behavior is cooperative.         Thought Content: Thought content does not include suicidal ideation. Thought content does not include suicidal plan.           ED Course, Procedures, & Data      Procedures                No results found for any visits on 04/23/25.  Medications - No data to display  Labs Ordered and " Resulted from Time of ED Arrival to Time of ED Departure - No data to display  No orders to display          Critical care was not performed.     Medical Decision Making  The patient's presentation was of high complexity (a chronic illness severe exacerbation, progression, or side effect of treatment).    The patient's evaluation involved:  an assessment requiring an independent historian (see separate area of note for details)  review of external note(s) from 3+ sources (see separate area of note for details)  discussion of management or test interpretation with another health professional (see separate area of note for details)    The patient's management necessitated high risk (a decision regarding hospitalization).    Assessment & Plan      28yo F pmhx depression, schizophrenia, bipolar affective disorder presents for mental health evaluation I/s/o insomnia x2 weeks.  Mental health commitment state as of April 15, 2025.  Seen at [facility] yesterday for similar symptoms.  Reports compliance with her Haldol.  Denies illicit substance use.  In ED patient has a very depressed affect and slowed speech, but she is cooperative denying SI, HI, auditory or visual hallucinations.  A DEC assessment was obtained. With pt endorsing SI with plan to OD to DEC . Recommendations for admission to inpatient mental health, which I am in agreement with.     I have reviewed the nursing notes. I have reviewed the findings, diagnosis, plan and need for follow up with the patient.    New Prescriptions    No medications on file       Final diagnoses:   Schizophrenia, unspecified type (H)         Kirby Flynn PA-C  [facility] EMERGENCY DEPARTMENT  4/23/2025     Kirby Flynn PA-C  04/23/25 1609    --    ED Attending Physician Attestation    I Alicia Cabrera MD, cared for this patient with the Advanced Practice Provider (OLEGARIO). I personally provided a substantive portion of the care for this patient, including  approving the care plan for the number and complexity of problems addressed and taking responsibility related to the risk of complications and/or morbidity or mortality of patient management. Please see the OLEGARIO's documentation for full details.      Alicia Cabrera MD  Emergency Medicine          Alicia Cabrera MD  04/23/25 2040

## 2025-04-23 NOTE — ED TRIAGE NOTES
"Pt arrived to the ED by ambulance. Pt's  called shortly after pt's arrival to inform ED pt is on a civil commitment. Writer could not find form in pt's records.  will fax form over. Pt stated that she has SI, but no plan. Pt stated that she has \"passive SI.\" Pt has a 1:1 for safety. Pt said she is not eating, drinking or sleeping. Pt slept 4 hours yesterday and last drank water this AM. Pt asking for water right now.     Pt was here 2 weeks ago, and at [facility] [place].        "

## 2025-04-24 VITALS
HEIGHT: 66 IN | BODY MASS INDEX: 29.7 KG/M2 | TEMPERATURE: 99 F | OXYGEN SATURATION: 99 % | SYSTOLIC BLOOD PRESSURE: 118 MMHG | RESPIRATION RATE: 17 BRPM | HEART RATE: 80 BPM | DIASTOLIC BLOOD PRESSURE: 80 MMHG | WEIGHT: 184.8 LBS

## 2025-04-24 PROBLEM — R45.851 SUICIDAL IDEATION: Status: ACTIVE | Noted: 2019-10-23

## 2025-04-24 LAB
ATRIAL RATE - MUSE: 66 BPM
DIASTOLIC BLOOD PRESSURE - MUSE: NORMAL MMHG
INTERPRETATION ECG - MUSE: NORMAL
OXYCODONE UR QL: NORMAL
P AXIS - MUSE: -6 DEGREES
PR INTERVAL - MUSE: 132 MS
QRS DURATION - MUSE: 78 MS
QT - MUSE: 420 MS
QTC - MUSE: 440 MS
R AXIS - MUSE: 32 DEGREES
SYSTOLIC BLOOD PRESSURE - MUSE: NORMAL MMHG
T AXIS - MUSE: 19 DEGREES
VENTRICULAR RATE- MUSE: 66 BPM

## 2025-04-24 PROCEDURE — 250N000013 HC RX MED GY IP 250 OP 250 PS 637

## 2025-04-24 PROCEDURE — 124N000002 HC R&B MH UMMC

## 2025-04-24 PROCEDURE — 250N000013 HC RX MED GY IP 250 OP 250 PS 637: Performed by: STUDENT IN AN ORGANIZED HEALTH CARE EDUCATION/TRAINING PROGRAM

## 2025-04-24 PROCEDURE — 93005 ELECTROCARDIOGRAM TRACING: CPT

## 2025-04-24 PROCEDURE — 99233 SBSQ HOSP IP/OBS HIGH 50: CPT | Mod: GC | Performed by: PSYCHIATRY & NEUROLOGY

## 2025-04-24 PROCEDURE — 250N000013 HC RX MED GY IP 250 OP 250 PS 637: Performed by: PSYCHIATRY & NEUROLOGY

## 2025-04-24 PROCEDURE — 250N000011 HC RX IP 250 OP 636

## 2025-04-24 RX ORDER — BENZTROPINE MESYLATE 1 MG/1
1 TABLET ORAL 2 TIMES DAILY
Status: DISCONTINUED | OUTPATIENT
Start: 2025-04-24 | End: 2025-04-24

## 2025-04-24 RX ORDER — QUETIAPINE FUMARATE 100 MG/1
100 TABLET, FILM COATED ORAL
Status: DISCONTINUED | OUTPATIENT
Start: 2025-04-24 | End: 2025-04-30 | Stop reason: HOSPADM

## 2025-04-24 RX ORDER — PROPRANOLOL HYDROCHLORIDE 10 MG/1
10 TABLET ORAL 2 TIMES DAILY
Status: DISCONTINUED | OUTPATIENT
Start: 2025-04-24 | End: 2025-04-28

## 2025-04-24 RX ORDER — POLYETHYLENE GLYCOL 3350 17 G/17G
17 POWDER, FOR SOLUTION ORAL DAILY PRN
Status: DISCONTINUED | OUTPATIENT
Start: 2025-04-24 | End: 2025-04-30 | Stop reason: HOSPADM

## 2025-04-24 RX ORDER — ALBUTEROL SULFATE 90 UG/1
2 INHALANT RESPIRATORY (INHALATION) EVERY 6 HOURS PRN
Status: DISCONTINUED | OUTPATIENT
Start: 2025-04-24 | End: 2025-04-30 | Stop reason: HOSPADM

## 2025-04-24 RX ORDER — BENZTROPINE MESYLATE 1 MG/1
2 TABLET ORAL 2 TIMES DAILY
Status: DISCONTINUED | OUTPATIENT
Start: 2025-04-24 | End: 2025-04-28

## 2025-04-24 RX ORDER — BENZTROPINE MESYLATE 1 MG/ML
1 INJECTION, SOLUTION INTRAMUSCULAR; INTRAVENOUS ONCE
Status: COMPLETED | OUTPATIENT
Start: 2025-04-24 | End: 2025-04-24

## 2025-04-24 RX ORDER — ACETAMINOPHEN 325 MG/1
650 TABLET ORAL EVERY 4 HOURS PRN
Status: DISCONTINUED | OUTPATIENT
Start: 2025-04-24 | End: 2025-04-30 | Stop reason: HOSPADM

## 2025-04-24 RX ORDER — DIPHENHYDRAMINE HYDROCHLORIDE 50 MG/ML
50 INJECTION, SOLUTION INTRAMUSCULAR; INTRAVENOUS ONCE
Status: COMPLETED | OUTPATIENT
Start: 2025-04-24 | End: 2025-04-24

## 2025-04-24 RX ORDER — HYDROXYZINE HYDROCHLORIDE 25 MG/1
25 TABLET, FILM COATED ORAL EVERY 4 HOURS PRN
Status: DISCONTINUED | OUTPATIENT
Start: 2025-04-24 | End: 2025-04-30 | Stop reason: HOSPADM

## 2025-04-24 RX ADMIN — QUETIAPINE FUMARATE 100 MG: 100 TABLET ORAL at 00:00

## 2025-04-24 RX ADMIN — QUETIAPINE FUMARATE 100 MG: 100 TABLET ORAL at 21:13

## 2025-04-24 RX ADMIN — PROPRANOLOL HYDROCHLORIDE 10 MG: 10 TABLET ORAL at 19:20

## 2025-04-24 RX ADMIN — BENZTROPINE MESYLATE 1 MG: 1 INJECTION, SOLUTION INTRAMUSCULAR; INTRAVENOUS at 13:05

## 2025-04-24 RX ADMIN — BENZTROPINE MESYLATE 1 MG: 1 TABLET ORAL at 11:20

## 2025-04-24 RX ADMIN — PROPRANOLOL HYDROCHLORIDE 10 MG: 10 TABLET ORAL at 13:18

## 2025-04-24 RX ADMIN — DIPHENHYDRAMINE HYDROCHLORIDE 50 MG: 50 INJECTION, SOLUTION INTRAMUSCULAR; INTRAVENOUS at 14:52

## 2025-04-24 RX ADMIN — BENZTROPINE MESYLATE 2 MG: 1 TABLET ORAL at 19:20

## 2025-04-24 ASSESSMENT — ACTIVITIES OF DAILY LIVING (ADL)
ADLS_ACUITY_SCORE: 53
ADLS_ACUITY_SCORE: 53
DRESS: INDEPENDENT
ADLS_ACUITY_SCORE: 53
ADLS_ACUITY_SCORE: 58
DRESS: INDEPENDENT
ADLS_ACUITY_SCORE: 53
ADLS_ACUITY_SCORE: 58
HYGIENE/GROOMING: INDEPENDENT
ADLS_ACUITY_SCORE: 58
ADLS_ACUITY_SCORE: 53
ADLS_ACUITY_SCORE: 58
ADLS_ACUITY_SCORE: 53
LAUNDRY: WITH SUPERVISION
ADLS_ACUITY_SCORE: 58
ADLS_ACUITY_SCORE: 53
ADLS_ACUITY_SCORE: 58
ORAL_HYGIENE: INDEPENDENT
ORAL_HYGIENE: INDEPENDENT
ADLS_ACUITY_SCORE: 53
HYGIENE/GROOMING: INDEPENDENT
ADLS_ACUITY_SCORE: 53
ADLS_ACUITY_SCORE: 53
LAUNDRY: WITH SUPERVISION
ADLS_ACUITY_SCORE: 43
ADLS_ACUITY_SCORE: 58
ADLS_ACUITY_SCORE: 53
ADLS_ACUITY_SCORE: 53
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
## 2025-04-24 NOTE — PLAN OF CARE
04/24/25 1438   Individualization/Patient Specific Goals   Patient Personal Strengths expressive of emotions;stable living environment   Patient Vulnerabilities adverse childhood experience(s);family/relationship conflict;limited support system;history of unsuccessful treatment;lacks insight into illness;substance abuse/addiction;poor impulse control;traumatic event   Interprofessional Rounds   Participants CTC;nursing;psychiatrist   Behavioral Team Discussion   Progress New admit   Anticipated length of stay 5-7 days   Continued Stay Criteria/Rationale Medication management- side effects from previous medication.   Medical/Physical See H&P   Precautions See below   Plan Psychiatric assessment/Medication management. Therapeutic Milieu. Individual care planning and after care planning. Patient to participate in unit groups and activities. Individual and group support on unit.   Rationale for change in precautions or plan N/A   Safety Plan Per unit protocol   Anticipated Discharge Disposition home or self-care         PRECAUTIONS AND SAFETY    Behavioral Orders   Procedures    Code 1 - Restrict to Unit    Routine Programming     As clinically indicated    Status 15     Every 15 minutes.    Suicide precautions: Suicide Risk: HIGH     Patients on Suicide Precautions should have a Combination Diet ordered that includes a Diet selection(s) AND a Behavioral Tray selection for Safe Tray - with utensils, or Safe Tray - NO utensils       Order Specific Question:   Suicide Risk     Answer:   HIGH       Safety  Safety WDL: WDL  Patient Location: patient room, own  Observed Behavior: sleeping  Observed Behavior (Comment): sleeping  Safety Measures: safety rounds completed  Suicidality: Status 15, Minimal personal belongings in room
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
## 2025-04-24 NOTE — PLAN OF CARE
Problem: Adult Behavioral Health Plan of Care  Goal: Plan of Care Review  Outcome: Progressing  Flowsheets (Taken 4/24/2025 1040)  Plan of Care Reviewed With: patient  Overall Patient Progress: no change  Patient Agreement with Plan of Care: agrees  Goal: Develops/Participates in Therapeutic [place] to Support Successful Transition  Outcome: Progressing  Flowsheets (Taken 4/24/2025 1040)  Develops/Participates in Therapeutic [place] to Support Successful Transition: making progress toward outcome     Problem: Sleep Disturbance  Goal: Adequate Sleep/Rest  Outcome: Progressing   Goal Outcome Evaluation:      Plan of Care Reviewed With: patient    Overall Patient Progress: no changeOverall Patient Progress: no change     Felecia noted this AM with rigid posture and slow movement-acknowledges tight/stiff muscles, difficulty swallowing food, although has been eating, excess saliva noted in mouth-commented she has to keep getting up to move her legs so they don't get too tight- Dr Zheng Hampton notified- 1040 c/o blurred vision-reports all sxs started post initiation of Haldol and continued post discharge home from station 10 on April 15-Received Cogentin 1 mg PO at 1120 with small amt relief of sxs-Received Cogentin 1 mg IM at 1318 reports significant improvement, reports able to move arms normally, improvement, but not total relief ability to move mouth, gait improved, but continues stiff and shuffling-Received Benadryl 50 mg IM at 1452 for continued EPS from Haldol taken outpt-
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
## 2025-04-24 NOTE — PLAN OF CARE

## 2025-04-24 NOTE — PLAN OF CARE
"   04/24/25 1500   General Information   Has Not Attended OT as of: 04/24/25     Pt briefly wandered into OT clinic group; writer offered an orientation to this group and project options, though she was dismissive of this, appeared a bit guarded, and reported that she might be interested in participating when she has \"more energy.\" She then left group. Pt has not formally attended scheduled occupational therapy sessions. Encourage attendance and participation.  "

## 2025-04-24 NOTE — PLAN OF CARE
"Goal Outcome Evaluation:  Problem: Sleep Disturbance  Goal: Adequate Sleep/Rest  Outcome: Not Progressing     Pt arrived to unit at 0055 from ED via wheelchair, cooperate with admission search. Pt oriented to the unit. Declined admission interview. Pt appeared drowsy. Stated \"I'm too sleepy, just want to go to bed.\" Pt received 100 Mg Seroquel at midnight before the transfer.     Pt came to ED with complaint of extremity weakness, generalized weakness and Insomnia. Pt appeared unstable on her feet's, leaning forward, shuffling her feet while walking. Slept for 4 hours.     "

## 2025-04-24 NOTE — TELEPHONE ENCOUNTER
R: Patient cleared and ready for behavioral bed placement: Yes    9:02 PM Intake paged the resident.     10:21 PM Intake received a call from resident Rush, accepting this pt to [facility].     10:26 PM Indicia complete.     10:33 PM Intake called [facility] and provided disposition to Naz ECHEVARRIA. Nurse report; Charge will call ED.     10:34 PM Intake called [facility] ED and provided placement information.

## 2025-04-24 NOTE — PLAN OF CARE
04/24/25 0137   Patient Belongings   Did you bring any home meds/supplements to the hospital?  No   Patient Belongings locker;sent to security per site process   Patient Belongings Put in Hospital Secure Location (Security or Locker, etc.) cash/credit card;clothing;glasses;wallet;purse/wallet;cell phone/electronics   Belongings Search Yes   Clothing Search Yes       In Locker:  1 Cell Phone, 1 Airpod, 3 chap sticks, 1 lotion, 1 sunglasses, 1 's license, 1 [employer] card, 1 [employer] card, 1 [employer] card, 1 sweater, 1 pants, 1 shirt, 1 underwear, 1 socks, 1 shoes    To Security:  [employer] Card (...[id number]); [employer] Card (...[id number]), Cash $0.50    A               Admission:  I am responsible for any personal items that are not sent to the safe or pharmacy.  [facility] is not responsible for loss, theft or damage of any property in my possession.    Signature:  _________________________________ Date: _______  Time: _____                                              Staff Signature:  ____________________________ Date: ________  Time: _____      2nd Staff person, if patient is unable/unwilling to sign:    Signature: ________________________________ Date: ________  Time: _____     Discharge:  [facility] has returned all of my personal belongings:    Signature: _________________________________ Date: ________  Time: _____                                          Staff Signature:  ____________________________ Date: ________  Time: _____

## 2025-04-24 NOTE — H&P
"  ----------------------------------------------------------------------------------------------------------  [facility]   Psychiatry History and Physical    Name: Lenore Suggs   MRN#: [id number]  Age: 29 year old YOB: 1995    Date of Admission: 4/23/2025  Attending Physician: Ishmael Salazar     Contacts:     Primary Outpatient Psychiatrist: Cyndi Johnson M.D.    Primary Physician: Salome Ref-Primary, Physician  Therapist: None  [facility] : Angelina Mcdermott ([phone])  Probation/: None  Family Members: Friend - Daisy Callahan ([phone])     Chief Concern:     \"Insomnia\"     History of Present Illness:      Brennon Cohen is a 29 year old female with a previous psychiatric diagnoses of Depression, Bipolar disorder, and Schizophrenia who presented with the ED on 4/22/25 due to concerns of insomnia, stiffness, and suicidal ideation with thoughts to overdose in the context of new started antipsychotic.    [facility]/DEC Assessment:  Referral Data and Chief Complaint  Lenore Suggs presents to the ED via EMS. Patient is presenting to the ED for the following concerns: Suicidal ideation, Anxiety, Significant behavioral change, Paranoia, Depression, from her home where she lives with a friend.  She was visited today by her  and then crisis staff that  summoned to the home.  She has complaints of not being able to sleep well for the last week. She says that she will lay awake for most of the night trying to get back to sleep.  She also has suicidal thoughts to overdose. When asked if she would act on these thoughts outside a secure setting she states \"probably.\"  She has access to her own medications, any medications in the home and other items in the home that she could use to make a suicide attempt.  She has flat affect, prolonged eye contact, minimal responses and frequent yawning.  " "At times she will quickly get out of bed and shuffle around the room. Recently [facility] 4/6-15/2025. Currently on Stay of Commitment.  She has been seen at [facility]20 and [facility] [place].         Collateral information:  Angelina Mcdermott [phone].      What happened today: Felecia asked to come to the ED.  She told  and crisis worker who came to her home today for a visit. She wanted to be seen for sleep concerns and shakiness.   Felecia reported to crisis worker and  that she had not been eating or drinking much and not sleeping well.  She had also reported inconsistently taking her medications.  She did not make any suicidal or homicidal comments.        ED/Hospital Course:  Lenore Suggs was medically cleared for admission to inpatient psychiatric unit. In the ED, she did not psychiatric PRNs, restrains or seclusions.    Patient interview:  Felecia says that she didn't sleep well and felt that her body was \"tight\", making it hard to breath at times. She also says that her mouth is \"full of spit\". Felecia says that these symptoms have been going on for the last 2 weeks since her haldol injection. She also says her difficulty walking gradually worsened over the same time frame. 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 She used to take adderal 25 ER for her ADHD but that was stopped during her hospitalization 2 weeks ago. Lately she has been unable to sleep, feels awake all day but is very sleepy, and has blurry vision.. Felecia yawns throughout the interview and periodically gets up to walk back and forth in her room.    Felecia lives with her \"friend\" in an apartment which they have shared for the last 4 years. She also has a room with her friend's father. Felecia's parents passed away at 9 years old, she does not recall how, and was in the foster system until 19 years of age. 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 She endorses previous manic symptoms like increased energy, lack of sleep,  going for about 3-4 days in the past. The last one happened about 3 weeks ago, prior hospitalization at [facility].      Psychiatric Review of Systems:     Depressive:   Reports suicidal ideation without plan, without intent    Denies  None    Dysregulation:    Reports none    Denies none   Psychosis:    Reports none   Denies none  Rafaela:    Reports decreased sleep need   Denies none  Anxiety:    Reports worries   Denies none    ADHD:    Reports trouble sustaining attention and hyperactive   Denies none     Medical Review of Systems:     The Review of Systems is negative other than what is noted in the HPI.     Psychiatric History:     Prior diagnoses: Previous psychiatric diagnoses include Depression, BPD, Bipolar II, Schizophrenia.     Hospitalizations: Multiple at [facility] for Insomnia .  Last Hospitalization 4/8/25 at [address] due to rafaela and psychosis    Court Commitments: Per Chart Review. Currently Stay of commitment per [facility]    Suicide attempts: Occurred in April 2018 per Chart Review..     Self-injurious behavior: Hx of non-suicidal self injury per Chart Review..     Violence towards others: None per Chart Review..     ECT/TMS: None per Chart Review..    Past medications:   Per Chart Review.: Prior trials include Zoloft, Seroquel, trazodone, Geodon, Latuda, lithium, Wellbutrin, Adderall, and Viibryd.      Substance Use History:     Alcohol: Denies She is allergic to alcohol      Nicotine: Endorses patient vapes    Illicit Substances: Endorses past use to cannabis. Current UDS 4/23/25 positive for Amphetamines and Cannabis    Chemical Dependency Treatment: Denies history of chemical dependency treatment      Social History:     Upbringing: Grew up in [place], Given to foster care at 9 years old..     Family/Relationships: Cohabitating    Living Situation:  Lives with  "roommate (ex-girlfriend)    Education: Highest level of education obtained is: Some College    Occupation: Works as a Pharm tech at [employer]     Legal: Denies history of legal issues.     Guns: no    Abuse/Trauma: Endorses history of trauma. Parents  when she was 9 years old, lived in the foster system until 19 years     Service: None     Spirituality: None     Hobbies/Interests: Social media/computer activities      Past Medical/Surgical History:     Past Medical History:   Diagnosis Date    Asthma     Bipolar affective disorder (H)     Depressive disorder     Outbursts of anger     Schizophrenia (H)          Past Surgical History:   Procedure Laterality Date    ENT SURGERY      Unknown object in ear        Family History:     Psychiatric Family Hx:     Family History   Problem Relation Age of Onset    Mental Illness Mother     Paranoid behavior Mother     Other Cancer Mother         Bio-mother, surgery on stomach cancer    No Known Problems Father         Allergies:      Allergies   Allergen Reactions    Alcohol Hives    Kiwi Hives    Red Dye #40 (Allura Red)     Seasonal Allergies Other (See Comments) and Rash     sneezing        Medications:     Medications Prior to Admission   Medication Sig Dispense Refill Last Dose/Taking    haloperidol (HALDOL) 10 MG tablet Take 1 tablet (10 mg) by mouth at bedtime. 30 tablet 0 2025    vitamin D2 (ERGOCALCIFEROL) 10309 units (1250 mcg) capsule Take 1 capsule (50,000 Units) by mouth every 7 days. 5 capsule 0 Past Week    albuterol (PROAIR HFA/PROVENTIL HFA/VENTOLIN HFA) 108 (90 Base) MCG/ACT inhaler Inhale 2 puffs into the lungs every 6 hours as needed for shortness of breath, wheezing or cough 18 g 3 Unknown       See current inpatient medications below.     Vitals and Physical Exam:     /89 (BP Location: Left arm, Patient Position: Sitting)   Pulse 85   Temp 98.4  F (36.9  C) (Temporal)   Resp 17   Ht 1.676 m (5' 6\")   Wt 83.9 kg (185 lb)   " LMP 02/09/2025 (Exact Date)   SpO2 97%   BMI 29.86 kg/m      See ED assessment note by ED physician on 4/22/25, [facility] ED.     Labs and Imaging:     Recent Results (from the past 72 hours)   Urine Drug Screen Panel    Collection Time: 04/23/25  6:05 PM   Result Value Ref Range    Amphetamines Urine Screen Positive (A) Screen Negative    Barbituates Urine Screen Negative Screen Negative    Benzodiazepine Urine Screen Negative Screen Negative    Cannabinoids Urine Screen Positive (A) Screen Negative    Cocaine Urine Screen Negative Screen Negative    Fentanyl Qual Urine Screen Negative Screen Negative    Opiates Urine Screen Negative Screen Negative    PCP Urine Screen Negative Screen Negative   Oxycodone Qual Urine    Collection Time: 04/23/25  6:05 PM   Result Value Ref Range    Oxycodone Urine Screen Negative Screen Negative   Comprehensive metabolic panel    Collection Time: 04/23/25  8:12 PM   Result Value Ref Range    Sodium 136 135 - 145 mmol/L    Potassium 3.2 (L) 3.4 - 5.3 mmol/L    Carbon Dioxide (CO2) 27 22 - 29 mmol/L    Anion Gap 10 7 - 15 mmol/L    Urea Nitrogen 8.5 6.0 - 20.0 mg/dL    Creatinine 0.78 0.51 - 0.95 mg/dL    GFR Estimate >90 >60 mL/min/1.73m2    Calcium 9.2 8.8 - 10.4 mg/dL    Chloride 99 98 - 107 mmol/L    Glucose 130 (H) 70 - 99 mg/dL    Alkaline Phosphatase 56 40 - 150 U/L    AST 22 0 - 45 U/L    ALT 14 0 - 50 U/L    Protein Total 6.9 6.4 - 8.3 g/dL    Albumin 4.3 3.5 - 5.2 g/dL    Bilirubin Total 0.4 <=1.2 mg/dL        Mental Status Examination:     Oriented to:  Grossly Oriented  General:  Awake, Alert, and Somnolent  Appearance:  appears older than stated age, Posture is stiff and sitting, and Dressed in hospital scrubs  Behavior/Attitude:  Cooperative and Engaged, somnolent  Eye Contact: Appropriate  Psychomotor: Restless, Slowed, Rigidity, Pacing, and Decreased arm swing no catatonia present  Speech:  appropriate volume/tone and poverty  Language: Fluent in English with  "appropriate syntax and vocabulary.  Mood:  \"Tired\"  Affect:  appropriate and restricted, current severe distonia difficult to have adequate facial expressions  Thought Process:  linear, coherent, goal directed, and circumstantial  Thought Content:    Denies A/V/H, HI but some passive fleeting thoughts of death ; No apparent delusions  Associations:  questionable  Insight:  partial due to medications  Judgment:  partial due to medications  Impulse control: partial  Attention Span:  grossly intact  Concentration:  grossly intact  Recent and Remote Memory:  not formally assessed  Fund of Knowledge: average  Muscle Strength and Tone:  increased  Gait and Station:  narrow gait base, small rapid steps, with limited extremities mobility     Psychiatric Assessment:      Lenore AdairBrennon allison is a 29 year old female with a previous psychiatric diagnoses of  Bipolar disorder v Schizoaffective Disorder,  ADHD, c-PTSD, and BPD who presented with the ED on 4/22/25 due to concerns of insomnia and suicidal ideation with thoughts to overdose. Most recent psychiatric hospitalization was April 2025 due to psychosis. Significant symptoms on admission include severe distonia, blurry vision, limited movement, anxiety. The MSE on admission was pertinent for EPS, passive and fleeting SI, and somnolence. Biological contributions to mental health presentation include Previous episode of bijal and psychosis with new start of antipsychotic and Aleida-Danlos. Psychological contributions to mental health presentation include maladaptive coping. Social factors contributing to mental health presentation include limited support system. Protective factors include engage in care, help-seeking.     In summary, the patient's reported symptoms of  acute severe distonia and restlessness in the context of start of new antipsychotic (Haldol) are consistent with EPS (distonia and akathisia) related to medication. She will likely benefit from medical " stabilization of current symptoms this admission. Per chart review she was recently discharge 2 weeks ago with new start of Haldol, with symptoms starting quickly afterwards. At first it was mostly difficult facial movements, progressing to overall stiffness, drooling, muscle pain and blurry vision, increasingly distressing and developing SI before coming to the ED. Previous diagnosis of Rafaela with Psychosis was motive of recent initiation of Haldol. Currently started on Seroquel this admission, with no added side effects. Due to current presentation, is difficult to assess if there is underlying relapsing mood or psychotic disorder.     She mentions that in the past she was on Adderall due to ADHD but was stopped recently between hospitalizations, Felecia mentions that on that outpatient evaluation they were considering assessment for ASD too. She tried cannabis vape to help her sleep due to increased insomnia, but did not helped. She had recently a Sleep study that ruled out Sleep apnea, but assert Dx of Insomnia disorder.    Given that she currently has SI and acute distonia, patient warrants inpatient psychiatric hospitalization to maintain her safety.      Psychiatric Plan by Diagnosis      # Acute Distonia  # Akathisia   # Rule out Mood disorder with Psychosis v Primary Psychotic Disorder  1. Medications:  - Cogentin 2 mg BID  - Cogentin 1 mg IM + Benadryl 50 mg IM once  - Propranolol 10 mg BID     2. Pertinent Labs/Monitoring:   - UDS positive for amphetamines and cannabis     3. Additional Plans:  - Patient will be treated in therapeutic milieu with appropriate individual and group therapies as described      # ADHD  # r/o ASD       Psychiatric Hospital Course:        Lenore Suggs was admitted to [facility] as a voluntary patient, currently on a Stay of Commitment.   Medications:  PTA Haldol was held due to EPS.    New medications started at the time of admission include Seroquel 100 mg, Cogentin 1 mg  and Propranolol 10 mg.     The risks, benefits, alternatives, and side effects were discussed and understood by the patient and other caregivers.     Medical Assessment and Plan     Medical diagnoses to be addressed this admission:    # Aleida-Danlos (vascular hypermobility type III)      Medical course: Patient was physically examined by the ED prior to being transferred to the unit and was found to be medically stable and appropriate for admission.     Consults:  none     Checklist     Legal Status: Orders Placed This Encounter      Voluntary      Legal status Stay of Commitment  [facility] [place]   File Number: Case No. [id number]   Start and expiration date of commitment: 4/15/2025-10/15/2025    Safety Assessment:   Behavioral Orders   Procedures    [facility] Extended Care     Until discharge, Extended Care to offer psychotherapeutic services to mental health patients boarding for admission or stabilization. These services are to include but are not limited to: individual psychotherapy, diagnostic assessment, case management and care planning, safety planning, etc. This may include up to 1 visit per day. If patient is physically located at [facility] or [facility], group psychotherapy up to 2 time per day may be offered.        Risk Assessment:  Risk for harm is low.  Risk factors: SI, maladaptive coping, substance use, trauma, and past behaviors  Protective factors: family and peers     SIO: None    Dispo: TBD. Disposition pending clinical stabilization, medication optimization and development of an appropriate discharge plan.     Attestations:     Resident with med student: Tom Heredia, MS3  [facility] Medical Student      I was present with the medical student who participated in the service and in the documentation of the note.  I have verified the history and personally performed the physical exam and medical decision making. I agree with the assessment and plan of care as documented in the note.    This patient was seen and  discussed with my attending physician.  Laura Paul MD  [facility] Psychiatry Resident  04/24/2025

## 2025-04-25 LAB
ATRIAL RATE - MUSE: 66 BPM
CK SERPL-CCNC: 264 U/L (ref 26–192)
DIASTOLIC BLOOD PRESSURE - MUSE: NORMAL MMHG
HOLD SPECIMEN: NORMAL
INTERPRETATION ECG - MUSE: NORMAL
P AXIS - MUSE: -6 DEGREES
PR INTERVAL - MUSE: 132 MS
QRS DURATION - MUSE: 78 MS
QT - MUSE: 420 MS
QTC - MUSE: 440 MS
R AXIS - MUSE: 32 DEGREES
SYSTOLIC BLOOD PRESSURE - MUSE: NORMAL MMHG
T AXIS - MUSE: 19 DEGREES
TSH SERPL DL<=0.005 MIU/L-ACNC: 1.65 UIU/ML (ref 0.3–4.2)
VENTRICULAR RATE- MUSE: 66 BPM

## 2025-04-25 PROCEDURE — 82550 ASSAY OF CK (CPK): CPT

## 2025-04-25 PROCEDURE — H2032 ACTIVITY THERAPY, PER 15 MIN: HCPCS

## 2025-04-25 PROCEDURE — 36415 COLL VENOUS BLD VENIPUNCTURE: CPT

## 2025-04-25 PROCEDURE — 250N000013 HC RX MED GY IP 250 OP 250 PS 637

## 2025-04-25 PROCEDURE — 124N000002 HC R&B MH UMMC

## 2025-04-25 PROCEDURE — 250N000013 HC RX MED GY IP 250 OP 250 PS 637: Performed by: PSYCHIATRY & NEUROLOGY

## 2025-04-25 PROCEDURE — 99232 SBSQ HOSP IP/OBS MODERATE 35: CPT | Mod: GC | Performed by: PSYCHIATRY & NEUROLOGY

## 2025-04-25 PROCEDURE — 250N000013 HC RX MED GY IP 250 OP 250 PS 637: Performed by: STUDENT IN AN ORGANIZED HEALTH CARE EDUCATION/TRAINING PROGRAM

## 2025-04-25 PROCEDURE — 84443 ASSAY THYROID STIM HORMONE: CPT

## 2025-04-25 RX ORDER — VITAMIN B COMPLEX
50 TABLET ORAL DAILY
Status: DISCONTINUED | OUTPATIENT
Start: 2025-04-25 | End: 2025-04-30 | Stop reason: HOSPADM

## 2025-04-25 RX ADMIN — PROPRANOLOL HYDROCHLORIDE 10 MG: 10 TABLET ORAL at 08:24

## 2025-04-25 RX ADMIN — BENZTROPINE MESYLATE 2 MG: 1 TABLET ORAL at 20:00

## 2025-04-25 RX ADMIN — QUETIAPINE FUMARATE 100 MG: 100 TABLET ORAL at 21:07

## 2025-04-25 RX ADMIN — PROPRANOLOL HYDROCHLORIDE 10 MG: 10 TABLET ORAL at 20:00

## 2025-04-25 RX ADMIN — Medication 50 MCG: at 14:37

## 2025-04-25 RX ADMIN — BENZTROPINE MESYLATE 2 MG: 1 TABLET ORAL at 08:24

## 2025-04-25 ASSESSMENT — ACTIVITIES OF DAILY LIVING (ADL)
ADLS_ACUITY_SCORE: 58
DRESS: INDEPENDENT
ADLS_ACUITY_SCORE: 58
LAUNDRY: WITH SUPERVISION
ADLS_ACUITY_SCORE: 58
HYGIENE/GROOMING: INDEPENDENT
ADLS_ACUITY_SCORE: 58
HYGIENE/GROOMING: INDEPENDENT
ADLS_ACUITY_SCORE: 58
ORAL_HYGIENE: INDEPENDENT
ADLS_ACUITY_SCORE: 58

## 2025-04-25 NOTE — PROGRESS NOTES
"Problem: Adult Inpatient Plan of Care  Goal: Plan of Care Review   Outcome: Progressing  Flowsheets (Taken 4/24/2025 2200)  Plan of Care Reviewed With: patient  Overall Patient Progress: improving   Goal Outcome Evaluation:progressing    Plan of Care Reviewed With: patient.  Overall Patient Progress: improving.      Pt was met in the lounge at the beginning of the shift, presenting as guarded and withdrawn but calm and pleasant upon approach. Her affect was flat, and her thoughts were appropriate. She reported bilateral foot pain rated 4/10, but declined pain medication. When walking, she was observed shuffling slightly and reported some stiffness, though she noted significant improvement compared to admission. She credited the initiation of Cogentin with helping her mobility and reducing drooling.    Although withdrawn and avoiding social engagement with peers, pt was visible on the unit, she watched TV in the lounge, made phone calls, and attended OT group. She ate her dinner in the dining area, consuming a sufficient amount, and was properly dressed and groomed. Pt maintained good eye contact and had clear, coherent speech. She denied experiencing anxiety, thoughts of harm to herself or others, and hallucinations. However, she endorsed depression 5/10, attributing it to frustration with her admission, stating, \"I was just here, on a different floor, and I'm back again.\" Nurse provided emotional support and reassurance, emphasizing that it's okay to seek help at the hospital to ensure safety. Pt was compliant with her medication, and no behavioral outbursts, safety issues, or medical concerns during the shift. ?  "

## 2025-04-25 NOTE — DISCHARGE INSTRUCTIONS
Behavioral Discharge Planning and Instructions    Summary: You were admitted on 4/23/2025  due to Suicidal Ideations.  You were treated by Darnell Quiñones MD and discharged on *** from [facility] to Home    Main Diagnosis: Bipolar I Disorder    Commitment:  Stay of Commitment:A petition for commitment was made and you did agree to a stayed commitment. This started on 04/15/2025 and will remain in effect until 10/15/2025. The terms of the stayed commitment do require that you follow recommendations for medication and treatment.     Health Care Follow-up:   Medication Management/Psychiatry:  Name: ANAYELI Marina CNP   [facility]    Phone Number: [phone]   Next Appointment: 5/19 9am    Therapy:  [facility] [place] [facility]  [address] [place] [address], [address] [place] [address]  P: [phone] F: [phone]  Initial Appointment: 5/8 1pm- in person       Patient Navigation Hub:   [facility] Navigators work to be your point-of-contact for trustworthy and compassionate care from Inpatient services to [facility] Programmatic Care. We will provide resources and communication to help guide you into programmatic care. Ultimately, our goal is to be the one-stop-shop of communication, coordination, and support for your journey to programmatic care.    Phone: [phone]    Information will be faxed to your outpatient providers to ensure a healthy continuity of care for you.     Attend all scheduled appointments with your outpatient providers. Call at least 24 hours in advance if you need to reschedule an appointment to ensure continued access to your outpatient providers.     Major Treatments, Procedures and Findings:  You were provided with: a psychiatric assessment, assessed for medical stability, medication evaluation and/or management, group therapy, individual therapy, and milieu management    Symptoms to Report: losing more sleep,  mood getting worse, or thoughts of suicide    Early warning signs can include: increased depression or anxiety sleep disturbances increased thoughts or behaviors of suicide or self-harm     Safety and Wellness:  Take all medicines as directed.  Make no changes unless your doctor suggests them.      Follow treatment recommendations.  Refrain from alcohol and non-prescribed drugs.  If there is a concern for safety, call 911.    Resources:   Mental Health Crisis Resources  Throughout [place]: call **CRISIS (**198309)  Crisis Text Line: is available for free, 24/7 by texting [place] to 147907  Suicide Awareness Voices of Education (SAVE) (www.save.org): 531-483-CVDV (9927)  The [place] Suicide Prevention Lifeline is now: 988 Suicide and Crisis Lifeline. Call 988 anytime.  National [place] on Mental Illness (www.[place].negrita.org): 977.400.1985 or 034-472-3507.  Xlgr5cayb: text the word LIFE to 66969 for immediate support and crisis intervention  Mental Health Consumer/Survivor Network of [place] (www.mhcsn.net): 881.691.1776 or 668-092-4405  Mental Health Association of [place] (www.mentalhealth.org): 552.627.6931 or 784-688-3709  Peer Support Connection [place] Warmline (PSC) 1-473.798.3986 Available from 5pm - 9am (7 days a week/365 days a year)  [facility] 1-926.581.6542 Community Outreach for Psych Emergencies      General Medication Instructions:   See your medication sheet(s) for instructions.   Take all medicines as directed.  Make no changes unless your doctor suggests them.   Go to all your doctor visits.  Be sure to have all your required lab tests. This way, your medicines can be refilled on time.  Do not use any drugs not prescribed by your doctor.  Avoid alcohol.    Advance Directives:   Scanned document on file with [place]? No scanned doc  Is document scanned? Pt states no documents  Honoring Choices Your Rights Handout:    Was more information offered? Pt declined    The Treatment team has appreciated the opportunity to  work with you. If you have any questions or concerns about your recent admission, you can contact the unit which can receive your call 24 hours a day, 7 days a week. They will be able to get in touch with a Provider if needed. The unit number is [phone] .

## 2025-04-25 NOTE — PROGRESS NOTES
"  ----------------------------------------------------------------------------------------------------------  [facility]  Psychiatry Progress Note  Hospital Day #1     Interim History:     The patient's care was discussed with the treatment team and chart notes were reviewed.    Vitals: /80   Pulse 80   Temp 97.7  F (36.5  C) (Temporal)   Resp 16   Ht 1.676 m (5' 6\")   Wt 83.8 kg (184 lb 12.8 oz)   LMP 02/09/2025 (Exact Date)   SpO2 98%   BMI 29.83 kg/m      Sleep: 0 hours (04/25/25 0600)  Scheduled medications: Took all scheduled medications as prescribed  Psychiatric PRN medications: No psychiatric prns given    Staff Report:   No acute events or safety concerns overnight. Please see staff notes for details.      Subjective:     Patient Interview:  Lenorekay Suggs feels better today. She was feeling like \"drunk and not like herself\", yesterday. She still could not sleep but feels she would  be today. She mentions how when she received the IM medication yesterday she could felt how her muscles\"unclench\". She is able to walk better now, but still feels that she needs to use her tippy toes. She still has problem with eating but it is getting better.    She mentioned that before she used propranolol but after a sleep study they stop it due to making her \"heart stop\". Currently she does not have palpitation, chest pain or dizziness. Also no blurry vision.    She denies SI, A/V/H, but has some racing thoughts but still able to control it.    ROS:  Patient has muscle stiffness  Patient denies acute concerns     Objective:     Vitals:  /80   Pulse 80   Temp 97.7  F (36.5  C) (Temporal)   Resp 16   Ht 1.676 m (5' 6\")   Wt 83.8 kg (184 lb 12.8 oz)   LMP 02/09/2025 (Exact Date)   SpO2 98%   BMI 29.83 kg/m      Allergies:  Allergies   Allergen Reactions    Haldol [Haloperidol] Other (See Comments)     Dystonic reaction     Alcohol Hives    Kiwi Hives "    Red Dye #40 (Allura Red)     Seasonal Allergies Other (See Comments) and Rash     sneezing       Current Medications:  Scheduled:  Current Facility-Administered Medications   Medication Dose Route Frequency Provider Last Rate Last Admin    acetaminophen (TYLENOL) tablet 650 mg  650 mg Oral Q4H PRN Alicia Beverly MD        albuterol (PROVENTIL HFA/VENTOLIN HFA) inhaler  2 puff Inhalation Q6H PRN Alicia Beverly MD        benztropine (COGENTIN) tablet 2 mg  2 mg Oral BID Yocasta, Darnell Orr MD   2 mg at 04/25/25 0824    hydrOXYzine HCl (ATARAX) tablet 25 mg  25 mg Oral Q4H PRN Alicia Beverly MD        polyethylene glycol (MIRALAX) Packet 17 g  17 g Oral Daily PRN Alicia Beverly MD        propranolol (INDERAL) tablet 10 mg  10 mg Oral BID Laura Loza MD   10 mg at 04/25/25 0824    QUEtiapine (SEROquel) tablet 100 mg  100 mg Oral At Bedtime PRN Alicia Beverly MD        QUEtiapine (SEROquel) tablet 100 mg  100 mg Oral At Bedtime Alicia Cabrera MD   100 mg at 04/24/25 2113       PRN:  Current Facility-Administered Medications   Medication Dose Route Frequency Provider Last Rate Last Admin    acetaminophen (TYLENOL) tablet 650 mg  650 mg Oral Q4H PRN Alicia Beverly MD        albuterol (PROVENTIL HFA/VENTOLIN HFA) inhaler  2 puff Inhalation Q6H PRN Alicia Beverly MD        benztropine (COGENTIN) tablet 2 mg  2 mg Oral BID Yocasta, Darnell Orr MD   2 mg at 04/25/25 0824    hydrOXYzine HCl (ATARAX) tablet 25 mg  25 mg Oral Q4H PRN Alicia Beverly MD        polyethylene glycol (MIRALAX) Packet 17 g  17 g Oral Daily PRN Alicia Beverly MD        propranolol (INDERAL) tablet 10 mg  10 mg Oral BID Laura Loza MD   10 mg at 04/25/25 0824    QUEtiapine (SEROquel) tablet 100 mg  100 mg Oral At Bedtime PRN Alicia Beverly MD        QUEtiapine (SEROquel)  "tablet 100 mg  100 mg Oral At Bedtime Alicia Cabrera MD   100 mg at 04/24/25 2113       Labs and Imaging:  New results:   Recent Results (from the past 24 hours)   TSH with free T4 reflex and/or T3 as indicated    Collection Time: 04/25/25  7:51 AM   Result Value Ref Range    TSH 1.65 0.30 - 4.20 uIU/mL   CK total    Collection Time: 04/25/25  7:51 AM   Result Value Ref Range     (H) 26 - 192 U/L   Extra Purple Top Tube    Collection Time: 04/25/25  7:51 AM   Result Value Ref Range    Hold Specimen JIC    Most Recent 3 CBC's:  Recent Labs   Lab Test 04/21/25 0133 04/08/25  0741 07/28/23  1903   WBC 6.9 4.9 6.6   HGB 11.1* 12.3 12.4   MCV 87 88 89    212 180     Most Recent 3 BMP's:  Recent Labs   Lab Test 04/23/25 2012 04/21/25 0133 04/08/25  0741    140 139   POTASSIUM 3.2* 3.2* 3.4   CHLORIDE 99 101 105   CO2 27 27 23   BUN 8.5 7.5 10.0   CR 0.78 0.68 0.73   ANIONGAP 10 12 11   KIKE 9.2 9.4 9.0   * 111* 86         Data this admission:  - CBC unremarkable  - CMP low K+  - TSH normal  - UDS positive for amphetamines and cannabis  - Vit D low  - Hgb A1c normal  - Lipids unremarkable  - Vit B12 normal  - Folate normal  - CK increased     Mental Status Exam:     Oriented to:  Grossly Oriented  General:  Awake and Alert  Appearance:  appears stated age, Posture is sitting in chair in lounge, Grooming is adequate, Dressed in hospital scrubs, Hair is well groomed, and appropriate weight  Behavior/Attitude:  Calm, Cooperative, and Engaged  Eye Contact: Appropriate  Psychomotor: Dystonia and akathisia no catatonia present  Speech:  appropriate volume/tone  Language: Fluent in English with appropriate syntax and vocabulary.  Mood:  \"Better\"  Affect:  appropriate, difficult to have adequate facial expressions   Thought Process:  linear, coherent, goal directed, and racing thoughts  Thought Content:   No SI/HI/AH/VH; No apparent delusions  Associations:  questionable  Insight:  fair due to resolution " of EPS symptoms  Judgment:  fair due to previous behaviors  Impulse control: fair  Attention Span:  grossly intact  Concentration:  grossly intact  Recent and Remote Memory:  not formally assessed  Fund of Knowledge: average  Muscle Strength and Tone:  increased  Gait and Station:   still mildly narrow gait base, with limited extremities mobility     Psychiatric Assessment        LenoreBrennon Ghosh is a 29 year old female with a previous psychiatric diagnoses of  Bipolar disorder v Schizoaffective Disorder,  ADHD, c-PTSD, and BPD who presented with the ED on 4/22/25 due to concerns of insomnia and suicidal ideation with thoughts to overdose. Most recent psychiatric hospitalization was April 2025 due to psychosis. Significant symptoms on admission include severe distonia, blurry vision, limited movement, anxiety. The MSE on admission was pertinent for EPS, passive and fleeting SI, and somnolence. Biological contributions to mental health presentation include Previous episode of bijal and psychosis with new start of antipsychotic and Aleida-Danlos. Psychological contributions to mental health presentation include maladaptive coping. Social factors contributing to mental health presentation include limited support system. Protective factors include engage in care, help-seeking.      In summary, the patient's reported symptoms of  acute severe distonia and restlessness in the context of start of new antipsychotic (Haldol) are consistent with EPS (distonia and akathisia) related to medication. She will likely benefit from medical stabilization of current symptoms this admission. Per chart review she was recently discharge 2 weeks ago with new start of Haldol, with symptoms starting quickly afterwards. At first it was mostly difficult facial movements, progressing to overall stiffness, drooling, muscle pain and blurry vision, increasingly distressing and developing SI before coming to the ED. Previous diagnosis of  Rafaela with Psychosis was motive of recent initiation of Haldol. Currently started on Seroquel this admission, with no added side effects. Due to current presentation, is difficult to assess if there is underlying relapsing mood or psychotic disorder.      She mentions that in the past she was on Adderall due to ADHD but was stopped recently between hospitalizations, Felecia mentions that on that outpatient evaluation they were considering assessment for ASD too. She tried cannabis vape to help her sleep due to increased insomnia, but did not helped. She had recently a Sleep study that ruled out Sleep apnea, but assert Dx of Insomnia disorder. Currently, her symptoms are slowly improving, still difficulty eating and opening her mouth as well as walking with mild difficulty.      Given persistent symptoms of acute distonia, patient warrants inpatient psychiatric hospitalization for monitoring EPS.     Psychiatric Plan by Diagnosis      Today's changes:  - Monitor medication efficacy     # Acute Distonia  # Akathisia   # Rule out Mood disorder with Psychosis v Primary Psychotic Disorder  1. Medications:  - Cogentin 2 mg BID  - Cogentin 1 mg IM + Benadryl 50 mg IM once  - Propranolol 10 mg BID     2. Pertinent Labs/Monitoring:   - UDS positive for amphetamines and cannabis  - CK elevated possible due to acute distonia     3. Additional Plans:  - Patient will be treated in therapeutic milieu with appropriate individual and group therapies as described        # ADHD  # r/o ASD        Psychiatric Hospital Course:      Lenore Suggs was admitted to [facility] as a voluntary patient, currently on a Stay of Commitment.   Medications:  PTA Haldol was held due to EPS.    New medications started at the time of admission include Seroquel 100 mg, Cogentin 1 mg and Propranolol 10 mg.      The risks, benefits, alternatives, and side effects were discussed and understood by the patient and other caregivers.     Medical Assessment  and Plan     Medical diagnoses to be addressed this admission:    # Aleida-Danlos (vascular hypermobility type III)     #Low Vitamin D  - Start Vitamin D 50 mcg      Medical course: Patient was physically examined by the ED prior to being transferred to the unit and was found to be medically stable and appropriate for admission.      Consults:  none     Checklist     Legal Status: Voluntary  Stay of Commitment   County: [place]   File Number: Case No. [id number]   Start and expiration date of commitment: 4/15/2025-10/15/2025    Safety Assessment:   Behavioral Orders   Procedures    Code 1 - Restrict to Unit    Routine Programming     As clinically indicated    Status 15     Every 15 minutes.    Suicide precautions: Suicide Risk: HIGH     Patients on Suicide Precautions should have a Combination Diet ordered that includes a Diet selection(s) AND a Behavioral Tray selection for Safe Tray - with utensils, or Safe Tray - NO utensils       Order Specific Question:   Suicide Risk     Answer:   HIGH       Risk Assessment:  Risk for harm is low.  Risk factors: SI and past behaviors  Protective factors: engaged in treatment     SIO: none    Disposition: Pending stabilization, medication optimization, & development of a safe discharge plan.     Attestations     Resident with med student: Tom Heredia, MS3  [facility] Medical Student      I was present with the medical student who participated in the service and in the documentation of the note.  I have verified the history and personally performed the physical exam and medical decision making. I agree with the assessment and plan of care as documented in the note.    This patient was seen and discussed with my attending physician.  Laura Paul MD  [facility] Psychiatry Resident  04/25/2025    This patient has been seen and evaluated by me, Darnell Quiñones.  I have discussed this patient with the psychiatry resident and I agree with the findings and plan in this  note.    I have reviewed today's vital signs, medications, labs and imaging.     Darnell Rust MD

## 2025-04-25 NOTE — PLAN OF CARE

## 2025-04-25 NOTE — PLAN OF CARE
Rehab Group    Start time: 1700  End time: 1745  Patient time total: 45 minutes    attended partial group     #3 attended   Group Type: occupational therapy   Group Topic Covered: balanced lifestyle, coping skills, healthy leisure time, mindfulness, and relaxation      Group Session Detail:    Intervention: Pt participated in a OT Water color relaxation group to facilitate coping skills exploration and creative expression through personally meaningful activities, and to encourage utilization of these healthy coping skills to promote overall health and wellness. Group included clinical observation of social, cognitive and kinesthetic performance skills to inform treatment and safe discharge planning.    Mood/Affect: Flat, Pleasant      Plan: Patient encouraged to maintain attendance for continued ongoing support in working towards occupational therapy goals to support overall treatment/care.        Patient Detail:    Joined at start of group after writer invited patient out in the lounge. Presents as somewhat sedated and low energy. States she did not go to other groups today d/t medications making her feel tired. Was an active participant in water color painting project for first half or so of the group, otherwise requesting to journal for the rest of time in group. Was briefly social towards start of group with another peer at the table. Asking peer if they would consider her part of their [place] given both having different native connections in their family. Shared having family descendants of a [place] in [facility]. After this working quietly on project and journaling with headphones on for the rest of duration in group. At times observed staring ahead at peer while peer was working on their project. Unclear if prolonged staring or listening just listening to music and looking ahead.       [id number] OT Group (2 or more in attendance)    Patient Active Problem List   Diagnosis    History of suicide attempt     Suicidal ideation    Headache, unspecified headache type    Attention deficit hyperactivity disorder (ADHD), combined type    Complex posttraumatic stress disorder    Borderline personality disorder (H)    Major depressive disorder, recurrent, mild    Paranoia (H)    Psychosis (H)    Aggression    Major depressive disorder, recurrent episode, moderate (H)

## 2025-04-25 NOTE — PROGRESS NOTES
Rehab Group     Start time: 1015  End time: 1145  Patient time total: 35 minutes     attended partial group      #5 attended   Group Type: music   Group Topic Covered: coping skills, emotional regulation, healthy leisure time, mindfulness, relaxation , self-care, self-esteem, and sensory intervention      Group Session Detail: Morning Relaxation Music       Patient Response/Contribution:  positive affect and cooperative with task, presented as a bit scattered, requested and followed cues      Patient Detail: Cooperatively engaged in Music Relaxation group to decrease anxiety and promote personal organization and mood uplift  Upbeat affect, appropriately engaged in session, responding well to the music.  Pt also engaged in some written prompts MT had for pts.  Filled them out logically and with small, neat printing. Kept mostly to self, was not seen interacting with peers.  Does well with cueing.       Activity Therapy Per 15 min ()          Patient Active Problem List   Diagnosis    History of suicide attempt    Suicidal ideation    Headache, unspecified headache type    Attention deficit hyperactivity disorder (ADHD), combined type    Complex posttraumatic stress disorder    Borderline personality disorder (H)    Major depressive disorder, recurrent, mild    Paranoia (H)    Psychosis (H)    Aggression    Major depressive disorder, recurrent episode, moderate (H)

## 2025-04-25 NOTE — PLAN OF CARE
"Goal Outcome Evaluation:       Pt a wake all shift.  She sat on her bed all night.  No behaviors noted.  Pt declined prn medication for sleep.  Pt stated \"I am fine\".                 "

## 2025-04-25 NOTE — PLAN OF CARE
SHIFT NURSING PLAN OF CARE   Problem: Suicide Risk  Goal: Absence of Self-Harm  4/25/2025 1707 by aHn Valdez RN  Outcome: Progressing    Problem: Anxiety Signs/Symptoms  Goal: Improved Mood Symptoms (Anxiety Signs/Symptoms)  Outcome: Progressing  Flowsheets (Taken 4/25/2025 1707)  Mutually Determined Action Steps (Improved Mood Symptoms):   adheres to medication regimen   shares insight re: need for meds   Goal Outcome Evaluation:    Pt has been active and visible in the milieu; spent the shift watching TV in the lounge and listening to a headset; endorsed low anxiety and depression without rating; pt said that her EPS symptoms stopped post IM benadryl and cogentin administration and now walking fine without issue. Denied SI/SIB,AVH and HI; denied issue with appetite and fluid intake; hygiene is adequate; C/O neck soreness and head of bed adjusted using pillow and extra blanket;  pt reported having difficulty of falling asleep last night and recorded slept 0 hrs; pt attribute her insomnia to uncomfortable bed though eggcrate mattress already applied to her bed; encouraged to seek PRN if not able to sleep tonight. VSS.

## 2025-04-25 NOTE — PLAN OF CARE
Nursing assessment completed. Patient awake and visible for much of the shift. Spending time in the lounge. Social with select peers at brief times, otherwise withdrawn to herself. Cooperative with scheduled am medications. States the cogentin and propanolol have been helpful with her previous side effects from haldol. States this is significantly improving. Blunted affect, but brightens some on approach. No PRNs requested or received. Denies SI/SIB or thoughts to harm others. Continue to monitor and assess.

## 2025-04-25 NOTE — PLAN OF CARE
Individual Therapy Note    Pt progress: Met with Pt to engage in safety planning. Pt was somewhat engaged, appeared guarded though shared when prompted. Pt denied SI, reports she has not had SI with plan for 14 years when she had her attempt. Reports she came to the hospital for concerns regarding side effect of medications, feels those symptoms and concerns have been relieved.    Therapeutic Intervention(s):   Provided active listening, unconditional positive regard, and validation.   Engaged in safety planning identifying coping skills, warning signs, health support and resources      No Charge (0-15 min)

## 2025-04-25 NOTE — PROGRESS NOTES
"   Rehab Group     Start time: 1330  End time: 1430  Patient time total: 60 minutes     attended full group     #4 attended   Group Type: music   Group Topic Covered: balanced lifestyle, coping skills, and emotional regulation      Group Session Detail: Amalia Substitution       Patient Response/Contribution:  positive affect, cooperative with task, attentive, and actively engaged      Patient Detail:  Pt completed the Amalia Substitution intervention, writing and sharing responses with MT.  Pt wrote about \"friendship\" and a \"female president\" in pts amalia substitution.  Pts answers were sensical in the song completion.  Pt worked on the intervention duteously, and appears to enjoy structured \"assignments\".  Pt also stated \"I'm just here for the chill vibe!\"       Activity Therapy Per 15 min ()        Patient Active Problem List   Diagnosis    History of suicide attempt    Suicidal ideation    Headache, unspecified headache type    Attention deficit hyperactivity disorder (ADHD), combined type    Complex posttraumatic stress disorder    Borderline personality disorder (H)    Major depressive disorder, recurrent, mild    Paranoia (H)    Psychosis (H)    Aggression    Major depressive disorder, recurrent episode, moderate (H)       "

## 2025-04-26 PROCEDURE — 124N000002 HC R&B MH UMMC

## 2025-04-26 PROCEDURE — 250N000013 HC RX MED GY IP 250 OP 250 PS 637: Performed by: STUDENT IN AN ORGANIZED HEALTH CARE EDUCATION/TRAINING PROGRAM

## 2025-04-26 PROCEDURE — 97150 GROUP THERAPEUTIC PROCEDURES: CPT | Mod: GO

## 2025-04-26 PROCEDURE — 250N000013 HC RX MED GY IP 250 OP 250 PS 637: Performed by: PSYCHIATRY & NEUROLOGY

## 2025-04-26 PROCEDURE — 250N000013 HC RX MED GY IP 250 OP 250 PS 637

## 2025-04-26 RX ADMIN — BENZTROPINE MESYLATE 2 MG: 1 TABLET ORAL at 20:04

## 2025-04-26 RX ADMIN — HYDROXYZINE HYDROCHLORIDE 25 MG: 25 TABLET, FILM COATED ORAL at 09:59

## 2025-04-26 RX ADMIN — PROPRANOLOL HYDROCHLORIDE 10 MG: 10 TABLET ORAL at 08:25

## 2025-04-26 RX ADMIN — Medication 50 MCG: at 08:25

## 2025-04-26 RX ADMIN — PROPRANOLOL HYDROCHLORIDE 10 MG: 10 TABLET ORAL at 20:04

## 2025-04-26 RX ADMIN — QUETIAPINE FUMARATE 100 MG: 100 TABLET ORAL at 22:04

## 2025-04-26 RX ADMIN — BENZTROPINE MESYLATE 2 MG: 1 TABLET ORAL at 08:25

## 2025-04-26 RX ADMIN — QUETIAPINE FUMARATE 100 MG: 100 TABLET ORAL at 20:42

## 2025-04-26 ASSESSMENT — ACTIVITIES OF DAILY LIVING (ADL)
ADLS_ACUITY_SCORE: 58
ORAL_HYGIENE: INDEPENDENT
ADLS_ACUITY_SCORE: 58
DRESS: INDEPENDENT
ADLS_ACUITY_SCORE: 58
HYGIENE/GROOMING: INDEPENDENT
ADLS_ACUITY_SCORE: 58

## 2025-04-26 NOTE — PLAN OF CARE
Goal Outcome Evaluation:  Problem: Sleep Disturbance  Goal: Adequate Sleep/Rest  Outcome: Progressing     Pt appeared asleep as the shift started, even respirations noted, with no reported. No PRN required during this shift. Observed to have slept for 7 hours.

## 2025-04-26 NOTE — PLAN OF CARE
"  Problem: Suicide Risk  Goal: Absence of Self-Harm  Outcome: Progressing   Goal Outcome Evaluation:       Pt was up this morning, VS done and are WNL, a.m meds given. Pt then said, \"I'm having anxiety, what can I do?\" Writer told her hydroxyzine is available as PRN for anxiety if she would like it. Pt declined at the time saying \"it makes me sleepy.\" 2 hours later pt said anxiety was getting worse and would like a PRN Hydroxyzine; given per her request.                  "

## 2025-04-26 NOTE — PLAN OF CARE
SHIFT NURSING PLAN OF CARE   Problem: Adult Behavioral Health Plan of Care  Goal: Develops/Participates in Therapeutic [place] to Support Successful Transition  Outcome: Progressing  Flowsheets (Taken 4/26/2025 1622)  Develops/Participates in Therapeutic [place] to Support Successful Transition: making progress toward outcome     Problem: Suicide Risk  Goal: Absence of Self-Harm  Outcome: Progressing     Problem: Anxiety Signs/Symptoms  Goal: Improved Mood Symptoms (Anxiety Signs/Symptoms)  Outcome: Progressing  Flowsheets (Taken 4/26/2025 1622)  Mutually Determined Action Steps (Improved Mood Symptoms):   adheres to medication regimen   shares insight re: need for meds   Goal Outcome Evaluation:    Plan of Care Reviewed With: patient      Pt reported slept good last night and feeling rested; calm and pleasant with bright affect; spent the shift watching TV in the lounge and listening to a headset; stated that she would like to stay hospitalized over the weekend to monitor her sleep progress; verbalized her plan to discharge on Monday and need a paper for excuse of absence; encourage to address her request with attending provider on Monday;  pt was receptive and cooperative with care; denied SI/SIB,AVH and HI; reported her anxiety and depression are improving with sleep; denied issue with appetite and fluid intake; VSS. Medication compliant; denied medication side effect.      Pt took the second dose of Seroquel at 2204 per request C/O not able to fall asleep.

## 2025-04-26 NOTE — CARE PLAN
"  Rehab Group    Start time: 1015  End time: 1100  Patient time total: 43 minutes    attended full group    #5 attended   Group Type: occupational therapy   Group Topic Covered: balanced lifestyle, cognitive activities, coping skills, educational support, healthy leisure time, substance use/recovery , self-care, self-esteem, and social skills       Group Session Detail:  Education and group discussion provided on the positive effects of affirmation and gratitude practices with hands on Affirmation or Gratitude Calendars for creative expression, building self-esteem, changing negative thoughts to positive, organization, follow through, coping, mood stabilization, reality-based activity, sharing thoughts/feelings, and socialization.       Patient Response/Contribution:  socially appropriate, attentive, and actively engaged       Patient Detail:  Pt was pleasant and fully engaged in working on her calendar.  She worked with good attention to detail in a neat and organized fashion.  Pt shared during the check in question that her favorite affirmation is, \"You're good enough.\"            [id number] OT Group (2 or more in attendance)      Patient Active Problem List   Diagnosis    History of suicide attempt    Suicidal ideation    Headache, unspecified headache type    Attention deficit hyperactivity disorder (ADHD), combined type    Complex posttraumatic stress disorder    Borderline personality disorder (H)    Major depressive disorder, recurrent, mild    Paranoia (H)    Psychosis (H)    Aggression    Major depressive disorder, recurrent episode, moderate (H)       "

## 2025-04-27 LAB
AMPHET UR-MCNC: <50 NG/ML
MDA UR-MCNC: <200 NG/ML
MDEA UR-MCNC: <200 NG/ML
MDMA UR-MCNC: <200 NG/ML
METHAMPHET UR-MCNC: <200 NG/ML
PHENTERMINE UR CFM-MCNC: <200 NG/ML

## 2025-04-27 PROCEDURE — 124N000002 HC R&B MH UMMC

## 2025-04-27 PROCEDURE — 250N000013 HC RX MED GY IP 250 OP 250 PS 637

## 2025-04-27 PROCEDURE — 250N000013 HC RX MED GY IP 250 OP 250 PS 637: Performed by: PSYCHIATRY & NEUROLOGY

## 2025-04-27 PROCEDURE — 250N000013 HC RX MED GY IP 250 OP 250 PS 637: Performed by: STUDENT IN AN ORGANIZED HEALTH CARE EDUCATION/TRAINING PROGRAM

## 2025-04-27 RX ADMIN — POLYETHYLENE GLYCOL 3350 17 G: 17 POWDER, FOR SOLUTION ORAL at 09:51

## 2025-04-27 RX ADMIN — BENZTROPINE MESYLATE 2 MG: 1 TABLET ORAL at 08:42

## 2025-04-27 RX ADMIN — QUETIAPINE FUMARATE 100 MG: 100 TABLET ORAL at 20:52

## 2025-04-27 RX ADMIN — PROPRANOLOL HYDROCHLORIDE 10 MG: 10 TABLET ORAL at 08:43

## 2025-04-27 RX ADMIN — HYDROXYZINE HYDROCHLORIDE 25 MG: 25 TABLET, FILM COATED ORAL at 19:18

## 2025-04-27 RX ADMIN — BENZTROPINE MESYLATE 2 MG: 1 TABLET ORAL at 20:09

## 2025-04-27 RX ADMIN — Medication 50 MCG: at 08:42

## 2025-04-27 RX ADMIN — PROPRANOLOL HYDROCHLORIDE 10 MG: 10 TABLET ORAL at 20:09

## 2025-04-27 RX ADMIN — HYDROXYZINE HYDROCHLORIDE 25 MG: 25 TABLET, FILM COATED ORAL at 14:27

## 2025-04-27 ASSESSMENT — ACTIVITIES OF DAILY LIVING (ADL)
ADLS_ACUITY_SCORE: 58
DRESS: INDEPENDENT
ADLS_ACUITY_SCORE: 58
ORAL_HYGIENE: INDEPENDENT
LAUNDRY: WITH SUPERVISION
ADLS_ACUITY_SCORE: 58
ADLS_ACUITY_SCORE: 58
HYGIENE/GROOMING: INDEPENDENT

## 2025-04-27 NOTE — PLAN OF CARE
"  Problem: Adult Inpatient Plan of Care  Goal: Plan of Care Review  Description: The Plan of Care Review/Shift note should be completed every shift.  The Outcome Evaluation is a brief statement about your assessment that the patient is improving, declining, or no change.  This information will be displayed automatically on your shiftnote.  Outcome: Progressing     Problem: Sleep Disturbance  Goal: Adequate Sleep/Rest  Outcome: Progressing   Goal Outcome Evaluation:    Plan of Care Reviewed With: patient      Shift Summary:    Vital Signs:  /70 (BP Location: Left arm, Patient Position: Sitting, Cuff Size: Adult Regular)   Pulse 60   Temp 97.6  F (36.4  C) (Temporal)   Resp 16   Ht 1.676 m (5' 6\")   Wt 85.4 kg (188 lb 4.8 oz)   SpO2 100%   BMI 30.39 kg/m           Mental Health:    Patient has been visible in the milieu a majority of the shift watching television, listening to music, eating meals, working on puzzles and word searches.  States she is \"bored \" being here but is calm and cooperative. Requested and received prn Miralax at 09:51 as hasn't had a BM since Thursday 5/24/25.  No results thus far this shift.  Denies SI/SIB/HI and feels safe in the hospital.      Patient requested and received prn hydoxyzine at 14:27 for anxiety 5/10.  Reports she is anxious about \"getting home and not being able to sleep,\" as that may trigger \"hurting\" herself.  Patient reports \"good sleep\" for the last 2 nights as she has been taking Seroquel around 21:00 and falling asleep at 22:00.  Encouraged  Lenore to continue this routine and discussed other methods of relaxing at bedtime while at home.  Patient continues to note \"improvement\" from the \"side effects of haldol\" and is almost back to her \"normal self.\"     Patient remains on a no roommmate order and continues on suicide precautions.  No associated behaviors noted this shift.  Patient has been compliant with medications.   Will continue plan of care and " assist as needed.

## 2025-04-27 NOTE — CARE PLAN
Rehab Group    Start time: 1715  End time: 1800  Patient time total: 41 minutes    attended full group    #5 attended   Group Type: occupational therapy   Group Topic Covered: balanced lifestyle, cognitive activities, coping skills, educational support, healthy leisure time, self-esteem, and social skills       Group Session Detail:  Education provided on the importance of healthy leisure and how it gets lost during times of mental health or substance use events with hands on Robles card activity for healthy leisure exploration, concentration, tracking, formulating strategy, attention, planning, follow through, frustration tolerance, coping, mood stabilization, reality-based activity, an opportunity to experience success and socialization.       Patient Response/Contribution:  socially appropriate and actively engaged       Patient Detail:  Pt was pleasant and engaged in group.  She was noted to be marginally groomed.  Pt made a few odd assertions.  They often began with seemingly educated sounding information, but then would veer on an odd path.  Pt often needed cues during the group activity to take her turn due to limited tracking of the activity.  Unsure if this was due to limited focus vs intrusive thoughts.  Pt's play lacked planning and strategy.          [id number] OT Group (2 or more in attendance)      Patient Active Problem List   Diagnosis    History of suicide attempt    Suicidal ideation    Headache, unspecified headache type    Attention deficit hyperactivity disorder (ADHD), combined type    Complex posttraumatic stress disorder    Borderline personality disorder (H)    Major depressive disorder, recurrent, mild    Paranoia (H)    Psychosis (H)    Aggression    Major depressive disorder, recurrent episode, moderate (H)

## 2025-04-27 NOTE — PLAN OF CARE
Problem: Sleep Disturbance  Goal: Adequate Sleep/Rest  Outcome: Progressing   Goal Outcome Evaluation:    Pt slept for the most part of the night. No safety issues noted. Observed to have slept for 6 hours.

## 2025-04-28 PROCEDURE — 250N000013 HC RX MED GY IP 250 OP 250 PS 637: Performed by: PSYCHIATRY & NEUROLOGY

## 2025-04-28 PROCEDURE — 250N000013 HC RX MED GY IP 250 OP 250 PS 637

## 2025-04-28 PROCEDURE — 124N000002 HC R&B MH UMMC

## 2025-04-28 PROCEDURE — 99232 SBSQ HOSP IP/OBS MODERATE 35: CPT | Mod: GC | Performed by: PSYCHIATRY & NEUROLOGY

## 2025-04-28 PROCEDURE — 97150 GROUP THERAPEUTIC PROCEDURES: CPT | Mod: GO

## 2025-04-28 PROCEDURE — 90853 GROUP PSYCHOTHERAPY: CPT

## 2025-04-28 RX ORDER — GUANFACINE 1 MG/1
1 TABLET, EXTENDED RELEASE ORAL EVERY EVENING
Status: DISCONTINUED | OUTPATIENT
Start: 2025-04-28 | End: 2025-04-30 | Stop reason: HOSPADM

## 2025-04-28 RX ORDER — BENZTROPINE MESYLATE 1 MG/1
2 TABLET ORAL DAILY
Status: DISCONTINUED | OUTPATIENT
Start: 2025-04-29 | End: 2025-04-29

## 2025-04-28 RX ADMIN — PROPRANOLOL HYDROCHLORIDE 10 MG: 10 TABLET ORAL at 07:51

## 2025-04-28 RX ADMIN — Medication 50 MCG: at 07:43

## 2025-04-28 RX ADMIN — POLYETHYLENE GLYCOL 3350 17 G: 17 POWDER, FOR SOLUTION ORAL at 12:06

## 2025-04-28 RX ADMIN — HYDROXYZINE HYDROCHLORIDE 25 MG: 25 TABLET, FILM COATED ORAL at 16:44

## 2025-04-28 RX ADMIN — QUETIAPINE FUMARATE 150 MG: 100 TABLET ORAL at 19:06

## 2025-04-28 RX ADMIN — MENTHOL 1 PATCH: 205.5 PATCH TOPICAL at 11:15

## 2025-04-28 RX ADMIN — ACETAMINOPHEN 650 MG: 325 TABLET, FILM COATED ORAL at 07:42

## 2025-04-28 RX ADMIN — BENZTROPINE MESYLATE 2 MG: 1 TABLET ORAL at 07:43

## 2025-04-28 RX ADMIN — GUANFACINE 1 MG: 1 TABLET, EXTENDED RELEASE ORAL at 19:05

## 2025-04-28 ASSESSMENT — ACTIVITIES OF DAILY LIVING (ADL)
ADLS_ACUITY_SCORE: 58
HYGIENE/GROOMING: INDEPENDENT
ADLS_ACUITY_SCORE: 58
ADLS_ACUITY_SCORE: 58
LAUNDRY: WITH SUPERVISION
ADLS_ACUITY_SCORE: 58
ORAL_HYGIENE: INDEPENDENT
ADLS_ACUITY_SCORE: 58
HYGIENE/GROOMING: INDEPENDENT
ORAL_HYGIENE: INDEPENDENT
ADLS_ACUITY_SCORE: 58
ADLS_ACUITY_SCORE: 58
DRESS: INDEPENDENT
ADLS_ACUITY_SCORE: 58
LAUNDRY: WITH SUPERVISION
ADLS_ACUITY_SCORE: 58
DRESS: INDEPENDENT
ADLS_ACUITY_SCORE: 58

## 2025-04-28 NOTE — PLAN OF CARE
Problem: Adult Behavioral Health Plan of Care  Goal: Plan of Care Review  Outcome: Progressing  Flowsheets (Taken 4/28/2025 1125)  Plan of Care Reviewed With: patient  Overall Patient Progress: improving  Patient Agreement with Plan of Care: agrees  Goal: Optimized Coping Skills in Response to Life Stressors  Outcome: Progressing  Flowsheets (Taken 4/28/2025 1125)  Optimized Coping Skills in Response to Life Stressors: making progress toward outcome     Problem: Sleep Disturbance  Goal: Adequate Sleep/Rest  Outcome: Progressing     Problem: Anxiety Signs/Symptoms  Goal: Improved Mood Symptoms (Anxiety Signs/Symptoms)  Outcome: Progressing  Flowsheets (Taken 4/28/2025 1125)  Mutually Determined Action Steps (Improved Mood Symptoms):   adheres to medication regimen   names internal triggers   Goal Outcome Evaluation:      Plan of Care Reviewed With: patient    Overall Patient Progress: improvingOverall Patient Progress: improving     Felecia OOB at beginning of shift-states she slept well, but continues anxious about discharge fearing she will not be able to sleep at home-reviewed use of paced breathing to relax in bed if wakes or unable to sleep- states she feels she is back to normal regarding dystonia, but does c/o chronic pain upper spine area where she states vertebrae are twisted-visible deformity of spine-received Tylenol 650 mg PO at 0742 with partial relief, hot pack applied to area-1115 received Icy Hot patch which was moderately effective-Felecia's mood is hopeful-she laughs and jokes appropriately in conversation- pleasant in interactions-did express concern regarding missing work, feeling concerned the work she does may be getting backed up-does, however, understand the benefit of taking time to verify med changes are effective and without issues-attending grps- received Miralax at 1206 for c/o continued constipation-at 1450 reported no results-drank 120 ml of prune juice and agreed to increase water intake-  next shift RN notified

## 2025-04-28 NOTE — PROGRESS NOTES
Rehab Group    Start time: 1020  End time: 1210  Patient time total: 80 minutes    attended full group    #7 attended   Group Type: occupational therapy   Group Topic Covered: coping skills     Group Session Detail:  OT clinic     Patient Response/Contribution:  cooperative with task, organized, attentive, and actively engaged     Patient Detail:    Pt actively participated in occupational therapy clinic to facilitate coping skill exploration, creative expression within personally meaningful activities, and clinical observation of social, cognitive, and kinesthetic performance skills. Pt response: Independent to initiate, gather materials, sequence, and adjust to workspace demands as needed. Demonstrated good focus, planning, and attention to detail for selected creative expression task. Organized with task materials. Able to ask for assistance as needed, and occasionally socialized with peers and staff. Calm, pleasant, and engaged.      [id number] OT Group (2 or more in attendance)      Patient Active Problem List   Diagnosis    History of suicide attempt    Suicidal ideation    Headache, unspecified headache type    Attention deficit hyperactivity disorder (ADHD), combined type    Complex posttraumatic stress disorder    Borderline personality disorder (H)    Major depressive disorder, recurrent, mild    Paranoia (H)    Psychosis (H)    Aggression    Major depressive disorder, recurrent episode, moderate (H)

## 2025-04-28 NOTE — PLAN OF CARE
Group Attendance:  attended full group    Time session began: 1410  Time session ended: 1450  Patient's total time in group: 40    Total # Attendees   6   Group Type psychotherapeutic and ACT      Group Topic Covered emotional regulation, insight improvement, symptom management, goals and motivation, and relationships and boundaries     Group Session Detail Self-exploration activity: group discussion and worksheet to identify the strengths, values, beliefs, aspirations we have that inform our choices, make us who we are, and lead to greater self-awareness and personal fulfillment.        Patient's response to the group topic/interactions:  positive affect, cooperative with task, organized, listened actively, and expressed understanding of topic     Patient Details: Pt checked in feeling content, was engaged throughout session, shared insightful reflections about strengths and how they have helped Pt succeed in areas in life (relationships, jobs, personal fulfillment).           [id number] - Group psychotherapy - 1 Session  Patient Active Problem List   Diagnosis    History of suicide attempt    Suicidal ideation    Headache, unspecified headache type    Attention deficit hyperactivity disorder (ADHD), combined type    Complex posttraumatic stress disorder    Borderline personality disorder (H)    Major depressive disorder, recurrent, mild    Paranoia (H)    Psychosis (H)    Aggression    Major depressive disorder, recurrent episode, moderate (H)

## 2025-04-28 NOTE — PLAN OF CARE
Problem: Adult Inpatient Plan of Care  Goal: Plan of Care Review  Description: The Plan of Care Review/Shift note should be completed every shift.  The Outcome Evaluation is a brief statement about your assessment that the patient is improving, declining, or no change.  This information will be displayed automatically on your shiftnote.  Outcome: Progressing  Flowsheets (Taken 4/27/2025 1930)  Plan of Care Reviewed With: patient     Problem: Adult Behavioral Health Plan of Care  Goal: Optimized Coping Skills in Response to Life Stressors  Outcome: Progressing  Intervention: Promote Effective Coping Strategies  Recent Flowsheet Documentation  Taken 4/27/2025 1754 by Carley Garcia, RN  Supportive Measures: other (see comments)  Taken 4/27/2025 1745 by Carley Garcia RN  Supportive Measures: active listening utilized   Goal Outcome Evaluation:    Plan of Care Reviewed With: patient Plan of Care Reviewed With: patient   Pt has flat affect but brightens up on approach, calm and co-operative with assessment. Denies SI/SIB/Hallucinations and depression, pt reports chronic neck and shoulder pain -6/10, and anxiety-5/10, PRN hydroxyzine administered per pt request. Pt had no BM during the shift, denies any discomfort, encourage to take adequate fluids.   Pt is med compliant.

## 2025-04-28 NOTE — PROGRESS NOTES
"  Rehab Group    Start time: 1315  End time: 1410  Patient time total: 55 minutes    attended full group    #5 attended   Group Type: occupational therapy   Group Topic Covered: balanced lifestyle and social skills     Group Session Detail:  Dice game - discussion questions & gentle movement     Patient Response/Contribution:  cooperative with task, listened actively, attentive, and actively engaged     Patient Detail:    Pt actively participated in a structured occupational therapy group with a focus on social engagement and movement. Pt had the option to choose a self-reflection question or a gentle exercise/stretching movement, and pt primarily chose to answer questions. Pt appeared comfortable responding to discussion prompts, and also followed along with a majority of the guided movements. When prompted to identify a personal accomplishment, she shared that she is fluent in 8 languages. She emphasized her value of \"telling the truth\" in response to multiple prompts, though was relatively vague in her explanation of this. She reported that she is in the hospital to take care of herself and is feeling significantly better in comparison to when she was first admitted, explaining that she wasn't sleeping well and could barely walk prior to admission as a result of a side effect to a medication she was taking. Affect appeared to brighten in interactions.       [id number] OT Group (2 or more in attendance)      Patient Active Problem List   Diagnosis    History of suicide attempt    Suicidal ideation    Headache, unspecified headache type    Attention deficit hyperactivity disorder (ADHD), combined type    Complex posttraumatic stress disorder    Borderline personality disorder (H)    Major depressive disorder, recurrent, mild    Paranoia (H)    Psychosis (H)    Aggression    Major depressive disorder, recurrent episode, moderate (H)       "

## 2025-04-28 NOTE — PROGRESS NOTES
"  ----------------------------------------------------------------------------------------------------------  [facility]  Psychiatry Progress Note  Hospital Day #4     Interim History:     The patient's care was discussed with the treatment team and chart notes were reviewed.    Vitals: /68   Pulse 68   Temp 97.5  F (36.4  C) (Temporal)   Resp 16   Ht 1.676 m (5' 6\")   Wt 85.4 kg (188 lb 4.8 oz)   SpO2 99%   BMI 30.39 kg/m      Sleep: 6 hours (04/28/25 0610)  Scheduled medications: Took all scheduled medications as prescribed  Psychiatric PRN medications: No psychiatric prns given    Staff Report:   No acute events or safety concerns overnight. Please see staff notes for details.      Subjective:     Patient Interview:  Felecia reports to feel better today, she mentions that she is close to baseline but still persistent with some mild stiffness, the restlessness already subside. Sh mentions that she has racing thoughts related to discharge, she is afraid that she won't be able to sleep and that can trigger her SI. She reports being doing good with the structure inside the inpatient unit but she is also looking forward to keep that outpatient. Besides that she report no other psychiatric symptom.     ROS:  Patient has muscle stiffness  Patient denies acute concerns     Objective:     Vitals:  /68   Pulse 68   Temp 97.5  F (36.4  C) (Temporal)   Resp 16   Ht 1.676 m (5' 6\")   Wt 85.4 kg (188 lb 4.8 oz)   SpO2 99%   BMI 30.39 kg/m      Allergies:  Allergies   Allergen Reactions    Haldol [Haloperidol] Other (See Comments)     Dystonic reaction     Kiwi Hives    Red Dye #40 (Allura Red)     Seasonal Allergies Other (See Comments) and Rash     sneezing       Current Medications:  Scheduled:  Current Facility-Administered Medications   Medication Dose Route Frequency Provider Last Rate Last Admin    acetaminophen (TYLENOL) tablet 650 mg  650 mg Oral Q4H " PRN Alicia Beverly MD   650 mg at 04/28/25 0742    albuterol (PROVENTIL HFA/VENTOLIN HFA) inhaler  2 puff Inhalation Q6H PRN Alicia Beverly MD        [START ON 4/29/2025] benztropine (COGENTIN) tablet 2 mg  2 mg Oral Daily Laura Loza MD        guanFACINE (INTUNIV) 24 hr tablet 1 mg  1 mg Oral QPM Laura Loza MD        hydrOXYzine HCl (ATARAX) tablet 25 mg  25 mg Oral Q4H PRN Alicia Beverly MD   25 mg at 04/27/25 1918    menthol (ICY HOT) 5 % patch 1 patch  1 patch Topical Q8H PRN Laura Loza MD   1 patch at 04/28/25 1115    polyethylene glycol (MIRALAX) Packet 17 g  17 g Oral Daily PRN Alicia Beverly MD   17 g at 04/28/25 1206    QUEtiapine (SEROquel) tablet 100 mg  100 mg Oral At Bedtime PRN Alicia Beverly MD   100 mg at 04/26/25 2204    QUEtiapine (SEROquel) tablet 150 mg  150 mg Oral QPM Laura Loza MD        Vitamin D3 (CHOLECALCIFEROL) tablet 50 mcg  50 mcg Oral Daily Laura Loza MD   50 mcg at 04/28/25 0743       PRN:  Current Facility-Administered Medications   Medication Dose Route Frequency Provider Last Rate Last Admin    acetaminophen (TYLENOL) tablet 650 mg  650 mg Oral Q4H PRN Alicia Beverly MD   650 mg at 04/28/25 0742    albuterol (PROVENTIL HFA/VENTOLIN HFA) inhaler  2 puff Inhalation Q6H PRN Alicia Beverly MD        [START ON 4/29/2025] benztropine (COGENTIN) tablet 2 mg  2 mg Oral Daily Laura Loza MD        guanFACINE (INTUNIV) 24 hr tablet 1 mg  1 mg Oral QPM Laura Loza MD        hydrOXYzine HCl (ATARAX) tablet 25 mg  25 mg Oral Q4H PRN Alicia Beverly MD   25 mg at 04/27/25 1918    menthol (ICY HOT) 5 % patch 1 patch  1 patch Topical Q8H PRN Laura Loza MD   1 patch at 04/28/25 1115    polyethylene glycol  "(MIRALAX) Packet 17 g  17 g Oral Daily PRN Alicia Beverly MD   17 g at 04/28/25 1206    QUEtiapine (SEROquel) tablet 100 mg  100 mg Oral At Bedtime PRN Alicia Beverly MD   100 mg at 04/26/25 2204    QUEtiapine (SEROquel) tablet 150 mg  150 mg Oral QPM Laura Loza MD        Vitamin D3 (CHOLECALCIFEROL) tablet 50 mcg  50 mcg Oral Daily Laura Loza MD   50 mcg at 04/28/25 0743       Labs and Imaging:  New results:   No results found for this or any previous visit (from the past 24 hours).  Most Recent 3 CBC's:  Recent Labs   Lab Test 04/21/25  0133 04/08/25  0741 07/28/23  1903   WBC 6.9 4.9 6.6   HGB 11.1* 12.3 12.4   MCV 87 88 89    212 180     Most Recent 3 BMP's:  Recent Labs   Lab Test 04/23/25 2012 04/21/25  0133 04/08/25  0741    140 139   POTASSIUM 3.2* 3.2* 3.4   CHLORIDE 99 101 105   CO2 27 27 23   BUN 8.5 7.5 10.0   CR 0.78 0.68 0.73   ANIONGAP 10 12 11   KIKE 9.2 9.4 9.0   * 111* 86         Data this admission:  - CBC unremarkable  - CMP low K+  - TSH normal  - UDS positive for amphetamines and cannabis  - Vit D low  - Hgb A1c normal  - Lipids unremarkable  - Vit B12 normal  - Folate normal  - CK increased     Mental Status Exam:     Oriented to:  Grossly Oriented  General:  Awake and Alert  Appearance:  appears stated age, Posture is sitting in chair in lounge, Grooming is adequate, Dressed in hospital scrubs, Hair is well groomed, and appropriate weight  Behavior/Attitude:  Calm, Cooperative, and Engaged  Eye Contact: Appropriate  Psychomotor: mild Dystonia no catatonia present  Speech:  appropriate volume/tone  Language: Fluent in English with appropriate syntax and vocabulary.  Mood:  \"Better\"  Affect:  appropriate, congruent with mood, stable, and anxious  Thought Process:  linear, coherent, goal directed, and racing thoughts  Thought Content:   No SI/HI/AH/VH; No apparent delusions  Associations:  " intact  Insight:  fair due to resolution of EPS symptoms, and subsiding of SI  Judgment:  fair due to previous help-seeking behaviors  Impulse control: fair  Attention Span:  grossly intact  Concentration:  grossly intact  Recent and Remote Memory:  not formally assessed  Fund of Knowledge: average  Muscle Strength and Tone: normal  Gait and Station: Normal     Psychiatric Assessment        Brennon Cohen is a 29 year old female with a previous psychiatric diagnoses of  Bipolar disorder v Schizoaffective Disorder,  ADHD, c-PTSD, and BPD who presented with the ED on 4/22/25 due to concerns of insomnia and suicidal ideation with thoughts to overdose. Most recent psychiatric hospitalization was April 2025 due to psychosis. Significant symptoms on admission include severe distonia, blurry vision, limited movement, anxiety. The MSE on admission was pertinent for EPS, passive and fleeting SI, and somnolence. Biological contributions to mental health presentation include Previous episode of bijal and psychosis with new start of antipsychotic and Aleida-Danlos. Psychological contributions to mental health presentation include maladaptive coping. Social factors contributing to mental health presentation include limited support system. Protective factors include engage in care, help-seeking.      In summary, the patient's reported symptoms of  acute severe distonia and restlessness in the context of start of new antipsychotic (Haldol) are consistent with EPS (distonia and akathisia) related to medication. She will likely benefit from medical stabilization of current symptoms this admission. Per chart review she was recently discharge 2 weeks ago with new start of Haldol, with symptoms starting quickly afterwards. At first it was mostly difficult facial movements, progressing to overall stiffness, drooling, muscle pain and blurry vision, increasingly distressing and developing SI before coming to the ED. Previous  diagnosis of Rafaela with Psychosis was motive of recent initiation of Haldol. Currently started on Seroquel this admission, with no added side effects. Due to current presentation, is difficult to assess if there is underlying relapsing mood or psychotic disorder.      She mentions that in the past she was on Adderall due to ADHD but was stopped recently between hospitalizations, Felecia mentions that on that outpatient evaluation they were considering assessment for ASD too. She tried cannabis vape to help her sleep due to increased insomnia, but did not helped. She had recently a Sleep study that ruled out Sleep apnea, but assert Dx of Insomnia disorder. Currently, her symptoms are slowly improving, still some mild stiffness. She reports racing thoughts and ruminations regarding ability to sleep outside the hospital. Currently she is responding well to Seroquel and we discuss increasing it here to help with sleep  and thought process to monitor side effect profile. We also discussed about treatment for her ADHD, she reports that Adderall was not a consideration for her but she was initiated onto that. She has past trauma regarding use of controlled substances due to her sister dying due to overdose. She mentions being open to other medications as she feels she still needs treatment for concentration and energy. We discussed starting Guanfacine XR that cold target not only ADHD symptoms but reslessness, and thought process as well. She was agreeable with plan. Due to this we decided to discontinue Propranolol as could interact with Guanfacine.     Per Felecia reports, she shared to have episodes of increased energy, risk taking behaviors, and unable to sleep for several days these episodes trigger SI and psychosis. With this new informations we consider changing dx approach to Bipolar Disorder type 1 with Psychosis. Thus re-starting adderall is also no recommended and alternative was already discussed in the previous  paragraph.        Psychiatric Plan by Diagnosis      Today's changes:  - Increase Seroquel to 150 mg at 7PM  - Add Guanfacine XR 1 mg   - Discontinue Propranolol  - Continue Cogentin 2 mg daily     # Acute Distonia  # Akathisia   #Bipolar Disorder type I with Psychosis   1. Medications:  - Cogentin 2 mg daily  - Increase Seroquel to 150 mg at 7PM  - Add Guanfacine XR 1 mg   - Continue Cogentin 2 mg daily     2. Pertinent Labs/Monitoring:   - UDS positive for amphetamines and cannabis  - CK elevated possible due to acute distonia     3. Additional Plans:  - Patient will be treated in therapeutic milieu with appropriate individual and group therapies as described        # ADHD  # r/o ASD        Psychiatric Hospital Course:      Lenore Suggs was admitted to [facility] as a voluntary patient, currently on a Stay of Commitment.   Medications:  PTA Haldol was held due to EPS.    New medications started at the time of admission include Seroquel 100 mg, Cogentin 1 mg and Propranolol 10 mg.   4/28: Increase Seroquel to 150 mg at 7PM. Add Guanfacine XR 1 mg. Discontinue Propranolol. Continue Cogentin 2 mg daily.     The risks, benefits, alternatives, and side effects were discussed and understood by the patient and other caregivers.     Medical Assessment and Plan     Medical diagnoses to be addressed this admission:    # Aleida-Danlos (vascular hypermobility type III)     #Low Vitamin D  - Start Vitamin D 50 mcg      Medical course: Patient was physically examined by the ED prior to being transferred to the unit and was found to be medically stable and appropriate for admission.      Consults:  none     Checklist     Legal Status: Voluntary  Stay of Commitment   [facility]   File Number: Case No. [id number]   Start and expiration date of commitment: 4/15/2025-10/15/2025    Safety Assessment:   Behavioral Orders   Procedures    Code 1 - Restrict to Unit    Routine Programming     As clinically indicated     Status 15     Every 15 minutes.    Suicide precautions: Suicide Risk: HIGH     Patients on Suicide Precautions should have a Combination Diet ordered that includes a Diet selection(s) AND a Behavioral Tray selection for Safe Tray - with utensils, or Safe Tray - NO utensils       Order Specific Question:   Suicide Risk     Answer:   HIGH       Risk Assessment:  Risk for harm is low.  Risk factors: SI and past behaviors  Protective factors: engaged in treatment     SIO: none    Disposition: Pending stabilization, medication optimization, & development of a safe discharge plan.     Attestations     This patient was seen and discussed with my attending physician.  Laura Paul MD  [facility] Psychiatry Resident  04/28/2025      This patient has been seen and evaluated by me, Darnell Quiñones.  I have discussed this patient with the psychiatry resident and I agree with the findings and plan in this note.    I have reviewed today's vital signs, medications, labs and imaging.     Darnell Rust MD

## 2025-04-28 NOTE — PLAN OF CARE
Goal Outcome Evaluation:  Problem: Sleep Disturbance  Goal: Adequate Sleep/Rest  Outcome: Progressing     Pt observed to have slept for 6 hours, based on q15 minutes safety checks. No safety concerns noted at this time.

## 2025-04-28 NOTE — PLAN OF CARE
Team Note Due:  Thursday    Assessment/Intervention/Current Symtoms and Care Coordination:  Chart review and met with team, discussed pt progress, symptomology, and response to treatment.  Discussed the discharge plan and any potential impediments to discharge.    -Called Felecia's , Angelina, to update her on discharge plan. She hopes Felecia can be connected with a therapist prior to discharge if Felecia is open to outpatient therapy. This writer spoke with her regarding housing and reported that Felecia had mentioned that she had a plan to stay with a friend. Angelina reported Felecia had previously spoke about staying with her roommate's dad, and she worries about Felecia staying with him because he has friends who are involved with trafficking and that can be triggering for Felecia. This writer will follow up with Felecia to ensure the place she is going to stay is safe. This writer will follow up with Angelina closer to discharge and email any discharge paperwork to her.      Discharge Plan or Goal:  TBD     Barriers to Discharge:  Medication management     Referral Status:  None currently      Legal Status:  Voluntary- Stay of Commitment    County: [place]   File Number: Case No. [id number]   Start and expiration date of commitment: 4/15/2025-10/15/2025    Contacts (include NATHALIE status):  Medication Management/Psychiatry:  Name: ANAYELI [facility]   [facility]    Phone Number: [phone]   Next Appointment: 5/19 9am     [facility] Team:  [facility]    Number: [phone]     Other contact information (family, friends, SO) and NATHALIE status: Daiys Callahan, ex-girlfriend/roommate, [phone], NATHALIE on file from 4/6/25     Upcoming Meetings and Dates/Important Information and next steps:  None currently

## 2025-04-28 NOTE — PLAN OF CARE

## 2025-04-28 NOTE — PLAN OF CARE
Occupational Therapy   Brief Assessment   [facility]    Information is based on chart review, interdisciplinary team correspondence and writer's observations and interactions with the patient in groups and on the unit.      Data     04/24/25 1500   General Information   Date Initially Attended OT 04/24/25   Has Not Attended OT as of: 04/24/25   Clinical Impression   Affect Appropriate to situation   Orientation Oriented to person, place and time   Appearance and ADLs General cleanliness observed in most areas   Attention to Internal Stimuli No observed signs   Interaction Skills Interacts appropriately with staff;Interacts appropriately with peers   Ability to Communicate Needs Independent   Verbal Content Articulate;Clear;Appropriate to topic   Ability to Maintain Boundaries Maintains appropriate physical boundaries;Maintains appropriate verbal boundaries   Participation Independently participates   Concentration Concentrates 30+ minutes   Ability to Concentrate Without difficulty;With structure   Follows and Comprehends Directions Independently follows multi-step directions   Memory Delayed and immediate recall intact   Organization Independently organizes medium tasks   Decision Making Independent   Planning and Problem Solving Independently plans ahead   Ability to Apply and Learn Concepts Needs further assessment   Frustrations / Stress Tolerance Independently identifies sources of frustration/stress   Level of Insight Identifies needs with structure/support   Self Esteem Takes risks with support and encouragement;Accepts positive feedback   Social Supports Has knowledge of support systems  (appears limited per chart review)   BEH OT Care Plan Goals   OT Care Plan coping with symptoms       Assessment    Anticipate patient will benefit from a safe, structured environment focusing on recovery strategies while stabilizing on medication.  Patient appears to have limited knowledge of and follow through with coping  skills and self-management strategies, thus contributing to exacerbation of mental health symptoms.  Patient's insight into their mental illness is fair at this time.  Patient would benefit from OT groups for exploration of coping skills and leisure interests for symptom and stress management, exploration of and practice using relapse prevention strategies, and opportunities for: Self-care skills, social interaction skills, self-management skills and ADL/work/leisure/processing skills training.      Plan    Care plan goals have been initiated (see flowsheet above).  Plan to offer graded occupation-based activities to support progress towards goals and independence with ADLs/IADLs in the community upon discharge.  Continue to correspond with interdisciplinary team regarding ongoing treatment plan, potential changes in patient's status, and discharge planning.     Erika Whitmore OT, OTR/L  4/28/2025

## 2025-04-29 PROBLEM — F29 PSYCHOSIS (H): Status: RESOLVED | Noted: 2025-04-06 | Resolved: 2025-04-29

## 2025-04-29 PROBLEM — Z91.51 HISTORY OF SUICIDE ATTEMPT: Status: RESOLVED | Noted: 2019-10-23 | Resolved: 2025-04-29

## 2025-04-29 PROBLEM — F31.70 BIPOLAR AFFECTIVE DISORDER IN REMISSION: Status: ACTIVE | Noted: 2025-04-29

## 2025-04-29 PROBLEM — R46.89 AGGRESSION: Status: RESOLVED | Noted: 2025-04-07 | Resolved: 2025-04-29

## 2025-04-29 PROBLEM — R51.9 HEADACHE, UNSPECIFIED HEADACHE TYPE: Status: RESOLVED | Noted: 2020-06-09 | Resolved: 2025-04-29

## 2025-04-29 PROBLEM — F33.0 MAJOR DEPRESSIVE DISORDER, RECURRENT, MILD: Status: RESOLVED | Noted: 2023-11-17 | Resolved: 2025-04-29

## 2025-04-29 PROBLEM — F90.2 ATTENTION DEFICIT HYPERACTIVITY DISORDER (ADHD), COMBINED TYPE: Status: RESOLVED | Noted: 2021-08-03 | Resolved: 2025-04-29

## 2025-04-29 PROBLEM — F60.3 BORDERLINE PERSONALITY DISORDER (H): Status: RESOLVED | Noted: 2023-11-17 | Resolved: 2025-04-29

## 2025-04-29 PROBLEM — F33.1 MAJOR DEPRESSIVE DISORDER, RECURRENT EPISODE, MODERATE (H): Status: RESOLVED | Noted: 2025-04-21 | Resolved: 2025-04-29

## 2025-04-29 PROCEDURE — 250N000013 HC RX MED GY IP 250 OP 250 PS 637

## 2025-04-29 PROCEDURE — 97150 GROUP THERAPEUTIC PROCEDURES: CPT | Mod: GO

## 2025-04-29 PROCEDURE — 99232 SBSQ HOSP IP/OBS MODERATE 35: CPT | Mod: GC | Performed by: PSYCHIATRY & NEUROLOGY

## 2025-04-29 PROCEDURE — 124N000002 HC R&B MH UMMC

## 2025-04-29 PROCEDURE — H2032 ACTIVITY THERAPY, PER 15 MIN: HCPCS

## 2025-04-29 RX ORDER — BENZTROPINE MESYLATE 2 MG/1
2 TABLET ORAL 2 TIMES DAILY PRN
Qty: 30 TABLET | Refills: 0 | Status: SHIPPED | OUTPATIENT
Start: 2025-04-29

## 2025-04-29 RX ORDER — QUETIAPINE FUMARATE 150 MG/1
150 TABLET, FILM COATED ORAL EVERY EVENING
Qty: 30 TABLET | Refills: 0 | Status: SHIPPED | OUTPATIENT
Start: 2025-04-29 | End: 2025-04-29

## 2025-04-29 RX ORDER — PROPRANOLOL HYDROCHLORIDE 10 MG/1
10 TABLET ORAL DAILY PRN
Qty: 30 TABLET | Refills: 0 | Status: SHIPPED | OUTPATIENT
Start: 2025-04-29 | End: 2025-04-29

## 2025-04-29 RX ORDER — PROPRANOLOL HYDROCHLORIDE 10 MG/1
10 TABLET ORAL DAILY PRN
Qty: 30 TABLET | Refills: 0 | Status: SHIPPED | OUTPATIENT
Start: 2025-04-29

## 2025-04-29 RX ORDER — BENZTROPINE MESYLATE 1 MG/1
2 TABLET ORAL 2 TIMES DAILY PRN
Status: DISCONTINUED | OUTPATIENT
Start: 2025-04-29 | End: 2025-04-30 | Stop reason: HOSPADM

## 2025-04-29 RX ORDER — VITAMIN B COMPLEX
50 TABLET ORAL DAILY
Qty: 60 TABLET | Refills: 0 | Status: SHIPPED | OUTPATIENT
Start: 2025-04-30 | End: 2025-04-29

## 2025-04-29 RX ORDER — HYDROXYZINE HYDROCHLORIDE 25 MG/1
25 TABLET, FILM COATED ORAL EVERY 4 HOURS PRN
Qty: 30 TABLET | Refills: 0 | Status: SHIPPED | OUTPATIENT
Start: 2025-04-29 | End: 2025-04-29

## 2025-04-29 RX ORDER — GUANFACINE 1 MG/1
1 TABLET, EXTENDED RELEASE ORAL EVERY EVENING
Qty: 30 TABLET | Refills: 0 | Status: SHIPPED | OUTPATIENT
Start: 2025-04-29 | End: 2025-04-29

## 2025-04-29 RX ORDER — BENZTROPINE MESYLATE 2 MG/1
2 TABLET ORAL 2 TIMES DAILY PRN
Qty: 30 TABLET | Refills: 0 | Status: SHIPPED | OUTPATIENT
Start: 2025-04-29 | End: 2025-04-29

## 2025-04-29 RX ORDER — QUETIAPINE FUMARATE 150 MG/1
150 TABLET, FILM COATED ORAL EVERY EVENING
Qty: 30 TABLET | Refills: 0 | Status: SHIPPED | OUTPATIENT
Start: 2025-04-29

## 2025-04-29 RX ORDER — VITAMIN B COMPLEX
50 TABLET ORAL DAILY
Qty: 60 TABLET | Refills: 0 | Status: SHIPPED | OUTPATIENT
Start: 2025-04-30

## 2025-04-29 RX ORDER — HYDROXYZINE HYDROCHLORIDE 25 MG/1
25 TABLET, FILM COATED ORAL EVERY 4 HOURS PRN
Qty: 30 TABLET | Refills: 0 | Status: SHIPPED | OUTPATIENT
Start: 2025-04-29

## 2025-04-29 RX ORDER — PROPRANOLOL HYDROCHLORIDE 10 MG/1
10 TABLET ORAL 2 TIMES DAILY PRN
Status: DISCONTINUED | OUTPATIENT
Start: 2025-04-29 | End: 2025-04-30 | Stop reason: HOSPADM

## 2025-04-29 RX ORDER — GUANFACINE 1 MG/1
1 TABLET, EXTENDED RELEASE ORAL EVERY EVENING
Qty: 30 TABLET | Refills: 0 | Status: SHIPPED | OUTPATIENT
Start: 2025-04-29

## 2025-04-29 RX ADMIN — POLYETHYLENE GLYCOL 3350 17 G: 17 POWDER, FOR SOLUTION ORAL at 08:32

## 2025-04-29 RX ADMIN — HYDROXYZINE HYDROCHLORIDE 25 MG: 25 TABLET, FILM COATED ORAL at 13:40

## 2025-04-29 RX ADMIN — MENTHOL 1 PATCH: 205.5 PATCH TOPICAL at 12:31

## 2025-04-29 RX ADMIN — GUANFACINE 1 MG: 1 TABLET, EXTENDED RELEASE ORAL at 19:10

## 2025-04-29 RX ADMIN — Medication 50 MCG: at 08:36

## 2025-04-29 RX ADMIN — QUETIAPINE FUMARATE 150 MG: 100 TABLET ORAL at 19:10

## 2025-04-29 RX ADMIN — PROPRANOLOL HYDROCHLORIDE 10 MG: 10 TABLET ORAL at 17:58

## 2025-04-29 ASSESSMENT — ACTIVITIES OF DAILY LIVING (ADL)
HYGIENE/GROOMING: INDEPENDENT
DRESS: INDEPENDENT
ADLS_ACUITY_SCORE: 58
LAUNDRY: WITH SUPERVISION
ORAL_HYGIENE: INDEPENDENT
ADLS_ACUITY_SCORE: 58
HYGIENE/GROOMING: INDEPENDENT
ADLS_ACUITY_SCORE: 58
LAUNDRY: WITH SUPERVISION
ADLS_ACUITY_SCORE: 58
ORAL_HYGIENE: INDEPENDENT
ADLS_ACUITY_SCORE: 58
ADLS_ACUITY_SCORE: 58
DRESS: INDEPENDENT
ADLS_ACUITY_SCORE: 58
ADLS_ACUITY_SCORE: 58

## 2025-04-29 NOTE — PLAN OF CARE
Team Note Due:  Thursday    Assessment/Intervention/Current Symtoms and Care Coordination:  Chart review and met with team, discussed pt progress, symptomology, and response to treatment.  Discussed the discharge plan and any potential impediments to discharge.    -Spoke with Felecia regarding outpatient therapy. She is open to it and would like an appointment with one.   -Wrote a work note her Felecia and placed it in her chart for discharge.      Discharge Plan or Goal:  TBD     Barriers to Discharge:  Medication management     Referral Status:  None currently      Legal Status:  Voluntary- Stay of Commitment    County: [place]   File Number: Case No. [id number]   Start and expiration date of commitment: 4/15/2025-10/15/2025    Contacts (include NATHALIE status):  Medication Management/Psychiatry:  Name: ANAYELI Marina [facility]   [facility]    Phone Number: [phone]   Next Appointment: 5/19 9am     /ACT Team:  Name: Angelina [facility]    Number: [phone]     Other contact information (family, friends, SO) and NATHALIE status: Daisy Callahan, ex-girlfriend/roommate, [phone], NATHALIE on file from 4/6/25     Upcoming Meetings and Dates/Important Information and next steps:  None currently
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
## 2025-04-29 NOTE — PROGRESS NOTES
"  Rehab Group    Start time: 1320  End time: 1420  Patient time total: 55 minutes    attended full group    #5 attended   Group Type: occupational therapy   Group Topic Covered: balanced lifestyle, coping skills, and social skills     Group Session Detail:  Life skills discussion/game     Patient Response/Contribution:  cooperative with task, listened actively, expressed understanding of topic, attentive, and actively engaged     Patient Detail:    Pt actively participated in a structured occupational therapy group with a discussion focused on various mental health topics, including mental health management, social situations, healthy support systems, healthy mind/body, and activities of daily living. Pt was able to follow 3 step directions for the novel task and was observed tracking the task consistently. Demonstrated active listening when peers were sharing, and responses to prompts were thoughtful and appropriate to topic. In the context of responding to a discussion prompt, she shared that she is still \"best friends\" with all of her ex-partners. When prompted to identify coping skills that help manage anxiety, she identified \"mindfulness, DBT, and CBT techniques.\" Calm, pleasant, and engaged.      [id number] OT Group (2 or more in attendance)      Patient Active Problem List   Diagnosis    Suicidal ideation    Complex posttraumatic stress disorder    Paranoia (H)    Bipolar affective disorder in remission       "

## 2025-04-29 NOTE — PLAN OF CARE
Problem: Adult Behavioral Health Plan of Care  Goal: Plan of Care Review  Outcome: Progressing  Flowsheets (Taken 4/29/2025 1547)  Plan of Care Reviewed With: patient  Overall Patient Progress: improving  Patient Agreement with Plan of Care: agrees  Goal: Optimized Coping Skills in Response to Life Stressors  Outcome: Progressing  Flowsheets (Taken 4/29/2025 1547)  Optimized Coping Skills in Response to Life Stressors: making progress toward outcome  Goal: Develops/Participates in Therapeutic Rogers to Support Successful Transition  Outcome: Progressing  Flowsheets (Taken 4/29/2025 1547)  Develops/Participates in Therapeutic Rogers to Support Successful Transition: making progress toward outcome     Problem: Anxiety Signs/Symptoms  Goal: Improved Mood Symptoms (Anxiety Signs/Symptoms)  Outcome: Progressing  Flowsheets (Taken 4/29/2025 1547)  Mutually Determined Action Steps (Improved Mood Symptoms):   adheres to medication regimen   shares insight re: need for meds   names internal triggers   Goal Outcome Evaluation:      Plan of Care Reviewed With: patient    Overall Patient Progress: improvingOverall Patient Progress: improving     Felecia continues to c/o constipation-received Miralax 0832 with prune juice for constipation without relief-denies abdominal discomfort other than feeling stool is present- Felecia attended most grps-states she otherwise feels completely normal-denies EPS sxs she experienced on admission, although does continue pacing which she states is not bothersome to her-does c/o feeling anxious at times-commented she's not sure why as she doesn't feel she has anything happening that would make her particularly anxious-received hydroxyzine 50 mg PO at 1340 which was effective in reducing anxiety-reports mood is good-denies issues with current medication-states she is ready for discharge tomorrow

## 2025-04-29 NOTE — PLAN OF CARE

## 2025-04-29 NOTE — PROGRESS NOTES
"  Rehab Group    Start time: 1020  End time: 1215  Patient time total: 75 minutes    attended full group    #8 attended   Group Type: occupational therapy   Group Topic Covered: coping skills     Group Session Detail:  OT clinic     Patient Response/Contribution:  cooperative with task, organized, attentive, and actively engaged     Patient Detail:    Pt actively participated in occupational therapy clinic to facilitate coping skill exploration, creative expression within personally meaningful activities, and clinical observation of social, cognitive, and kinesthetic performance skills. Pt response: Independent to initiate, gather materials, sequence, and adjust to workspace demands as needed. Demonstrated good focus, planning, and attention to detail for selected creative expression task. Organized with task materials. Able to ask for assistance as needed, and intermittently socialized with peers and staff. Shared that she didn't have a theme for her creative project (collage), though again briefly commented on how it relates to \"telling the truth,\" which she discussed several times in group yesterday. Appeared more restless towards the end of group and was in and out of group several times. Affect appeared to brighten in interactions.      [id number] OT Group (2 or more in attendance)      Patient Active Problem List   Diagnosis    Suicidal ideation    Complex posttraumatic stress disorder    Paranoia (H)    Bipolar affective disorder in remission       "

## 2025-04-29 NOTE — PLAN OF CARE
Problem: Adult Behavioral Health Plan of Care  Goal: Develops/Participates in Therapeutic Mutual to Support Successful Transition  Outcome: Progressing     Pt spends most of the shift out of her room watching TV and pacing. She is bright, pleasant, and cooperative. She is minimally interactive with peers but interacts appropriately with staff. She denies all mental health symptoms including SI/SIB/HI/AH/VH. She received PRN Hydroxyzine 25 mg @ 1644 for 6/10 anxiety after a conversation with her grandmother. This was effective.

## 2025-04-29 NOTE — DISCHARGE SUMMARY
"                                                                                                                 ----------------------------------------------------------------------------------------------------------  [facility]   Psychiatric Discharge Summary      Lenore Suggs MRN# [id number]   Age: 29 year old YOB: 1995     Date of Admission:  4/23/2025  Date of Discharge:  4/30/2025  Admitting Physician:  Darnell Rust MD  Discharge Physician:  Darnell Rust MD  **Update date of service and hit refresh**    This document serves as a transfer of care to Lenore Suggs's outpatient providers.     Events Leading to Hospitalization:     Brennon Cohen is a 29 year old female with a previous psychiatric diagnoses of Depression, Bipolar disorder, and Schizophrenia who presented with the ED on 4/22/25 due to concerns of insomnia, stiffness, and suicidal ideation with thoughts to overdose in the context of new started antipsychotic.     [facility]/DEC Assessment:  Referral Data and Chief Complaint  Lenore Suggs presents to the ED via EMS. Patient is presenting to the ED for the following concerns: Suicidal ideation, Anxiety, Significant behavioral change, Paranoia, Depression, from her home where she lives with a friend.  She was visited today by her  and then crisis staff that  summoned to the home.  She has complaints of not being able to sleep well for the last week. She says that she will lay awake for most of the night trying to get back to sleep.  She also has suicidal thoughts to overdose. When asked if she would act on these thoughts outside a secure setting she states \"probably.\"  She has access to her own medications, any medications in the home and other items in the home that she could use to make a suicide attempt.  She has flat affect, prolonged eye contact, minimal " responses and frequent yawning.  At times she will quickly get out of bed and shuffle around the room. Recently [facility] [place] 4/6-15/2025. Currently on Stay of Commitment.  She has been seen at the [facility] unit 4/20 and [facility] 4/22.    ED/Hospital Course:  Lenore Suggs was medically cleared for admission to inpatient psychiatric unit. In the ED, she did not psychiatric PRNs, restrains or seclusions.     Diagnoses:   # Acute Distonia  # Akathisia   # Rule out Mood disorder with Psychosis v Primary Psychotic Disorder  1. Medications:  - Cogentin 2 mg BID  - Cogentin 1 mg IM + Benadryl 50 mg IM once  - Propranolol 10 mg BID     2. Pertinent Labs/Monitoring:   - UDS positive for amphetamines and cannabis     3. Additional Plans:  - Patient will be treated in therapeutic milieu with appropriate individual and group therapies as described        # ADHD  # r/o ASD   1. Guanfacine (Intuniv) 24 hr tablet 1 mg     Psychiatric Hospital Course:     Lenore Suggs was admitted to [facility] as a voluntary patient, currently on a Stay of Commitment.   Medications:  PTA Haldol was held due to EPS.    New medications started at the time of admission include Seroquel 100 mg, Cogentin 1 mg and Propranolol 10 mg.   4/28: Increase Seroquel to 150 mg at 7PM. Add Guanfacine XR 1 mg. Discontinue Propranolol. Continue Cogentin 2 mg daily.     The risks, benefits, alternatives, and side effects were discussed and understood by the patient and other caregivers    Risk Assessment:        Risk for harm is low.  Risk factors: SI and past behaviors  Protective factors: engaged in treatment     On day of discharge, Felecia was goal oriented and focused on the future. She wanted to get her drivers license. She is also wondering if she should keep working or leave her job which has been a stressor. She slept well last night and ate her meals. She is happy with the outcome of the hospitalization and endorses frustration they were  "not able to treat her symptoms earlier. She reports no SI/AH/VH or racing thoughts.     Psychiatric Examination:     Mental Status Exam:  Oriented to:  Grossly Oriented  General:  Awake and Alert  Appearance:  appears stated age, Posture is sitting in bed, Grooming is adequate, Dressed in scrubs, and Hair is well groomed  Behavior/Attitude:  Calm, Cooperative, Engaged, and Open  Eye Contact: Appropriate  Psychomotor: No evidence of tics, dystonia, or tardive dyskinesia  no catatonia present  Speech:  appropriate volume/tone  Language: Fluent in English with appropriate syntax and vocabulary.  Mood:  \"Good\"  Affect:  appropriate  Thought Process:  linear, coherent, goal directed, and racing thoughts  Thought Content:   No SI/HI/AH/VH; No apparent delusions  Associations:  questionable  Insight:  fair due to previous behavior  Judgment:  fair due to previous behavior  Impulse control: fair  Attention Span:  grossly intact  Concentration:  grossly intact  Recent and Remote Memory:  not formally assessed  Fund of Knowledge: average  Muscle Strength and Tone: normal  Gait and Station: Normal     Medical Hospital Course:   Medical diagnoses to be addressed this admission:    # Aleida-Danlos (vascular hypermobility type III)     #Low Vitamin D  - Start Vitamin D 50 mcg      Medical course: Patient was physically examined by the ED prior to being transferred to the unit and was found to be medically stable and appropriate for admission.      Consults:  none     Discharge Medications:     Current Discharge Medication List        START taking these medications    Details   benztropine (COGENTIN) 2 MG tablet Take 1 tablet (2 mg) by mouth 2 times daily as needed for extrapyramidal symptoms (EPS).  Qty: 30 tablet, Refills: 0    Associated Diagnoses: Dystonia, torsion, fragments of      guanFACINE (INTUNIV) 1 MG TB24 24 hr tablet Take 1 tablet (1 mg) by mouth every evening.  Qty: 30 tablet, Refills: 0    Associated Diagnoses: " Attention deficit hyperactivity disorder (ADHD), combined type      hydrOXYzine HCl (ATARAX) 25 MG tablet Take 1 tablet (25 mg) by mouth every 4 hours as needed for anxiety.  Qty: 30 tablet, Refills: 0    Associated Diagnoses: Anxiety      propranolol (INDERAL) 10 MG tablet Take 1 tablet (10 mg) by mouth daily as needed (restlessness).  Qty: 30 tablet, Refills: 0    Associated Diagnoses: Akathisia      QUEtiapine 150 MG TABS Take 150 mg by mouth every evening.  Qty: 30 tablet, Refills: 0    Associated Diagnoses: Bipolar affective disorder, remission status unspecified (H)      Vitamin D3 (CHOLECALCIFEROL) 25 mcg (1000 units) tablet Take 2 tablets (50 mcg) by mouth daily.  Qty: 60 tablet, Refills: 0    Associated Diagnoses: Vitamin D deficiency           CONTINUE these medications which have NOT CHANGED    Details   albuterol (PROAIR HFA/PROVENTIL HFA/VENTOLIN HFA) 108 (90 Base) MCG/ACT inhaler Inhale 2 puffs into the lungs every 6 hours as needed for shortness of breath, wheezing or cough  Qty: 18 g, Refills: 3    Comments: Pharmacy may dispense brand covered by insurance (Proair, or proventil or ventolin or generic albuterol inhaler)  Associated Diagnoses: Mild intermittent asthma without complication           STOP taking these medications       haloperidol (HALDOL) 10 MG tablet Comments:   Reason for Stopping:         vitamin D2 (ERGOCALCIFEROL) 53746 units (1250 mcg) capsule Comments:   Reason for Stopping:                Discharge Plan:   Health Care Follow-up:   Medication Management/Psychiatry:  Name: [facility]   [facility]    Phone Number: [phone]   Next Appointment: 5/19 9am     Attestations:   Resident with med student: Tom Heredia, MS3  N Medical Student      I was present with the medical student who participated in the service and in the documentation of the note.  I have verified the history and personally performed the physical  exam and medical decision making. I agree with the assessment and plan of care as documented in the note.    This patient was seen and discussed with my attending physician.  Laura Paul MD  [facility] Psychiatry Resident  04/29/2025

## 2025-04-29 NOTE — PLAN OF CARE
Problem: Adult Behavioral Health Plan of Care  Goal: Plan of Care Review  Outcome: Progressing     Problem: Sleep Disturbance  Goal: Adequate Sleep/Rest  Outcome: Progressing     Problem: Suicide Risk  Goal: Absence of Self-Harm  Outcome: Progressing   Goal Outcome Evaluation:       Felceia appeared asleep and comfortable as observed during Q15 minutes safety rounds. Slept for 7 hours. Breathing was quiet and spontaneous. No respiratory distress reported. No prn medication administered overnight. No safety or behavioral concerns reported or observed. Will continue to monitor.

## 2025-04-30 VITALS
OXYGEN SATURATION: 100 % | RESPIRATION RATE: 17 BRPM | WEIGHT: 191.5 LBS | HEART RATE: 71 BPM | BODY MASS INDEX: 30.78 KG/M2 | TEMPERATURE: 97.7 F | SYSTOLIC BLOOD PRESSURE: 101 MMHG | DIASTOLIC BLOOD PRESSURE: 68 MMHG | HEIGHT: 66 IN

## 2025-04-30 PROCEDURE — 250N000013 HC RX MED GY IP 250 OP 250 PS 637

## 2025-04-30 PROCEDURE — 97150 GROUP THERAPEUTIC PROCEDURES: CPT | Mod: GO

## 2025-04-30 RX ADMIN — PROPRANOLOL HYDROCHLORIDE 10 MG: 10 TABLET ORAL at 12:04

## 2025-04-30 RX ADMIN — Medication 50 MCG: at 08:46

## 2025-04-30 RX ADMIN — MENTHOL 1 PATCH: 205.5 PATCH TOPICAL at 08:46

## 2025-04-30 ASSESSMENT — ACTIVITIES OF DAILY LIVING (ADL)
ADLS_ACUITY_SCORE: 58
ADLS_ACUITY_SCORE: 58
DRESS: INDEPENDENT
ORAL_HYGIENE: INDEPENDENT
ADLS_ACUITY_SCORE: 58
LAUNDRY: WITH SUPERVISION
ADLS_ACUITY_SCORE: 58
HYGIENE/GROOMING: INDEPENDENT

## 2025-04-30 NOTE — PLAN OF CARE
Problem: Sleep Disturbance  Goal: Adequate Sleep/Rest  Outcome: Progressing   Goal Outcome Evaluation:    The Patient slept 5.5 hours and did not experience any respiratory problems. She had an uneventful night and reported no concerns regarding her mental or physical health. The Team conducted safety checks every 15 minutes, and there were no issues.

## 2025-04-30 NOTE — PROGRESS NOTES
Rehab Group    Start time: 1020  End time: 1220  Patient time total: 75 minutes    attended full group    #7 attended   Group Type: occupational therapy   Group Topic Covered: coping skills     Group Session Detail:  OT clinic     Patient Response/Contribution:  cooperative with task, organized, attentive, and actively engaged     Patient Detail:    Pt actively participated in occupational therapy clinic to facilitate coping skill exploration, creative expression within personally meaningful activities, and clinical observation of social, cognitive, and kinesthetic performance skills. Pt response: Independent to initiate, gather materials, sequence, and adjust to workspace demands as needed. Demonstrated good focus, planning, and problem solving for selected creative expression task. Able to ask for assistance as needed, and occasionally socialized with peers and staff.       [id number] OT Group (2 or more in attendance)      Patient Active Problem List   Diagnosis    Suicidal ideation    Complex posttraumatic stress disorder    Paranoia (H)    Bipolar affective disorder in remission

## 2025-04-30 NOTE — PLAN OF CARE
Problem: Adult Behavioral Health Plan of Care  Goal: Plan of Care Review  Outcome: Progressing  Flowsheets (Taken 4/30/2025 1317)  Plan of Care Reviewed With: patient  Overall Patient Progress: improving  Patient Agreement with Plan of Care: agrees  Goal: Optimized Coping Skills in Response to Life Stressors  Outcome: Adequate for Care Transition  Flowsheets (Taken 4/30/2025 1317)  Optimized Coping Skills in Response to Life Stressors: making progress toward outcome     Problem: Suicide Risk  Goal: Absence of Self-Harm  Outcome: Adequate for Care Transition     Problem: Anxiety Signs/Symptoms  Goal: Improved Mood Symptoms (Anxiety Signs/Symptoms)  Outcome: Adequate for Care Transition   Goal Outcome Evaluation:    Plan of Care Reviewed With: patient Plan of Care Reviewed With: patient    Overall Patient Progress: improvingOverall Patient Progress: improving     Felecia reports mood is good-denies SI-denies confusion, hallucinations or unusual thoughts-joked about knowing she should not be going out attempting to identify sex traffickers, indicating those  thoughts were not realistic and that it would be a dangerous thing to do-expressed relief she is feeling better- Discharged 1352 via taxi home-Reviewed AVS including outpt tx plans and medication schedule- verbalized understanding and agreement-Prescriptions for 30 day supply of medications sent to [facility] [address], [place]

## 2025-04-30 NOTE — PLAN OF CARE
Problem: Adult Inpatient Plan of Care  Goal: Plan of Care Review  Description: The Plan of Care Review/Shift note should be completed every shift.  The Outcome Evaluation is a brief statement about your assessment that the patient is improving, declining, or no change.  This information will be displayed automatically on your shiftnote.  Outcome: Progressing  Flowsheets (Taken 4/29/2025 1936)  Plan of Care Reviewed With: patient  Overall Patient Progress: improving   Goal Outcome Evaluation:    Plan of Care Reviewed With: patient      Overall Patient Progress: improving       Pt presented with an appropriate affect and mood. She expressed excitement about her planned discharge for tomorrow. She was visible on the unit, went to group, watched TV in the lounge and walked in the hallway. Pt was pleasant and cooperative. No behavioral or safety concerns. She denies experiencing anxiety, depression, hallucination, suicidal and homicidal ideation. She ate evening meal, snack and hydrating sufficiently. No report of pain and medication compliant.
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
## 2025-04-30 NOTE — PLAN OF CARE

## 2025-04-30 NOTE — PROGRESS NOTES
Rehab Group     Start time: 1715  End time: 1805  Patient time total: 50 minutes     attended full group      #3 attended   Group Type: music   Group Topic Covered: emotional regulation, healthy leisure time, and sensory intervention      Group Session Detail: Mindfulness      Patient Response/Contribution:  cooperative with task and actively engaged      Patient Detail: Cooperatively engaged in Evening Music & Mindfulness group to decrease anxiety and promote autonomy and self-determination.       Pt was inspired to speak after peer (J.H.) shared thoughts tonight.  Pt showed paranoid thinking/associations.  Pts affect was bright and engaged.  Pt appears to be lacking full insight at this time, but required no redirections for emotional or physical safety.         Activity Therapy Per 15 min ()    Patient Active Problem List   Diagnosis    Suicidal ideation    Complex posttraumatic stress disorder    Paranoia (H)    Bipolar affective disorder in remission

## 2025-05-01 ENCOUNTER — PATIENT OUTREACH (OUTPATIENT)
Dept: CARE COORDINATION | Facility: CLINIC | Age: 30
End: 2025-05-01
Payer: COMMERCIAL

## 2025-05-01 NOTE — PROGRESS NOTES
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 We reviewed appt's together. No questions/concerns or needs at this time.  How are your symptoms? (Red Flag symptoms escalate to triage hotline per guidelines): Improved  Do you know how to contact your clinic care team if you have future questions or changes to your health status? : Yes  Does the patient have their discharge instructions? : Yes  Does the patient have questions regarding their discharge instructions? : No  Does the patient have all of their medications?: Yes  Do you have questions regarding any of your medications? : No  Do you have all of your needed medical supplies or equipment (DME)?  (i.e. oxygen tank, CPAP, cane, etc.): Yes    Post-op ([facility] Only)  If the patient had a surgery or procedure, do they have any questions for a nurse?: No    Post-op (Clinicians Only)  Did the patient have surgery or a procedure: No    Follow up Plan     Discharge Follow-Up  Discharge follow up appointment scheduled in alignment with recommended follow up timeframe or Transitions of Risk Category? (Low = within 30 days; Moderate= within 14 days; High= within 7 days): Yes  Discharge Follow Up Appointment Date: 05/07/25  Discharge Follow Up  Appointment Scheduled with?: Specialty Care Provider (Therapy)    Future Appointments   Date Time Provider Department Center   5/19/2025  9:00 AM Cyndi Johnson APRN CNP [facility]   6/9/2025  1:00 PM [facility]       Outpatient Plan as outlined on AVS reviewed with patient.    For any urgent concerns, please contact our 24 hour nurse triage line: [phone] ([phone])       OCHOA Medrano

## 2025-05-07 ENCOUNTER — E-VISIT (OUTPATIENT)
Dept: PEDIATRICS | Facility: CLINIC | Age: 30
End: 2025-05-07
Payer: COMMERCIAL

## 2025-05-07 ENCOUNTER — MYC MEDICAL ADVICE (OUTPATIENT)
Dept: PEDIATRICS | Facility: CLINIC | Age: 30
End: 2025-05-07
Payer: COMMERCIAL

## 2025-05-07 DIAGNOSIS — F33.0 MAJOR DEPRESSIVE DISORDER, RECURRENT, MILD: Primary | ICD-10-CM

## 2025-05-07 DIAGNOSIS — F60.3 BORDERLINE PERSONALITY DISORDER (H): ICD-10-CM

## 2025-05-07 PROCEDURE — 99207 PR NON-BILLABLE SERV PER CHARTING: CPT | Performed by: STUDENT IN AN ORGANIZED HEALTH CARE EDUCATION/TRAINING PROGRAM

## 2025-05-07 ASSESSMENT — ANXIETY QUESTIONNAIRES
6. BECOMING EASILY ANNOYED OR IRRITABLE: SEVERAL DAYS
IF YOU CHECKED OFF ANY PROBLEMS ON THIS QUESTIONNAIRE, HOW DIFFICULT HAVE THESE PROBLEMS MADE IT FOR YOU TO DO YOUR WORK, TAKE CARE OF THINGS AT HOME, OR GET ALONG WITH OTHER PEOPLE: EXTREMELY DIFFICULT
7. FEELING AFRAID AS IF SOMETHING AWFUL MIGHT HAPPEN: NOT AT ALL
7. FEELING AFRAID AS IF SOMETHING AWFUL MIGHT HAPPEN: NOT AT ALL
3. WORRYING TOO MUCH ABOUT DIFFERENT THINGS: SEVERAL DAYS
GAD7 TOTAL SCORE: 8
8. IF YOU CHECKED OFF ANY PROBLEMS, HOW DIFFICULT HAVE THESE MADE IT FOR YOU TO DO YOUR WORK, TAKE CARE OF THINGS AT HOME, OR GET ALONG WITH OTHER PEOPLE?: EXTREMELY DIFFICULT
5. BEING SO RESTLESS THAT IT IS HARD TO SIT STILL: SEVERAL DAYS
4. TROUBLE RELAXING: NEARLY EVERY DAY
2. NOT BEING ABLE TO STOP OR CONTROL WORRYING: SEVERAL DAYS
GAD7 TOTAL SCORE: 8
1. FEELING NERVOUS, ANXIOUS, OR ON EDGE: SEVERAL DAYS
GAD7 TOTAL SCORE: 8

## 2025-05-07 ASSESSMENT — PATIENT HEALTH QUESTIONNAIRE - PHQ9
SUM OF ALL RESPONSES TO PHQ QUESTIONS 1-9: 12
SUM OF ALL RESPONSES TO PHQ QUESTIONS 1-9: 12
10. IF YOU CHECKED OFF ANY PROBLEMS, HOW DIFFICULT HAVE THESE PROBLEMS MADE IT FOR YOU TO DO YOUR WORK, TAKE CARE OF THINGS AT HOME, OR GET ALONG WITH OTHER PEOPLE: EXTREMELY DIFFICULT

## 2025-05-07 NOTE — TELEPHONE ENCOUNTER
RN recommended E-visit (per clinic policy) in order to address patient request for new medication.    Mena Martinez RN

## 2025-05-14 ASSESSMENT — PATIENT HEALTH QUESTIONNAIRE - PHQ9
10. IF YOU CHECKED OFF ANY PROBLEMS, HOW DIFFICULT HAVE THESE PROBLEMS MADE IT FOR YOU TO DO YOUR WORK, TAKE CARE OF THINGS AT HOME, OR GET ALONG WITH OTHER PEOPLE: EXTREMELY DIFFICULT
SUM OF ALL RESPONSES TO PHQ QUESTIONS 1-9: 18
SUM OF ALL RESPONSES TO PHQ QUESTIONS 1-9: 18

## 2025-05-15 ENCOUNTER — VIRTUAL VISIT (OUTPATIENT)
Dept: PEDIATRICS | Facility: CLINIC | Age: 30
End: 2025-05-15
Payer: COMMERCIAL

## 2025-05-15 DIAGNOSIS — F31.9 BIPOLAR AFFECTIVE DISORDER, REMISSION STATUS UNSPECIFIED (H): ICD-10-CM

## 2025-05-15 DIAGNOSIS — F60.3 BORDERLINE PERSONALITY DISORDER (H): ICD-10-CM

## 2025-05-15 DIAGNOSIS — F90.2 ATTENTION DEFICIT HYPERACTIVITY DISORDER (ADHD), COMBINED TYPE: ICD-10-CM

## 2025-05-15 DIAGNOSIS — F43.10 COMPLEX POSTTRAUMATIC STRESS DISORDER: ICD-10-CM

## 2025-05-15 DIAGNOSIS — R55 VASOVAGAL SYNCOPE: Primary | ICD-10-CM

## 2025-05-15 ASSESSMENT — ANXIETY QUESTIONNAIRES
8. IF YOU CHECKED OFF ANY PROBLEMS, HOW DIFFICULT HAVE THESE MADE IT FOR YOU TO DO YOUR WORK, TAKE CARE OF THINGS AT HOME, OR GET ALONG WITH OTHER PEOPLE?: VERY DIFFICULT
7. FEELING AFRAID AS IF SOMETHING AWFUL MIGHT HAPPEN: NOT AT ALL
2. NOT BEING ABLE TO STOP OR CONTROL WORRYING: NOT AT ALL
6. BECOMING EASILY ANNOYED OR IRRITABLE: MORE THAN HALF THE DAYS
4. TROUBLE RELAXING: NOT AT ALL
GAD7 TOTAL SCORE: 8
7. FEELING AFRAID AS IF SOMETHING AWFUL MIGHT HAPPEN: NOT AT ALL
GAD7 TOTAL SCORE: 8
1. FEELING NERVOUS, ANXIOUS, OR ON EDGE: MORE THAN HALF THE DAYS
IF YOU CHECKED OFF ANY PROBLEMS ON THIS QUESTIONNAIRE, HOW DIFFICULT HAVE THESE PROBLEMS MADE IT FOR YOU TO DO YOUR WORK, TAKE CARE OF THINGS AT HOME, OR GET ALONG WITH OTHER PEOPLE: VERY DIFFICULT
3. WORRYING TOO MUCH ABOUT DIFFERENT THINGS: MORE THAN HALF THE DAYS
GAD7 TOTAL SCORE: 8
5. BEING SO RESTLESS THAT IT IS HARD TO SIT STILL: MORE THAN HALF THE DAYS

## 2025-05-15 NOTE — PROGRESS NOTES
"Felecia is a 29 year old who is being evaluated via a billable video visit.          Assessment & Plan     Vasovagal syncope  Episode of fainting most consistent with vasovagal syncope. No further work up indicated at this time. Increase water intake especially at work.    Attention deficit hyperactivity disorder (ADHD), combined type  Borderline personality disorder (H)  Complex posttraumatic stress disorder  Has appointment with psychiatric provider in a few days. I would defer prescription of current medications to them at this time, particularly given recent hospitalizations.          BMI  Estimated body mass index is 30.91 kg/m  as calculated from the following:    Height as of 4/24/25: 1.676 m (5' 6\").    Weight as of 4/29/25: 86.9 kg (191 lb 8 oz).           Subjective   Felecia is a 29 year old, presenting for the following health issues:  No chief complaint on file.    History of Present Illness       Reason for visit:  Please refill the adderall, I have a psychiatrist appointment on the 19th. I'm defeated    She eats 2-3 servings of fruits and vegetables daily.She consumes 1 sweetened beverage(s) daily.She exercises with enough effort to increase her heart rate 60 or more minutes per day.  She exercises with enough effort to increase her heart rate 4 days per week.   She is taking medications regularly.      Originally wanted to talk about adderall but then fainted last night  Got white and then passed out  Last episode of fainting was a few months back until last night  Was able to crawl onto couch  Felt less energized after  Went to pee at 3:50am and was washing hands and then felt faint and fell down  May have krishna dehydrated: not drinking water   -at work does not eat or drink                  Objective           Vitals:  No vitals were obtained today due to virtual visit.    Physical Exam   GENERAL: alert and no distress  EYES: Eyes grossly normal to inspection.  No discharge or erythema, or obvious " scleral/conjunctival abnormalities.  RESP: No audible wheeze, cough, or visible cyanosis.    SKIN: Visible skin clear. No significant rash, abnormal pigmentation or lesions.  NEURO: Cranial nerves grossly intact.  Mentation and speech appropriate for age.  PSYCH: Appropriate affect, tone, and pace of words          Video-Visit Details    Type of service:  Video Visit   Originating Location (pt. Location): Home    Distant Location (provider location):  On-site  Platform used for Video Visit: Minerva  Signed Electronically by: Deirdre E. Milligan, MD

## 2025-05-16 ENCOUNTER — TELEPHONE (OUTPATIENT)
Dept: BEHAVIORAL HEALTH | Facility: CLINIC | Age: 30
End: 2025-05-16

## 2025-05-16 ENCOUNTER — HOSPITAL ENCOUNTER (EMERGENCY)
Facility: CLINIC | Age: 30
Discharge: OTHER INSTITUTION | End: 2025-05-17
Attending: EMERGENCY MEDICINE | Admitting: EMERGENCY MEDICINE
Payer: COMMERCIAL

## 2025-05-16 VITALS
HEART RATE: 107 BPM | RESPIRATION RATE: 18 BRPM | TEMPERATURE: 98.4 F | OXYGEN SATURATION: 99 % | HEIGHT: 66 IN | SYSTOLIC BLOOD PRESSURE: 136 MMHG | DIASTOLIC BLOOD PRESSURE: 94 MMHG | WEIGHT: 180 LBS | BODY MASS INDEX: 28.93 KG/M2

## 2025-05-16 DIAGNOSIS — F23 ACUTE PSYCHOSIS (H): ICD-10-CM

## 2025-05-16 DIAGNOSIS — R45.850 HOMICIDAL IDEATION: ICD-10-CM

## 2025-05-16 DIAGNOSIS — R55 VASOVAGAL SYNCOPE: ICD-10-CM

## 2025-05-16 DIAGNOSIS — R45.851 SUICIDAL IDEATION: ICD-10-CM

## 2025-05-16 PROBLEM — F25.1 SCHIZOAFFECTIVE DISORDER, DEPRESSIVE TYPE (H): Status: ACTIVE | Noted: 2025-05-16

## 2025-05-16 LAB
ALBUMIN SERPL BCG-MCNC: 4.6 G/DL (ref 3.5–5.2)
ALP SERPL-CCNC: 75 U/L (ref 40–150)
ALT SERPL W P-5'-P-CCNC: 14 U/L (ref 0–50)
AMPHETAMINES UR QL SCN: ABNORMAL
ANION GAP SERPL CALCULATED.3IONS-SCNC: 11 MMOL/L (ref 7–15)
APAP SERPL-MCNC: <5 UG/ML (ref 10–30)
AST SERPL W P-5'-P-CCNC: 14 U/L (ref 0–45)
ATRIAL RATE - MUSE: 90 BPM
BARBITURATES UR QL SCN: ABNORMAL
BASOPHILS # BLD AUTO: 0 10E3/UL (ref 0–0.2)
BASOPHILS NFR BLD AUTO: 0 %
BENZODIAZ UR QL SCN: ABNORMAL
BILIRUB SERPL-MCNC: 0.3 MG/DL
BUN SERPL-MCNC: 9.2 MG/DL (ref 6–20)
BZE UR QL SCN: ABNORMAL
CALCIUM SERPL-MCNC: 9.4 MG/DL (ref 8.8–10.4)
CANNABINOIDS UR QL SCN: ABNORMAL
CHLORIDE SERPL-SCNC: 104 MMOL/L (ref 98–107)
CREAT SERPL-MCNC: 0.82 MG/DL (ref 0.51–0.95)
DIASTOLIC BLOOD PRESSURE - MUSE: NORMAL MMHG
EGFRCR SERPLBLD CKD-EPI 2021: >90 ML/MIN/1.73M2
EOSINOPHIL # BLD AUTO: 0 10E3/UL (ref 0–0.7)
EOSINOPHIL NFR BLD AUTO: 1 %
ERYTHROCYTE [DISTWIDTH] IN BLOOD BY AUTOMATED COUNT: 13 % (ref 10–15)
ETHANOL SERPL-MCNC: <0.01 G/DL
FENTANYL UR QL: ABNORMAL
GLUCOSE SERPL-MCNC: 89 MG/DL (ref 70–99)
HCG SERPL QL: NEGATIVE
HCO3 SERPL-SCNC: 23 MMOL/L (ref 22–29)
HCT VFR BLD AUTO: 40.1 % (ref 35–47)
HGB BLD-MCNC: 13.3 G/DL (ref 11.7–15.7)
HOLD SPECIMEN: NORMAL
IMM GRANULOCYTES # BLD: 0 10E3/UL
IMM GRANULOCYTES NFR BLD: 1 %
INTERPRETATION ECG - MUSE: NORMAL
LYMPHOCYTES # BLD AUTO: 1.1 10E3/UL (ref 0.8–5.3)
LYMPHOCYTES NFR BLD AUTO: 17 %
MCH RBC QN AUTO: 29.2 PG (ref 26.5–33)
MCHC RBC AUTO-ENTMCNC: 33.2 G/DL (ref 31.5–36.5)
MCV RBC AUTO: 88 FL (ref 78–100)
MONOCYTES # BLD AUTO: 0.4 10E3/UL (ref 0–1.3)
MONOCYTES NFR BLD AUTO: 7 %
NEUTROPHILS # BLD AUTO: 4.8 10E3/UL (ref 1.6–8.3)
NEUTROPHILS NFR BLD AUTO: 75 %
NRBC # BLD AUTO: 0 10E3/UL
NRBC BLD AUTO-RTO: 0 /100
OPIATES UR QL SCN: ABNORMAL
P AXIS - MUSE: 0 DEGREES
PCP QUAL URINE (ROCHE): ABNORMAL
PLATELET # BLD AUTO: 258 10E3/UL (ref 150–450)
POTASSIUM SERPL-SCNC: 3.5 MMOL/L (ref 3.4–5.3)
PR INTERVAL - MUSE: 140 MS
PROT SERPL-MCNC: 7.8 G/DL (ref 6.4–8.3)
QRS DURATION - MUSE: 72 MS
QT - MUSE: 376 MS
QTC - MUSE: 459 MS
R AXIS - MUSE: 34 DEGREES
RBC # BLD AUTO: 4.55 10E6/UL (ref 3.8–5.2)
SODIUM SERPL-SCNC: 138 MMOL/L (ref 135–145)
SYSTOLIC BLOOD PRESSURE - MUSE: NORMAL MMHG
T AXIS - MUSE: 27 DEGREES
VENTRICULAR RATE- MUSE: 90 BPM
WBC # BLD AUTO: 6.4 10E3/UL (ref 4–11)

## 2025-05-16 PROCEDURE — 250N000013 HC RX MED GY IP 250 OP 250 PS 637: Performed by: EMERGENCY MEDICINE

## 2025-05-16 PROCEDURE — 80143 DRUG ASSAY ACETAMINOPHEN: CPT | Performed by: EMERGENCY MEDICINE

## 2025-05-16 PROCEDURE — 85025 COMPLETE CBC W/AUTO DIFF WBC: CPT | Performed by: EMERGENCY MEDICINE

## 2025-05-16 PROCEDURE — 96361 HYDRATE IV INFUSION ADD-ON: CPT

## 2025-05-16 PROCEDURE — 80307 DRUG TEST PRSMV CHEM ANLYZR: CPT | Performed by: EMERGENCY MEDICINE

## 2025-05-16 PROCEDURE — 84155 ASSAY OF PROTEIN SERUM: CPT | Performed by: EMERGENCY MEDICINE

## 2025-05-16 PROCEDURE — 99285 EMERGENCY DEPT VISIT HI MDM: CPT

## 2025-05-16 PROCEDURE — 258N000003 HC RX IP 258 OP 636: Performed by: EMERGENCY MEDICINE

## 2025-05-16 PROCEDURE — 80179 DRUG ASSAY SALICYLATE: CPT | Performed by: EMERGENCY MEDICINE

## 2025-05-16 PROCEDURE — 84703 CHORIONIC GONADOTROPIN ASSAY: CPT | Performed by: EMERGENCY MEDICINE

## 2025-05-16 PROCEDURE — 82077 ASSAY SPEC XCP UR&BREATH IA: CPT | Performed by: EMERGENCY MEDICINE

## 2025-05-16 PROCEDURE — 93005 ELECTROCARDIOGRAM TRACING: CPT

## 2025-05-16 PROCEDURE — 96360 HYDRATION IV INFUSION INIT: CPT

## 2025-05-16 PROCEDURE — 36415 COLL VENOUS BLD VENIPUNCTURE: CPT | Performed by: EMERGENCY MEDICINE

## 2025-05-16 RX ORDER — QUETIAPINE FUMARATE 50 MG/1
150 TABLET, FILM COATED ORAL EVERY EVENING
Status: DISCONTINUED | OUTPATIENT
Start: 2025-05-16 | End: 2025-05-17 | Stop reason: HOSPADM

## 2025-05-16 RX ORDER — HYDROXYZINE HYDROCHLORIDE 25 MG/1
50 TABLET, FILM COATED ORAL EVERY 6 HOURS PRN
Status: DISCONTINUED | OUTPATIENT
Start: 2025-05-16 | End: 2025-05-17 | Stop reason: HOSPADM

## 2025-05-16 RX ORDER — HYDROXYZINE HYDROCHLORIDE 25 MG/1
50 TABLET, FILM COATED ORAL ONCE
Status: COMPLETED | OUTPATIENT
Start: 2025-05-16 | End: 2025-05-16

## 2025-05-16 RX ORDER — PROPRANOLOL HYDROCHLORIDE 10 MG/1
10 TABLET ORAL DAILY PRN
Status: DISCONTINUED | OUTPATIENT
Start: 2025-05-16 | End: 2025-05-17 | Stop reason: HOSPADM

## 2025-05-16 RX ORDER — BENZTROPINE MESYLATE 1 MG/1
2 TABLET ORAL 2 TIMES DAILY PRN
Status: DISCONTINUED | OUTPATIENT
Start: 2025-05-16 | End: 2025-05-17 | Stop reason: HOSPADM

## 2025-05-16 RX ORDER — GUANFACINE 1 MG/1
1 TABLET, EXTENDED RELEASE ORAL EVERY EVENING
Status: DISCONTINUED | OUTPATIENT
Start: 2025-05-16 | End: 2025-05-17 | Stop reason: HOSPADM

## 2025-05-16 RX ADMIN — SODIUM CHLORIDE 1000 ML: 9 INJECTION, SOLUTION INTRAVENOUS at 15:31

## 2025-05-16 RX ADMIN — GUANFACINE 1 MG: 1 TABLET, EXTENDED RELEASE ORAL at 19:38

## 2025-05-16 RX ADMIN — QUETIAPINE FUMARATE 150 MG: 50 TABLET ORAL at 19:38

## 2025-05-16 RX ADMIN — HYDROXYZINE HYDROCHLORIDE 50 MG: 25 TABLET, FILM COATED ORAL at 15:30

## 2025-05-16 ASSESSMENT — ACTIVITIES OF DAILY LIVING (ADL)
ADLS_ACUITY_SCORE: 46
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
## 2025-05-16 NOTE — ED PROVIDER NOTES
"  Emergency Department Note      History of Present Illness     Chief Complaint   Psychiatric Evaluation      HPI   Lenore \"Felecia\" LUZMA Suggs is a 29 year old female with history of C-PTSD, bipolar affective disorder, paranoia, and suicidal ideation who arrives to the ED restrained by police for a psychiatric evaluation.  Per [place] police, patient's therapist, with whom she was doing intake with this morning at 0900, contacted them because she is a mandated  and during their meeting today, the patient was making suicidal and homicidal statements.  In particular, she stated that she wanted to kill innocent people and had a plan, that she wanted to kill people who smiled at her, that she wanted to kill a male coworker, that she would hurt law enforcement, that she knew how to kill people with a pen and how to defend herself.  Police then showed up at patient's place of work, [employer].  Per PD, the patient stated multiple times that she wanted to do suicide by .  She told them she would not go to the hospital willingly.  She stated she knew officers would be coming and she was hoping they sent to big officers because she \"wanted to challenge.\"  While PD was speaking with her she grabbed a pen, so she was held at [facility] point until she put it down.  She was then detained.     Patient reports a longtime history of suicidal ideation. She reports compliance with her psych medications.  Patient is allergic to Haldol.   Denies drug or alcohol use.  Of note, patient mentions she fainted yesterday morning at 0300 while up to use the bathroom.  She saw her primary that same day and was told it was likely a vasovagal episode.    Independent Historian   [place] police as detailed above.    Review of External Notes   I reviewed [place] [employer] application by  for emergency evaluation paper copy.    Past Medical History     Medical History and Problem List   Past Medical History: " "  Diagnosis Date    Asthma     Bipolar affective disorder (H)     Depressive disorder     Outbursts of anger     Schizophrenia (H)        Medications   albuterol (PROAIR HFA/PROVENTIL HFA/VENTOLIN HFA) 108 (90 Base) MCG/ACT inhaler  benztropine (COGENTIN) 2 MG tablet  guanFACINE (INTUNIV) 1 MG TB24 24 hr tablet  hydrOXYzine HCl (ATARAX) 25 MG tablet  propranolol (INDERAL) 10 MG tablet  QUEtiapine Fumarate 150 MG TABS  Vitamin D3 (CHOLECALCIFEROL) 25 mcg (1000 units) tablet        Surgical History   Past Surgical History:   Procedure Laterality Date    ENT SURGERY  2005    Unknown object in ear       Physical Exam     Patient Vitals for the past 24 hrs:   BP Temp Temp src Pulse Resp SpO2 Height Weight   05/16/25 1404 (!) 136/94 -- -- 107 -- -- -- --   05/16/25 1400 -- 98.4  F (36.9  C) Temporal -- 18 99 % 1.676 m (5' 6\") 81.6 kg (180 lb)     Physical Exam  Nursing note and vitals reviewed.  Constitutional:  Appears well-developed and well-nourished.   HENT:   Head:    Atraumatic.   Mouth/Throat:   Oropharynx is clear and moist. No oropharyngeal exudate.   Eyes:    Pupils are equal, round, and reactive to light.   Neck:    Normal range of motion. Neck supple.      No tracheal deviation present. No thyromegaly present.   Cardiovascular:  Normal rate, regular rhythm, no murmur   Pulmonary/Chest: Breath sounds are clear and equal without wheezes or crackles.  Abdominal:   Soft. Bowel sounds are normal. Exhibits no distension and      no mass. There is no tenderness.      There is no rebound and no guarding.   Musculoskeletal:  Exhibits no edema.   Lymphadenopathy:  No cervical adenopathy.   Psych:   Appears upset.  Talking rapidly.  Cooperative.  Neurological:   Alert and oriented to person, place, and time. GCS 15.  CN 2-12 intact.  and proximal upper extremity strength strong and equal.  Bilateral lower extremity strength strong and equal, including strong dorsiflexion and plantarflexion strength.  Sensation " intact and equal to the face, arms and legs.  No facial droop or weakness. Normal speech.  Follows commands and answers questions normally.    Skin:    Skin is warm and dry. No rash noted. No pallor.      Diagnostics     Lab Results   Labs Ordered and Resulted from Time of ED Arrival to Time of ED Departure   URINE DRUG SCREEN PANEL - Abnormal       Result Value    Amphetamines Urine Screen Negative      Barbituates Urine Screen Negative      Benzodiazepine Urine Screen Negative      Cannabinoids Urine Screen Positive (*)     Cocaine Urine Screen Negative      Fentanyl Qual Urine Screen Negative      Opiates Urine Screen Negative      PCP Urine Screen Negative     ACETAMINOPHEN LEVEL - Abnormal    Acetaminophen <5.0 (*)    COMPREHENSIVE METABOLIC PANEL - Normal    Sodium 138      Potassium 3.5      Carbon Dioxide (CO2) 23      Anion Gap 11      Urea Nitrogen 9.2      Creatinine 0.82      GFR Estimate >90      Calcium 9.4      Chloride 104      Glucose 89      Alkaline Phosphatase 75      AST 14      ALT 14      Protein Total 7.8      Albumin 4.6      Bilirubin Total 0.3     HCG QUALITATIVE PREGNANCY - Normal    hCG Serum Qualitative Negative     ETHANOL LEVEL BLOOD - Normal    Ethanol Level Blood <0.01     CBC WITH PLATELETS AND DIFFERENTIAL    WBC Count 6.4      RBC Count 4.55      Hemoglobin 13.3      Hematocrit 40.1      MCV 88      MCH 29.2      MCHC 33.2      RDW 13.0      Platelet Count 258      % Neutrophils 75      % Lymphocytes 17      % Monocytes 7      % Eosinophils 1      % Basophils 0      % Immature Granulocytes 1      NRBCs per 100 WBC 0      Absolute Neutrophils 4.8      Absolute Lymphocytes 1.1      Absolute Monocytes 0.4      Absolute Eosinophils 0.0      Absolute Basophils 0.0      Absolute Immature Granulocytes 0.0      Absolute NRBCs 0.0         Imaging   No orders to display       EKG   ECG taken at 15:41 on 5/16/25, ECG read at 15:53 on 5/16/25 by Dr. Liza Murcia  Normal Sinus  Rhythm  Ventricular Rate 90 bpm. AK interval 140 ms. QRS duration 72 ms. QT/QTc 376/459 ms. P-R-T axes 0 34 27.    Independent Interpretation   None    ED Course      Medications Administered   Medications   hydrOXYzine HCl (ATARAX) tablet 50 mg (has no administration in time range)   hydrOXYzine HCl (ATARAX) tablet 50 mg (50 mg Oral $Given 5/16/25 1534)   sodium chloride 0.9% BOLUS 1,000 mL (0 mLs Intravenous Stopped 5/16/25 3389)       Procedures   Procedures     Discussion of Management   I spoke with Angelina who is the patient's  through [facility] who states that the patient is on a commitment and she is considering resending the patient's provisional discharge.    Spoke with Chana MATHEW who recommends inpatient mental health admission.    ED Course   ED Course as of 05/16/25 1502   Fri May 16, 2025   1419 I obtained history and examined the patient as noted above.    1430 I called patient's , Angelina. Might revoke her stay of commitment. Patient previously had suicidal plan to jump off a bridge. Tension with roommate, who is her ex-partner, who she feels is the cause of her depression. She also feels like work is a stressor for her.       Additional Documentation  Social Determinants of Health: Stress/Adjustment Disorders the patient has increased stress at home since she lives with her ex partner    Medical Decision Making / Diagnosis     CMS Diagnoses: None    MIPS   None               MDM   Lenore Suggs is a 29 year old female who arrives to the emergency department due to homicidal and suicidal ideations.  I placed patient on a 72-hour hold and spoke with her  Angelina who states she is considering revoking her provisional discharge since she is on a commitment not doing well with her mental health.  DEC evaluated the patient and feels that she requires inpatient mental health admission and [facility]  The patient was prescribed  hydroxyzine for her anxiety and agitation since she states she had an allergic reaction to Haldol and she is hesitant to take Zyprexa.  She describes a syncopal episode yesterday that sounds like a vasovagal episode so ECG was performed which is normal without any prolonged QT interval or arrhythmia.  She does not have any electrolyte abnormality or renal failure.  She is not pregnant.  I felt she was medically cleared for mental health admission.    Disposition   Care of the patient was transferred to my colleague Dr. Ross pending patient mental health bed assignment.     Diagnosis     ICD-10-CM    1. Suicidal ideation  R45.851       2. Homicidal ideation  R45.850       3. Vasovagal syncope  R55            Discharge Medications   New Prescriptions    No medications on file         Scribe Disclosure:  I, Evelina Doyle, am serving as a scribe at 3:02 PM on 5/16/2025 to document services personally performed by Liza Murcia MD based on my observations and the provider's statements to me.        Liza Murcia MD  05/16/25 [id number]

## 2025-05-16 NOTE — ED NOTES
Bed: 2  Expected date:   Expected time:   Means of arrival:   Comments:  521 29F SI, HI, Restrained
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
## 2025-05-16 NOTE — ED NOTES
IP MH Referral Acuity Rating Score (RARS)    LM complete at referral to IP MH, with DEC; and, daily while awaiting IP MH placement. Call score to PPS.  CRITERIA SCORING   New 72 HH and Involuntary for IP MH (not adolescent) 3/3   Boarding over 24 hours 0/1   Vulnerable adult at least 55+ with multiple co morbidities; or, Patient age 11 or under 0/1   Suicide ideation without relief of precipitating factors 1/1   Current plan for suicide 0/1   Current plan for homicide 0/1   Imminent risk or actual attempt to seriously harm another without relief of factors precipitating the attempt 1/1   Severe dysfunction in daily living (ex: complete neglect for self care, extreme disruption in vegetative function, extreme deterioration in social interactions) 1/1   Recent (last 2 weeks) or current physical aggression in the ED 0/1   Restraints or seclusion episode in ED 0/1   Verbal aggression, agitation, yelling, etc., while in the ED 0/1   Active psychosis with psychomotor agitation or catatonia 0/1   Need for constant or near constant redirection (from leaving, from others, etc).  0/1   Intrusive or disruptive behaviors 1/1   TOTAL 6     DARIA Brock, Psychotherapist  [facility] - Triage & Transition Services  Callback: [phone]

## 2025-05-17 ENCOUNTER — TELEPHONE (OUTPATIENT)
Dept: BEHAVIORAL HEALTH | Facility: CLINIC | Age: 30
End: 2025-05-17
Payer: COMMERCIAL

## 2025-05-17 ENCOUNTER — HOSPITAL ENCOUNTER (INPATIENT)
Facility: CLINIC | Age: 30
DRG: 885 | End: 2025-05-17
Attending: PSYCHIATRY & NEUROLOGY | Admitting: PSYCHIATRY & NEUROLOGY
Payer: COMMERCIAL

## 2025-05-17 DIAGNOSIS — F90.2 ATTENTION DEFICIT HYPERACTIVITY DISORDER (ADHD), COMBINED TYPE: ICD-10-CM

## 2025-05-17 DIAGNOSIS — G25.71 AKATHISIA: ICD-10-CM

## 2025-05-17 DIAGNOSIS — J45.20 MILD INTERMITTENT ASTHMA WITHOUT COMPLICATION: ICD-10-CM

## 2025-05-17 DIAGNOSIS — F25.1 SCHIZOAFFECTIVE DISORDER, DEPRESSIVE TYPE (H): Primary | ICD-10-CM

## 2025-05-17 DIAGNOSIS — E55.9 VITAMIN D DEFICIENCY: ICD-10-CM

## 2025-05-17 DIAGNOSIS — F41.9 ANXIETY: ICD-10-CM

## 2025-05-17 DIAGNOSIS — G24.1 DYSTONIA, TORSION, FRAGMENTS OF: ICD-10-CM

## 2025-05-17 DIAGNOSIS — F31.9 BIPOLAR AFFECTIVE DISORDER, REMISSION STATUS UNSPECIFIED (H): ICD-10-CM

## 2025-05-17 PROBLEM — F29 PSYCHOSIS (H): Status: ACTIVE | Noted: 2025-05-17

## 2025-05-17 LAB — SALICYLATES SERPL-MCNC: <0.3 MG/DL (ref ?–30)

## 2025-05-17 PROCEDURE — 99223 1ST HOSP IP/OBS HIGH 75: CPT | Performed by: PSYCHIATRY & NEUROLOGY

## 2025-05-17 PROCEDURE — 250N000013 HC RX MED GY IP 250 OP 250 PS 637: Performed by: EMERGENCY MEDICINE

## 2025-05-17 PROCEDURE — 124N000002 HC R&B MH UMMC

## 2025-05-17 PROCEDURE — 250N000013 HC RX MED GY IP 250 OP 250 PS 637: Performed by: PSYCHIATRY & NEUROLOGY

## 2025-05-17 RX ORDER — GUANFACINE 1 MG/1
1 TABLET, EXTENDED RELEASE ORAL EVERY EVENING
Status: DISCONTINUED | OUTPATIENT
Start: 2025-05-17 | End: 2025-05-28 | Stop reason: HOSPADM

## 2025-05-17 RX ORDER — PROPRANOLOL HYDROCHLORIDE 10 MG/1
10 TABLET ORAL DAILY PRN
Status: DISCONTINUED | OUTPATIENT
Start: 2025-05-17 | End: 2025-05-28 | Stop reason: HOSPADM

## 2025-05-17 RX ORDER — OLANZAPINE 10 MG/2ML
10 INJECTION, POWDER, FOR SOLUTION INTRAMUSCULAR 3 TIMES DAILY PRN
Status: DISCONTINUED | OUTPATIENT
Start: 2025-05-17 | End: 2025-05-28 | Stop reason: HOSPADM

## 2025-05-17 RX ORDER — VITAMIN B COMPLEX
50 TABLET ORAL DAILY
Status: DISCONTINUED | OUTPATIENT
Start: 2025-05-17 | End: 2025-05-28 | Stop reason: HOSPADM

## 2025-05-17 RX ORDER — ACETAMINOPHEN 325 MG/1
650 TABLET ORAL EVERY 4 HOURS PRN
Status: DISCONTINUED | OUTPATIENT
Start: 2025-05-17 | End: 2025-05-28 | Stop reason: HOSPADM

## 2025-05-17 RX ORDER — QUETIAPINE FUMARATE 50 MG/1
150 TABLET, FILM COATED ORAL EVERY EVENING
Status: DISCONTINUED | OUTPATIENT
Start: 2025-05-17 | End: 2025-05-21

## 2025-05-17 RX ORDER — POLYETHYLENE GLYCOL 3350 17 G/17G
17 POWDER, FOR SOLUTION ORAL DAILY PRN
Status: DISCONTINUED | OUTPATIENT
Start: 2025-05-17 | End: 2025-05-28 | Stop reason: HOSPADM

## 2025-05-17 RX ORDER — ALBUTEROL SULFATE 90 UG/1
2 INHALANT RESPIRATORY (INHALATION) EVERY 6 HOURS PRN
Status: DISCONTINUED | OUTPATIENT
Start: 2025-05-17 | End: 2025-05-28 | Stop reason: HOSPADM

## 2025-05-17 RX ORDER — OLANZAPINE 10 MG/1
10 TABLET, FILM COATED ORAL 3 TIMES DAILY PRN
Status: DISCONTINUED | OUTPATIENT
Start: 2025-05-17 | End: 2025-05-28 | Stop reason: HOSPADM

## 2025-05-17 RX ORDER — OLANZAPINE 5 MG/1
10 TABLET, FILM COATED ORAL 2 TIMES DAILY
Status: DISCONTINUED | OUTPATIENT
Start: 2025-05-17 | End: 2025-05-17 | Stop reason: HOSPADM

## 2025-05-17 RX ORDER — MAGNESIUM HYDROXIDE/ALUMINUM HYDROXICE/SIMETHICONE 120; 1200; 1200 MG/30ML; MG/30ML; MG/30ML
30 SUSPENSION ORAL EVERY 4 HOURS PRN
Status: DISCONTINUED | OUTPATIENT
Start: 2025-05-17 | End: 2025-05-28 | Stop reason: HOSPADM

## 2025-05-17 RX ORDER — HYDROXYZINE HYDROCHLORIDE 25 MG/1
25 TABLET, FILM COATED ORAL EVERY 4 HOURS PRN
Status: DISCONTINUED | OUTPATIENT
Start: 2025-05-17 | End: 2025-05-17

## 2025-05-17 RX ORDER — BENZTROPINE MESYLATE 2 MG/1
2 TABLET ORAL 2 TIMES DAILY PRN
Status: DISCONTINUED | OUTPATIENT
Start: 2025-05-17 | End: 2025-05-28 | Stop reason: HOSPADM

## 2025-05-17 RX ORDER — HYDROXYZINE HYDROCHLORIDE 25 MG/1
25 TABLET, FILM COATED ORAL EVERY 4 HOURS PRN
Status: DISCONTINUED | OUTPATIENT
Start: 2025-05-17 | End: 2025-05-19

## 2025-05-17 RX ADMIN — Medication 50 MCG: at 13:49

## 2025-05-17 RX ADMIN — OLANZAPINE 10 MG: 10 TABLET, FILM COATED ORAL at 12:48

## 2025-05-17 RX ADMIN — QUETIAPINE FUMARATE 150 MG: 50 TABLET ORAL at 20:26

## 2025-05-17 RX ADMIN — HYDROXYZINE HYDROCHLORIDE 50 MG: 25 TABLET ORAL at 08:17

## 2025-05-17 RX ADMIN — GUANFACINE 1 MG: 1 TABLET, EXTENDED RELEASE ORAL at 20:26

## 2025-05-17 RX ADMIN — HYDROXYZINE HYDROCHLORIDE 25 MG: 25 TABLET ORAL at 13:49

## 2025-05-17 RX ADMIN — OLANZAPINE 10 MG: 5 TABLET, FILM COATED ORAL at 09:02

## 2025-05-17 ASSESSMENT — ACTIVITIES OF DAILY LIVING (ADL)
ADLS_ACUITY_SCORE: 46
ADLS_ACUITY_SCORE: 46
DRESS: INDEPENDENT
LAUNDRY: UNABLE TO COMPLETE
ADLS_ACUITY_SCORE: 46
HYGIENE/GROOMING: INDEPENDENT
ADLS_ACUITY_SCORE: 46
ORAL_HYGIENE: INDEPENDENT
ADLS_ACUITY_SCORE: 46
HYGIENE/GROOMING: INDEPENDENT
ADLS_ACUITY_SCORE: 46
LAUNDRY: UNABLE TO COMPLETE
ADLS_ACUITY_SCORE: 46
ORAL_HYGIENE: INDEPENDENT
ADLS_ACUITY_SCORE: 46
DRESS: INDEPENDENT

## 2025-05-17 NOTE — ED NOTES
"Pt asked for some medication for sleep, RN brought pt atarax, pt took the medication and stated \"well I hope this kills me\"  "

## 2025-05-17 NOTE — ED NOTES
Pt took Seroquel 100 mg and not 150 mg. Pt reports she takes 100 mg and not 150mg. Writer discussed with MD and the decentralized pharmacist.  The decentralized pharmacist  plan to have dose reflect two tablets  for  a total dose of 150 mg.

## 2025-05-17 NOTE — ED NOTES
Pt Cell phone and wrist watch taken from pt uneventfully after writer explained the policy to pt regarding  no personal  electronics allowed.  Pt labeled placed on these items and locked up in pt locked bin. Pt changed into  Scrubs.   Video Observation initiated, patient informed.     Lex Mcnamara RN

## 2025-05-17 NOTE — TELEPHONE ENCOUNTER
R: [place]  Access Inpatient Bed Call Log 05/16/2025 @11:45PM:   Intake has called facilities that have not updated the bed status within the last 12 hours.          Pt is on 72HH.  Statewide only.      [place]:  [facility] is posting 0 beds.  [facility] is posting 0 beds. [phone]. N0 reviews after 9pm.   [facility] ([facility]) is posting 0 beds.  [facility] is posting 0 beds. No high school or soila psych. [phone]. Per Viviana @ 3:49 PM, they are full  [place] ([facility]) is posting 0 beds.  [facility] () is posting 0 beds. [phone]  [facility] is posting 6 beds. Ages 18-35, labs required. [phone]. No answer @ 12:07AM.  [facility] ([facility]) is posting 0 beds.  [facility] ([facility]) is posting 0 beds. [phone]     [place]:  [facility] () is posting 4 beds. Mixed unit (12+), low acuity. [phone]. Per call at 11:52pm- [place] reports that must be low acuity and they may have 1 bed.    [facility] ([facility]) is posting 0 beds. [phone]. No current aggression, low acuity.  [facility] is posting 0 beds. [phone] ext. [phone]  [facility] ([place]) is posting 2 beds. [phone]. No aggression.  Low acuity only. 11:55PM- [place] can review.  Fax clinicals to [phone].  12:11AM- Worked on obtaining all requested labs to fax to [facility].   12:20AM-  Intake Writer requested for aspirin level.    12:27AM-Faxed clinicals to [facility] for review.   2:42AM- TANNER El RN notified  Intake Writer that [facility] is not able to accommodate Pt d/t her acuity at this time.  Can check back at 10AM.     [facility] ([facility]) is posting 1 bed. [phone]. No current aggression, low acuity. Female.  [facility] ([place]) is posting 0 beds. [phone]. Per call at 11:54pm-[place] reports no beds.   [facility] is posting 0 beds. Low acuity, 72HH preferred. [phone]    [facility] ([place]) is posting 0 beds. [phone].      [facility] ([facility]) is posting 7 beds. VOL only, no hx of aggression/violence/assault. No sexual offenders, no 72HH. [phone]  [facility] is posting 2 beds. Must have cognitive ability to program. No aggression or violent hx in the last 2 years. [phone]  [facility] is posting 0 beds. Low acuity, violence and aggression capped. [phone]  [facility] is posting 1 bed. [phone]. Low acuity only.  Per call at 12:03AM, they are only reviewing low acuity beds.  Add to waitlist at the moment.     [facility] is posting 3 beds. [phone]. Low acuity only.   [facility] is posting 2 beds. No lines, drains, tubes, oxygen, IV or CPAP. [phone]  [facility] is posting 2 beds. MIXED UNIT. No lines, drains, tubes, oxygen, IV or CPAP. [phone].  Per call at 12:04AM-High acuity is very volatile so they are being very picky about who they can take.  1 general bed.     Pt remains on work list pending appropriate bed availability.
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
## 2025-05-17 NOTE — PLAN OF CARE
05/17/25 1648   Patient Belongings   Did you bring any home meds/supplements to the hospital?  Yes   Disposition of meds  Sent to security/pharmacy per site process   Patient Belongings locker;sent to security per site process   Patient Belongings Put in Hospital Secure Location (Security or Locker, etc.) cash/credit card;cell phone/electronics;clothing;keys;medication(s);wallet;purse/wallet;shoes;watch   Belongings Search Yes   Clothing Search Yes   Second Staff unsure -- clothing search done during day shift; belongings search completed on evening shift     Locker:   Small [employer] grey backpack  Cellphone  (2) keys on keyring  Wallet  Smartwatch  Black jeans with belt  Red [employer] tshirt with white undershirt  Black sweatshirt  Black [employer]ok[employer] with socks    Security #[id number]  Mastercard [id number]  Mastercard [id number]  Visa [id number]    Medication Envelope #[id number]  Hydroxyzine HcL 25mg Tabs  Benztropine 2mg Tablets          A               Admission:  I am responsible for any personal items that are not sent to the safe or pharmacy.  [facility] is not responsible for loss, theft or damage of any property in my possession.    Signature:  _________________________________ Date: _______  Time: _____                                              Staff Signature:  ____________________________ Date: ________  Time: _____      2nd Staff person, if patient is unable/unwilling to sign:    Signature: ________________________________ Date: ________  Time: _____     Discharge:  [facility] has returned all of my personal belongings:    Signature: _________________________________ Date: ________  Time: _____                                          Staff Signature:  ____________________________ Date: ________  Time: _____

## 2025-05-17 NOTE — PLAN OF CARE
"Lenore Suggs  May 16, 2025  Plan of Care Hand-off Note     Patient Recommended Care Path: inpatient mental health    Clinical Substantiation:  After diagnostic assessment, chart review and collateral information, the recommendation of this writer is for inpatient psychiatric admission.     At the time of assessment Lenore patient presents with acute symptoms of psychosis that have not been able to be managed effectively with outpatient supports. Patient presents with Delusions: Paranoid: belief that she can detect \"pedophiles\" and \"anybody who hurts kids and Other: Homicidal ideation with intent to protect children from pedophiles . Patient endorses delusions and paranoia with full conviction and inability to reality test. Patient endorses homicidal ideation with no current identified victim, but with intent and plan that she declined to share with this writer stating \"I know how.\" Patient expresses a desire to kill \"anybody who hurts kids.\"   As such patient would be best served by the psychiatric interventions and stabilization provided an an inpatient hospitalization level of care.  Patient is unable to engage in safety planning to mitigate risk level in a non-secure setting. Patient should remain in an inpatient hospitalization admission until they are able to stabilize the acute psychosis symptoms, and engage in outpatient mental health supports for ongoing care.    Goals for crisis stabilization:  Reduce paranoia and delusions. Increase ability to safetyplan for discharge.    Next steps for Care Team:  No next steps for [facility] care team    Treatment Objectives Addressed:  identifying and practicing coping strategies, identifying additional supports, exploring obstacles to safety in the community    Therapeutic Interventions:  Engaged in guided discovery, explored patient's perspectives and helped expand them through socratic dialogue.    Has a specific means been identified for suicidal.homicide actions: " No    Patient coping skills attempted to reduce the crisis:  Patient is not able to identify any coping skills that they utilized to reduce the crisis prior to arrival.    Imminent risk of harm: Homicidal Thoughts or Behaviors  Severe psychiatric, behavioral or other comorbid conditions are appropriate for management at inpatient mental health as indicated by at least one of the following: Psychiatric Symptoms, Impaired impulse control, judgement, or insight, Symptoms of impact to function  Severe dysfunction in daily living is present as indicated by at least one of the following: Complete withdrawal from all social interactions, Complete inability to maintain any appropriate aspect of personal responsibility in any adult roles  Situation and expectations are appropriate for inpatient care: Patient management/treatment at lower level of care is not feasible or is inappropriate, Patient is unwilling to participate in treatment voluntarily and requires treatment  Inpatient mental health services are necessary to meet patient needs and at least one of the following: Specific condition related to admission diagnosis is present and judged likely to deteriorate in absence of treatment at proposed level of care      Collateral contact information:  Friend - Daisy Callahan phone: [phone]     Angelinaemy Hoskisndaisy [phone]     Legal Status: Commitment                                                                             Order for Stay of Commitment (valid 4/15/2025 through 10/15/2025)   Case No. [id number]  Reviewed court records: yes     DARIA Brock, Psychotherapist  [facility] - Triage & Transition Services  Callback: [phone]

## 2025-05-17 NOTE — ED NOTES
[facility] called back saying that pt is too high aquity for their units at this time.  If no placement is made by 10am check back to see if something opened up.  [facility] intake notified
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
## 2025-05-17 NOTE — PHARMACY-ADMISSION MEDICATION HISTORY
Pharmacist Admission Medication History    Admission medication history is complete. The information provided in this note is only as accurate as the sources available at the time of the update.    Information Source(s): Hospital records and CareEverywhere/SureScripts via N/A    Pertinent Information: Patient is not a good candidate for interview at this time. Recent discharge information along with pharmacy fill history utilized.    Changes made to PTA medication list:  Added: None  Deleted: None  Changed: None    Allergies reviewed with patient and updates made in EHR: unable to assess    Medication History Completed By: Abiel Munroe [facility] 5/16/2025 10:05 PM    PTA Med List   Medication Sig Last Dose/Taking    albuterol (PROAIR HFA/PROVENTIL HFA/VENTOLIN HFA) 108 (90 Base) MCG/ACT inhaler Inhale 2 puffs into the lungs every 6 hours as needed for shortness of breath, wheezing or cough Taking As Needed    benztropine (COGENTIN) 2 MG tablet Take 1 tablet (2 mg) by mouth 2 times daily as needed for extrapyramidal symptoms (EPS). Taking As Needed    guanFACINE (INTUNIV) 1 MG TB24 24 hr tablet Take 1 tablet (1 mg) by mouth every evening. Taking    hydrOXYzine HCl (ATARAX) 25 MG tablet Take 1 tablet (25 mg) by mouth every 4 hours as needed for anxiety. Taking As Needed    propranolol (INDERAL) 10 MG tablet Take 1 tablet (10 mg) by mouth daily as needed (restlessness). Taking As Needed    QUEtiapine Fumarate 150 MG TABS Take 150 mg by mouth every evening. Taking    Vitamin D3 (CHOLECALCIFEROL) 25 mcg (1000 units) tablet Take 2 tablets (50 mcg) by mouth daily. Taking

## 2025-05-17 NOTE — PHARMACY-ADMISSION MEDICATION HISTORY
Admission medication history completed at [facility]. Please see Pharmacist Admission Medication History note from 5/16/2025.

## 2025-05-17 NOTE — TELEPHONE ENCOUNTER
R:  Pt presenting w/Verbal agitation, SI, HI delusions, and worsening Depression.  Pt has been calm in ED since arrival.  Pt is on a 72HH which was initiated 5/16/25 @ 2:38 PM.      No current bed availability within [facility].   Statewide bed search initiated @ 7:33am -     [facility] is posting 0 beds. [phone]; per call at 7:11 am to Nick, No current availability    [facility] ([facility]) is posting 0 beds. [phone];            [facility] is posting 0 beds. No high school or soila psych. [phone]; per call at 7:13 am to Beth, they are at cap.      [place] ([facility]) is posting 0 beds. [phone];            [facility] () is posting 0 beds. [phone]            [facility][note 3] is posting 6 beds. Ages 18-35, labs required. [phone]; per call at 7:14 am to Deanne, they have a handful of y/a beds avail.  PPS Called [facility][note 3] to give referral @ 7:35am.  Pt would require ANGELICA Bed and none for YA.      [facility] ([facility]) is posting 0 beds. [phone].        [facility] ([facility]) is posting 0 beds. [phone];                [facility] () is posting 4 beds. Mixed unit (12+), low acuity. [phone]; per call at 7:20 am to Ashvin, they have beds avail for adults, specific number unknown, but not for teens. Called @ 7:42am and talked to Sarah-  Due to mixed unit Pt not appropriate.      [facility] ([facility]) is posting 0 beds. [phone]. No current aggression, low acuity.            [facility] is posting 0 beds. [phone] ext. [phone]; per call at 7:24 am, recording says their adult and adol units are at cap.     [facility] ([place]) is posting 2 beds. Low acuity. [phone]; per call at 7:27 am to Silvana, they have 2 beds avail. [place] already reviewed on night shift  and pt declined as too acute.     [facility] ([facility]) is posting 0 bed. [phone]. No current aggression, low acuity.            [facility] ([place]) is  posting 2 beds, including 0 acute beds, 0 med psych and 2 mood d/o beds. Capped on IA. [phone]   [place] already reviewed on night shift  and pt declined as too acute.     [facility] is posting 0 beds. Low acuity, 72HH preferred. [phone]; per call at 7:31 am to Paulo, they have a couple beds avail      [facility] ([facility]) is posting 1 bed. [phone], low acuity only.   [place] already reviewed on night shift  and pt declined as too acute.     [facility] ([facility]) is posting 7 beds. VOL only, no hx of aggression/violence/assault. No sexual offenders, no 72HH. [phone];       [facility] is posting 2 beds. Must have cognitive ability to program. No aggression or violent hx in the last 2 years. Always low acuity. [phone];     PT is on a 72 HH    [facility] is posting 0 beds. Low acuity, violence and aggression capped. [phone];        [facility] is posting 4 beds. Low acuity. [phone]; PPS Called @ 7:46a and talked to Dana.  Willing to review.  Fax number  [phone].  Info faxed @ 7:52a for review. Unit 12 @ [place] accepted pt,  PPS Called to cancel review @ 8:22am    [facility] is posting 6 bed. [phone]; per Rody s call to TIFFANIE Escamilla at 7:05 am, they have 7 beds avail.      [facility] is posting 4 beds. No lines, drains, tubes, oxygen, IV or CPAP. [phone];    [facility] is posting 1 bed. MIXED UNIT w/ adol s. No lines, drains, tubes, oxygen, IV or CPAP. [phone];  Mixed unit and Pt endorsing SI and HI.  Not appropriate for mixed unit  ___________________________________      Unit 12 has bed availability.  PPS Paged Dr Ham - On call @ 8:02am to review for unit 12.    Dr. Parkinson called intake back @ 8:09am and accepted pt for unit 12.     Pt placed in units queue @ 8:11am     PPS Called unit 12 @ 8:13am to inform pt in  queue.    [facility] ED Updated with placement @ 8:18 am    Pt added to admit board    Indicia Completed:          Charge called from unit 12 with concern for admit to unit @ 9:50am.  PPS Informed can escalate to ANS if RN concerned and referenced Nik RN Notes that pt has been calm and cooperative in ED and suggested too - talking to ED RN.    RN Charge from unit 12 called @ 12:38pm reporting pt just arrived to unit and yelling at male pts on unit.  Charge reports pt needs to be transferred off to another unit and paging Dr Ham.     Writer awaiting direction from Jitendra and Kimberlyr did Text PPS Supervisor - Bee to alert @ 12:54PM,  Bee did call writer back @ 1:03pm and PPS Writer asked for guidance as PPS not to admit to 20/22 unless presenting pt to Actual provider of the unit.  Supervisor did confirm no pts to admit to unit 20//22 as beds cannot be blocked with out Dr Guzman or Remi approval and review.

## 2025-05-17 NOTE — PROGRESS NOTES
Patient arrived to the unit agitated. While doing the search, patient began yelling, swearing, and pounding on the walls in the exam room. She was yelling about needing to stop pedophiles. Patient exited the exam room and proceeded down the jeffery, continuing to yell and pound on the walls. Speaking in Latin and shouting about pedophiles. She was willing to take PO PRN zyprexa from writer. Provider updated.

## 2025-05-18 LAB
CHOLEST SERPL-MCNC: 171 MG/DL
HDLC SERPL-MCNC: 48 MG/DL
HOLD SPECIMEN: NORMAL
LDLC SERPL CALC-MCNC: 111 MG/DL
NONHDLC SERPL-MCNC: 123 MG/DL
TRIGL SERPL-MCNC: 58 MG/DL

## 2025-05-18 PROCEDURE — 124N000002 HC R&B MH UMMC

## 2025-05-18 PROCEDURE — 82465 ASSAY BLD/SERUM CHOLESTEROL: CPT | Performed by: PSYCHIATRY & NEUROLOGY

## 2025-05-18 PROCEDURE — 36415 COLL VENOUS BLD VENIPUNCTURE: CPT | Performed by: PSYCHIATRY & NEUROLOGY

## 2025-05-18 PROCEDURE — 250N000013 HC RX MED GY IP 250 OP 250 PS 637: Performed by: PSYCHIATRY & NEUROLOGY

## 2025-05-18 RX ADMIN — HYDROXYZINE HYDROCHLORIDE 25 MG: 25 TABLET ORAL at 15:26

## 2025-05-18 RX ADMIN — GUANFACINE 1 MG: 1 TABLET, EXTENDED RELEASE ORAL at 19:54

## 2025-05-18 RX ADMIN — HYDROXYZINE HYDROCHLORIDE 25 MG: 25 TABLET ORAL at 19:04

## 2025-05-18 RX ADMIN — QUETIAPINE FUMARATE 150 MG: 50 TABLET ORAL at 19:54

## 2025-05-18 RX ADMIN — OLANZAPINE 10 MG: 10 TABLET, FILM COATED ORAL at 08:31

## 2025-05-18 ASSESSMENT — ACTIVITIES OF DAILY LIVING (ADL)
ADLS_ACUITY_SCORE: 46
HYGIENE/GROOMING: INDEPENDENT
ADLS_ACUITY_SCORE: 46
HYGIENE/GROOMING: INDEPENDENT
ADLS_ACUITY_SCORE: 46
ORAL_HYGIENE: INDEPENDENT
ADLS_ACUITY_SCORE: 46
ADLS_ACUITY_SCORE: 46
DRESS: INDEPENDENT
ADLS_ACUITY_SCORE: 46
ORAL_HYGIENE: INDEPENDENT
ADLS_ACUITY_SCORE: 46
DRESS: INDEPENDENT
ADLS_ACUITY_SCORE: 46
ADLS_ACUITY_SCORE: 46
LAUNDRY: UNABLE TO COMPLETE
ADLS_ACUITY_SCORE: 46
ADLS_ACUITY_SCORE: 46

## 2025-05-18 NOTE — PLAN OF CARE
Problem: Sleep Disturbance  Goal: Adequate Sleep/Rest  Outcome: Progressing   Goal Outcome Evaluation:    [id number]: Pt sleeping at the start of the shift in no apparent distress. Continue on suicide/assault and sexual precautions without any related behavior noted.Slept all night for 7 hours. Npo after MN for fasting lab.Safety maintained.
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
## 2025-05-18 NOTE — PLAN OF CARE
"  Problem: Psychotic Signs/Symptoms  Goal: Improved Mood Symptoms (Psychotic Signs/Symptoms)  Outcome: Progressing  Intervention: Optimize Emotion and Mood   Goal Outcome Evaluation:    Plan of Care Reviewed With: patient      Pt stayed in the room most of the evening shift. Pt was lying in bed. Pt reported the mood to be \" ok\". The affect is flat and dismissive. Pt did not come out of the room all evening shift. Pt was sleeping on and off through out the shift. Pt was calm and cooperative with nursing assessment. Pt was also compliant with medications pass. Pt denies pain and psych symptoms including SI, HI, SIB, AVH, anxiety, depression, and paranoia. Pt is physically and behaviorally in control.            "

## 2025-05-18 NOTE — PLAN OF CARE
"  Problem: Manic Symptoms  Goal: Manic Symptoms  Description: Signs and symptoms of listed problems will be absent or manageable.  Outcome: Not Progressing  Flowsheets (Taken 5/18/2025 8031)  Manic Symptoms Assessed:   affect   mood   insight  Manic Symptoms Present:   anxiety   thought process   affect   Goal Outcome Evaluation:  Pt secluded to bed/room most of the shift. Out for needs only .On approach , reports mood to be \" ok\" although  endorses anxiety and depression.Mood incongruent with affect. Continues to endorse SI without any plan.Denies AVH/HI and pain.Patient consented for safety and then asked writer to leave her room.\" Stop asking me all these questions  or I will edmund mad. Leave me alone\" Then turned her back on writer and closed her eyes, sleeping on and off the rest of the shift. Came out once at 1250 with c/o anxiety 10/10, requesting for vistaril. Offered 25 mg po per order.While walking back to her room, turned to a select male staff and said  repeatedly, \"I don't like you\" Patient redirected and walked backed to her room.Currently sleeping without further management concerns.Safety maintained.  "

## 2025-05-19 PROCEDURE — 124N000002 HC R&B MH UMMC

## 2025-05-19 PROCEDURE — 250N000013 HC RX MED GY IP 250 OP 250 PS 637: Performed by: PSYCHIATRY & NEUROLOGY

## 2025-05-19 PROCEDURE — 99232 SBSQ HOSP IP/OBS MODERATE 35: CPT | Performed by: PSYCHIATRY & NEUROLOGY

## 2025-05-19 RX ORDER — LORAZEPAM 2 MG/1
2 TABLET ORAL 2 TIMES DAILY PRN
Status: DISCONTINUED | OUTPATIENT
Start: 2025-05-19 | End: 2025-05-28 | Stop reason: HOSPADM

## 2025-05-19 RX ORDER — QUETIAPINE FUMARATE 25 MG/1
25-50 TABLET, FILM COATED ORAL 3 TIMES DAILY PRN
Status: DISCONTINUED | OUTPATIENT
Start: 2025-05-19 | End: 2025-05-21 | Stop reason: ALTCHOICE

## 2025-05-19 RX ORDER — LORAZEPAM 2 MG/1
2 TABLET ORAL ONCE
Status: COMPLETED | OUTPATIENT
Start: 2025-05-19 | End: 2025-05-19

## 2025-05-19 RX ORDER — LORAZEPAM 2 MG/ML
2 INJECTION INTRAMUSCULAR 2 TIMES DAILY PRN
Status: DISCONTINUED | OUTPATIENT
Start: 2025-05-19 | End: 2025-05-28 | Stop reason: HOSPADM

## 2025-05-19 RX ADMIN — LORAZEPAM 2 MG: 2 TABLET ORAL at 23:00

## 2025-05-19 RX ADMIN — OLANZAPINE 10 MG: 10 TABLET, FILM COATED ORAL at 10:04

## 2025-05-19 RX ADMIN — GUANFACINE 1 MG: 1 TABLET, EXTENDED RELEASE ORAL at 19:35

## 2025-05-19 RX ADMIN — LORAZEPAM 2 MG: 2 TABLET ORAL at 10:26

## 2025-05-19 RX ADMIN — QUETIAPINE FUMARATE 150 MG: 50 TABLET ORAL at 19:35

## 2025-05-19 ASSESSMENT — ACTIVITIES OF DAILY LIVING (ADL)
LAUNDRY: UNABLE TO COMPLETE
HYGIENE/GROOMING: INDEPENDENT
ORAL_HYGIENE: INDEPENDENT
ADLS_ACUITY_SCORE: 46
ADLS_ACUITY_SCORE: 46
LAUNDRY: UNABLE TO COMPLETE
HYGIENE/GROOMING: INDEPENDENT
ADLS_ACUITY_SCORE: 46
DRESS: INDEPENDENT
ADLS_ACUITY_SCORE: 46
DRESS: INDEPENDENT
ADLS_ACUITY_SCORE: 46
ORAL_HYGIENE: INDEPENDENT
ADLS_ACUITY_SCORE: 46
ADLS_ACUITY_SCORE: 46

## 2025-05-19 NOTE — PLAN OF CARE
Team Note Due:  Monday    Assessment/Intervention/Current Symtoms and Care Coordination:  Chart review and met with team, discussed pt progress, symptomology, and response to treatment.  Discussed the discharge plan and any potential impediments to discharge.    I spoke to Angelina, her current commitment CM who stated that Pt is transferring to [facility] so she will be filling them in, but she is still filing to revoke the stay for a full commitment. She is aware her 72 HH hold expires on: 5/21 at 2:38 PM.    START: 5/16 at 2:38 PM  END: 5/21 at 2:38 PM    Pt coded this morning and went to seclusion. Pt agitated and was banging in the seclusion room.     Behavioral team note complete.      Discharge Plan or Goal:  TBD     Barriers to Discharge:  Symptoms     Referral Status:  TBD     Legal Status:  Stay is being revoked - and filing for full commitment  Currently on 72HH that expires 5/21 at 2:38 PM.  County: [place] but she is moving to [place] they are transferring the case    Contacts (include NATHALIE status):  Medication Management/Psychiatry:  Name: ANAYELI Marina [facility]   [facility]    Phone Number: [phone]      MHCM//ACT Team:  Name: Angelina Calderon [place] [facility]    Number: [phone]     Upcoming Meetings and Dates/Important Information and next steps:  Commitment process  Coordinate care

## 2025-05-19 NOTE — PROVIDER NOTIFICATION
In Person Face to Face Assessment Conducted Yes-Eval of pt's immediate situation, reaction to intervention, complete review of systems assessment, behavioral assessment & review/assessment of hx, drugs & meds, recent labs, etc, behavioral condition, need to continue/terminate restraint/seclusion   Patient Experienced No adverse physical outcome from seclusion/restraint initiation   Continuation of Seclus/Restraint indicated at this time Yes      Patient's face to face assessment completed. A review of systems and behavioral assessments completed, including recent medical history, laboratory findings, medication administration record, and progress notes.      Assessment of patient, current behavior, response to intervention: pt is agitated, threatening staff, banging seclusion door and refusing to contract for safety. Currently not responding to intervention.     Patient did not experience physical sequelae related to seclusion/restraint initiation.     Seclusion/restraint continues.      Provider notified and discussed current condition and behavior of patient.
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
## 2025-05-19 NOTE — PLAN OF CARE
"  Problem: Adult Behavioral Health Plan of Care  Goal: Adheres to Safety Considerations for Self and Others  Outcome: Progressing  Intervention: Develop and Maintain Individualized Safety Plan   Goal Outcome Evaluation:    Plan of Care Reviewed With: patient      Pt spent most of evening shift in the room. Pt was out one time to nursing station to ask for anxiety medications. Prn of vistaril 25 mg was given with a full affect. Pt reported back pain 5/10. Pt declined prn of pain medications. Pt request a soft egg crate mattress which was ordered by on-call provider. Pt denies other psych symptoms such as SI, HI, AVH and paranoia.  Pt stated their mood as \" ok\". Pt's affect is flat/blunted. Pt was calm and cooperative with nursing cares. Pt was compliant with medications pass. No physical and behavioral concerns reported by pt or noticed by writer.          "
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
## 2025-05-19 NOTE — PROGRESS NOTES
"Prior to the code, writer was on the pod with pt when she woke up and come out of her room already irritable.  Pt demanded meds to calm down from her nurse and got even more irritated when her nurse gave her options.  Pt took the marker she used to fill out her menu and broke it, removing the ink, threw it away, and told writer \"I want something I can stab with\"  "
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
## 2025-05-19 NOTE — PLAN OF CARE
"Pt woke up in irritable mood, presenting tense and immediately demanding \"medication to calm me down\". Writer offered pt options which only irritated her more and she yelled \"you're the nurse, you tell me what to take\". Pt was given PRN zyprexa 10 mg and pt adamantly declined vitamin D. Pt continued to escalate until a code 21 was called d/t behavioral outburst. See psych associate notes for more information about pt's behavior leading up to code and see writer's notes for more information about behavioral situation. Pt took one time dose of ativan 2 mg ordered by provider and pt walked to seclusion with staff. Pt was in seclusion from 1031 to 1150. Writer sat with her to monitor safety. While in seclusion, pt made many suicidal statements, including things she wants to do such as \"slit my throat or cut my wrists\". Pt also banged her head on the wall in there at one point, but stopped without redirection. Pt was punching and pounding on the seclusion door hard enough to make the door shake. See writer's notes. When seclusion discontinued, pt walked back to pod 2. Pt's room was switched to room 145 which has a seclusion door and pt now has safety bedding only. Pt contracted for safety and took shower without issue. New order from provider that pt may not attend group until she has gone >24 hours without self harm behaviors. This afternoon, pt has been calm, resting in bed. Pt complained of being cold in her room. Yellow sweatshirt given and heat turned up in pt's room.     Pt's bilateral knuckles are red and slightly swollen (still has full range of motion) from punching seclusion door. Provider aware. Pt declines intervention.     Pt remains on line of site monitoring for safety.     Problem: Manic Symptoms  Goal: Manic Symptoms  Description: Signs and symptoms of listed problems will be absent or manageable.  Outcome: Not Progressing  Problem: Seclusion/Restraint, Behavioral  Goal: Seclusion/Behavioral Restraint " Goal: Absence of Harm or Injury  Outcome: Not Progressing   Problem: Psychotic Signs/Symptoms  Goal: Improved Behavioral Control (Psychotic Signs/Symptoms)  Outcome: Not Progressing    Plan of Care Reviewed With: patient

## 2025-05-19 NOTE — PLAN OF CARE
BEH IP Unit Acuity Rating Score (UARS)  Patient is given one point for every criteria they meet.    CRITERIA SCORING   On a 72 hour hold, court hold, committed, stay of commitment, or revocation. 1    Patient LOS on BEH unit exceeds 20 days. 0  LOS: 2   Patient under guardianship, 55+, otherwise medically complex, or under age 11. 0   Suicide ideation without relief of precipitating factors. 0   Current plan for suicide. 0   Current plan for homicide. 0   Imminent risk or actual attempt to seriously harm another without relief of factors precipitating the attempt. 1   Severe dysfunction in daily living (ex: complete neglect for self care, extreme disruption in vegetative function, extreme deterioration in social interactions). 0   Recent (last 7 days) or current physical aggression in the ED or on unit. 0   Restraints or seclusion episode in past 72 hours. 0   Recent (last 7 days) or current verbal aggression, agitation, yelling, etc., while in the ED or unit. 1   Active psychosis. 0   Need for constant or near constant redirection (from leaving, from others, etc).  0   Intrusive or disruptive behaviors. 1   Patient requires 3 or more hours of individualized nursing care per 8-hour shift (i.e. for ADLs, meds, therapeutic interventions). 0   TOTAL 4

## 2025-05-19 NOTE — PROGRESS NOTES
"Writer came onto the pod  and Pt was already irritable, and upset. Pt was seen pacing in the jeffery yelling \" I do not want the female doctor, I want Dr. Freeman from 10\".     Pt kept pacing the jeffery, and then stated \" I want to kill someone right now\". Pt started pacing again, and looked through box of markers on the table. When writer removed the box of markers due to threats, the pt turned to the writer and said \" I don't need those markers anyway, I can kill someone with paper if I wanted\".    Pt then continued to pace down the jeffery yelling about a change of doctor. After a couple of rounds, pt was seen picking up the laundry cart, and repeatedly banging it on the floor, causing shards of plastic to fly through the pod, and the wheel of the laundry cart to go rolling. Writer along with another staff pressed the duress beeper, and cleaned the pieces of plastic off the floor.     Other staff arrived to the unit, and pt was seen yelling and pacing down the jeffery, when pt stops and turns to a female nurse and states \" Oh sweety, you are safe, I don't hurt females\" The pt continued pacing with the staff, and continued to pace and yelling at the staff stating \" I am here to protect the children\". The code proceeded.   "

## 2025-05-19 NOTE — PLAN OF CARE
Goal Outcome Evaluation:  Problem: Sleep Disturbance  Goal: Adequate Sleep/Rest  Outcome: Progressing              NOC Shift Report     Pt in bed at beginning of shift, breathing quiet and unlabored. Pt slept through shift. Pt slept 7 hours.      No pt complaints or concerns at this time.      No PRNs given. Will continue to monitor.

## 2025-05-19 NOTE — PROVIDER NOTIFICATION
"   05/19/25 1150   Restraint Type Q15 Minutes   Seclusion Discontinued   Debrief Immediately Before or After Removal   Debriefing DO   Does patient understand why the event happened? Yes   Does patient agree to safe behaviors? Yes   What can we do differently so this doesn't happen again? Other (comment)  (pt states staff can \"give me my medications on time\" and \"don't spill coffee all over my food\". other than that, pt stated \"i don't know\".)   Plan of care reviewed and modified Yes     Debrief: pt was able to discuss the precipitating event to some extent, still stating she was \"just so angry\". Pt able to commit to safety for self and others. Writer specifically asked pt not to make threats or slam doors or break things. Pt states \"ok, no threats, no slamming doors\". Pt agreed that she will keep self and others safe. When asked how staff can help her cope if she gets very angry again, pt states \"give me my medications on time\" and \"don't spill coffee all over my food\" and then states \"I don't know what else\". Pt has mentioned that she feels no one listens to her, so helping her feel listened to may help in the future. Writer called desk to let charge RN know pt is ready to return to her room. Charge RN came to seclusion and pt again committed to safety for herself and others. Seclusion door opened and pt walked back to her room on pod 2 with writer and charge RN without issue. Pt asking to take shower and staff were informed and helped pt with this. Pt was moved to a different room and asked writer \"what's this room for?\". Writer explained it is just another pt room with a seclusion door on it, so that if another angry outburst happens, pt will be able to cool down in her own room instead of going down the jeffery to seclusion. Pt nodded and said \"okay\". Pt aware of having safety bedding as well. Pt committed to keeping herself safe in the shower. Pt showered without issue.  Provider updated about debrief.     "

## 2025-05-20 PROCEDURE — 124N000002 HC R&B MH UMMC

## 2025-05-20 PROCEDURE — 250N000013 HC RX MED GY IP 250 OP 250 PS 637: Performed by: PSYCHIATRY & NEUROLOGY

## 2025-05-20 RX ADMIN — QUETIAPINE FUMARATE 150 MG: 50 TABLET ORAL at 20:29

## 2025-05-20 RX ADMIN — OLANZAPINE 10 MG: 10 TABLET, FILM COATED ORAL at 14:59

## 2025-05-20 RX ADMIN — GUANFACINE 1 MG: 1 TABLET, EXTENDED RELEASE ORAL at 20:28

## 2025-05-20 ASSESSMENT — ACTIVITIES OF DAILY LIVING (ADL)
ADLS_ACUITY_SCORE: 46
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
## 2025-05-20 NOTE — PROVIDER NOTIFICATION
05/19/25 8525   Observation Type   Harm Category other harm (comment)   Other patient did talk about wanting to harm her self but did contract to be safe   Level of Special Monitoring line-of-sight
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
## 2025-05-20 NOTE — PLAN OF CARE
Goal Outcome Evaluation:  Problem: Sleep Disturbance  Goal: Adequate Sleep/Rest  Outcome: Progressing          Patient was awake agitating when shift started. Evening shift reported given the patient PRN Ativan 2mg. Patient continues agitating and yelling at staff when when door opens for 15 minutes safety checks. Patient was calm when the medication kicks in and slept for the rest of the shift.        Patient appears to have slept 6.25 hours. Breathing even and non-labored. No sign/sx of acute medical concerns noted and reported. No PRN given. Team will continue to monitor for safety and intervene as needed. Continue 1:1 Line of safety.
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
## 2025-05-20 NOTE — PLAN OF CARE
BEH IP Unit Acuity Rating Score (UARS)  Patient is given one point for every criteria they meet.    CRITERIA SCORING   On a 72 hour hold, court hold, committed, stay of commitment, or revocation. 1    Patient LOS on BEH unit exceeds 20 days. 0  LOS: 3   Patient under guardianship, 55+, otherwise medically complex, or under age 11. 0   Suicide ideation without relief of precipitating factors. 1   Current plan for suicide. 1   Current plan for homicide. 0   Imminent risk or actual attempt to seriously harm another without relief of factors precipitating the attempt. 1   Severe dysfunction in daily living (ex: complete neglect for self care, extreme disruption in vegetative function, extreme deterioration in social interactions). 1   Recent (last 7 days) or current physical aggression in the ED or on unit. 1   Restraints or seclusion episode in past 72 hours. 1   Recent (last 7 days) or current verbal aggression, agitation, yelling, etc., while in the ED or unit. 1   Active psychosis. 1   Need for constant or near constant redirection (from leaving, from others, etc).  1   Intrusive or disruptive behaviors. 1   Patient requires 3 or more hours of individualized nursing care per 8-hour shift (i.e. for ADLs, meds, therapeutic interventions). 0   TOTAL 11

## 2025-05-21 PROCEDURE — 250N000013 HC RX MED GY IP 250 OP 250 PS 637: Performed by: PSYCHIATRY & NEUROLOGY

## 2025-05-21 PROCEDURE — 124N000002 HC R&B MH UMMC

## 2025-05-21 PROCEDURE — 99233 SBSQ HOSP IP/OBS HIGH 50: CPT | Performed by: PSYCHIATRY & NEUROLOGY

## 2025-05-21 RX ORDER — HYDROXYZINE HYDROCHLORIDE 25 MG/1
25 TABLET, FILM COATED ORAL EVERY 6 HOURS PRN
Status: DISCONTINUED | OUTPATIENT
Start: 2025-05-21 | End: 2025-05-28 | Stop reason: HOSPADM

## 2025-05-21 RX ORDER — HYDROXYZINE HYDROCHLORIDE 25 MG/1
25 TABLET, FILM COATED ORAL EVERY 6 HOURS PRN
Status: DISCONTINUED | OUTPATIENT
Start: 2025-05-21 | End: 2025-05-21

## 2025-05-21 RX ORDER — QUETIAPINE FUMARATE 200 MG/1
200 TABLET, FILM COATED ORAL EVERY EVENING
Status: DISCONTINUED | OUTPATIENT
Start: 2025-05-21 | End: 2025-05-22

## 2025-05-21 RX ORDER — HYDROXYZINE HYDROCHLORIDE 50 MG/1
50 TABLET, FILM COATED ORAL EVERY 6 HOURS PRN
Status: DISCONTINUED | OUTPATIENT
Start: 2025-05-21 | End: 2025-05-28 | Stop reason: HOSPADM

## 2025-05-21 RX ORDER — HYDROXYZINE HYDROCHLORIDE 50 MG/1
50 TABLET, FILM COATED ORAL EVERY 6 HOURS PRN
Status: DISCONTINUED | OUTPATIENT
Start: 2025-05-21 | End: 2025-05-21

## 2025-05-21 RX ADMIN — HYDROXYZINE HYDROCHLORIDE 50 MG: 50 TABLET, FILM COATED ORAL at 21:51

## 2025-05-21 RX ADMIN — ACETAMINOPHEN 650 MG: 325 TABLET ORAL at 19:42

## 2025-05-21 RX ADMIN — QUETIAPINE FUMARATE 200 MG: 200 TABLET ORAL at 19:41

## 2025-05-21 RX ADMIN — GUANFACINE 1 MG: 1 TABLET, EXTENDED RELEASE ORAL at 19:41

## 2025-05-21 RX ADMIN — HYDROXYZINE HYDROCHLORIDE 50 MG: 50 TABLET, FILM COATED ORAL at 13:17

## 2025-05-21 ASSESSMENT — ACTIVITIES OF DAILY LIVING (ADL)
LAUNDRY: UNABLE TO COMPLETE
ADLS_ACUITY_SCORE: 46
HYGIENE/GROOMING: INDEPENDENT
ADLS_ACUITY_SCORE: 46
LAUNDRY: UNABLE TO COMPLETE
ADLS_ACUITY_SCORE: 46
ADLS_ACUITY_SCORE: 46
DRESS: INDEPENDENT
ORAL_HYGIENE: INDEPENDENT
ORAL_HYGIENE: INDEPENDENT
ADLS_ACUITY_SCORE: 46
HYGIENE/GROOMING: INDEPENDENT
ADLS_ACUITY_SCORE: 46
DRESS: SCRUBS (BEHAVIORAL HEALTH)
ADLS_ACUITY_SCORE: 46

## 2025-05-21 NOTE — PLAN OF CARE
Nursing Assessment    Recent Vitals: refused    Sleep:  Hours of sleep at night: 7    General Shift Summary  Patient woke up at 0830 today. She remained in her room and presented as irritated, guarded, and refused care. She declined vitals and her morning Vitamin D. Around 1130 she came out wanting Hydroxyzine for anxiety. Writer spoke with provider and was able to provide this at 1317. Patient presented as annoyed however did not become agitated while waiting for an order. Patient did not appear to be responding to internal stimuli. She refused to talk to select male staff. Appetite is fair. Hygiene is fair. Insight and judgement are poor.    Eva Hanks, RN MSN

## 2025-05-21 NOTE — PLAN OF CARE
"  Problem: Adult Behavioral Health Plan of Care  Goal: Adheres to Safety Considerations for Self and Others  Outcome: Progressing   Goal Outcome Evaluation:    Patient has been isolative to her room during this shift and spent majority of the shift sleeping. Patient was given her medication and she was compliant. She denies having any thoughts of wanting to harm herself or others, visual hallucinations/auditory hallucinations. She does endorse feeling depressed, but doesn't want to talk about it further and states \" I don't like small talks\". Patient was reminded to have safe behaviors, and appropriate behaviors on the unit and she seems to be agreeing to the plan. Patient is currently on her menstrual cycle.     Incoming shift: have firm, and consistency plan of care with the patient.                           "

## 2025-05-21 NOTE — PLAN OF CARE
BEH IP Unit Acuity Rating Score (UARS)  Patient is given one point for every criteria they meet.    CRITERIA SCORING   On a 72 hour hold, court hold, committed, stay of commitment, or revocation. 1    Patient LOS on BEH unit exceeds 20 days. 0  LOS: 4   Patient under guardianship, 55+, otherwise medically complex, or under age 11. 0   Suicide ideation without relief of precipitating factors. 1   Current plan for suicide. 1   Current plan for homicide. 0   Imminent risk or actual attempt to seriously harm another without relief of factors precipitating the attempt. 1   Severe dysfunction in daily living (ex: complete neglect for self care, extreme disruption in vegetative function, extreme deterioration in social interactions). 1   Recent (last 7 days) or current physical aggression in the ED or on unit. 1   Restraints or seclusion episode in past 72 hours. 1   Recent (last 7 days) or current verbal aggression, agitation, yelling, etc., while in the ED or unit. 1   Active psychosis. 1   Need for constant or near constant redirection (from leaving, from others, etc).  1   Intrusive or disruptive behaviors. 1   Patient requires 3 or more hours of individualized nursing care per 8-hour shift (i.e. for ADLs, meds, therapeutic interventions). 0   TOTAL 11

## 2025-05-21 NOTE — PLAN OF CARE
Felecia appeared sleeping for 7 hours without acute distress   Continue on SI and assault precautions with absence of  relative behavior this shift  No pain or discomfort noted or verbalized  No outburst or other inappropriate behavior   this shift  No reported safety concern this shift       Problem: Sleep Disturbance  Goal: Adequate Sleep/Rest  Outcome: Progressing     Problem: Adult Inpatient Plan of Care  Goal: Plan of Care Review  Description: The Plan of Care Review/Shift note should be completed every shift.  The Outcome Evaluation is a brief statement about your assessment that the patient is improving, declining, or no change.  This information will be displayed automatically on your shiftnote.  Outcome: Progressing  Goal: Absence of Hospital-Acquired Illness or Injury  Intervention: Prevent Skin Injury  Recent Flowsheet Documentation  Taken 5/21/2025 0140 by Nanette Jensen, RN  Body Position:   position maintained   position changed independently   Goal Outcome Evaluation:
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
## 2025-05-21 NOTE — PROGRESS NOTES
"[facility], [place]   Psychiatric Progress Note  Hospital Day: 4        Interim History:   The patient's care was discussed with the treatment team during the daily team meeting and/or staff's chart notes were reviewed.  Staff report patient is irritable, tense, dismissive. On Monday evening, she followed a patient into his room after he informed her that he no longer has children. At one point she wrote on the board in the lounge \"protect children.\" It was noted that patient is suspicious of males and preoccupied on talking about children. She called 911 and hit window loudly and slammed her door multiple times. She threatened to kill herself. She continues to express significant frustration about the events leading to her hospital stay.    Upon interview, Felecia tells me that prior to her hospitalization in April, she was becoming increasingly concerned about a coworker whom she suspected was a pedophile. She said that it escalated to the point of her locking all the doors at her work with zip ties due to fears that her coworker would \"rape teenage girls.\" She said that she reported these concerns to her supervisor at work. She said that she would never harm anyone and she is the kind of person who would compromise her own health and sanity to help others. She expresses significant anger about the events leading to her hospital stay. She does not feel she needs to be in the hospital. Believes that she is experiencing distress because of the issues at her workplace and her history of trauma. She said that being in the hospital is not helping her. She wants to leave the hospital and engage in therapy. She said that she has not felt suicidal since she was in seclusion and will not attempt to harm herself. She is future oriented expressing plans to move in with a friend in the near future. She said that she would be open to a dose increase in Seroquel but wanted me to reach out to . " "Ishmael for his support in this plan. She denies side effects currently but said that she did \"pass out\" right before she came to the hospital and showed writer a bruise on her right inner knee from her fall. We discussed importance of adequate hydration and will titrate slowly to prevent significant changes in BP.       Per RN note:    Patient seen by writer in capacity of PMHNP-student.     Patient seen for safety assessment. During the assessment, patient strongly denies any thoughts of self-harm or suicidal ideation. Patient points out that the last time they had thoughts to harm self was during a seclusion event that patient insists triggered thoughts to self harm.      Patient assessed on thoughts to harm others, patient expressing concerns regarding a few individuals at patient's work place. Patient calls one of the coworkers a pedophile. Patient articulates feelings of distress and hyper vigilance related to these accusations but denies any thoughts or plan to harm the individuals or any other persons.     Patient asked what intervention patient has planned for the \"pedophile\" to which patient replied, \"I'll whisper to someone to stop him.\"     Patient encouraged to engage in coping strategies to manage stress.         Medications:     Current Facility-Administered Medications   Medication Dose Route Frequency Provider Last Rate Last Admin    guanFACINE (INTUNIV) 24 hr tablet 1 mg  1 mg Oral QPM Emiliano Ham MD   1 mg at 05/20/25 2028    QUEtiapine (SEROquel) tablet 150 mg  150 mg Oral QPM Emiliano Ham MD   150 mg at 05/20/25 2029    Vitamin D3 (CHOLECALCIFEROL) tablet 50 mcg  50 mcg Oral Daily Emiliano Ham MD   50 mcg at 05/17/25 1349          Allergies:     Allergies   Allergen Reactions    Haldol [Haloperidol] Other (See Comments)     Dystonic reaction     Kiwi Hives    Red Dye #40 (Allura Red)     Seasonal Allergies Other (See Comments) and Rash     sneezing          Labs:   No " results found for this or any previous visit (from the past 24 hours).       Psychiatric Examination:     /80 (BP Location: Right arm)   Pulse 80   Temp 98.4  F (36.9  C) (Oral)   Resp 16   SpO2 98%   Weight is 0 lbs 0 oz  There is no height or weight on file to calculate BMI.    Weight over time:  There were no vitals filed for this visit.    Orthostatic Vitals       None              Cardiometabolic risk assessment. 05/19/25      Reviewed patient profile for cardiometabolic risk factors    Date taken /Value  REFERENCE RANGE   Abdominal Obesity  (Waist Circumference)   See nursing flowsheet Women >=35 in (88 cm)   Men >=40 in (102 cm)      Triglycerides  Triglycerides   Date Value Ref Range Status   05/18/2025 58 <150 mg/dL Final   06/23/2021 71 <150 mg/dL Final       >=150 mg/dL (1.7 mmol/L) or current treatment for elevated triglycerides   HDL cholesterol  HDL Cholesterol   Date Value Ref Range Status   06/23/2021 52 >49 mg/dL Final     Direct Measure HDL   Date Value Ref Range Status   05/18/2025 48 (L) >=50 mg/dL Final   ]   Women <50 mg/dL (1.3 mmol/L) in women or current treatment for low HDL cholesterol  Men <40 mg/dL (1 mmol/L) in men or current treatment for low HDL cholesterol     Fasting plasma glucose (FPG) Lab Results   Component Value Date    GLC 89 05/16/2025    GLC 95 06/23/2021      FPG >=100 mg/dL (5.6 mmol/L) or treatment for elevated blood glucose   Blood pressure  BP Readings from Last 3 Encounters:   05/19/25 117/80   05/16/25 (!) 136/94   04/30/25 101/68    Blood pressure >=130/85 mmHg or treatment for elevated blood pressure   Family History  See family history     Mental Status Exam:  Appearance: awake, alert, dressed in hospital scrubs, appeared as age stated, unkempt, and disheveled   Attitude:  uncooperative  Eye Contact:  intense  Mood:  severely agitated  Affect:  mood congruent and intensity is heightened, reactive  Speech:  clear, coherent, screaming and yelling  Language:  fluent and intact in English  Psychomotor, Gait, Musculoskeletal:  fidgeting and physical agitation  Throught Process:  linear and goal oriented  Associations:  no loose associations  Thought Content:  no evidence of psychotic thought and passive suicidal ideation present  Insight:  limited  Judgement:  limited  Oriented to:  ALANIS due to severe agitation  Attention Span and Concentration:  limited  Recent and Remote Memory:  fair  Fund of Knowledge:  appropriate           Precautions:     Behavioral Orders   Procedures    Assault precautions    Code 1 - Restrict to Unit    Patient Monitoring Attendant     Harm Category::   none     Harm Category::   suicide/self-harm     Harm Category::   aggressive harm     Suicide/Self-Harm justifications::   high risk for non-suicidal self-injury - provider order     Monitoring Level::   line-of-sight     Monitoring Distance::   variable     Note::   *Follow [facility] protocols to maintain the safety of everyone on the unit     Monitoring Directions::   Monitor the Patient ONLY when they're in the milieu or groups     Monitoring Directions::   Do NOT monitor the Patient in their room, bathroom, or shower     When Patient is in their room, bathroom, or shower::   Observe the door for when Patient returns to the milieu, then resume monitoring     When Patient is in their room, bathroom, or shower::   Avoid lingering directly outside the door     When Patient is in their room, bathroom, or shower::   Perform tasks in the milieu while maintaining observation of the door     When Patient is in their room, bathroom, or shower::   Check on Patient every 15-minutes     When Patient is in their room, bathroom, or shower::   Do not leave the milieu without coverage     Aggressive Harm justifications::   agitated (escalating) - upset/angry and unresponsive to interventions    Routine Programming     As clinically indicated    Self Injury Precaution    Status 15     Every 15 minutes.    Suicide  "precautions: Suicide Risk: HIGH; Clinical rationale to override score: Exhibiting Suicidal/self-harm behaviors or thoughts     Patients on Suicide Precautions should have a Combination Diet ordered that includes a Diet selection(s) AND a Behavioral Tray selection for Safe Tray - with utensils, or Safe Tray - may use paper spoons @ RN station     Suicide Risk:   HIGH     Clinical rationale to override score::   Exhibiting Suicidal/self-harm behaviors or thoughts          Diagnoses:     Borderline personality disorder  Probable PTSD  Historical diagnoses of ADHD and ASD    Clinically Significant Risk Factors                                # Overweight: Estimated body mass index is 29.05 kg/m  as calculated from the following:    Height as of 5/16/25: 1.676 m (5' 6\").    Weight as of 5/16/25: 81.6 kg (180 lb).  , PRESENT ON ADMISSION       # Financial/Environmental Concerns:                       Assessment & Plan:     Assessment and hospital summary:  29 year old female.     Patient states that she has a history of PTSD. Patient has had problems with her mood since childhood. She states that she was treated as a teen and was given medications and had a couple of mental health admissions.     As an adult, patient was also diagnosed with Major Depression and NINFA. She has had 2 prior admissions for suicidal thoughts, both in April of 2025.     Patient states that since her most recent discharge she was doing reasonably well until yesterday. She states that she went to her therapy appointment with a new therapist and was triggered when asked about her prior abuse. She left the appointment and later that day she was picked up by police at work due to concerns about her safety.     On interview today, patient was irritable but overall more cooperative. She expressed significant concerns about her coworkers and also noted that she just ended a relationship with her significant other and is in the process of moving. She has " several psychosocial stressors in her life which are contributing to current presentation. She also is experiencing several symptoms consistent with PTSD and BPD.     Today's Changes:  Line of sight reordered for safety  She is declining repeat labs  Seroquel will be increased to 200 mg at bedtime. Monitoring for orthostatic hypotension. Discussed with Dr. Quiñones per patient's strong request who is in support of plan to increase Seroquel.   Stop prn Seroquel due to reported day time sedation  Add prn hydroxyzine     Target psychiatric symptoms and interventions:  Continue Seroquel 200 mg at bedtime  Continue guanfacine 1 mg at bedtime  Continue Vitamin D3    Risks, benefits, and alternatives discussed at length with patient.     Acute Medical Problems and Treatments:  Acute medical concerns:  - No acute medical concerns    Pertinent labs/imagin/16 routine labs unremarkable except for mildly elevated CK and low Mg. Pt declining repeat labs at this time.      Behavioral/Psychological/Social:  - Encourage unit programming    Safety:  - Continue precautions as noted above  - Status 15 minute checks  - Line of sight monitoring in place    Legal Status: Patient is committed.     Disposition Plan   Reason for ongoing admission: poses an imminent risk to self and poses an imminent risk to others  Discharge location: TBD  Discharge Medications: not ordered  Follow-up Appointments: not scheduled    Entered by: Karin Biggs MD on 2025  at 9:57 AM

## 2025-05-21 NOTE — PLAN OF CARE
Team Note Due:  Monday    Assessment/Intervention/Current Symtoms and Care Coordination:  Chart review and met with team, discussed pt progress, symptomology, and response to treatment. Discussed the discharge plan and any potential impediments to discharge.    Per team, mostly slept last evening - 7 hours. She was compliant with meds. Endorsed feeling depressed. Agreed to have safe behaviors on unit.     I secure emailed Pt's commitment CM (both) the new and old to ask if they have spoke with Pt about the transition given her hx of RAD and abandonment issues. I shared that I informed pt but it may be good coming from them as well. I shared an update with them on how pt was doing.     Discharge Plan or Goal:  TBD - Home      Barriers to Discharge:  Symptoms     Referral Status:  TBD     Legal Status:  Commitment, no dashawn   [facility]: [place]  File #: [id number]    Contacts (include NATHALIE status):  Medication Management/Psychiatry:  Name: ANAYELI Marina [facility]   [facility]    Phone Number: [phone]      MHCM//ACT Team:  [facility]: [facility]    Number: [phone]     PPS: Nai Cortez [facility]    Upcoming Meetings and Dates/Important Information and next steps:  Commitment process  Coordinate care

## 2025-05-22 VITALS
TEMPERATURE: 98.3 F | RESPIRATION RATE: 16 BRPM | DIASTOLIC BLOOD PRESSURE: 75 MMHG | SYSTOLIC BLOOD PRESSURE: 113 MMHG | OXYGEN SATURATION: 99 % | HEART RATE: 80 BPM

## 2025-05-22 LAB
CK SERPL-CCNC: 46 U/L (ref 26–192)
MAGNESIUM SERPL-MCNC: 1.7 MG/DL (ref 1.7–2.3)

## 2025-05-22 PROCEDURE — 99233 SBSQ HOSP IP/OBS HIGH 50: CPT | Performed by: PSYCHIATRY & NEUROLOGY

## 2025-05-22 PROCEDURE — 97150 GROUP THERAPEUTIC PROCEDURES: CPT | Mod: GO

## 2025-05-22 PROCEDURE — 250N000013 HC RX MED GY IP 250 OP 250 PS 637: Performed by: PSYCHIATRY & NEUROLOGY

## 2025-05-22 PROCEDURE — 82550 ASSAY OF CK (CPK): CPT | Performed by: PSYCHIATRY & NEUROLOGY

## 2025-05-22 PROCEDURE — 83735 ASSAY OF MAGNESIUM: CPT | Performed by: PSYCHIATRY & NEUROLOGY

## 2025-05-22 PROCEDURE — 124N000002 HC R&B MH UMMC

## 2025-05-22 PROCEDURE — 36415 COLL VENOUS BLD VENIPUNCTURE: CPT | Performed by: PSYCHIATRY & NEUROLOGY

## 2025-05-22 RX ADMIN — Medication 50 MCG: at 08:42

## 2025-05-22 RX ADMIN — GUANFACINE 1 MG: 1 TABLET, EXTENDED RELEASE ORAL at 20:22

## 2025-05-22 RX ADMIN — QUETIAPINE FUMARATE 250 MG: 200 TABLET ORAL at 20:22

## 2025-05-22 ASSESSMENT — ACTIVITIES OF DAILY LIVING (ADL)
HYGIENE/GROOMING: INDEPENDENT
ADLS_ACUITY_SCORE: 46
ORAL_HYGIENE: INDEPENDENT
ADLS_ACUITY_SCORE: 46
DRESS: INDEPENDENT;SCRUBS (BEHAVIORAL HEALTH)
ADLS_ACUITY_SCORE: 46
LAUNDRY: UNABLE TO COMPLETE
ADLS_ACUITY_SCORE: 46
ADLS_ACUITY_SCORE: 46
ORAL_HYGIENE: INDEPENDENT
ADLS_ACUITY_SCORE: 46
DRESS: INDEPENDENT
LAUNDRY: UNABLE TO COMPLETE
ADLS_ACUITY_SCORE: 46
HYGIENE/GROOMING: INDEPENDENT
ADLS_ACUITY_SCORE: 46

## 2025-05-22 NOTE — PLAN OF CARE
BEH IP Unit Acuity Rating Score (UARS)  Patient is given one point for every criteria they meet.    CRITERIA SCORING   On a 72 hour hold, court hold, committed, stay of commitment, or revocation. 1    Patient LOS on BEH unit exceeds 20 days. 0  LOS: 5   Patient under guardianship, 55+, otherwise medically complex, or under age 11. 0   Suicide ideation without relief of precipitating factors. 1   Current plan for suicide. 1   Current plan for homicide. 0   Imminent risk or actual attempt to seriously harm another without relief of factors precipitating the attempt. 1   Severe dysfunction in daily living (ex: complete neglect for self care, extreme disruption in vegetative function, extreme deterioration in social interactions). 1   Recent (last 7 days) or current physical aggression in the ED or on unit. 1   Restraints or seclusion episode in past 72 hours. 0   Recent (last 7 days) or current verbal aggression, agitation, yelling, etc., while in the ED or unit. 1   Active psychosis. 1   Need for constant or near constant redirection (from leaving, from others, etc).  0   Intrusive or disruptive behaviors. 1   Patient requires 3 or more hours of individualized nursing care per 8-hour shift (i.e. for ADLs, meds, therapeutic interventions). 0   TOTAL 9

## 2025-05-22 NOTE — PLAN OF CARE
Team Note Due:  Monday    Assessment/Intervention/Current Symtoms and Care Coordination:  Chart review and met with team, discussed pt progress, symptomology, and response to treatment. Discussed the discharge plan and any potential impediments to discharge.    Per team, had a good evening last night. She was cooperative. She was informed she could have some more privileges today due to good behavior such as attending group. Yesterday her old CM Angelina was able to call and have a closure call with her. New interim CM Nai Cortez will plan to come visit her here soon.     I updated her CM Nai Cortez and shared she could come visit tomorrow or Monday, I asked her to just let me know what time she plans to visit pt.     I met with pt she looks brighter, less tense. She stated she was feeling 'good', 'less angry'. She was engaged in groups today. I asked if she would be open to DBT. She stated she did some as a child and would be open to trying it again.    Discharge Plan or Goal:  TBD - Home      Barriers to Discharge:  Symptoms     Referral Status:  TBD     Legal Status:  Commitment, no tamez   [employer] [facility]: [place]  File #: [id number]    Contacts (include NATHALIE status):  Medication Management/Psychiatry:  Name: ANAYELI Marina [facility]   [facility]    Phone Number: [phone]      MHCM//ACT Team:  Name: Angelina Calderon [facility]    Number: [phone]     PPS: Nai Cortez [facility]    Upcoming Meetings and Dates/Important Information and next steps:  Commitment process  Coordinate care

## 2025-05-22 NOTE — PLAN OF CARE
Felecia noted to have slept for 7 hours without distress  No discomfort or pain verbalized   No harm/assault  to self and others this shift.   No aggressive behavior demonstrated  this shift.         Problem: Sleep Disturbance  Goal: Adequate Sleep/Rest  Outcome: Progressing     Problem: Psychotic Signs/Symptoms  Goal: Improved Behavioral Control (Psychotic Signs/Symptoms)  Outcome: Progressing  Goal: Improved Sleep (Psychotic Signs/Symptoms)  Intervention: Promote Healthy Sleep Hygiene  Recent Flowsheet Documentation  Taken 5/22/2025 0617 by Nanette Jensen, RN  Sleep Hygiene Promotion:   awakenings minimized   room lighting adjusted   Goal Outcome Evaluation:

## 2025-05-22 NOTE — PLAN OF CARE
"Nursing Assessment    Recent Vitals: B/P: 119/79, T: 98, P: 76, R: 16     Sleep:  Hours of sleep at night: 7    General Shift Summary  Patient was assessed in the morning and presented as calm and cooperative. She had no behavioral outbursts in about 40 hours, last was 5/20 around 1300 when punching the medication window. Restrictions to items were lifted and pod one was opened to her. She presents with a flat affect but brightens upon approach. She attended multiple groups and utilized arts and craft supplies without any issues. She was cooperative with labs and said with a smile that she put her \"lady pants on\" to get through having labs taken. She denied SI/HI/AVH. Hygiene is good. She is medications cooperative. Insight and judgement seem to be improving. Thought content seems logical.    Plan: possible transfer to less restrictive unit was discussed during team.    Eva Hanks, RN MSN  "

## 2025-05-22 NOTE — PLAN OF CARE
Rehab Group    Start time: 1600  End time: 1655  Patient time total: 50 minutes    attended full group    #4 attended   Group Type: occupational therapy   Group Topic Covered: balanced lifestyle, coping skills, healthy leisure time, mindfulness, and relaxation      Group Session Detail:    Intervention: Pt participated in a OT watercolor relaxation group to facilitate coping skills exploration and creative expression through personally meaningful activities, and to encourage utilization of these healthy coping skills to promote overall health and wellness. Group included clinical observation of social, cognitive and kinesthetic performance skills to inform treatment and safe discharge planning.    Cognition: Goal directed       Mood/Affect: Bright, Pleasant      Plan: Patient encouraged to maintain attendance for continued ongoing support in working towards occupational therapy goals to support overall treatment/care.        Patient Detail:    Joined at start of group with invitation from writer. Showed drawing on patient safe cardboard tray completed earlier, appeared proud of the final project and accepting on writer's compliment. Was an active participant in the [employer] activity. Shared never having completed an art class in high school, having done other classes instead. Shared excitement in trying different ways to use the [employer] paint, excitedly showing writer.       [id number] OT Group (2 or more in attendance)    Patient Active Problem List   Diagnosis    Suicidal ideation    Complex posttraumatic stress disorder    Borderline personality disorder (H)    Paranoia (H)    Bipolar affective disorder in remission    Homicidal ideation    Schizoaffective disorder, depressive type (H)    Psychosis (H)

## 2025-05-22 NOTE — PLAN OF CARE
Rehab Group    Start time: 1315  End time: 1415  Patient time total: 50 minutes    attended full group    #4 attended   Group Type: occupational therapy   Group Topic Covered: balanced lifestyle, cognitive activities, coping skills, healthy leisure time, and social skills     Group Session Detail:    Patient Response: Pt participated in a group dice activity for leisure exploration and participation as a healthy coping skill, socialization, problem solving and sequencing.     Cognition: Goal directed      Mood/Affect: Pleasant      Plan: Patient encouraged to maintain attendance for continued ongoing support in working towards occupational therapy goals to support overall treatment/care.        Patient Detail:    Joined at start of group with invitation from writer. Was an active participant for the duration of time in group. Was briefly social off and on with both writer and peers. Offered peers cues at times during the Doodle Dice activity and made a couple jokes with peers during this time as well. Able to participate ind after initial instruction with no additional cues needed from writer.       [id number] OT Group (2 or more in attendance)    Patient Active Problem List   Diagnosis    Suicidal ideation    Complex posttraumatic stress disorder    Borderline personality disorder (H)    Paranoia (H)    Bipolar affective disorder in remission    Homicidal ideation    Schizoaffective disorder, depressive type (H)    Psychosis (H)

## 2025-05-23 PROCEDURE — 90853 GROUP PSYCHOTHERAPY: CPT

## 2025-05-23 PROCEDURE — 250N000013 HC RX MED GY IP 250 OP 250 PS 637: Performed by: PSYCHIATRY & NEUROLOGY

## 2025-05-23 PROCEDURE — 124N000002 HC R&B MH UMMC

## 2025-05-23 PROCEDURE — 99233 SBSQ HOSP IP/OBS HIGH 50: CPT | Performed by: PSYCHIATRY & NEUROLOGY

## 2025-05-23 RX ORDER — QUETIAPINE FUMARATE 300 MG/1
300 TABLET, FILM COATED ORAL EVERY EVENING
Status: DISCONTINUED | OUTPATIENT
Start: 2025-05-23 | End: 2025-05-28 | Stop reason: HOSPADM

## 2025-05-23 RX ADMIN — QUETIAPINE FUMARATE 300 MG: 300 TABLET ORAL at 20:13

## 2025-05-23 RX ADMIN — Medication 50 MCG: at 08:28

## 2025-05-23 RX ADMIN — GUANFACINE 1 MG: 1 TABLET, EXTENDED RELEASE ORAL at 20:13

## 2025-05-23 ASSESSMENT — ACTIVITIES OF DAILY LIVING (ADL)
ADLS_ACUITY_SCORE: 46
DIFFICULTY_COMMUNICATING: NO
ADLS_ACUITY_SCORE: 46
FALL_HISTORY_WITHIN_LAST_SIX_MONTHS: NO
ADLS_ACUITY_SCORE: 46
ADLS_ACUITY_SCORE: 46
ADLS_ACUITY_SCORE: 20
ADLS_ACUITY_SCORE: 46
WEAR_GLASSES_OR_BLIND: NO
ADLS_ACUITY_SCORE: 46
ADLS_ACUITY_SCORE: 46
DIFFICULTY_EATING/SWALLOWING: NO
ADLS_ACUITY_SCORE: 46
ADLS_ACUITY_SCORE: 20
ADLS_ACUITY_SCORE: 20
ADLS_ACUITY_SCORE: 46
TOILETING_ISSUES: NO
ADLS_ACUITY_SCORE: 46
DOING_ERRANDS_INDEPENDENTLY_DIFFICULTY: NO
ADLS_ACUITY_SCORE: 46
HEARING_DIFFICULTY_OR_DEAF: NO
DRESSING/BATHING_DIFFICULTY: NO
ADLS_ACUITY_SCORE: 20
ADLS_ACUITY_SCORE: 20
CHANGE_IN_FUNCTIONAL_STATUS_SINCE_ONSET_OF_CURRENT_ILLNESS/INJURY: NO
ADLS_ACUITY_SCORE: 46
WALKING_OR_CLIMBING_STAIRS_DIFFICULTY: NO
DIFFICULTY_COMMUNICATING: NO
ADLS_ACUITY_SCORE: 46
ADLS_ACUITY_SCORE: 46
CONCENTRATING,_REMEMBERING_OR_MAKING_DECISIONS_DIFFICULTY: NO
ADLS_ACUITY_SCORE: 46
ADLS_ACUITY_SCORE: 46
HEARING_DIFFICULTY_OR_DEAF: NO
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
## 2025-05-23 NOTE — PLAN OF CARE
Problem: Psychotic Signs/Symptoms  Goal: Improved Behavioral Control (Psychotic Signs/Symptoms)  Outcome: Progressing  Intervention: Manage Behavior     Goal Outcome Evaluation:    Plan of Care Reviewed With: patient      Pt was out in the milieu most of the shift. Pt attended evening groups. Pt watched tv, played video games and listen to headphones. Pt paced hallways. Pt denies pain and psych symptoms including SI, HI, SIB, AVH, anxiety and depression. Pt has a flat affect. Pt did not interact with other peers. Pt kept to self. Pt has a good appetite. Pt was medications compliant. Behavior in control.

## 2025-05-23 NOTE — PLAN OF CARE
BEH IP Unit Acuity Rating Score (UARS)  Patient is given one point for every criteria they meet.    CRITERIA SCORING   On a 72 hour hold, court hold, committed, stay of commitment, or revocation. 1    Patient LOS on BEH unit exceeds 20 days. 0  LOS: 6   Patient under guardianship, 55+, otherwise medically complex, or under age 11. 0   Suicide ideation without relief of precipitating factors. 1   Current plan for suicide. 0   Current plan for homicide. 0   Imminent risk or actual attempt to seriously harm another without relief of factors precipitating the attempt. 0   Severe dysfunction in daily living (ex: complete neglect for self care, extreme disruption in vegetative function, extreme deterioration in social interactions). 1   Recent (last 7 days) or current physical aggression in the ED or on unit. 1   Restraints or seclusion episode in past 72 hours. 0   Recent (last 7 days) or current verbal aggression, agitation, yelling, etc., while in the ED or unit. 1   Active psychosis. 0   Need for constant or near constant redirection (from leaving, from others, etc).  0   Intrusive or disruptive behaviors. 0   Patient requires 3 or more hours of individualized nursing care per 8-hour shift (i.e. for ADLs, meds, therapeutic interventions). 0   TOTAL 5

## 2025-05-23 NOTE — PLAN OF CARE
Team Note Due:  Monday    Assessment/Intervention/Current Symtoms and Care Coordination:  Chart review and met with team, discussed pt progress, symptomology, and response to treatment. Discussed the discharge plan and any potential impediments to discharge.    Per team, had an uneventful night. Pt is stabilizing on meds.    Pt has been referred to DBT programming - pending confirmation of appt.     Pt was up and appeared bright, less tense. Pt attended group this morning.    KG Cortez plans to come next week to visit her - Tuesday 5/28 at 9:30AM.     I completed and sent diagnostic assessment to Nai Cortez.     Discharge Plan or Goal:  TBD - Home with DBT programming     Barriers to Discharge:  Symptoms     Referral Status:  TBD     Legal Status:  Commitment, no tamez   [facility]: [place]  File #: [id number]    Contacts (include NATHALIE status):  Medication Management/Psychiatry:  Name: ANAYELI Marina CNP   [facility]    Phone Number: [phone]      MHCM//ACT Team:  [facility]    Number: [phone]     New CM: Nai Cortez [facility]    Upcoming Meetings and Dates/Important Information and next steps:  Commitment process  Coordinate care   KG Cortez plans to come next week to visit her - Tuesday 5/28 at 9:30AM.

## 2025-05-23 NOTE — PLAN OF CARE
Goal Outcome Evaluation:    Plan of Care Reviewed With: patient      Problem: Psychotic Signs/Symptoms  Goal: Improved Behavioral Control (Psychotic Signs/Symptoms)  5/23/2025 1238 by Margot Escalona, RN  Outcome: Progressing  5/23/2025 1237 by Margot Escalona, RN  Outcome: Progressing  Pt was up on the unit, social with peers and staff. She was pleasant on approach, she denied pain or discomfort. She denied SI,HI, denied any hallucinations and she contracted for safety. Pt pt was medication compliant and she denied side effects. She made some phone calls and they seemed to go well. Pt is eating and drinking adequately. Her vital signs were within defined limits.

## 2025-05-23 NOTE — PLAN OF CARE
Felecia noted to have slept for 7 hours without  respiratory discomfort   No endorsement of pain tonight  No suicidal   behavior and no aggression demonstrated this shift.      Problem: Manic Symptoms  Goal: Manic Symptoms  Description: Signs and symptoms of listed problems will be absent or manageable.  Outcome: Progressing     Problem: Sleep Disturbance  Goal: Adequate Sleep/Rest  Outcome: Progressing   Goal Outcome Evaluation:

## 2025-05-23 NOTE — PLAN OF CARE
"  Group Attendance:  attended full group    Time session began: 1115  Time session ended: 1155  Patient's total time in group: 40    Total # Attendees   3   Group Type psychotherapeutic     Group Topic Covered emotional regulation, insight improvement, and relaxation and grounding techniques/coping skills     Group Session Detail Participants examined academic research that supports the healing power of poetry. We then read poems related to mental health and explored our reactions to them. We then wrote our own poems about our lived experiences and read these aloud to the group.       Patient's response to the group topic/interactions:  cooperative with task, organized, supportive of peers, and actively engaged     Patient Details: Lenore \"Felecia\" was actively engaged and shared her responses to each poem. She also wrote and shared her own poem. She was polite in her interactions, even when disagreeing with group members. She described feeling like she has a to \"wear a bright, bubbly, fun mask at work because no child is going to come up to a grumpy person\" and how this invalidates her true feelings of pain and suffering.          02798 - Group psychotherapy - 1 Session  Patient Active Problem List   Diagnosis    Suicidal ideation    Complex posttraumatic stress disorder    Borderline personality disorder (H)    Paranoia (H)    Bipolar affective disorder in remission    Homicidal ideation    Schizoaffective disorder, depressive type (H)    Psychosis (H)       "

## 2025-05-23 NOTE — PLAN OF CARE
"  Group Attendance:  attended full group    Time session began: 1315  Time session ended: 1353  Patient's total time in group: 38    Total # Attendees   5   Group Type psychotherapeutic     Group Topic Covered mindfulness and relaxation and grounding techniques/coping skills     Group Session Detail Participants engaged in a creative task using hemal with an emphasis on mindfully engaging in their project. A group discussion was facilitated to promote community.       Patient's response to the group topic/interactions:  positive affect, cooperative with task, and actively engaged     Patient Details: Lenore \"Felecia\" chose to mold a flower vase with a tray for water. She engaged in a productive conversation with a peer around his chosen project. She independently helped to clean up the table when folks were done.          45980 - Group psychotherapy - 1 Session  Patient Active Problem List   Diagnosis    Suicidal ideation    Complex posttraumatic stress disorder    Borderline personality disorder (H)    Paranoia (H)    Bipolar affective disorder in remission    Homicidal ideation    Schizoaffective disorder, depressive type (H)    Psychosis (H)       "
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
## 2025-05-24 PROCEDURE — 250N000013 HC RX MED GY IP 250 OP 250 PS 637: Performed by: PSYCHIATRY & NEUROLOGY

## 2025-05-24 PROCEDURE — 97150 GROUP THERAPEUTIC PROCEDURES: CPT | Mod: GO

## 2025-05-24 PROCEDURE — 124N000002 HC R&B MH UMMC

## 2025-05-24 RX ADMIN — QUETIAPINE FUMARATE 300 MG: 300 TABLET ORAL at 21:31

## 2025-05-24 RX ADMIN — GUANFACINE 1 MG: 1 TABLET, EXTENDED RELEASE ORAL at 21:31

## 2025-05-24 RX ADMIN — Medication 50 MCG: at 09:12

## 2025-05-24 ASSESSMENT — ACTIVITIES OF DAILY LIVING (ADL)
HYGIENE/GROOMING: INDEPENDENT
ADLS_ACUITY_SCORE: 20

## 2025-05-24 NOTE — PLAN OF CARE
"Problem: Manic Symptoms  Goal: Social and Therapeutic (Manic Symptoms)  Description: Signs and symptoms of listed problems will be absent or manageable.  Outcome: Progressing  Intervention: Social and Therapeutic Interv (Manic Symptoms)  Social and Therapeutic Interventions (Manic Symptoms): encourage participation in therapeutic groups and milieu activities    Patient is alert and oriented x3, calm and cooperative. She was withdrawn and isolative this evening and spent the evening in her room only coming out for needs. She keeps mostly to herself and doesn't interact with peers when in the milieu. Presents with a restricted affect that brightens upon approach; describes her mood as \"anxious and tired\". Speech is normal and insight remains poor. Appears adequately groomed and she is able to voices her needs appropriately. Patient endorses anxiety 4/10 and denies all other MH symptoms including depression, thoughts of SI/SIB/HI, racing thoughts,hallucinations, and thoughts of paranoia. No acute behavioral concern with patient this shift.     Patient is eating, hydrating, and sleeping adequately. Patient is medication compliant with reminders. Medication side effects were not observed or reported this shift. Patient denies pain/ physical discomforts. No acute medical concern. VS WNL /76 (BP Location: Right arm)   Pulse 76   Temp 98.4  F (36.9  C) (Temporal)   Resp 15   SpO2 97%               "

## 2025-05-24 NOTE — PLAN OF CARE
NOC Shift Report    SLEPT:  7 hours overnight.      Safety rounds completed with no issues identified.    PAIN:  No c/o pain or discomfort.    PRNs:  None.    PRECAUTIONS:  ASSAULT, SUICIDE, SELF INJURY.    Goal Outcome Evaluation:  Problem: Sleep Disturbance  Goal: Adequate Sleep/Rest  Outcome: Progressing

## 2025-05-24 NOTE — PLAN OF CARE
"  Rehab Group    Start time: 1115  End time: 1215  Patient time total: 60 minutes    attended full group    #5 attended   Group Type: occupational therapy   Group Topic Covered: cognitive activities, healthy leisure time, and social skills   Group Session Detail: OT: Education on healthy leisure engagement and creative hands-on endeavor (bookmarks) to increase concentration, focus, attention to task/detail, decision making, problem solving, frustration tolerance, task follow through, coping with stress, relaxation healthy leisure engagement, creative expression, and social engagement    Patient Response/Contribution:  cooperative with task and actively engaged   Patient Detail: Pt reported during check-in that \"hiking and cleaning\" are healthy leisure activities they enjoy. Pt sat among peers to complete presented activity and engaged in reciprocal social interactions with peers and therapist. Pt independently selected and gathered supplies. Pt worked in a neat and tidy manner to complete project. Pt reported they enjoyed the activity and was looking forward to using the completed project. Pt verbalized understanding of importance of healthy leisure engagement in a healthy lifestyle.       [id number] OT Group (2 or more in attendance)    Patient Active Problem List   Diagnosis    Suicidal ideation    Complex posttraumatic stress disorder    Borderline personality disorder (H)    Paranoia (H)    Bipolar affective disorder in remission    Homicidal ideation    Schizoaffective disorder, depressive type (H)    Psychosis (H)         "

## 2025-05-24 NOTE — PLAN OF CARE
"  Problem: Adult Behavioral Health Plan of Care  Goal: Adheres to Safety Considerations for Self and Others  Outcome: Progressing     Problem: Seclusion/Restraint, Behavioral  Goal: Seclusion/Behavioral Restraint Goal: Absence of Harm or Injury  Outcome: Progressing     Problem: Suicide Risk  Goal: Absence of Self-Harm  Outcome: Progressing   Goal Outcome Evaluation:       Care of patient from [id number]    Patient attended and participated in programming. Patient has been calm and cooperative. When not in group patient has been spending time in her room coloring or resting in her bed. Patient denied SI/HI. Patient stated HI has never been an issue for her. Patient stated that in the past she worried a lot about children getting abused, and stated that what triggered it was finding out that \"[place] immigration lost 10,000 children\". Patient stated that being here she has realized that she is \"not a superhero\" and also needs to take care of herself. Patient stated I have realized that \"I was a child once, even though I had to be a grown up my whole life\". Patient stated that her stay here has been helpful, despite feeling like it wasn't necessary.                      "

## 2025-05-24 NOTE — PLAN OF CARE
"Problem: Psychotic Signs/Symptoms  Goal: Improved Behavioral Control (Psychotic Signs/Symptoms)  Outcome: Progressing  Intervention: Manage Behavior  De-Escalation Techniques:   appropriate behavior reinforced   diversional activity encouraged     Patient is alert and oriented x3, calm and cooperative. Presents with tense affect; mood is \"okay\"; speech is rapid; insight remains poor. Reports mild anxiety and denies all other MH symptoms including depression, auditory hallucinations, paranoia and thoughts of harming self/others. No acute behavioral concern with patient this shift.       Attends to ADLs; showered this shift. Voids with no issue and denies constipation. Patient appears neat and well-groomed. Patient voices their needs appropriately.     Patient is eating & hydrating  adequately. Sleeping poorly and reported to have been awake all night saying \"that is what happens every time the med is increased and had no sleep for the last two nights.\" Patient's Seroquel is increased to 300 mg yesterday night from 200 mg. . Patient is medication compliant with reminders though was adamantly stating that the meds don't help her. Medication side effects were not observed or reported this shift. Patient reports neck pain which she said is chronic and declined pharmacological and nonpharmacologic interventions when offered. No acute medical concern. VS WNL /76 (BP Location: Right arm)   Pulse 76   Temp 98.4  F (36.9  C) (Temporal)   Resp 15   SpO2 97%               "

## 2025-05-25 PROCEDURE — 124N000002 HC R&B MH UMMC

## 2025-05-25 PROCEDURE — 250N000013 HC RX MED GY IP 250 OP 250 PS 637: Performed by: PSYCHIATRY & NEUROLOGY

## 2025-05-25 RX ADMIN — QUETIAPINE FUMARATE 300 MG: 300 TABLET ORAL at 21:25

## 2025-05-25 RX ADMIN — Medication 50 MCG: at 08:39

## 2025-05-25 RX ADMIN — GUANFACINE 1 MG: 1 TABLET, EXTENDED RELEASE ORAL at 21:25

## 2025-05-25 ASSESSMENT — ACTIVITIES OF DAILY LIVING (ADL)
ADLS_ACUITY_SCORE: 20
HYGIENE/GROOMING: INDEPENDENT
ADLS_ACUITY_SCORE: 20
ADLS_ACUITY_SCORE: 20

## 2025-05-25 NOTE — PLAN OF CARE
"Problem: Psychotic Signs/Symptoms  Goal: Improved Behavioral Control (Psychotic Signs/Symptoms)  Outcome: Progressing  Intervention: Manage Behavior  De-Escalation Techniques:   appropriate behavior reinforced   diversional activity encouraged     Patient is alert and oriented x3, calm and cooperative. Presents with tense affect; mood is \"okay\"; speech is rapid; insight remains poor. Reports mild anxiety and denies all other MH symptoms including depression, auditory hallucinations, paranoia and thoughts of harming self/others. No acute behavioral concern with patient this shift.     Attends to ADLs; Voids with no issue and denies constipation. Patient appears neat and well-groomed. Patient voices their needs appropriately.     Patient is eating & hydrating  adequately. Sleeping better than previously she reported. Patient is medication compliant with reminders. Medication side effects were not observed or reported this shift. Patient reports no pain/physical discomfort. No acute medical concern. VS WNL /70 (BP Location: Left arm, Patient Position: Sitting, Cuff Size: Adult Regular)   Pulse 66   Temp 97.5  F (36.4  C) (Temporal)   Resp 16   Wt 87.4 kg (192 lb 11.2 oz)   SpO2 100%   BMI 31.10 kg/m                "

## 2025-05-25 NOTE — PLAN OF CARE
Goal Outcome Evaluation:       Pt appeared to be a sleep @ all safety checks.  Pt slept 7 hours.

## 2025-05-25 NOTE — PLAN OF CARE
Problem: Psychotic Signs/Symptoms  Goal: Improved Behavioral Control (Psychotic Signs/Symptoms)  Outcome: Progressing   Goal Outcome Evaluation:    This  writer assumed pt's care at 1900. Pt was in the lounge in POD 2 watching TV. Pleasant and cooperative. Quiet. Denied feeling anxious, sad, depressed or suicidal. No evidence of psychosis. No behavorial issues. Med compliant.

## 2025-05-26 PROCEDURE — 97150 GROUP THERAPEUTIC PROCEDURES: CPT | Mod: GO

## 2025-05-26 PROCEDURE — 250N000013 HC RX MED GY IP 250 OP 250 PS 637: Performed by: PSYCHIATRY & NEUROLOGY

## 2025-05-26 PROCEDURE — 124N000002 HC R&B MH UMMC

## 2025-05-26 RX ADMIN — QUETIAPINE FUMARATE 300 MG: 300 TABLET ORAL at 20:04

## 2025-05-26 RX ADMIN — Medication 50 MCG: at 10:05

## 2025-05-26 RX ADMIN — GUANFACINE 1 MG: 1 TABLET, EXTENDED RELEASE ORAL at 20:04

## 2025-05-26 ASSESSMENT — ACTIVITIES OF DAILY LIVING (ADL)
ADLS_ACUITY_SCORE: 20
ADLS_ACUITY_SCORE: 20
DRESS: SCRUBS (BEHAVIORAL HEALTH);INDEPENDENT;PROMPTS
ADLS_ACUITY_SCORE: 20
ORAL_HYGIENE: INDEPENDENT
ADLS_ACUITY_SCORE: 20
ADLS_ACUITY_SCORE: 20
DRESS: SCRUBS (BEHAVIORAL HEALTH)
ADLS_ACUITY_SCORE: 30
ADLS_ACUITY_SCORE: 30
ADLS_ACUITY_SCORE: 20
HYGIENE/GROOMING: INDEPENDENT
ADLS_ACUITY_SCORE: 20
ADLS_ACUITY_SCORE: 30
ORAL_HYGIENE: INDEPENDENT
ADLS_ACUITY_SCORE: 20
ADLS_ACUITY_SCORE: 30
ADLS_ACUITY_SCORE: 20
ADLS_ACUITY_SCORE: 20
HYGIENE/GROOMING: INDEPENDENT
ADLS_ACUITY_SCORE: 20
ADLS_ACUITY_SCORE: 20
ADLS_ACUITY_SCORE: 30
ADLS_ACUITY_SCORE: 30
ADLS_ACUITY_SCORE: 20
ADLS_ACUITY_SCORE: 30
ADLS_ACUITY_SCORE: 20

## 2025-05-26 NOTE — PLAN OF CARE
"  Problem: Adult Behavioral Health Plan of Care  Goal: Adheres to Safety Considerations for Self and Others  Outcome: Progressing     Problem: Psychotic Signs/Symptoms  Goal: Improved Behavioral Control (Psychotic Signs/Symptoms)  Outcome: Progressing   Milieu Participation:Behavior: Pt isolative to room the majority of the shift. Pt welcoming of check in and denies all mental health s/s. Pt stated, \" no nothing like that, the only thing is I only take my vitamins and night meds, that's it\". Pt declined to expand on this subject. Pt is able to make needs known and has been appropriate in behaviors this evening. Medication compliant no PRN medications given. Pt finds diversional activities like drawing on her cardboard trays satisfying. No medical complaints or concerns. No overtly psychotic s/s observed or noted.    Safety: status 15 SI/Self harm: denies  Aggression/agitation/HI: denies, exhibited safe behavior  AVH: denies Affect: blunted Mood: calm  Physical Complaints/Issues: denies  Medication AE: denies  I & O: eating and drinking well  Sleep: denies concerns  LBM: denies concerns  ADLs: independent  Visits/calls: none    Vitals:  Blood pressure 118/80, pulse 79, temperature 98.2  F (36.8  C), temperature source Temporal, resp. rate 16, weight 87.4 kg (192 lb 11.2 oz), SpO2 100%, not currently breastfeeding.                             "

## 2025-05-26 NOTE — PLAN OF CARE
Problem: Psychotic Signs/Symptoms  Goal: Increased Participation and Engagement (Psychotic Signs/Symptoms)  Outcome: Progressing  Intervention: Facilitate Participation and Engagement   Goal Outcome Evaluation:    Plan of Care Reviewed With: patient      Pt remained in the room most of the evening shift. Pt was observed watching TV in her room. Pt denies pain and psych symptoms including SI, HI, SIB, AVH, anxiety and paranoia. Pt has neutral mood with a flat affect. Pt was calm and cooperative with nursing cares. Pt has good appetite and good hygiene. Pt was medications compliant. No physical and behavioral concerns reported by pt or observed by writer.

## 2025-05-26 NOTE — PLAN OF CARE
"  Problem: Adult Inpatient Plan of Care  Goal: Optimal Comfort and Wellbeing  Intervention: Provide Person-Centered Care  Recent Flowsheet Documentation  Taken 5/26/2025 1030 by Abril Augustin RN  Trust Relationship/Rapport:   care explained   choices provided   emotional support provided   empathic listening provided   questions answered   reassurance provided   questions encouraged   thoughts/feelings acknowledged   Goal Outcome Evaluation:    Plan of Care Reviewed With: patient        Pt presents as calm and cooperative with a blunted affect. Pt slept in and ate breakfast and took her meds around 1000.   Pt denied all mental health symptoms including SI/HI/AVH and did not appear to be responding. Pt was mainly isolative in her room, coming out to get her meal trays, use the restroom, and make her needs known. When writer was in the pt room for assessment and vitals, the pt was watching CNN. Patricia was on the TV for a Memorial day celebration, and she appeared irritated. She began speaking about how \"Patricia needs to recognize that this is a WW2 memorial day, and that he needs to stop using this day as a prop\". Writer did not acknowledge the comment.   She was appropriate with staff and peers and maintained appropriate behavioral control.   Pt ate and drank well denied pain, and had no other acute physical or behavioral concerns this shift.   /76 (BP Location: Right arm, Patient Position: Sitting, Cuff Size: Adult Regular)   Pulse 88   Temp 97.9  F (36.6  C) (Temporal)   Resp 18   Wt 87.4 kg (192 lb 11.2 oz)   SpO2 100%   BMI 31.10 kg/m     "

## 2025-05-26 NOTE — PLAN OF CARE
Felecia appeared sleeping for 7- breathing was quiet and unlabored.  No pain or discomfort endorsed  No aggressive  behaviors demonstrated.     Problem: Sleep Disturbance  Goal: Adequate Sleep/Rest  Outcome: Progressing     Problem: Manic Symptoms  Goal: Manic Symptoms  Description: Signs and symptoms of listed problems will be absent or manageable.  Outcome: Progressing   Goal Outcome Evaluation:

## 2025-05-27 VITALS
TEMPERATURE: 97.8 F | SYSTOLIC BLOOD PRESSURE: 126 MMHG | RESPIRATION RATE: 16 BRPM | OXYGEN SATURATION: 100 % | DIASTOLIC BLOOD PRESSURE: 85 MMHG | WEIGHT: 192.7 LBS | HEART RATE: 79 BPM | BODY MASS INDEX: 31.1 KG/M2

## 2025-05-27 PROCEDURE — 124N000002 HC R&B MH UMMC

## 2025-05-27 PROCEDURE — 250N000013 HC RX MED GY IP 250 OP 250 PS 637: Performed by: PSYCHIATRY & NEUROLOGY

## 2025-05-27 PROCEDURE — 97150 GROUP THERAPEUTIC PROCEDURES: CPT | Mod: GO

## 2025-05-27 PROCEDURE — H2032 ACTIVITY THERAPY, PER 15 MIN: HCPCS

## 2025-05-27 PROCEDURE — 90834 PSYTX W PT 45 MINUTES: CPT

## 2025-05-27 PROCEDURE — 99232 SBSQ HOSP IP/OBS MODERATE 35: CPT | Performed by: STUDENT IN AN ORGANIZED HEALTH CARE EDUCATION/TRAINING PROGRAM

## 2025-05-27 RX ADMIN — Medication 50 MCG: at 08:56

## 2025-05-27 RX ADMIN — GUANFACINE 1 MG: 1 TABLET, EXTENDED RELEASE ORAL at 20:23

## 2025-05-27 RX ADMIN — QUETIAPINE FUMARATE 300 MG: 300 TABLET ORAL at 20:24

## 2025-05-27 ASSESSMENT — ACTIVITIES OF DAILY LIVING (ADL)
ADLS_ACUITY_SCORE: 20
ADLS_ACUITY_SCORE: 20
DRESS: SCRUBS (BEHAVIORAL HEALTH)
ADLS_ACUITY_SCORE: 20
ADLS_ACUITY_SCORE: 20
HYGIENE/GROOMING: INDEPENDENT
ORAL_HYGIENE: INDEPENDENT
ADLS_ACUITY_SCORE: 20
HYGIENE/GROOMING: INDEPENDENT
ADLS_ACUITY_SCORE: 20
DRESS: SCRUBS (BEHAVIORAL HEALTH);INDEPENDENT
ADLS_ACUITY_SCORE: 20
ORAL_HYGIENE: INDEPENDENT
ADLS_ACUITY_SCORE: 20
ADLS_ACUITY_SCORE: 20

## 2025-05-27 NOTE — PLAN OF CARE
BEH IP Unit Acuity Rating Score (UARS)  Patient is given one point for every criteria they meet.    CRITERIA SCORING   On a 72 hour hold, court hold, committed, stay of commitment, or revocation. 1    Patient LOS on BEH unit exceeds 20 days. 0  LOS: 10   Patient under guardianship, 55+, otherwise medically complex, or under age 11. 0   Suicide ideation without relief of precipitating factors. 1   Current plan for suicide. 0   Current plan for homicide. 0   Imminent risk or actual attempt to seriously harm another without relief of factors precipitating the attempt. 0   Severe dysfunction in daily living (ex: complete neglect for self care, extreme disruption in vegetative function, extreme deterioration in social interactions). 0   Recent (last 7 days) or current physical aggression in the ED or on unit. 0   Restraints or seclusion episode in past 72 hours. 0   Recent (last 7 days) or current verbal aggression, agitation, yelling, etc., while in the ED or unit. 0   Active psychosis. 0   Need for constant or near constant redirection (from leaving, from others, etc).  0   Intrusive or disruptive behaviors. 0   Patient requires 3 or more hours of individualized nursing care per 8-hour shift (i.e. for ADLs, meds, therapeutic interventions). 0   TOTAL 2

## 2025-05-27 NOTE — PLAN OF CARE
Problem: Adult Inpatient Plan of Care  Goal: Optimal Comfort and Wellbeing  Intervention: Provide Person-Centered Care  Recent Flowsheet Documentation  Taken 5/27/2025 1000 by Abril Augustin RN  Trust Relationship/Rapport:   care explained   choices provided   emotional support provided   empathic listening provided   questions answered   reassurance provided   questions encouraged   thoughts/feelings acknowledged     Problem: Adult Behavioral Health Plan of Care  Goal: Adheres to Safety Considerations for Self and Others  Intervention: Develop and Maintain Individualized Safety Plan  Recent Flowsheet Documentation  Taken 5/27/2025 1000 by Abril Augustin RN  Safety Measures: safety rounds completed   Goal Outcome Evaluation:    Plan of Care Reviewed With: patient        Pt presents as calm and cooperative with a blunted affect. Pt denied all mental health symptoms including SI/HI/AVH and did not appear to be responding. Pt was mainly isolative in her room, coming out to get her meal trays, use the restroom walk the halls listening to headphones, and to make her needs known.   Pt had a visit from [facility] pre-petition and she stated that she is already committed, but they are transferring her case to another outside vendor to manage her case.   She was appropriate with staff and peers and maintained appropriate behavioral control.   Pt ate and drank well denied pain, and had no other acute physical or behavioral concerns this shift.   /81   Pulse 86   Temp 97.7  F (36.5  C) (Temporal)   Resp 16   Wt 87.4 kg (192 lb 11.2 oz)   SpO2 99%   BMI 31.10 kg/m

## 2025-05-27 NOTE — PLAN OF CARE
Team Note Due:  Monday    Assessment/Intervention/Current Symtoms and Care Coordination:  Chart review     New CM came to see Pt this morning from [facility] - Nai Stone.   Pt has been stabilizing, med compliant.     Discharge Plan or Goal:  TBD - Home with DBT programming     Barriers to Discharge:  Symptoms     Referral Status:  TBD     Legal Status:  Commitment, no tamez   [facility]: [facility]  File #: [id number]    Contacts (include NATHALIE status):  Medication Management/Psychiatry:  Name: ANAYELI Marina [facility]   Clinic: [facility]    Phone Number: [phone]      MHCM//ACT Team:  Name: Angelina Calderon [facility]    Number: [phone]     New CM: Nai Cortez [facility]    Upcoming Meetings and Dates/Important Information and next steps:  Commitment process  Coordinate care   CM Nai Cortez plans to come next week to visit her - Tuesday 5/28 at 9:30AM.

## 2025-05-27 NOTE — PROGRESS NOTES
Rehab Group    Start time: 1615  End time: 1700  Patient time total: 40 minutes (30 billable)    attended full group    #2 attended   Group Type: music   Group Topic Covered: mood elevation, focus skills     Group Session Detail: Music Bingo/Oldies+Mixtape edition     Patient Response/Contribution:  cooperative with task, attentive, and actively engaged     Patient Detail: Participated in Music Therapy intervention of Music Bingo today.  Goals of session were focusing, memory recall, positive distraction, emotional containment and social cohesion.  Pt response was engaged and cooperative.  No redirections needed.  Pt presented as grounded and centered throughout with positive affect.       Activity Therapy Per 15 min ()      Patient Active Problem List   Diagnosis    Suicidal ideation    Complex posttraumatic stress disorder    Borderline personality disorder (H)    Paranoia (H)    Bipolar affective disorder in remission    Homicidal ideation    Schizoaffective disorder, depressive type (H)    Psychosis (H)

## 2025-05-27 NOTE — PLAN OF CARE
"Individual Therapy Note      Date of Service: May 27, 2025    Patient: Felecia goes by \"Felecia,\" uses she/her pronouns    Individuals Present: Felecia & Danielle DevlinVENESSA    Session start: 1330  Session end: 1413  Session duration in minutes: 43      Modality Used: Person Centered and Brief Therapy    Goals: Identify coping strategies    Patient Description of current symptoms: \"I'm able to think clearly here.\"     Mental Status Exam:   Attitude: cooperative  Eye Contact: good  Mood: better  Affect: intensity is normal  Speech: clear, coherent  Psychomotor Behavior: no evidence of tardive dyskinesia, dystonia, or tics  Thought Process:  linear  Associations: no loose associations  Thought Content: passive suicidal ideation present  Insight: limited  Judgment: limited  Attention Span and Concentration: intact    Pt progress: Less angry than initial encounter     Treatment Objective(s) Addressed:   The focus of this session was on identifying and practicing coping strategies and exploring obstacles to safety in the community.     Progress Towards Goals and Assessment of Patient:   Purpose of this session was to identify coping strategies for rage and depression. She had more insight into paranoid thinking, stating that being in the hospital has allowed her to think more clearly about her co-worker and the stressors associated with these dynamics. She acknowledged HI towards her co-worker and that a more appropriate course of action is to involve HR when she has concerns. We discussed justice systems in place while acknowledging the harm she has experienced by these systems. She stated, \"I've never wanted to kill anybody else before, expect my mother. I held a dying person before, a friend who overdosed. I wanted to feel his body (co-worker) sink into mine.\" Stated she's been told she could be a \"serial killer or a psychopath\" by therapists in the past, then stated she's a \"serial lover.\" She identified thinking of " "people as their inner child as a way she reduces the risk of harming others. Stated she would never harm a woman because she has the strength of a man. When discussing depressive symptoms she identified \"small things\" that bring her happiness throughout the day. She stated that working is one way she manages her symptoms, while also stating that she talked to her  and thinks she needs to work less. Reported she's been depressed for 21 years and feels hopeless that she will experience a significant reduction in these symptoms.     Therapeutic Intervention(s):   Provided active listening, unconditional positive regard, and validation. Engaged in safety planning identifying coping skills, warning signs, health support and resources. Understand and identify reasons for hospitalization, how to use the hospital to create change and prevent future hospitalizations. Provided positive reinforcement for progress towards goals, gains in knowledge, and application of skills previously taught. Identified stress relief practices. Engaged in guided discovery, explored patient's perspectives and helped expand them through socratic dialogue.    Safety Plan: discussed throughout session    Plan for future action (include changes needed if current intervention is ineffective):  Plan is for DBT upon discharge.     55018 - Psychotherapy (with patient) - 45 (38-52*) min    Patient Active Problem List   Diagnosis    Suicidal ideation    Complex posttraumatic stress disorder    Borderline personality disorder (H)    Paranoia (H)    Bipolar affective disorder in remission    Homicidal ideation    Schizoaffective disorder, depressive type (H)    Psychosis (H)       "

## 2025-05-27 NOTE — PROGRESS NOTES
Rehab Group    Start time: 1420  End time: 1500  Patient time total: 40 minutes    attended full group     #3 attended   Group Type: occupational therapy and OT Clinic   Group Topic Covered: activity therapy, coping skills, and problem solving       Group Session Detail:  OT Clinic     Patient Response/Contribution:  cooperative with task, socially appropriate, attentive, and actively engaged       Patient Detail:    Pt actively participated in occupational therapy clinic to facilitate coping skill exploration, creative expression within personally meaningful activities, and clinical observation of social, cognitive, and kinesthetic performance skills. Pt response: Independent to initiate, gather materials, sequence, and adjust to workspace demands as needed. Demonstrated good focus, planning, and problem solving for selected beading task. Able to ask for assistance as needed, and appropriately social with peers and staff.  Pt will continue to be encouraged to attend groups for further asssesssment and to address goals identified on plan of care.       [id number] OT Group (2 or more in attendance)      Patient Active Problem List   Diagnosis    Suicidal ideation    Complex posttraumatic stress disorder    Borderline personality disorder (H)    Paranoia (H)    Bipolar affective disorder in remission    Homicidal ideation    Schizoaffective disorder, depressive type (H)    Psychosis (H)

## 2025-05-27 NOTE — PLAN OF CARE
Goal Outcome Evaluation:  Problem: Sleep Disturbance  Goal: Adequate Sleep/Rest  Outcome: Progressing        Pt in bed at beginning of shift, breathing quiet and unlabored. Pt slept through shift. Pt slept 7 hours.      No pt complaints or concerns at this time.      No PRNs given. Will continue to monitor.

## 2025-05-27 NOTE — PROGRESS NOTES
"[facility] [place]   Psychiatric Progress Note  Hospital Day: 10        Interim History:   The patient's care was discussed with the treatment team during the daily team meeting and/or staff's chart notes were reviewed.  Staff report patient has been maintaining safe behaviors. She has been calm and cooperative. Attending groups.     Sleep: 7 hours (05/27/25 0600)  Scheduled Medications: took all scheduled medications as prescribed   PRN medications: no psychiatric PRNs given     Upon interview, Felecia says that she is doing \"okay\". She talked with her outpatient  this morning and it went well. She feels like she is ready to go home. She is less angry now compared to when she first got to the hospital. She says that she doesn't lose her temper very much \"but when I do it's disasterous\". She would like to try a lower dose of quetiapine but is open to waiting another day and reconsidering it tomorrow. She has not had any lightheadedness. She denies SI/HI/AH/VH/paranoia and has no other questions or concerns.           Medications:     Current Facility-Administered Medications   Medication Dose Route Frequency Provider Last Rate Last Admin    guanFACINE (INTUNIV) 24 hr tablet 1 mg  1 mg Oral QPM Emiliano Ham MD   1 mg at 05/26/25 2004    QUEtiapine (SEROquel) tablet 300 mg  300 mg Oral QPM Karin Biggs MD   300 mg at 05/26/25 2004    Vitamin D3 (CHOLECALCIFEROL) tablet 50 mcg  50 mcg Oral Daily Emiliano Ham MD   50 mcg at 05/27/25 0856          Allergies:     Allergies   Allergen Reactions    Haldol [Haloperidol] Other (See Comments)     Dystonic reaction     Kiwi Hives    Red Dye #40 (Allura Red)     Seasonal Allergies Other (See Comments) and Rash     sneezing          Labs:     No results found for this or any previous visit (from the past 24 hours).         Psychiatric Examination:     /85 (BP Location: Left arm)   Pulse 79   Temp 97.8  F " "(36.6  C) (Temporal)   Resp 16   Wt 87.4 kg (192 lb 11.2 oz)   SpO2 100%   BMI 31.10 kg/m    Weight is 192 lbs 11.2 oz  Body mass index is 31.1 kg/m .    Weight over time:  Vitals:    05/25/25 0856   Weight: 87.4 kg (192 lb 11.2 oz)       Orthostatic Vitals       None              Cardiometabolic risk assessment. 05/19/25      Reviewed patient profile for cardiometabolic risk factors    Date taken /Value  REFERENCE RANGE   Abdominal Obesity  (Waist Circumference)   See nursing flowsheet Women >=35 in (88 cm)   Men >=40 in (102 cm)      Triglycerides  Triglycerides   Date Value Ref Range Status   05/18/2025 58 <150 mg/dL Final   06/23/2021 71 <150 mg/dL Final       >=150 mg/dL (1.7 mmol/L) or current treatment for elevated triglycerides   HDL cholesterol  HDL Cholesterol   Date Value Ref Range Status   06/23/2021 52 >49 mg/dL Final     Direct Measure HDL   Date Value Ref Range Status   05/18/2025 48 (L) >=50 mg/dL Final   ]   Women <50 mg/dL (1.3 mmol/L) in women or current treatment for low HDL cholesterol  Men <40 mg/dL (1 mmol/L) in men or current treatment for low HDL cholesterol     Fasting plasma glucose (FPG) Lab Results   Component Value Date    GLC 89 05/16/2025    GLC 95 06/23/2021      FPG >=100 mg/dL (5.6 mmol/L) or treatment for elevated blood glucose   Blood pressure  BP Readings from Last 3 Encounters:   05/27/25 126/85   05/16/25 (!) 136/94   04/30/25 101/68    Blood pressure >=130/85 mmHg or treatment for elevated blood pressure   Family History  See family history     Mental Status Exam:  Appearance: awake, alert, dressed in hospital scrubs, appeared as age stated, well groomed  Attitude:  cooperative  Eye Contact: good  Mood: \"tired\"  Affect:  mood congruent and intensity is heightened  Speech:  clear, coherent  Language: fluent and intact in English  Psychomotor, Gait, Musculoskeletal:  fidgeting and physical agitation  Throught Process:  linear and goal oriented  Associations:  no loose " "associations  Thought Content:  no evidence of psychotic thought and passive suicidal ideation present  Insight:  limited  Judgement:  limited  Oriented to:  ALANIS due to severe agitation  Attention Span and Concentration:  limited  Recent and Remote Memory:  fair  Fund of Knowledge:  appropriate           Precautions:     Behavioral Orders   Procedures    Assault precautions    Code 1 - Restrict to Unit    Routine Programming     As clinically indicated    Self Injury Precaution    Status 15     Every 15 minutes.    Suicide precautions: Suicide Risk: HIGH; Clinical rationale to override score: Exhibiting Suicidal/self-harm behaviors or thoughts     Patients on Suicide Precautions should have a Combination Diet ordered that includes a Diet selection(s) AND a Behavioral Tray selection for Safe Tray - with utensils, or Safe Tray - may use paper spoons @ RN station     Suicide Risk:   HIGH     Clinical rationale to override score::   Exhibiting Suicidal/self-harm behaviors or thoughts          Diagnoses:     Borderline personality disorder  Probable PTSD  Historical diagnoses of ADHD and ASD    Clinically Significant Risk Factors   # Obesity: Estimated body mass index is 31.1 kg/m  as calculated from the following:    Height as of 5/16/25: 1.676 m (5' 6\").    Weight as of this encounter: 87.4 kg (192 lb 11.2 oz).        # Financial/Environmental Concerns:              Assessment & Plan:     Assessment and hospital summary:  29 year old female.     Patient states that she has a history of PTSD. Patient has had problems with her mood since childhood. She states that she was treated as a teen and was given medications and had a couple of mental health admissions.     As an adult, patient was also diagnosed with Major Depression and NINFA. She has had 2 prior admissions for suicidal thoughts, both in April of 2025.     Patient states that since her most recent discharge she was doing reasonably well until yesterday. She states " that she went to her therapy appointment with a new therapist and was triggered when asked about her prior abuse. She left the appointment and later that day she was picked up by police at work due to concerns about her safety.     On interview today, patient was more cooperative. She continues to voice concerns about her coworkers. Reports significant history of trauma and chronic invalidation. Amenable to a dose increase in Seroquel. She has several psychosocial stressors in her life which are contributing to current presentation. She also is experiencing several symptoms consistent with PTSD and BPD.     Today's Changes:  None    Target psychiatric symptoms and interventions:  Continue Seroquel 300 mg at bedtime  Continue guanfacine 1 mg at bedtime  Continue Vitamin D3    Risks, benefits, and alternatives discussed at length with patient.     Acute Medical Problems and Treatments:  Acute medical concerns:  - No acute medical concerns    Pertinent labs/imagin/16 routine labs unremarkable except for mildly elevated CK and low Mg. Pt declining repeat labs at this time.      Behavioral/Psychological/Social:  - Encourage unit programming    Safety:  - Continue precautions as noted above  - Status 15 minute checks  - Line of sight monitoring in place    Legal Status: Patient is committed.     Disposition Plan   Reason for ongoing admission: poses an imminent risk to self and poses an imminent risk to others  Discharge location: Home with increased supports  Discharge Medications: not ordered  Follow-up Appointments: not scheduled    Entered by: Alicia Fernandez MD on 2025  at 5:03 PM

## 2025-05-28 PROCEDURE — 97150 GROUP THERAPEUTIC PROCEDURES: CPT | Mod: GO

## 2025-05-28 PROCEDURE — 99239 HOSP IP/OBS DSCHRG MGMT >30: CPT | Performed by: PSYCHIATRY & NEUROLOGY

## 2025-05-28 PROCEDURE — 250N000013 HC RX MED GY IP 250 OP 250 PS 637: Performed by: PSYCHIATRY & NEUROLOGY

## 2025-05-28 RX ORDER — QUETIAPINE FUMARATE 300 MG/1
300 TABLET, FILM COATED ORAL AT BEDTIME
Qty: 30 TABLET | Refills: 0 | Status: SHIPPED | OUTPATIENT
Start: 2025-05-28

## 2025-05-28 RX ORDER — PROPRANOLOL HYDROCHLORIDE 10 MG/1
10 TABLET ORAL DAILY PRN
Qty: 30 TABLET | Refills: 0 | Status: SHIPPED | OUTPATIENT
Start: 2025-05-28 | End: 2025-05-28

## 2025-05-28 RX ORDER — VITAMIN B COMPLEX
50 TABLET ORAL DAILY
Qty: 60 TABLET | Refills: 0 | Status: SHIPPED | OUTPATIENT
Start: 2025-05-28

## 2025-05-28 RX ORDER — GUANFACINE 1 MG/1
1 TABLET, EXTENDED RELEASE ORAL EVERY EVENING
Qty: 30 TABLET | Refills: 0 | Status: SHIPPED | OUTPATIENT
Start: 2025-05-28

## 2025-05-28 RX ORDER — HYDROXYZINE HYDROCHLORIDE 25 MG/1
25 TABLET, FILM COATED ORAL EVERY 4 HOURS PRN
Qty: 30 TABLET | Refills: 0 | Status: SHIPPED | OUTPATIENT
Start: 2025-05-28 | End: 2025-05-28

## 2025-05-28 RX ORDER — VITAMIN B COMPLEX
50 TABLET ORAL DAILY
Qty: 60 TABLET | Refills: 0 | Status: SHIPPED | OUTPATIENT
Start: 2025-05-28 | End: 2025-05-28

## 2025-05-28 RX ORDER — BENZTROPINE MESYLATE 2 MG/1
2 TABLET ORAL 2 TIMES DAILY PRN
Qty: 30 TABLET | Refills: 0 | Status: SHIPPED | OUTPATIENT
Start: 2025-05-28

## 2025-05-28 RX ORDER — BENZTROPINE MESYLATE 2 MG/1
2 TABLET ORAL 2 TIMES DAILY PRN
Qty: 30 TABLET | Refills: 0 | Status: SHIPPED | OUTPATIENT
Start: 2025-05-28 | End: 2025-05-28

## 2025-05-28 RX ORDER — PROPRANOLOL HYDROCHLORIDE 10 MG/1
10 TABLET ORAL DAILY PRN
Qty: 30 TABLET | Refills: 0 | Status: SHIPPED | OUTPATIENT
Start: 2025-05-28

## 2025-05-28 RX ORDER — GUANFACINE 1 MG/1
1 TABLET, EXTENDED RELEASE ORAL EVERY EVENING
Qty: 30 TABLET | Refills: 0 | Status: SHIPPED | OUTPATIENT
Start: 2025-05-28 | End: 2025-05-28

## 2025-05-28 RX ORDER — QUETIAPINE FUMARATE 300 MG/1
300 TABLET, FILM COATED ORAL AT BEDTIME
Qty: 30 TABLET | Refills: 0 | Status: SHIPPED | OUTPATIENT
Start: 2025-05-28 | End: 2025-05-28

## 2025-05-28 RX ORDER — ALBUTEROL SULFATE 90 UG/1
2 INHALANT RESPIRATORY (INHALATION) EVERY 6 HOURS PRN
Qty: 18 G | Refills: 3 | Status: SHIPPED | OUTPATIENT
Start: 2025-05-28

## 2025-05-28 RX ORDER — ALBUTEROL SULFATE 90 UG/1
2 INHALANT RESPIRATORY (INHALATION) EVERY 6 HOURS PRN
Qty: 18 G | Refills: 3 | Status: SHIPPED | OUTPATIENT
Start: 2025-05-28 | End: 2025-05-28

## 2025-05-28 RX ORDER — HYDROXYZINE HYDROCHLORIDE 25 MG/1
25 TABLET, FILM COATED ORAL EVERY 4 HOURS PRN
Qty: 30 TABLET | Refills: 0 | Status: SHIPPED | OUTPATIENT
Start: 2025-05-28

## 2025-05-28 RX ADMIN — Medication 50 MCG: at 09:04

## 2025-05-28 ASSESSMENT — ACTIVITIES OF DAILY LIVING (ADL)
ADLS_ACUITY_SCORE: 20
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
## 2025-05-28 NOTE — DISCHARGE INSTRUCTIONS
Behavioral Discharge Planning and Instructions    Summary: You were admitted on 5/17/2025  due to Manic Symptomology, agitation.  You were treated by Karin Biggs MD and discharged on 5/28/25 from Station 12 to Home    Main Diagnosis:   Borderline personality disorder  Probable PTSD  Historical diagnoses of ADHD and ASD    Commitment:   You were [facility] and you are being discharged on a Provisional Discharge Agreement which shall remain in effect for the duration of the Commitment. and Summers: You are also court ordered to take the medications that the doctor ordered for you.     Please keep in contact with Nai Cortez who will follow up with you in the community.     Medication Management/Psychiatry:  Name: ANAYELI Marina CNP   [facility]    Ph: [phone]  Appt: 5/29/25 at 11:30AM follow up - VIDEO visit     To set up DBT intake please call:  [phone] ([phone])   Or see list of options attached.       Patient Navigation Hub:   [facility] work to be your point-of-contact for trustworthy and compassionate care from Inpatient services to [facility] Programmatic Care. We will provide resources and communication to help guide you into programmatic care. Ultimately, our goal is to be the one-stop-shop of communication, coordination, and support for your journey to programmatic care.    Phone: [phone]    Information will be faxed to your outpatient providers to ensure a healthy continuity of care for you.     Attend all scheduled appointments with your outpatient providers. Call at least 24 hours in advance if you need to reschedule an appointment to ensure continued access to your outpatient providers.     Major Treatments, Procedures and Findings:  You were provided with: a psychiatric assessment, assessed for medical  stability, medication evaluation and/or management, group therapy, individual therapy, milieu management, and medical interventions    Symptoms to Report: feeling more aggressive, increased confusion, losing more sleep, mood getting worse, or thoughts of suicide    Early warning signs can include: increased depression or anxiety sleep disturbances increased thoughts or behaviors of suicide or self-harm  increased unusual thinking, such as paranoia or hearing voices    Safety and Wellness:  Take all medicines as directed.  Make no changes unless your doctor suggests them.      Follow treatment recommendations.  Refrain from alcohol and non-prescribed drugs.  Ask your support system to help you reduce your access to items that could harm yourself or others. If there is a concern for safety, call 911.    Resources:   Mental Health Crisis Resources  Throughout [place]: call **CRISIS (**532677)  Crisis Text Line: is available for free, 24/7 by texting [place] to 052180  Suicide Awareness Voices of Education (SAVE) (www.save.org): 068-288-OVRY (7044)  The National Suicide Prevention Lifeline is now: 988 Suicide and Crisis Lifeline. Call 988 anytime.  National [place] on Mental Illness (www.[place].negrita.org): 219.509.5669 or 168-089-9263.  Dfws4aglh: text the word LIFE to 23505 for immediate support and crisis intervention  Mental Health Consumer/Survivor Network of [place] (www.mhcsn.net): 522.931.2714 or 825-731-9006  Mental Health Association of [place] (www.mentalhealth.org): 572.771.1404 or 562-610-2871  Peer Support Connection [place] [facility] ([facility]) 1-969.635.4531 Available from 5pm - 9am (7 days a week/365 days a year)  Call or Text 988 or [facility] 1-719.457.4383    General Medication Instructions:   See your medication sheet(s) for instructions.   Take all medicines as directed.  Make no changes unless your doctor suggests them.   Go to all your doctor visits.  Be sure to have all your required lab tests. This way, your medicines  can be refilled on time.  Do not use any drugs not prescribed by your doctor.  Avoid alcohol.    Advance Directives:   Scanned document on file with [facility]? No scanned doc  Is document scanned? Pt states no documents  Honoring Choices Your Rights Handout: Informed and given  Was more information offered? Pt declined    The Treatment team has appreciated the opportunity to work with you. If you have any questions or concerns about your recent admission, you can contact the unit which can receive your call 24 hours a day, 7 days a week. They will be able to get in touch with a Provider if needed. The unit number is [phone] .

## 2025-05-28 NOTE — PLAN OF CARE
Problem: Suicide Risk  Goal: Absence of Self-Harm  Intervention: Assess Risk to Self and Maintain Safety  Recent Flowsheet Documentation  Taken 5/28/2025 1018 by Lizzette Monk RN  Behavior Management:   impulse control promoted   boundaries reinforced  Self-Harm Prevention: environment modified for self-harm risk   Goal Outcome Evaluation:    Plan of Care Reviewed With: patient      Patient alert and ambulatory. Patient reported that she's upset because she was not able to sleep last night d/t the noise outside her room. She denied pain and all mental health symptoms. Offered ear buds and sound machine but patient declined. Patient stayed in her room most of the time.     Discharge order in place. AVS and discharge meds discussed with patient. Patient's prescription was sent to [facility]. Patient's belongings given to patient. Patient left the unit ambulatory, in fair condition. Patient went home by cab.

## 2025-05-28 NOTE — PLAN OF CARE
Team Note Due:  Monday    Assessment/Intervention/Current Symtoms and Care Coordination:  Chart review and team     Per team will discharge today  I met with pt and reviewed provisional discharge agreement. Her commitment CM signed this.    I called to schedule discharge follow up with her [facility] psych tomorrow 5/29 at 11:30am virtual.  I provided DBT treatment options for her to follow up with.   Her CM will call her when she leaves as well.    I completed internal referral tracker.     Will fax PD and COS once she discharges.     Discharge Plan or Goal:  Home with programming  Will need cab home     Barriers to Discharge:  Symptoms     Referral Status:  TBD     Legal Status:  Commitment, no tamez   [facility]  File #: [id number]    Contacts (include NATHALIE status):  Medication Management/Psychiatry:  Name: ANAYELI Marina [facility]   [facility]    Phone Number: [phone]      MHCM//ACT Team:  Name: Angelina Calderon [facility]    Number: [phone]     Ramin CM: Nai Cortez [facility]    Upcoming Meetings and Dates/Important Information and next steps:

## 2025-05-28 NOTE — PLAN OF CARE
"  Problem: Suicidal Behavior  Goal: Suicidal Behavior is Absent or Managed     Outcome: Progressing  Intervention: Provide Immediate and Ongoing Protective Physical Environment   Goal Outcome Evaluation:    Plan of Care Reviewed With: patient       Pt was calm and cooperative to approach. Pt spent most of his evening shift out in the lounge. Pt was observed playing puzzle with selected peers. Pt denies pain and psych symptoms including SI, HI, SIB, AVH, anxiety and paranoia. Pt reported her mood as \" fine \". patient has an appropriate affect. Pt eat meals with good appetite. She also hydrated well. Pt has good hygiene. No prn medications needed this shift. No physical or behavioral concern reported by pt or noticed by writer.          "

## 2025-05-28 NOTE — PLAN OF CARE
BEH IP Unit Acuity Rating Score (UARS)  Patient is given one point for every criteria they meet.    CRITERIA SCORING   On a 72 hour hold, court hold, committed, stay of commitment, or revocation. 1    Patient LOS on BEH unit exceeds 20 days. 0  LOS: 11   Patient under guardianship, 55+, otherwise medically complex, or under age 11. 0   Suicide ideation without relief of precipitating factors. 0   Current plan for suicide. 0   Current plan for homicide. 0   Imminent risk or actual attempt to seriously harm another without relief of factors precipitating the attempt. 0   Severe dysfunction in daily living (ex: complete neglect for self care, extreme disruption in vegetative function, extreme deterioration in social interactions). 0   Recent (last 7 days) or current physical aggression in the ED or on unit. 0   Restraints or seclusion episode in past 72 hours. 0   Recent (last 7 days) or current verbal aggression, agitation, yelling, etc., while in the ED or unit. 0   Active psychosis. 0   Need for constant or near constant redirection (from leaving, from others, etc).  0   Intrusive or disruptive behaviors. 0   Patient requires 3 or more hours of individualized nursing care per 8-hour shift (i.e. for ADLs, meds, therapeutic interventions). 0   TOTAL 1

## 2025-05-28 NOTE — PLAN OF CARE
"  Rehab Group    Start time: 1030  End time: 1200  Patient time total: 75 minutes    attended full group    #4 attended   Group Type: OT Clinic   Group Topic Covered: balanced lifestyle, coping skills, healthy leisure time, mindfulness, and relaxation      Group Session Detail:    Intervention: Pt participated in a OT Clinic group to facilitate coping skills exploration and creative expression through personally meaningful activities, and to encourage utilization of these healthy coping skills to promote overall health and wellness. Group included clinical observation of social, cognitive and kinesthetic performance skills to inform treatment and safe discharge planning.    Cognition: Goal directed, Follows through with task        Mood/Affect: Pleasant       Plan: Patient encouraged to maintain attendance for continued ongoing support in working towards occupational therapy goals to support overall treatment/care.        Patient Detail:    Joined with invitation from writer. Requested to work on a beading project similar to other peers in the group. Stated \"I've learned that I'm pretty good at beading\". Was briefly social off and on with writer and other peers in the group, chiming in on conversations. Shared feeling good about leaving the hospital and wanting to go home stating \"I'm just over it, I'm so over it\". Assisted another peer in finding beads when they were having trouble finding the one they wanted. Pleasant and positive participant in group for duration of group. Assisted writer in cleanup of beading supplies and thanked for their time.       [id number] OT Group (2 or more in attendance)    Patient Active Problem List   Diagnosis    Suicidal ideation    Complex posttraumatic stress disorder    Borderline personality disorder (H)    Paranoia (H)    Bipolar affective disorder in remission    Homicidal ideation    Schizoaffective disorder, depressive type (H)    Psychosis (H)        "

## 2025-05-28 NOTE — PLAN OF CARE
Goal Outcome Evaluation:  Problem: Sleep Disturbance  Goal: Adequate Sleep/Rest  Outcome: Progressing         Pt in bed at beginning of shift, breathing quiet and unlabored. Pt slept through shift. Pt slept 6 hours.      No pt complaints or concerns at this time.      No PRNs given. Will continue to monitor.

## 2025-05-28 NOTE — CARE PLAN
Patient Active Problem List   Diagnosis    Suicidal ideation    Complex posttraumatic stress disorder    Borderline personality disorder (H)    Paranoia (H)    Bipolar affective disorder in remission    Homicidal ideation    Schizoaffective disorder, depressive type (H)    Psychosis (H)     Rehab Group    Start time: 1330  End time: 1415  Patient time total: 30 minutes    Attended partial group     #3 attended   Group Type: OT Clinic   Group Topic Covered: activity therapy and coping skills       Group Session Detail:  OT clinic group offers a hands-on opportunity to promote problem solving skills, improve attention, provide opportunities for new learning, foster leisure and relaxation, and promote overall wellbeing and socialization.     Patient Response/Contribution:  positive affect, cooperative with task, and organized     Patient Detail:    Continuation from earlier group.  Pt was pleased with the opportunity to complete project.  Social and interactive with a variety conversation topics.      [id number] OT Group (2 or more in attendance)    
   05/18/25 0300   Observation Type   Harm Category unmindful harm   Unmindful Harm intrusive - violates personal space or privacy of others   Level of Special Monitoring line-of-sight     
   05/18/25 1100   Observation Type   Harm Category none   No Harm Category Noted at This Time patient in their room, minimal interaction   Level of Special Monitoring line-of-sight     
   05/19/25 1506   Observation Type   Harm Category suicide/self-harm;aggressive harm   Aggressive Harm impulsive - inappropriate non-aggressive interactions with others;anxious/Irritable   unresponsive to staff interaction or medication;agitated (persistent)   unresponsive to staff interaction or medication;agitated (escalating) - upset/angry and unresponsive to interventions   Level of Special Monitoring line-of-sight     
   05/19/25 6811   Observation Type   Harm Category other harm (comment)   Other patient states wanting to harm herself but agrees to contract to be safe   Level of Special Monitoring line-of-sight     
   05/19/25 7137   Observation Type   Harm Category other harm (comment)   Other patient did talk about wanting to harm her self but did contract to be safe   Level of Special Monitoring line-of-sight     
   05/20/25 0328   Observation Type   Harm Category none   No Harm Category Noted at This Time patient sleeping, no interaction   Level of Special Monitoring none     
   05/21/25 1359   Observation Type   Harm Category suicide/self-harm   Suicide/Self-Harm high risk for suicide (provider)   Indicated by provider order;high risk for non-suicidal self-injury   provider order   Level of Special Monitoring line-of-sight     
   05/21/25 2033   Observation Type   Harm Category suicide/self-harm   Level of Special Monitoring line-of-sight     
   05/23/25 2212   Observation Type   Harm Category none   No Harm Category Noted at This Time no applicable harm categories or justifications   Level of Special Monitoring none     
   05/24/25 1101   Observation Type   Harm Category none   No Harm Category Noted at This Time no applicable harm categories or justifications   Level of Special Monitoring none     
   05/25/25 1102   Observation Type   Harm Category none   No Harm Category Noted at This Time no applicable harm categories or justifications   Level of Special Monitoring none     
   05/25/25 1610   Observation Type   Harm Category none   No Harm Category Noted at This Time no applicable harm categories or justifications   Level of Special Monitoring none     
   05/26/25 1100   Observation Type   Harm Category none   No Harm Category Noted at This Time no applicable harm categories or justifications   Level of Special Monitoring none     
   05/27/25 1144   Observation Type   Harm Category none   No Harm Category Noted at This Time no applicable harm categories or justifications   Level of Special Monitoring none     
   05/28/25 0307   Observation Type   Harm Category none   No Harm Category Noted at This Time patient sleeping, no interaction   Level of Special Monitoring none     
  Rehab Group    Start time: 1115  End time: 1200  Patient time total: 45 minutes    [id number] OT Group (2 or more in attendance)     #4 attended   Group Type:     Occupational Therapy Exercise Group   Group Topic Covered:     -Wellness    -Physical Activity       -Stress Management   Group Session Detail   The purpose of occupational therapy exercise group is to explore the benefits of active engagement in physical activity for overall mind-body wellness.  Various full body exercises/stretches were demonstrated, and movements were put together for cardio workout with adaptations for different fitness levels.  Discussion regarding the various benefits of active engagement, including such as: reduction in muscle tension, improvement in blood circulation, improved/stabilized mood, reduction in feelings of stress/anxiety, and improved overall well-being.   Patient Response/Contribution:       Pt was actively engaged in gross motor exercises to promote physical activity, boost mood, increase concentration and alertness. Pt demonstrated adequate cognition/concentration for following along with exercises, and took a break as needed the last 10 minutes of the group.  Friendly interaction with writer - when asked about exercise, pt states she gets exercise at work, that she's on her feet 12 hours a day.  When writer commented 12 hours is a lot, that that is a long shift, pt seemed surprised, and asked what is a normal shift.       Patient Active Problem List   Diagnosis    Suicidal ideation    Complex posttraumatic stress disorder    Borderline personality disorder (H)    Paranoia (H)    Bipolar affective disorder in remission    Homicidal ideation    Schizoaffective disorder, depressive type (H)    Psychosis (H)       
  Rehab Group    Start time: 1115  End time: 1200  Patient time total: 45 minutes    attended full group    #5 attended   Group Type: OT Clinic   Group Topic Covered: activity therapy and coping skills     Group Session Detail:    OT clinic group offers a hands-on opportunity to promote problem solving skills, improve attention, provide opportunities for new learning, foster leisure and relaxation, and promote overall wellbeing and socialization.     Patient Response/Contribution:  positive affect, cooperative with task, organized, attentive, and actively engaged     Patient Detail:    Congruent and engaged. Polite to writer and peers. Demonstrated consistent performance skills as observed on previous dates - chose to work on a beading project today. Independent with all simple tasks. Observed with calming effect while sorting beads and later completing project.   Social about past schooling, having completed courses for medical school, though choosing not to go forward.  Pt also discussed having a linguistics degree and speaking several languages.  Brings up her desire to make sure children are safe, a passion she explains she will never lose sight of.        [id number] OT Group (2 or more in attendance)      Patient Active Problem List   Diagnosis    Suicidal ideation    Complex posttraumatic stress disorder    Borderline personality disorder (H)    Paranoia (H)    Bipolar affective disorder in remission    Homicidal ideation    Schizoaffective disorder, depressive type (H)    Psychosis (H)       
  Rehab Group    Start time: 1545  End time: 1630  Patient time total: 33 minutes  (Pt was billed for only one unit as they were alone in group for part of the session.)    attended partial group    #2 attended   Group Type: occupational therapy   Group Topic Covered: balanced lifestyle, coping skills, emotional regulation, healthy leisure time, Physical activity, self-care, self-esteem, and social skills       Group Session Detail:    Large Motor Stretching and Social Questions for concentration, large motor movement, an opportunity to share thoughts/feelings, building self-awareness and insight, coping with stress, mood stabilization, reality-based activity, follow through, improving self-esteem, healthy leisure exploration and expanding social skills.       Patient Response/Contribution:  socially appropriate and actively engaged       Patient Detail:  Pt was pleasant and recalled working with this writer previously.  Pt was polite and opened up about growing up in the foster care system.  Pt verbalized a very positive outlook, despite the sundry challenges she has faced.  Pt took an active role with all of the exercises as well as the social questions.  She was also patient and kind towards a peer who was in group for only part of the session.          [id number] OT Group (2 or more in attendance)      Patient Active Problem List   Diagnosis    Suicidal ideation    Complex posttraumatic stress disorder    Borderline personality disorder (H)    Paranoia (H)    Bipolar affective disorder in remission    Homicidal ideation    Schizoaffective disorder, depressive type (H)    Psychosis (H)       
  Rehab Group    Start time: 1815  End time: 1900  Patient time total: 45 minutes    attended full group    #6 attended   Group Type: recreation   Group Topic Covered: healthy leisure time     Group Session Detail:  Leisure group, quick thinking, word game   Patient Response/Contribution:  positive affect, cooperative with task, organized, socially appropriate, and actively engaged     Patient Detail:    Demonstrated ability to think quickly and come up with creative answers to score points.  Laughing/joking appropriately with peers.  Enjoyed elaborating, and sharing further information/knowledge that were elicited by some answers.    Focused entire session, and at the end, thanked writer for group, and commented it was fun.      [id number] OT Group (2 or more in attendance)        Patient Active Problem List   Diagnosis    Suicidal ideation    Complex posttraumatic stress disorder    Borderline personality disorder (H)    Paranoia (H)    Bipolar affective disorder in remission    Homicidal ideation    Schizoaffective disorder, depressive type (H)    Psychosis (H)       
"  Rehab Group    Start time: 1030  End time: 1115  Patient time total: 45 minutes    attended full group    #6 attended   Group Type: general health and coping   Group Topic Covered: coping skills and self-esteem     Group Session Detail:  Mental health management group focused on a further exploration of how pt is feeling based on responses from the questions/worksheet prompts.         Patient Response/Contribution:  positive affect, cooperative with task, organized, attentive, actively engaged, and engaged socially when prompted     Patient Detail:    Pt discussed how since age 8 she's had feelings of not wanting to be alive.  Denies active SI right now, but calmly explained that nothing brings her rio or excitement.  Pt feels she is only in this world to help others.  Pt discussed past accomplishments, though doesn't take much pride in those.  Pt does take an interest in global issues, answering questions that \"President Patricia should remember that he's a public servant\" and \"I hope the [place] will have the happiness that our  counterparts have\".      [id number] OT Group (2 or more in attendance)    Patient Active Problem List   Diagnosis    Suicidal ideation    Complex posttraumatic stress disorder    Borderline personality disorder (H)    Paranoia (H)    Bipolar affective disorder in remission    Homicidal ideation    Schizoaffective disorder, depressive type (H)    Psychosis (H)       "
"Met with pt individually in the afternoon as no pts were interested in group, or were sleeping/etc.  Pleasant mood, was coloring on her cardboard meal tray \"I figure they just throw these out anyway\".  Complimented pt on her work, offered her time for activity, other art materials available in group, or items to use outside of group for structure - markers, fuzzy posters, paint by sticker, etc.  Pt shared she really doesn't like markers, prefers crayons (was given new crayons), and picked out large coloring sheets (declined other options).    When asked what she likes to do in her free time, pt mentioned again she works 12 hours a day, and she doesn't do much else, \"I don't watch tv, I don't go out, I don't sit on my phone, I don't do anything but just sit there when I'm at home.  That's going to change, I'm not giving them 12 hours anymore\", and discussed the importance of figuring out things she enjoys to increase structure.  "
For information on Fall & Injury Prevention, visit: [email or web address] OR  [email or web address] OR  https://www.cdc.gov/steadi/patient.html

## 2025-05-29 ENCOUNTER — PATIENT OUTREACH (OUTPATIENT)
Dept: CARE COORDINATION | Facility: CLINIC | Age: 30
End: 2025-05-29
Payer: COMMERCIAL

## 2025-05-29 NOTE — PROGRESS NOTES
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 We reviewed appt's together, she has one tomorrow. No questions/concerns or needs at this time.  How are your symptoms? (Red Flag symptoms escalate to triage hotline per guidelines): Improved  Do you know how to contact your clinic care team if you have future questions or changes to your health status? : Yes  Does the patient have their discharge instructions? : Yes  Does the patient have questions regarding their discharge instructions? : No  Were you started on any new medications or were there changes to any of your previous medications? : Yes  Does the patient have all of their medications?: Yes  Do you have questions regarding any of your medications? : No  Do you have all of your needed medical supplies or equipment (DME)?  (i.e. oxygen tank, CPAP, cane, etc.): Yes         Post-op (Clinicians Only)  Did the patient have surgery or a procedure: No        Follow up Plan     Please keep in contact with Nai Cortez who will follow up with you in the community.      Medication Management/Psychiatry:  Name: ANAYELI Marina CNP   [facility]    Ph: [phone]  Appt:  5/29/25 at 11:30AM follow up - VIDEO visit      Discharge Follow-Up  Discharge follow up appointment scheduled in alignment with recommended follow up timeframe or Transitions of Risk Category? (Low = within 30 days; Moderate= within 14 days; High= within 7 days): Yes  Discharge Follow Up Appointment Date: 05/30/25    Future Appointments   Date Time Provider Department Center   5/30/2025 11:00 AM Cyndi Johnson APRN CNP [facility]   5/30/2025 11:30 AM Cyndi Johnson APRN CNP [facility]   6/30/2025 11:30 AM Cyndi Johnson APRN CNP [facility]       Outpatient Plan as outlined on AVS reviewed with patient.    For any urgent concerns, please contact our 24 hour nurse triage line: [phone] ([phone])       OCHOA Rios

## 2025-06-03 ENCOUNTER — MYC MEDICAL ADVICE (OUTPATIENT)
Dept: PEDIATRICS | Facility: CLINIC | Age: 30
End: 2025-06-03
Payer: COMMERCIAL

## 2025-06-08 ENCOUNTER — TELEPHONE (OUTPATIENT)
Dept: BEHAVIORAL HEALTH | Facility: CLINIC | Age: 30
End: 2025-06-08

## 2025-06-08 ENCOUNTER — HOSPITAL ENCOUNTER (EMERGENCY)
Facility: CLINIC | Age: 30
Discharge: OTHER INSTITUTION | End: 2025-06-09
Attending: EMERGENCY MEDICINE | Admitting: EMERGENCY MEDICINE
Payer: COMMERCIAL

## 2025-06-08 ENCOUNTER — TELEPHONE (OUTPATIENT)
Dept: BEHAVIORAL HEALTH | Facility: CLINIC | Age: 30
End: 2025-06-08
Payer: COMMERCIAL

## 2025-06-08 DIAGNOSIS — F23 ACUTE PSYCHOSIS (H): ICD-10-CM

## 2025-06-08 LAB
ALBUMIN UR-MCNC: NEGATIVE MG/DL
ALCOHOL BREATH TEST: 0 (ref 0–0.01)
AMPHETAMINES UR QL SCN: ABNORMAL
APPEARANCE UR: ABNORMAL
BACTERIA #/AREA URNS HPF: ABNORMAL /HPF
BARBITURATES UR QL SCN: ABNORMAL
BENZODIAZ UR QL SCN: ABNORMAL
BILIRUB UR QL STRIP: NEGATIVE
BZE UR QL SCN: ABNORMAL
CANNABINOIDS UR QL SCN: ABNORMAL
CAOX CRY #/AREA URNS HPF: ABNORMAL /HPF
COLOR UR AUTO: ABNORMAL
FENTANYL UR QL: ABNORMAL
GLUCOSE UR STRIP-MCNC: NEGATIVE MG/DL
HCG UR QL: NEGATIVE
HGB UR QL STRIP: NEGATIVE
HYALINE CASTS: 2 /LPF
KETONES UR STRIP-MCNC: ABNORMAL MG/DL
LEUKOCYTE ESTERASE UR QL STRIP: ABNORMAL
MUCOUS THREADS #/AREA URNS LPF: PRESENT /LPF
NITRATE UR QL: NEGATIVE
OPIATES UR QL SCN: ABNORMAL
PCP QUAL URINE (ROCHE): ABNORMAL
PH UR STRIP: 5.5 [PH] (ref 5–7)
RBC URINE: 6 /HPF
SP GR UR STRIP: 1.01 (ref 1–1.03)
SQUAMOUS EPITHELIAL: 8 /HPF
UROBILINOGEN UR STRIP-MCNC: NORMAL MG/DL
WBC URINE: 18 /HPF

## 2025-06-08 PROCEDURE — 81001 URINALYSIS AUTO W/SCOPE: CPT | Performed by: EMERGENCY MEDICINE

## 2025-06-08 PROCEDURE — 99285 EMERGENCY DEPT VISIT HI MDM: CPT | Performed by: EMERGENCY MEDICINE

## 2025-06-08 PROCEDURE — 250N000013 HC RX MED GY IP 250 OP 250 PS 637: Performed by: EMERGENCY MEDICINE

## 2025-06-08 PROCEDURE — 81025 URINE PREGNANCY TEST: CPT | Performed by: EMERGENCY MEDICINE

## 2025-06-08 PROCEDURE — 87086 URINE CULTURE/COLONY COUNT: CPT | Performed by: EMERGENCY MEDICINE

## 2025-06-08 PROCEDURE — 80307 DRUG TEST PRSMV CHEM ANLYZR: CPT | Performed by: EMERGENCY MEDICINE

## 2025-06-08 RX ORDER — QUETIAPINE FUMARATE 100 MG/1
300 TABLET, FILM COATED ORAL ONCE
Status: COMPLETED | OUTPATIENT
Start: 2025-06-08 | End: 2025-06-08

## 2025-06-08 RX ADMIN — QUETIAPINE FUMARATE 300 MG: 100 TABLET ORAL at 06:11

## 2025-06-08 ASSESSMENT — ACTIVITIES OF DAILY LIVING (ADL)
ADLS_ACUITY_SCORE: 46

## 2025-06-08 ASSESSMENT — COLUMBIA-SUICIDE SEVERITY RATING SCALE - C-SSRS
IS THE PATIENT NOT ABLE TO COMPLETE C-SSRS: REFUSES TO ANSWER
6. HAVE YOU EVER DONE ANYTHING, STARTED TO DO ANYTHING, OR PREPARED TO DO ANYTHING TO END YOUR LIFE?: YES
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
## 2025-06-08 NOTE — PLAN OF CARE
Lenore LUZMA Bergeronz  June 8, 2025  Plan of Care Hand-off Note     Patient Recommended Care Path: inpatient mental health    Clinical Substantiation:  After therapeutic assessment, intervention and aftercare planning by ED care team and LM and in consultation with attending provider, the patient's circumstances and mental state were appropriate for inpatient mental health hospitalization.  At this time the pt is presenting as an acute risk to self or others due to the following factors: patient is actively delusional at time of assessment.  She presents as depressed, somewhat fearful and paranoid. She is unable to make good decisions for herself at time of assessment.  While patient agrees to stay in hospital at time of assessment, she is actively delusional at present.  She is deemed holdable if she wants to leave at this time.    Goals for crisis stabilization:  Reduction of patient symptoms; patient safety    Next steps for Care Team:  Patient is on [facility] wait list    Treatment Objectives Addressed:  assessing safety, exploring obstacles to safety in the community    Therapeutic Interventions:  Engaged in guided discovery, explored patient's perspectives and helped expand them through socratic dialogue., Explored barriers to safe discharge    Has a specific means been identified for suicidal.homicide actions: no   If yes, describe:  NA  Explain action steps toward mitigation:  reportedly has therapist, medications  Document completion of mitigation action:  See chart  The follow up action still needed prior to discharge:  Patient is on [facility] wait list     Patient coping skills attempted to reduce the crisis:  she reportedly has a therapist and medications          Severe psychiatric, behavioral or other comorbid conditions are appropriate for management at inpatient mental health as indicated by at least one of the following: Impaired impulse control, judgement, or insight  Severe dysfunction in daily living is  present as indicated by at least one of the following: Other evidence of severe dysfunction  Situation and expectations are appropriate for inpatient care: Voluntary treatment at lower level of care is not feasible  Inpatient mental health services are necessary to meet patient needs and at least one of the following: Specific condition related to admission diagnosis is present and judged likely to further improve at proposed level of care, Specific condition related to admission diagnosis is present and judged likely to deteriorate in absence of treatment at proposed level of care      Collateral contact information:   Unable to reach emergency contact     Legal Status: Voluntary/Patient has signed consent for treatment (writer believes patient to be holdable at this time if she decides she wants to leave)                                                                                                                                       Psychiatry Consult: No    Crissy Lindo, LESIASW

## 2025-06-08 NOTE — TELEPHONE ENCOUNTER
S: [facility] [place] , DEC  Crissy calling at 2:01 AM about 29 year old/female presenting with acute psychosis.      B: Pt arrived via EMS. Presenting problem, stressors: Pt is presenting with acute psychosis. Pt is presenting with delusions and paranoia. Pt reports she can contract for safety in ED.     Pt affect in ED: Constricted  Pt Dx: Schizoaffective Disorder and PTSD  Previous IPMH hx? Yes: May 2025, [facility]  Pt denies SI   Hx of suicide attempt? Yes: Unsure details  Pt denies SIB  Pt denies HI   Pt unable to be assessed for hallucinations due to current presentation .   Pt RARS Score: 2    Hx of aggression/violence, sexual offenses, legal concerns, Epic care plan? describe: No  Current concerns for aggression this visit? No  Does pt have a history of Civil Commitment? Yes, currently on [place] civil commitment  Is Pt their own guardian? Yes    Pt is prescribed medication. Is patient medication compliant? Yes  Pt endorses OP services: Medication Management and Therapist  CD concerns: Actively using/consuming Unsure- vapping?  Acute or chronic medical concerns: No- Medically cleared  Does Pt present with specific needs, assistive devices, or exclusionary criteria? None      Pt is ambulatory  Pt is able to perform ADLs independently      A: Pt to be reviewed for [facility] admission. Pt is voluntary at this time, if status changes provider has confirmed that this Pt is holdable.   Preferred placement: [facility]    COVID Symptoms: No  If yes, COVID test required   Utox: Ordered, not yet collected   CMP: N/A  CBC: N/A  HCG: Ordered, not yet collected    R: Patient cleared and ready for behavioral bed placement: Yes  Pt placed on IP worklist? Yes    Does Patient need a Transfer Center request created? No, Pt is located within [facility] ED, [facility] ED, or [place] ED

## 2025-06-08 NOTE — ED PROVIDER NOTES
ED Provider Note  [facility]      History     Chief Complaint   Patient presents with    Altered Mental Status     Patient brought in by EMS after calling and stating that she knew about a terrorist incident, now denies claims. Patient unable to recall why they are here and how they got here. Denies current SI/HI.     HPI  Lenore Suggs is a 29 year old female with history of C-PTSD, bipolar affective disorder, paranoia, and suicidal ideation who presents to the emergency department for an altered mental status. Patient called EMS stating that she knew about a terrorist incident but was unable to elaborate to police. On the way to the emergency department, patient stated she was having suicidal ideation. In the emergency department she states that she discovered a secret sex ring scandal that was being hidden by numbers. She denied any suicidal ideation or homicidal ideation in the emergency department.  Patient reports she would like to be tested for infection.  After further inquiry she was able to articulate that she is worried about a urinary tract infection and that she has been having burning when she pees.    Per chart review: The patient was [facility] to [facility] from 5/17-5/28, under an ongoing civil commitment, due to Manic Symptomology, agitation.     Past Medical History  Past Medical History:   Diagnosis Date    Asthma     Bipolar affective disorder (H)     Borderline personality disorder (H)     Outbursts of anger     Vitamin D deficiency      Past Surgical History:   Procedure Laterality Date    ENT SURGERY  2005    Unknown object in ear     albuterol (PROAIR HFA/PROVENTIL HFA/VENTOLIN HFA) 108 (90 Base) MCG/ACT inhaler  Amphet-Dextroamphet 3-Bead ER 25 MG CP24  [START ON 6/27/2025] Amphet-Dextroamphet 3-Bead ER 25 MG CP24  benztropine (COGENTIN) 2 MG tablet  guanFACINE (INTUNIV) 1 MG TB24 24 hr tablet  hydrOXYzine HCl (ATARAX) 25 MG tablet  propranolol (INDERAL) 10  MG tablet  QUEtiapine (SEROQUEL) 300 MG tablet  Vitamin D3 (CHOLECALCIFEROL) 25 mcg (1000 units) tablet      Allergies   Allergen Reactions    Haldol [Haloperidol] Other (See Comments)     Dystonic reaction     Kiwi Hives    Red Dye #40 (Allura Red)     Seasonal Allergies Other (See Comments) and Rash     sneezing     Family History  Family History   Problem Relation Age of Onset    Mental Illness Mother     Paranoid behavior Mother     Other Cancer Mother         Bio-mother, surgery on stomach cancer    No Known Problems Father      Social History   Social History     Tobacco Use    Smoking status: Passive Smoke Exposure - Never Smoker    Smokeless tobacco: Never   Vaping Use    Vaping status: Former    Substances: [facility]   Substance Use Topics    Alcohol use: Not Currently     Comment: Alcohol allergy    Drug use: Never      A medically appropriate review of systems was performed with pertinent positives and negatives noted in the HPI, and all other systems negative.    Physical Exam   BP: 128/88  Pulse: 97  Temp:  (Refused at this time)  Resp: 18  SpO2: 98 %  Physical Exam  Vitals and nursing note reviewed.   Constitutional:       General: She is not in acute distress.     Appearance: She is well-developed. She is not ill-appearing or diaphoretic.   HENT:      Head: Normocephalic and atraumatic.      Nose: Nose normal.      Mouth/Throat:      Mouth: Mucous membranes are moist.   Eyes:      General: No scleral icterus.     Conjunctiva/sclera: Conjunctivae normal.   Cardiovascular:      Rate and Rhythm: Normal rate.   Pulmonary:      Effort: Pulmonary effort is normal. No respiratory distress.   Abdominal:      General: There is no distension.   Musculoskeletal:         General: No deformity or signs of injury. Normal range of motion.      Cervical back: Normal range of motion and neck supple.   Skin:     General: Skin is warm and dry.      Coloration: Skin is not jaundiced or pale.      Findings: No rash.    Neurological:      Mental Status: She is alert and oriented to person, place, and time.   Psychiatric:         Attention and Perception: Attention normal.         Speech: Speech is rapid and pressured and tangential.         Behavior: Behavior normal. Behavior is cooperative.           ED Course, Procedures, & Data      Procedures                Results for orders placed or performed during the hospital encounter of 06/08/25   HCG qualitative urine (UPT)     Status: Normal   Result Value Ref Range    hCG Urine Qualitative Negative Negative   UA with Microscopic reflex to Culture     Status: Abnormal    Specimen: Urine, NOS   Result Value Ref Range    Color Urine Light Yellow Colorless, Straw, Light Yellow, Yellow    Appearance Urine Slightly Cloudy (A) Clear    Glucose Urine Negative Negative mg/dL    Bilirubin Urine Negative Negative    Ketones Urine Trace (A) Negative mg/dL    Specific Gravity Urine 1.010 1.003 - 1.035    Blood Urine Negative Negative    pH Urine 5.5 5.0 - 7.0    Protein Albumin Urine Negative Negative mg/dL    Urobilinogen Urine Normal Normal mg/dL    Nitrite Urine Negative Negative    Leukocyte Esterase Urine Moderate (A) Negative    Bacteria Urine Moderate (A) None Seen /HPF    Mucus Urine Present (A) None Seen /LPF    Calcium Oxalate Crystals Urine Few (A) None Seen /HPF    RBC Urine 6 (H) <=2 /HPF    WBC Urine 18 (H) <=5 /HPF    Squamous Epithelials Urine 8 (H) <=1 /HPF    Hyaline Casts Urine 2 <=2 /LPF    Narrative    Urine Culture ordered based on laboratory criteria   Urine Drug Screen Panel     Status: Abnormal   Result Value Ref Range    Amphetamines Urine Screen Positive (A) Screen Negative    Barbituates Urine Screen Negative Screen Negative    Benzodiazepine Urine Screen Negative Screen Negative    Cannabinoids Urine Screen Positive (A) Screen Negative    Cocaine Urine Screen Negative Screen Negative    Fentanyl Qual Urine Screen Negative Screen Negative    Opiates Urine Screen  Negative Screen Negative    PCP Urine Screen Negative Screen Negative   Alcohol breath test POCT     Status: Normal   Result Value Ref Range    Alcohol Breath Test 0.000 0.00 - 0.01   Urine Drug Screen     Status: Abnormal    Narrative    The following orders were created for panel order Urine Drug Screen.  Procedure                               Abnormality         Status                     ---------                               -----------         ------                     Urine Drug Screen Panel[[id number]]     Abnormal            Final result                 Please view results for these tests on the individual orders.     Medications - No data to display  Labs Ordered and Resulted from Time of ED Arrival to Time of ED Departure   ROUTINE UA WITH MICROSCOPIC REFLEX TO CULTURE - Abnormal       Result Value    Color Urine Light Yellow      Appearance Urine Slightly Cloudy (*)     Glucose Urine Negative      Bilirubin Urine Negative      Ketones Urine Trace (*)     Specific Gravity Urine 1.010      Blood Urine Negative      pH Urine 5.5      Protein Albumin Urine Negative      Urobilinogen Urine Normal      Nitrite Urine Negative      Leukocyte Esterase Urine Moderate (*)     Bacteria Urine Moderate (*)     Mucus Urine Present (*)     Calcium Oxalate Crystals Urine Few (*)     RBC Urine 6 (*)     WBC Urine 18 (*)     Squamous Epithelials Urine 8 (*)     Hyaline Casts Urine 2     URINE DRUG SCREEN PANEL - Abnormal    Amphetamines Urine Screen Positive (*)     Barbituates Urine Screen Negative      Benzodiazepine Urine Screen Negative      Cannabinoids Urine Screen Positive (*)     Cocaine Urine Screen Negative      Fentanyl Qual Urine Screen Negative      Opiates Urine Screen Negative      PCP Urine Screen Negative     HCG QUALITATIVE URINE - Normal    hCG Urine Qualitative Negative     ALCOHOL BREATH TEST POCT - Normal    Alcohol Breath Test 0.000     URINE CULTURE     No orders to display               Assessment & Plan    DEC  evaluated the patient and recommended she be admitted inpatient for management of acute psychosis.  Patient is voluntary and cooperative.  She would like to be tested for UTI.    Alcohol breath test negative.  Urine drug screen positive for amphetamines and cannabinoids.  Urinalysis with moderate leukocyte Estrace nitrite negative.  Moderate bacteria but also 8 squamous epithelial cells.  Only 18 white blood cells.  Will await culture results but not treat empirically since I am not sure how reliable patient's report of dysuria is in the setting of her acute psychosis.  Patient admitted to mental health for ongoing treatment of her acute psychosis.  Currently voluntary but would be holdable.    I have reviewed the nursing notes. I have reviewed the findings, diagnosis, plan and need for follow up with the patient.    New Prescriptions    No medications on file       Final diagnoses:   Acute psychosis (H)   IPauline, am serving as a trained medical scribe to document services personally performed by Oksana Boggs MD, based on the provider's statements to me.     Oksana GARCIA MD, was physically present and have reviewed and verified the accuracy of this note documented by Pauline Fay.     Oksana Boggs MD  [facility] EMERGENCY DEPARTMENT  6/8/2025     Oksana Boggs MD  06/08/25 [id number]

## 2025-06-08 NOTE — PHARMACY-ADMISSION MEDICATION HISTORY
Pharmacist Admission Medication History    Admission medication history is complete. The information provided in this note is only as accurate as the sources available at the time of the update.    Information Source(s): Patient via in-person    Pertinent Information: Pt stated she took her medications recently. Pt was able to say she was taking the medications listed. Medications match fill hx    Changes made to PTA medication list:  Added: None  Deleted: None  Changed: None    Allergies reviewed with patient and updates made in EHR: yes    Medication History Completed By: Nikole Parrish [facility] 6/8/2025 9:05 AM    PTA Med List   Medication Sig Last Dose/Taking    albuterol (PROAIR HFA/PROVENTIL HFA/VENTOLIN HFA) 108 (90 Base) MCG/ACT inhaler Inhale 2 puffs into the lungs every 6 hours as needed for shortness of breath, wheezing or cough. Past Week    Amphet-Dextroamphet 3-Bead ER 25 MG CP24 Take 25 mg by mouth daily. Past Week    benztropine (COGENTIN) 2 MG tablet Take 1 tablet (2 mg) by mouth 2 times daily as needed for extrapyramidal symptoms (EPS). Past Week    guanFACINE (INTUNIV) 1 MG TB24 24 hr tablet Take 1 tablet (1 mg) by mouth every evening. Past Week    hydrOXYzine HCl (ATARAX) 25 MG tablet Take 1 tablet (25 mg) by mouth every 4 hours as needed for anxiety. Past Week    propranolol (INDERAL) 10 MG tablet Take 1 tablet (10 mg) by mouth daily as needed (restlessness). Past Week    QUEtiapine (SEROQUEL) 300 MG tablet Take 1 tablet (300 mg) by mouth at bedtime. Past Week    Vitamin D3 (CHOLECALCIFEROL) 25 mcg (1000 units) tablet Take 2 tablets (50 mcg) by mouth daily. Past Week

## 2025-06-08 NOTE — TELEPHONE ENCOUNTER
R: [place]  Access Inpatient Bed Call Log  6/8/2025 12:22 AM  Intake has called facilities that have not updated their bed status within the last 12 hours.    Adults:    *METRO:  [place] -- [facility]: @ CAPACITY.  [facility]-[id number]: @ POSTING 4 BEDS. Reporting no reviews overnight.    [facility]- [id number]: @ CAPACITY. Low acuity   [place] -- [facility]- [phone]: @ CAPACITY. Low acuity only -12:35 AM Per [place] they can review for low acuity.   [place] -- [facility]- [phone]: @ CAPACITY.   [facility]- [phone]: @ CAPACITY.   [facility]/ beds- [id number]: @ POSTING 6 BEDS. Ages 18-35, Voluntary only, NO aggression/physical/sexual assault, violence hx or drug abuse, or psychosis. Negative Covid-12:27 AM Per Deanne, YA: 0, Adol: 2-F, Child: 0.   [facility]- [phone]: @ CAPACITY.  [facility]- [phone]: @ CAPACITY.  [place] -- [facility]- [phone]: @ CAPACITY. Do not review overnight.       Pt remains on waitlist pending appropriate placement availability.

## 2025-06-08 NOTE — ED NOTES
IP MH Referral Acuity Rating Score (RARS)    LMHP complete at referral to IP MH, with DEC; and, daily while awaiting IP MH placement. Call score to PPS.  CRITERIA SCORING   New 73 HH and Involuntary for IP MH (not adolescent) 0/3   Boarding over 24 hours 0/1   Vulnerable adult at least 55+ with multiple co morbidities; or, Patient age 11 or under 0/1   Suicide ideation without relief of precipitating factors 0/1   Current plan for suicide 0/1   Current plan for homicide 0/1   Imminent risk or actual attempt to seriously harm another without relief of factors precipitating the attempt 0/1   Severe dysfunction in daily living (ex: complete neglect for self care, extreme disruption in vegetative function, extreme deterioration in social interactions) 1/1   Recent (last 2 weeks) or current physical aggression in the ED 0/1   Restraints or seclusion episode in ED 0/1   Verbal aggression, agitation, yelling, etc., while in the ED 0/1   Active psychosis with psychomotor agitation or catatonia 1/1   Need for constant or near constant redirection (from leaving, from others, etc).  0/1   Intrusive or disruptive behaviors 0/1   TOTAL 2
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
## 2025-06-08 NOTE — TELEPHONE ENCOUNTER
Pt is voluntary and wants [facility] only.    S: [place] [facility] [place] Call Log 6/8/25 at 8:00 AM: Intake has called facilities that have not updated the bed status within the last 12 hours.                               [facility] is at capacity.           [facility] is posting 0 beds. [phone] Per call, no beds available today  [facility] is posting 0 beds. Negative covid required.  [facility] is posting 0 beds. Neg covid. No high school/[place]-psych. [phone]  [facility] is posting 0 beds. [phone]  [facility] is posting 0 beds. [phone]  [facility] is posting 0 beds. Negative covid. [phone] Per call @8:07am, no YA or child beds  [facility] is posting 0 beds [phone].    Continue to seek placement.

## 2025-06-08 NOTE — ED NOTES
Emergency Department I-PASS Sign-out      Illness Severity: Stable    Patient Summary:  29 year old female with pertinent PMH C-PTSD, bipolar affective disorder, paranoia, and suicidal ideation who presents to the emergency department for acute psychosis.    ED Course/treatment plan: admit to inpatient     Clinical Impression:  (F23) Acute psychosis (H)      Edited by: Oksana Boggs MD at 2025 0607    Action List:  Tests to Follow-up:  Urine culture    Medications Reconciled/Ordered:  No    ED Mental Health Boarding Order Set Used for Diet/PRNs/Other:  Yes    DEC, Extended Care, Psych Consult Orders:  DEC assessment completed.     Situational Awareness & Contingency Plannin Hour Hold Status:  Voluntary, would hold if wanting to leave.  Active Orders  N/A    Psychiatric Emergency:  A psychiatric emergency is active and the patient continues to pose an imminent danger to self and/or others    Disposition:  Admit/Transfer to Behavioral Health, medically clear for admit/transfer    Edited by: Oksana Boggs MD at 2025 0608    Synthesis & Events after sign-out:  No significant events.  Patient to be signed out to oncoming provider.        Crissy Goldberg MD   Emergency Medicine       Crissy Goldberg MD  [phone]

## 2025-06-09 ENCOUNTER — TELEPHONE (OUTPATIENT)
Dept: BEHAVIORAL HEALTH | Facility: CLINIC | Age: 30
End: 2025-06-09
Payer: COMMERCIAL

## 2025-06-09 VITALS
RESPIRATION RATE: 18 BRPM | BODY MASS INDEX: 28.93 KG/M2 | OXYGEN SATURATION: 100 % | HEART RATE: 97 BPM | DIASTOLIC BLOOD PRESSURE: 77 MMHG | WEIGHT: 180 LBS | SYSTOLIC BLOOD PRESSURE: 123 MMHG | TEMPERATURE: 98.6 F | HEIGHT: 66 IN

## 2025-06-09 LAB — BACTERIA UR CULT: NORMAL

## 2025-06-09 PROCEDURE — 250N000013 HC RX MED GY IP 250 OP 250 PS 637: Performed by: EMERGENCY MEDICINE

## 2025-06-09 RX ORDER — HYDROXYZINE HYDROCHLORIDE 25 MG/1
25 TABLET, FILM COATED ORAL EVERY 4 HOURS PRN
Status: DISCONTINUED | OUTPATIENT
Start: 2025-06-09 | End: 2025-06-09 | Stop reason: HOSPADM

## 2025-06-09 RX ORDER — QUETIAPINE FUMARATE 100 MG/1
300 TABLET, FILM COATED ORAL AT BEDTIME
Status: DISCONTINUED | OUTPATIENT
Start: 2025-06-09 | End: 2025-06-09 | Stop reason: HOSPADM

## 2025-06-09 RX ORDER — PROPRANOLOL HYDROCHLORIDE 10 MG/1
10 TABLET ORAL DAILY PRN
Status: DISCONTINUED | OUTPATIENT
Start: 2025-06-09 | End: 2025-06-09 | Stop reason: HOSPADM

## 2025-06-09 RX ORDER — BENZTROPINE MESYLATE 1 MG/1
2 TABLET ORAL 2 TIMES DAILY PRN
Status: DISCONTINUED | OUTPATIENT
Start: 2025-06-09 | End: 2025-06-09 | Stop reason: HOSPADM

## 2025-06-09 RX ORDER — VITAMIN B COMPLEX
50 TABLET ORAL DAILY
Status: DISCONTINUED | OUTPATIENT
Start: 2025-06-09 | End: 2025-06-09 | Stop reason: HOSPADM

## 2025-06-09 RX ORDER — GUANFACINE 1 MG/1
1 TABLET, EXTENDED RELEASE ORAL EVERY EVENING
Status: DISCONTINUED | OUTPATIENT
Start: 2025-06-09 | End: 2025-06-09 | Stop reason: HOSPADM

## 2025-06-09 RX ORDER — OLANZAPINE 10 MG/1
10 TABLET, ORALLY DISINTEGRATING ORAL ONCE
Status: COMPLETED | OUTPATIENT
Start: 2025-06-09 | End: 2025-06-09

## 2025-06-09 RX ADMIN — QUETIAPINE FUMARATE 300 MG: 100 TABLET ORAL at 03:23

## 2025-06-09 RX ADMIN — OLANZAPINE 10 MG: 10 TABLET, ORALLY DISINTEGRATING ORAL at 03:23

## 2025-06-09 ASSESSMENT — ACTIVITIES OF DAILY LIVING (ADL)
ADLS_ACUITY_SCORE: 46

## 2025-06-09 NOTE — ED PROVIDER NOTES
Emergency Department I-PASS Sign-out      Illness Severity: Stable    Patient Summary:  29 year old female with pertinent PMH C-PTSD, bipolar affective disorder, paranoia, and suicidal ideation who presents to the emergency department for acute psychosis.    ED Course/treatment plan: admit to inpatient     Clinical Impression:  (F23) Acute psychosis (H)      Edited by: Oksana Boggs MD at 2025 0607    Action List:  Tests to Follow-up:  Urine culture    Medications Reconciled/Ordered:  No    ED Mental Health Boarding Order Set Used for Diet/PRNs/Other:  Yes    DEC, Extended Care, Psych Consult Orders:  DEC assessment completed.     Situational Awareness & Contingency Plannin Hour Hold Status:  Voluntary, would hold if wanting to leave.  Active Orders  N/A    Psychiatric Emergency:  A psychiatric emergency is active and the patient continues to pose an imminent danger to self and/or others    Disposition:  Admit/Transfer to Behavioral Health, medically clear for admit/transfer      Edited by: Oksana Boggs MD at 2025 0661    Synthesis & Events after sign-out:  No acute events my shift        Lemuel Muniz MD   Emergency Medicine     Lemuel Muniz MD  [phone]

## 2025-06-09 NOTE — TELEPHONE ENCOUNTER
[facility]    9:43 AM Per call with Amalia @ , their pending discharge has been pushed back, therefore, pt will not be able to transfer until later this afternoon.  following informed     12:08p Received call from Psychiatry [facility] Medical Director Ishmael informing pt can be accommodated on unit 22 for admission for shared room.  Intake informed unsure if able to rescind the acceptance of the pt to .     12:08p  Intake notified Supervisor Yaima, awaiting response.        12:27p Sent notification page to Psychiatry Provider Ishmael notifying.     per Lanre at  at 2:37 pm, pt can come now under Dr Nichole at [phone] for nurse report. Author informed Akiko in intake.     2:46p Called [facility] ED SARAH Sanchez to inform pt is accepted to [facility] under Provider Tori and N2N for report is [phone].    2:47p  Intake notes all worklists updated with pt's acceptance.

## 2025-06-09 NOTE — TELEPHONE ENCOUNTER
1 AM Intake called [facility]. Pr [place], they can review. Clinical faxed.     4:28 AM Intake received a call from [facility]. Per Marietta, this pt has been accepted pending a 10AM discharge. Once the discharge is confirmed, they will call back to provide accepting information.           R: [place] [facility] Access [place] Call Log  6/9/2025 12:59 AM  Intake has called facilities that have not updated their bed status within the last 12 hours.    Adults:    *[facility]:  [place] -- [facility]: @ CAPACITY.  [facility]-[id number]: @ POSTING 4 BEDS. Reporting no reviews overnight.    [facility]- [phone]: @ CAPACITY. Low acuity   [place] -- [facility]- [id number]: @ CAPACITY. Low acuity only -1:22 AM Per Melanie, they are capped.    [place] -- [facility]- [phone]: @ CAPACITY.   [facility]- [phone]: @ CAPACITY.   [facility]/ beds- [id number]: @ POSTING 6 BEDS. Ages 18-35, Voluntary only, NO aggression/physical/sexual assault, violence hx or drug abuse, or psychosis. Negative Covid-1 AM Per [place], YA: 3, Adol: 5-Female, Child: 0.   [facility]- [id number]: @ CAPACITY.  [facility]- [id number]: @ CAPACITY.  [place] -- [facility]- [phone]: @ CAPACITY. Do not review overnight.     Pt remains on waitlist pending appropriate placement availability.

## 2025-06-09 NOTE — TELEPHONE ENCOUNTER
R:    9:43 AM Per call with Amalia @ [facility], their pending discharge has been pushed back, therefore, pt will not be able to transfer until later this afternoon.  following informed.

## 2025-07-01 ENCOUNTER — TRANSFERRED RECORDS (OUTPATIENT)
Dept: HEALTH INFORMATION MANAGEMENT | Facility: CLINIC | Age: 30
End: 2025-07-01
Payer: COMMERCIAL

## 2025-07-07 ENCOUNTER — TELEPHONE (OUTPATIENT)
Dept: PSYCHOLOGY | Facility: CLINIC | Age: 30
End: 2025-07-07
Payer: COMMERCIAL
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
## 2025-07-24 ENCOUNTER — MYC MEDICAL ADVICE (OUTPATIENT)
Dept: PSYCHIATRY | Facility: CLINIC | Age: 30
End: 2025-07-24
Payer: COMMERCIAL

## 2025-08-12 ENCOUNTER — MYC REFILL (OUTPATIENT)
Dept: PSYCHIATRY | Facility: CLINIC | Age: 30
End: 2025-08-12
Payer: COMMERCIAL

## 2025-08-12 DIAGNOSIS — F90.2 ATTENTION DEFICIT HYPERACTIVITY DISORDER (ADHD), COMBINED TYPE: ICD-10-CM

## 2025-08-13 RX ORDER — DEXTROAMPHETAMINE SACCHARATE, AMPHETAMINE ASPARTATE MONOHYDRATE, DEXTROAMPHETAMINE SULFATE, AMPHETAMINE SULFATE 6.25; 6.25; 6.25; 6.25 MG/1; MG/1; MG/1; MG/1
25 CAPSULE, EXTENDED RELEASE ORAL DAILY
Qty: 30 CAPSULE | Refills: 0 | OUTPATIENT
Start: 2025-08-13

## 2025-08-14 ASSESSMENT — ANXIETY QUESTIONNAIRES
7. FEELING AFRAID AS IF SOMETHING AWFUL MIGHT HAPPEN: NOT AT ALL
3. WORRYING TOO MUCH ABOUT DIFFERENT THINGS: SEVERAL DAYS
6. BECOMING EASILY ANNOYED OR IRRITABLE: MORE THAN HALF THE DAYS
IF YOU CHECKED OFF ANY PROBLEMS ON THIS QUESTIONNAIRE, HOW DIFFICULT HAVE THESE PROBLEMS MADE IT FOR YOU TO DO YOUR WORK, TAKE CARE OF THINGS AT HOME, OR GET ALONG WITH OTHER PEOPLE: SOMEWHAT DIFFICULT
GAD7 TOTAL SCORE: 10
8. IF YOU CHECKED OFF ANY PROBLEMS, HOW DIFFICULT HAVE THESE MADE IT FOR YOU TO DO YOUR WORK, TAKE CARE OF THINGS AT HOME, OR GET ALONG WITH OTHER PEOPLE?: SOMEWHAT DIFFICULT
2. NOT BEING ABLE TO STOP OR CONTROL WORRYING: MORE THAN HALF THE DAYS
5. BEING SO RESTLESS THAT IT IS HARD TO SIT STILL: MORE THAN HALF THE DAYS
4. TROUBLE RELAXING: MORE THAN HALF THE DAYS
1. FEELING NERVOUS, ANXIOUS, OR ON EDGE: SEVERAL DAYS
GAD7 TOTAL SCORE: 10

## 2025-08-18 ASSESSMENT — PATIENT HEALTH QUESTIONNAIRE - PHQ9
10. IF YOU CHECKED OFF ANY PROBLEMS, HOW DIFFICULT HAVE THESE PROBLEMS MADE IT FOR YOU TO DO YOUR WORK, TAKE CARE OF THINGS AT HOME, OR GET ALONG WITH OTHER PEOPLE: SOMEWHAT DIFFICULT
SUM OF ALL RESPONSES TO PHQ QUESTIONS 1-9: 12
SUM OF ALL RESPONSES TO PHQ QUESTIONS 1-9: 12

## 2025-08-19 ENCOUNTER — VIRTUAL VISIT (OUTPATIENT)
Dept: PSYCHIATRY | Facility: CLINIC | Age: 30
End: 2025-08-19
Payer: COMMERCIAL

## 2025-08-19 VITALS — BODY MASS INDEX: 36.16 KG/M2 | HEIGHT: 66 IN | WEIGHT: 225 LBS

## 2025-08-19 DIAGNOSIS — F43.10 COMPLEX POSTTRAUMATIC STRESS DISORDER: ICD-10-CM

## 2025-08-19 DIAGNOSIS — F39 MOOD DISORDER: Primary | ICD-10-CM

## 2025-08-19 DIAGNOSIS — F90.2 ATTENTION DEFICIT HYPERACTIVITY DISORDER (ADHD), COMBINED TYPE: ICD-10-CM

## 2025-08-19 DIAGNOSIS — F60.3 BORDERLINE PERSONALITY DISORDER (H): ICD-10-CM

## 2025-08-19 PROCEDURE — 98006 SYNCH AUDIO-VIDEO EST MOD 30: CPT | Performed by: STUDENT IN AN ORGANIZED HEALTH CARE EDUCATION/TRAINING PROGRAM

## 2025-08-19 PROCEDURE — G2211 COMPLEX E/M VISIT ADD ON: HCPCS | Mod: 95 | Performed by: STUDENT IN AN ORGANIZED HEALTH CARE EDUCATION/TRAINING PROGRAM

## 2025-08-19 RX ORDER — DEXTROAMPHETAMINE SACCHARATE, AMPHETAMINE ASPARTATE MONOHYDRATE, DEXTROAMPHETAMINE SULFATE, AMPHETAMINE SULFATE 6.25; 6.25; 6.25; 6.25 MG/1; MG/1; MG/1; MG/1
25 CAPSULE, EXTENDED RELEASE ORAL DAILY
Qty: 30 CAPSULE | Refills: 0 | Status: SHIPPED | OUTPATIENT
Start: 2025-08-19

## 2025-08-19 RX ORDER — QUETIAPINE FUMARATE 50 MG/1
50 TABLET, FILM COATED ORAL AT BEDTIME
Qty: 90 TABLET | Refills: 1 | Status: SHIPPED | OUTPATIENT
Start: 2025-08-19

## 2025-08-19 RX ORDER — DIVALPROEX SODIUM 250 MG/1
250 TABLET, FILM COATED, EXTENDED RELEASE ORAL DAILY
Qty: 90 TABLET | Refills: 1 | Status: SHIPPED | OUTPATIENT
Start: 2025-08-19

## 2025-08-19 RX ORDER — OLANZAPINE 20 MG/1
20 TABLET, FILM COATED ORAL AT BEDTIME
Qty: 90 TABLET | Refills: 1 | Status: SHIPPED | OUTPATIENT
Start: 2025-08-19

## 2025-08-19 ASSESSMENT — PAIN SCALES - GENERAL: PAINLEVEL_OUTOF10: MODERATE PAIN (6)

## 2025-08-26 ENCOUNTER — LAB (OUTPATIENT)
Dept: LAB | Facility: CLINIC | Age: 30
End: 2025-08-26
Payer: COMMERCIAL

## 2025-08-26 DIAGNOSIS — F90.2 ATTENTION DEFICIT HYPERACTIVITY DISORDER (ADHD), COMBINED TYPE: ICD-10-CM

## 2025-08-26 DIAGNOSIS — F39 MOOD DISORDER: ICD-10-CM

## 2025-08-26 LAB
AMPHETAMINES UR QL: DETECTED
BARBITURATES UR QL SCN: NOT DETECTED
BENZODIAZ UR QL SCN: NOT DETECTED
BUPRENORPHINE UR QL: NOT DETECTED
CANNABINOIDS UR QL: DETECTED
COCAINE UR QL SCN: NOT DETECTED
D-METHAMPHET UR QL: NOT DETECTED
METHADONE UR QL SCN: NOT DETECTED
OPIATES UR QL SCN: NOT DETECTED
OXYCODONE UR QL SCN: NOT DETECTED
PCP UR QL SCN: NOT DETECTED
TRICYCLICS UR QL SCN: NOT DETECTED

## 2025-08-26 PROCEDURE — 36415 COLL VENOUS BLD VENIPUNCTURE: CPT

## 2025-08-26 PROCEDURE — 80306 DRUG TEST PRSMV INSTRMNT: CPT

## 2025-08-26 PROCEDURE — 80164 ASSAY DIPROPYLACETIC ACD TOT: CPT | Mod: 90

## 2025-08-26 PROCEDURE — 80165 DIPROPYLACETIC ACID FREE: CPT | Mod: 90

## 2025-08-26 PROCEDURE — 99000 SPECIMEN HANDLING OFFICE-LAB: CPT

## 2025-08-28 LAB
VALPROATE FREE MFR SERPL: ABNORMAL %
VALPROATE FREE SERPL-MCNC: <7 UG/ML
VALPROATE SERPL-MCNC: 36 UG/ML

## (undated) RX ORDER — BENZTROPINE MESYLATE 1 MG/ML
INJECTION, SOLUTION INTRAMUSCULAR; INTRAVENOUS
Status: DISPENSED